# Patient Record
Sex: FEMALE | Race: WHITE | NOT HISPANIC OR LATINO | Employment: OTHER | URBAN - METROPOLITAN AREA
[De-identification: names, ages, dates, MRNs, and addresses within clinical notes are randomized per-mention and may not be internally consistent; named-entity substitution may affect disease eponyms.]

---

## 2017-02-05 ENCOUNTER — COMMUNICATION - HEALTHEAST (OUTPATIENT)
Dept: FAMILY MEDICINE | Facility: CLINIC | Age: 82
End: 2017-02-05

## 2017-02-05 DIAGNOSIS — E83.52 HYPERCALCEMIA: ICD-10-CM

## 2017-03-29 ENCOUNTER — COMMUNICATION - HEALTHEAST (OUTPATIENT)
Dept: FAMILY MEDICINE | Facility: CLINIC | Age: 82
End: 2017-03-29

## 2017-03-29 DIAGNOSIS — I10 HTN (HYPERTENSION): ICD-10-CM

## 2017-04-25 ENCOUNTER — COMMUNICATION - HEALTHEAST (OUTPATIENT)
Dept: FAMILY MEDICINE | Facility: CLINIC | Age: 82
End: 2017-04-25

## 2017-04-25 DIAGNOSIS — E83.52 HYPERCALCEMIA: ICD-10-CM

## 2017-04-26 ENCOUNTER — AMBULATORY - HEALTHEAST (OUTPATIENT)
Dept: FAMILY MEDICINE | Facility: CLINIC | Age: 82
End: 2017-04-26

## 2017-04-26 ENCOUNTER — COMMUNICATION - HEALTHEAST (OUTPATIENT)
Dept: FAMILY MEDICINE | Facility: CLINIC | Age: 82
End: 2017-04-26

## 2017-04-26 DIAGNOSIS — R53.83 FATIGUE: ICD-10-CM

## 2017-04-26 DIAGNOSIS — E55.9 VITAMIN D DEFICIENCY: ICD-10-CM

## 2017-04-26 DIAGNOSIS — I10 ESSENTIAL HYPERTENSION: ICD-10-CM

## 2017-04-26 DIAGNOSIS — E21.3 HYPERPARATHYROIDISM (H): ICD-10-CM

## 2017-05-01 ENCOUNTER — AMBULATORY - HEALTHEAST (OUTPATIENT)
Dept: LAB | Facility: CLINIC | Age: 82
End: 2017-05-01

## 2017-05-01 DIAGNOSIS — I10 ESSENTIAL HYPERTENSION: ICD-10-CM

## 2017-05-01 DIAGNOSIS — E21.3 HYPERPARATHYROIDISM (H): ICD-10-CM

## 2017-05-01 DIAGNOSIS — E55.9 VITAMIN D DEFICIENCY: ICD-10-CM

## 2017-05-01 DIAGNOSIS — R53.83 FATIGUE: ICD-10-CM

## 2017-05-01 LAB
CHOLEST SERPL-MCNC: 208 MG/DL
FASTING STATUS PATIENT QL REPORTED: YES
HDLC SERPL-MCNC: 64 MG/DL
LDLC SERPL CALC-MCNC: 123 MG/DL
TRIGL SERPL-MCNC: 106 MG/DL

## 2017-05-08 ENCOUNTER — OFFICE VISIT - HEALTHEAST (OUTPATIENT)
Dept: FAMILY MEDICINE | Facility: CLINIC | Age: 82
End: 2017-05-08

## 2017-05-08 DIAGNOSIS — I10 ESSENTIAL HYPERTENSION: ICD-10-CM

## 2017-05-08 ASSESSMENT — MIFFLIN-ST. JEOR: SCORE: 820.46

## 2017-05-24 ENCOUNTER — COMMUNICATION - HEALTHEAST (OUTPATIENT)
Dept: FAMILY MEDICINE | Facility: CLINIC | Age: 82
End: 2017-05-24

## 2017-05-24 DIAGNOSIS — Z96.0 URINARY CATHETER IN PLACE: ICD-10-CM

## 2017-08-01 ENCOUNTER — COMMUNICATION - HEALTHEAST (OUTPATIENT)
Dept: FAMILY MEDICINE | Facility: CLINIC | Age: 82
End: 2017-08-01

## 2017-08-01 DIAGNOSIS — I10 HTN (HYPERTENSION): ICD-10-CM

## 2017-08-01 DIAGNOSIS — E83.52 HYPERCALCEMIA: ICD-10-CM

## 2017-10-03 ENCOUNTER — COMMUNICATION - HEALTHEAST (OUTPATIENT)
Dept: FAMILY MEDICINE | Facility: CLINIC | Age: 82
End: 2017-10-03

## 2017-10-03 DIAGNOSIS — I10 ESSENTIAL HYPERTENSION: ICD-10-CM

## 2017-10-03 DIAGNOSIS — I10 HTN (HYPERTENSION): ICD-10-CM

## 2017-11-09 ENCOUNTER — RECORDS - HEALTHEAST (OUTPATIENT)
Dept: ADMINISTRATIVE | Facility: OTHER | Age: 82
End: 2017-11-09

## 2017-12-03 ENCOUNTER — RECORDS - HEALTHEAST (OUTPATIENT)
Dept: ADMINISTRATIVE | Facility: OTHER | Age: 82
End: 2017-12-03

## 2017-12-30 ENCOUNTER — COMMUNICATION - HEALTHEAST (OUTPATIENT)
Dept: FAMILY MEDICINE | Facility: CLINIC | Age: 82
End: 2017-12-30

## 2017-12-30 DIAGNOSIS — I10 ESSENTIAL HYPERTENSION: ICD-10-CM

## 2018-01-05 ENCOUNTER — OFFICE VISIT - HEALTHEAST (OUTPATIENT)
Dept: FAMILY MEDICINE | Facility: CLINIC | Age: 83
End: 2018-01-05

## 2018-01-05 DIAGNOSIS — C68.0 MALIGNANT NEOPLASM OF URETHRA (H): ICD-10-CM

## 2018-01-05 DIAGNOSIS — J02.9 SORE THROAT: ICD-10-CM

## 2018-01-05 LAB — DEPRECATED S PYO AG THROAT QL EIA: NORMAL

## 2018-01-06 LAB — GROUP A STREP BY PCR: NORMAL

## 2018-01-16 ENCOUNTER — RECORDS - HEALTHEAST (OUTPATIENT)
Dept: ADMINISTRATIVE | Facility: OTHER | Age: 83
End: 2018-01-16

## 2018-01-24 ENCOUNTER — COMMUNICATION - HEALTHEAST (OUTPATIENT)
Dept: FAMILY MEDICINE | Facility: CLINIC | Age: 83
End: 2018-01-24

## 2018-01-24 DIAGNOSIS — E83.52 HYPERCALCEMIA: ICD-10-CM

## 2018-02-06 ENCOUNTER — COMMUNICATION - HEALTHEAST (OUTPATIENT)
Dept: FAMILY MEDICINE | Facility: CLINIC | Age: 83
End: 2018-02-06

## 2018-02-06 DIAGNOSIS — I10 HTN (HYPERTENSION): ICD-10-CM

## 2018-03-08 ENCOUNTER — COMMUNICATION - HEALTHEAST (OUTPATIENT)
Dept: FAMILY MEDICINE | Facility: CLINIC | Age: 83
End: 2018-03-08

## 2018-03-08 ENCOUNTER — RECORDS - HEALTHEAST (OUTPATIENT)
Dept: GENERAL RADIOLOGY | Facility: CLINIC | Age: 83
End: 2018-03-08

## 2018-03-08 ENCOUNTER — OFFICE VISIT - HEALTHEAST (OUTPATIENT)
Dept: FAMILY MEDICINE | Facility: CLINIC | Age: 83
End: 2018-03-08

## 2018-03-08 ENCOUNTER — COMMUNICATION - HEALTHEAST (OUTPATIENT)
Dept: SCHEDULING | Facility: CLINIC | Age: 83
End: 2018-03-08

## 2018-03-08 DIAGNOSIS — R22.2 CHEST MASS: ICD-10-CM

## 2018-03-08 DIAGNOSIS — E21.3 HYPERPARATHYROIDISM (H): ICD-10-CM

## 2018-03-08 DIAGNOSIS — R07.81 PLEURODYNIA: ICD-10-CM

## 2018-03-08 DIAGNOSIS — S22.31XA FRACTURE OF RIB OF RIGHT SIDE: ICD-10-CM

## 2018-03-08 DIAGNOSIS — K59.00 CONSTIPATION: ICD-10-CM

## 2018-03-08 DIAGNOSIS — Z13.0 SCREENING FOR DEFICIENCY ANEMIA: ICD-10-CM

## 2018-03-08 DIAGNOSIS — I10 ESSENTIAL HYPERTENSION: ICD-10-CM

## 2018-03-08 LAB
ANION GAP SERPL CALCULATED.3IONS-SCNC: 9 MMOL/L (ref 5–18)
BUN SERPL-MCNC: 21 MG/DL (ref 8–28)
CALCIUM SERPL-MCNC: 10.3 MG/DL (ref 8.5–10.5)
CHLORIDE BLD-SCNC: 103 MMOL/L (ref 98–107)
CO2 SERPL-SCNC: 26 MMOL/L (ref 22–31)
CREAT SERPL-MCNC: 0.85 MG/DL (ref 0.6–1.1)
ERYTHROCYTE [DISTWIDTH] IN BLOOD BY AUTOMATED COUNT: 13 % (ref 11–14.5)
GFR SERPL CREATININE-BSD FRML MDRD: >60 ML/MIN/1.73M2
GLUCOSE BLD-MCNC: 89 MG/DL (ref 70–125)
HCT VFR BLD AUTO: 37.3 % (ref 35–47)
HGB BLD-MCNC: 12.5 G/DL (ref 12–16)
MCH RBC QN AUTO: 29.7 PG (ref 27–34)
MCHC RBC AUTO-ENTMCNC: 33.4 G/DL (ref 32–36)
MCV RBC AUTO: 89 FL (ref 80–100)
PLATELET # BLD AUTO: 313 THOU/UL (ref 140–440)
PMV BLD AUTO: 7.7 FL (ref 7–10)
POTASSIUM BLD-SCNC: 4.1 MMOL/L (ref 3.5–5)
PTH-INTACT SERPL-MCNC: 73 PG/ML (ref 10–86)
RBC # BLD AUTO: 4.2 MILL/UL (ref 3.8–5.4)
SODIUM SERPL-SCNC: 138 MMOL/L (ref 136–145)
WBC: 8.1 THOU/UL (ref 4–11)

## 2018-03-09 ENCOUNTER — COMMUNICATION - HEALTHEAST (OUTPATIENT)
Dept: FAMILY MEDICINE | Facility: CLINIC | Age: 83
End: 2018-03-09

## 2018-03-09 ENCOUNTER — HOSPITAL ENCOUNTER (OUTPATIENT)
Dept: CT IMAGING | Facility: CLINIC | Age: 83
Discharge: HOME | End: 2018-03-09
Attending: NURSE PRACTITIONER

## 2018-03-09 ENCOUNTER — AMBULATORY - HEALTHEAST (OUTPATIENT)
Dept: FAMILY MEDICINE | Facility: CLINIC | Age: 83
End: 2018-03-09

## 2018-03-09 DIAGNOSIS — S22.31XA FRACTURE OF RIB OF RIGHT SIDE: ICD-10-CM

## 2018-03-09 DIAGNOSIS — R22.2 CHEST MASS: ICD-10-CM

## 2018-03-09 DIAGNOSIS — C64.9 RENAL CELL CARCINOMA (H): ICD-10-CM

## 2018-03-12 ENCOUNTER — COMMUNICATION - HEALTHEAST (OUTPATIENT)
Dept: ONCOLOGY | Facility: HOSPITAL | Age: 83
End: 2018-03-12

## 2018-03-12 ENCOUNTER — COMMUNICATION - HEALTHEAST (OUTPATIENT)
Dept: SCHEDULING | Facility: CLINIC | Age: 83
End: 2018-03-12

## 2018-03-20 ENCOUNTER — OFFICE VISIT - HEALTHEAST (OUTPATIENT)
Dept: ONCOLOGY | Facility: CLINIC | Age: 83
End: 2018-03-20

## 2018-03-20 DIAGNOSIS — R22.2 MASS OF RIGHT CHEST WALL: ICD-10-CM

## 2018-03-20 DIAGNOSIS — N28.89 RENAL MASS, RIGHT: ICD-10-CM

## 2018-03-20 LAB
ALBUMIN SERPL-MCNC: 3.7 G/DL (ref 3.5–5)
ALP SERPL-CCNC: 92 U/L (ref 45–120)
ALT SERPL W P-5'-P-CCNC: 33 U/L (ref 0–45)
AST SERPL W P-5'-P-CCNC: 30 U/L (ref 0–40)
BILIRUB DIRECT SERPL-MCNC: 0.2 MG/DL
BILIRUB SERPL-MCNC: 0.9 MG/DL (ref 0–1)
INR PPP: 0.94 (ref 0.9–1.1)
LDH SERPL L TO P-CCNC: 219 U/L (ref 125–220)
PROT SERPL-MCNC: 6.6 G/DL (ref 6–8)

## 2018-03-20 ASSESSMENT — MIFFLIN-ST. JEOR: SCORE: 811.84

## 2018-03-22 ENCOUNTER — COMMUNICATION - HEALTHEAST (OUTPATIENT)
Dept: FAMILY MEDICINE | Facility: CLINIC | Age: 83
End: 2018-03-22

## 2018-03-22 ENCOUNTER — COMMUNICATION - HEALTHEAST (OUTPATIENT)
Dept: ONCOLOGY | Facility: HOSPITAL | Age: 83
End: 2018-03-22

## 2018-03-22 DIAGNOSIS — I10 HTN (HYPERTENSION): ICD-10-CM

## 2018-03-23 ENCOUNTER — HOSPITAL ENCOUNTER (OUTPATIENT)
Dept: CT IMAGING | Facility: CLINIC | Age: 83
Setting detail: RADIATION/ONCOLOGY SERIES
Discharge: STILL ADMITTED OR ADMINISTRATIVE | End: 2018-03-23
Attending: INTERNAL MEDICINE

## 2018-03-23 ENCOUNTER — HOSPITAL ENCOUNTER (OUTPATIENT)
Dept: NUCLEAR MEDICINE | Facility: CLINIC | Age: 83
Setting detail: RADIATION/ONCOLOGY SERIES
Discharge: STILL ADMITTED OR ADMINISTRATIVE | End: 2018-03-23
Attending: INTERNAL MEDICINE

## 2018-03-23 DIAGNOSIS — R22.2 MASS OF RIGHT CHEST WALL: ICD-10-CM

## 2018-03-23 DIAGNOSIS — N28.89 RENAL MASS, RIGHT: ICD-10-CM

## 2018-03-23 LAB
CREAT BLD-MCNC: 0.9 MG/DL
POC GFR AMER AF HE - HISTORICAL: >60 ML/MIN/1.73M2
POC GFR NON AMER AF HE - HISTORICAL: 59 ML/MIN/1.73M2

## 2018-03-26 ENCOUNTER — HOSPITAL ENCOUNTER (OUTPATIENT)
Dept: CT IMAGING | Facility: HOSPITAL | Age: 83
Discharge: HOME | End: 2018-03-26
Attending: INTERNAL MEDICINE | Admitting: RADIOLOGY

## 2018-03-26 ENCOUNTER — HOSPITAL ENCOUNTER (OUTPATIENT)
Dept: INTERVENTIONAL RADIOLOGY/VASCULAR | Facility: HOSPITAL | Age: 83
Discharge: HOME | End: 2018-03-26
Attending: INTERNAL MEDICINE

## 2018-03-26 DIAGNOSIS — R22.2 MASS OF RIGHT CHEST WALL: ICD-10-CM

## 2018-03-26 LAB
HGB BLD-MCNC: 12 G/DL (ref 12–16)
INR PPP: 0.91 (ref 0.9–1.1)
PLATELET # BLD AUTO: 342 THOU/UL (ref 140–440)

## 2018-03-26 ASSESSMENT — MIFFLIN-ST. JEOR: SCORE: 824.99

## 2018-04-03 ENCOUNTER — OFFICE VISIT - HEALTHEAST (OUTPATIENT)
Dept: ONCOLOGY | Facility: CLINIC | Age: 83
End: 2018-04-03

## 2018-04-03 ENCOUNTER — HOSPITAL ENCOUNTER (OUTPATIENT)
Dept: CARDIOLOGY | Facility: CLINIC | Age: 83
Discharge: HOME | End: 2018-04-03
Attending: INTERNAL MEDICINE

## 2018-04-03 DIAGNOSIS — R06.00 DYSPNEA, UNSPECIFIED TYPE: ICD-10-CM

## 2018-04-03 DIAGNOSIS — C64.1 RENAL CELL CARCINOMA OF RIGHT KIDNEY (H): ICD-10-CM

## 2018-04-03 ASSESSMENT — MIFFLIN-ST. JEOR: SCORE: 824.99

## 2018-04-04 ENCOUNTER — COMMUNICATION - HEALTHEAST (OUTPATIENT)
Dept: ONCOLOGY | Facility: CLINIC | Age: 83
End: 2018-04-04

## 2018-04-04 LAB
AORTIC VALVE MEAN VELOCITY: 115 CM/S
AV DIMENSIONLESS INDEX VTI: 0.9
AV MEAN GRADIENT: 6 MMHG
AV PEAK GRADIENT: 10.2 MMHG
AV VALVE AREA: 2.6 CM2
AV VELOCITY RATIO: 0.9
BSA FOR ECHO PROCEDURE: 1.46 M2
CV BLOOD PRESSURE: NORMAL MMHG
CV ECHO HEIGHT: 60 IN
CV ECHO WEIGHT: 111 LBS
DOP CALC AO PEAK VEL: 160 CM/S
DOP CALC AO VTI: 34.3 CM
DOP CALC LVOT AREA: 2.83 CM2
DOP CALC LVOT DIAMETER: 1.9 CM
DOP CALC LVOT PEAK VEL: 139 CM/S
DOP CALC LVOT STROKE VOLUME: 88.4 CM3
DOP CALCLVOT PEAK VEL VTI: 31.2 CM
ECHO EJECTION FRACTION ESTIMATED: 70 %
EJECTION FRACTION: 56 % (ref 55–75)
FRACTIONAL SHORTENING: 30 % (ref 28–44)
INTERVENTRICULAR SEPTUM IN END DIASTOLE: 0.75 CM (ref 0.6–0.9)
IVS/PW RATIO: 1
LA AREA 1: 12.3 CM2
LA AREA 2: 12.4 CM2
LEFT ATRIUM LENGTH: 4.44 CM
LEFT ATRIUM SIZE: 3.1 CM
LEFT ATRIUM VOLUME INDEX: 20 ML/M2
LEFT ATRIUM VOLUME: 29.2 ML
LEFT VENTRICLE CARDIAC INDEX: 5 L/MIN/M2
LEFT VENTRICLE CARDIAC OUTPUT: 7.3 L/MIN
LEFT VENTRICLE DIASTOLIC VOLUME INDEX: 21.9 CM3/M2 (ref 34–74)
LEFT VENTRICLE DIASTOLIC VOLUME: 32 CM3 (ref 46–106)
LEFT VENTRICLE HEART RATE: 83 BPM
LEFT VENTRICLE MASS INDEX: 46.5 G/M2
LEFT VENTRICLE SYSTOLIC VOLUME INDEX: 9.6 CM3/M2 (ref 11–31)
LEFT VENTRICLE SYSTOLIC VOLUME: 14 CM3 (ref 14–42)
LEFT VENTRICULAR INTERNAL DIMENSION IN DIASTOLE: 3.5 CM (ref 3.8–5.2)
LEFT VENTRICULAR INTERNAL DIMENSION IN SYSTOLE: 2.45 CM (ref 2.2–3.5)
LEFT VENTRICULAR MASS: 67.9 G
LEFT VENTRICULAR OUTFLOW TRACT MEAN GRADIENT: 5 MMHG
LEFT VENTRICULAR OUTFLOW TRACT MEAN VELOCITY: 111 CM/S
LEFT VENTRICULAR OUTFLOW TRACT PEAK GRADIENT: 8 MMHG
LEFT VENTRICULAR POSTERIOR WALL IN END DIASTOLE: 0.74 CM (ref 0.6–0.9)
LV STROKE VOLUME INDEX: 60.6 ML/M2
MITRAL VALVE E/A RATIO: 0.9
MV AVERAGE E/E' RATIO: 17.9 CM/S
MV DECELERATION TIME: 225 MS
MV E'TISSUE VEL-LAT: 5.46 CM/S
MV E'TISSUE VEL-MED: 4.78 CM/S
MV LATERAL E/E' RATIO: 16.8
MV MEDIAL E/E' RATIO: 19.2
MV PEAK A VELOCITY: 101 CM/S
MV PEAK E VELOCITY: 91.8 CM/S
NUC REST DIASTOLIC VOLUME INDEX: 1776 LBS
NUC REST SYSTOLIC VOLUME INDEX: 60 IN
TRICUSPID REGURGITATION PEAK PRESSURE GRADIENT: 26 MMHG
TRICUSPID VALVE ANULAR PLANE SYSTOLIC EXCURSION: 2.4 CM
TRICUSPID VALVE PEAK REGURGITANT VELOCITY: 255 CM/S

## 2018-04-11 ENCOUNTER — OFFICE VISIT - HEALTHEAST (OUTPATIENT)
Dept: ONCOLOGY | Facility: CLINIC | Age: 83
End: 2018-04-11

## 2018-04-11 DIAGNOSIS — C64.1 RENAL CELL CARCINOMA OF RIGHT KIDNEY (H): ICD-10-CM

## 2018-04-11 LAB
LAB AP CHARGES (HE HISTORICAL CONVERSION): ABNORMAL
LAB AP INITIAL CYTO EVAL (HE HISTORICAL CONVERSION): ABNORMAL
LAB MED GENERAL PATH INTERP (HE HISTORICAL CONVERSION): ABNORMAL
PATH REPORT.ADDENDUM SPEC: ABNORMAL
PATH REPORT.COMMENTS IMP SPEC: ABNORMAL
PATH REPORT.COMMENTS IMP SPEC: ABNORMAL
PATH REPORT.FINAL DX SPEC: ABNORMAL
PATH REPORT.MICROSCOPIC SPEC OTHER STN: ABNORMAL
PATH REPORT.RELEVANT HX SPEC: ABNORMAL
RESULT FLAG (HE HISTORICAL CONVERSION): ABNORMAL
SPECIMEN DESCRIPTION: ABNORMAL

## 2018-04-11 ASSESSMENT — MIFFLIN-ST. JEOR: SCORE: 812.75

## 2018-04-17 ENCOUNTER — COMMUNICATION - HEALTHEAST (OUTPATIENT)
Dept: ONCOLOGY | Facility: CLINIC | Age: 83
End: 2018-04-17

## 2018-04-24 ENCOUNTER — COMMUNICATION - HEALTHEAST (OUTPATIENT)
Dept: ONCOLOGY | Facility: CLINIC | Age: 83
End: 2018-04-24

## 2018-04-24 ENCOUNTER — OFFICE VISIT - HEALTHEAST (OUTPATIENT)
Dept: ONCOLOGY | Facility: CLINIC | Age: 83
End: 2018-04-24

## 2018-04-24 ENCOUNTER — AMBULATORY - HEALTHEAST (OUTPATIENT)
Dept: INFUSION THERAPY | Facility: CLINIC | Age: 83
End: 2018-04-24

## 2018-04-24 DIAGNOSIS — C64.1 RENAL CELL CARCINOMA OF RIGHT KIDNEY (H): ICD-10-CM

## 2018-04-24 DIAGNOSIS — C79.51 METASTASIS TO BONE (H): ICD-10-CM

## 2018-04-24 LAB
ALBUMIN SERPL-MCNC: 3.3 G/DL (ref 3.5–5)
ALP SERPL-CCNC: 103 U/L (ref 45–120)
ALT SERPL W P-5'-P-CCNC: 31 U/L (ref 0–45)
ANION GAP SERPL CALCULATED.3IONS-SCNC: 10 MMOL/L (ref 5–18)
AST SERPL W P-5'-P-CCNC: 27 U/L (ref 0–40)
BASOPHILS # BLD AUTO: 0.1 THOU/UL (ref 0–0.2)
BASOPHILS NFR BLD AUTO: 1 % (ref 0–2)
BILIRUB SERPL-MCNC: 1.3 MG/DL (ref 0–1)
BUN SERPL-MCNC: 27 MG/DL (ref 8–28)
CALCIUM SERPL-MCNC: 10.1 MG/DL (ref 8.5–10.5)
CHLORIDE BLD-SCNC: 102 MMOL/L (ref 98–107)
CO2 SERPL-SCNC: 24 MMOL/L (ref 22–31)
CREAT SERPL-MCNC: 0.84 MG/DL (ref 0.6–1.1)
EOSINOPHIL # BLD AUTO: 0 THOU/UL (ref 0–0.4)
EOSINOPHIL NFR BLD AUTO: 1 % (ref 0–6)
ERYTHROCYTE [DISTWIDTH] IN BLOOD BY AUTOMATED COUNT: 15.3 % (ref 11–14.5)
GFR SERPL CREATININE-BSD FRML MDRD: >60 ML/MIN/1.73M2
GLUCOSE BLD-MCNC: 81 MG/DL (ref 70–125)
HCT VFR BLD AUTO: 38.1 % (ref 35–47)
HGB BLD-MCNC: 12.6 G/DL (ref 12–16)
LYMPHOCYTES # BLD AUTO: 1.3 THOU/UL (ref 0.8–4.4)
LYMPHOCYTES NFR BLD AUTO: 16 % (ref 20–40)
MCH RBC QN AUTO: 29.2 PG (ref 27–34)
MCHC RBC AUTO-ENTMCNC: 33.1 G/DL (ref 32–36)
MCV RBC AUTO: 88 FL (ref 80–100)
MONOCYTES # BLD AUTO: 0.5 THOU/UL (ref 0–0.9)
MONOCYTES NFR BLD AUTO: 6 % (ref 2–10)
NEUTROPHILS # BLD AUTO: 6.4 THOU/UL (ref 2–7.7)
NEUTROPHILS NFR BLD AUTO: 77 % (ref 50–70)
PLATELET # BLD AUTO: 294 THOU/UL (ref 140–440)
PMV BLD AUTO: 9.6 FL (ref 8.5–12.5)
POTASSIUM BLD-SCNC: 4.1 MMOL/L (ref 3.5–5)
PROT SERPL-MCNC: 6.2 G/DL (ref 6–8)
RBC # BLD AUTO: 4.31 MILL/UL (ref 3.8–5.4)
SODIUM SERPL-SCNC: 136 MMOL/L (ref 136–145)
WBC: 8.4 THOU/UL (ref 4–11)

## 2018-04-24 ASSESSMENT — MIFFLIN-ST. JEOR: SCORE: 799.59

## 2018-04-26 ENCOUNTER — OFFICE VISIT - HEALTHEAST (OUTPATIENT)
Dept: FAMILY MEDICINE | Facility: CLINIC | Age: 83
End: 2018-04-26

## 2018-04-26 DIAGNOSIS — R19.5 LOOSE STOOLS: ICD-10-CM

## 2018-04-26 DIAGNOSIS — I10 HYPERTENSION: ICD-10-CM

## 2018-04-27 ENCOUNTER — COMMUNICATION - HEALTHEAST (OUTPATIENT)
Dept: ONCOLOGY | Facility: CLINIC | Age: 83
End: 2018-04-27

## 2018-04-30 ENCOUNTER — COMMUNICATION - HEALTHEAST (OUTPATIENT)
Dept: ONCOLOGY | Facility: CLINIC | Age: 83
End: 2018-04-30

## 2018-05-09 ENCOUNTER — COMMUNICATION - HEALTHEAST (OUTPATIENT)
Dept: FAMILY MEDICINE | Facility: CLINIC | Age: 83
End: 2018-05-09

## 2018-05-09 ENCOUNTER — OFFICE VISIT - HEALTHEAST (OUTPATIENT)
Dept: ONCOLOGY | Facility: CLINIC | Age: 83
End: 2018-05-09

## 2018-05-09 ENCOUNTER — INFUSION - HEALTHEAST (OUTPATIENT)
Dept: INFUSION THERAPY | Facility: CLINIC | Age: 83
End: 2018-05-09

## 2018-05-09 ENCOUNTER — COMMUNICATION - HEALTHEAST (OUTPATIENT)
Dept: ONCOLOGY | Facility: CLINIC | Age: 83
End: 2018-05-09

## 2018-05-09 ENCOUNTER — AMBULATORY - HEALTHEAST (OUTPATIENT)
Dept: INFUSION THERAPY | Facility: CLINIC | Age: 83
End: 2018-05-09

## 2018-05-09 DIAGNOSIS — C79.51 METASTASIS TO BONE (H): ICD-10-CM

## 2018-05-09 DIAGNOSIS — I10 HTN (HYPERTENSION): ICD-10-CM

## 2018-05-09 DIAGNOSIS — C64.1 RENAL CELL CARCINOMA OF RIGHT KIDNEY (H): ICD-10-CM

## 2018-05-09 LAB
ANION GAP SERPL CALCULATED.3IONS-SCNC: 11 MMOL/L (ref 5–18)
BUN SERPL-MCNC: 18 MG/DL (ref 8–28)
CALCIUM SERPL-MCNC: 10 MG/DL (ref 8.5–10.5)
CHLORIDE BLD-SCNC: 100 MMOL/L (ref 98–107)
CO2 SERPL-SCNC: 24 MMOL/L (ref 22–31)
CREAT SERPL-MCNC: 0.79 MG/DL (ref 0.6–1.1)
GFR SERPL CREATININE-BSD FRML MDRD: >60 ML/MIN/1.73M2
GLUCOSE BLD-MCNC: 94 MG/DL (ref 70–125)
POTASSIUM BLD-SCNC: 4.6 MMOL/L (ref 3.5–5)
SODIUM SERPL-SCNC: 135 MMOL/L (ref 136–145)

## 2018-05-09 ASSESSMENT — MIFFLIN-ST. JEOR: SCORE: 800.95

## 2018-05-15 ENCOUNTER — COMMUNICATION - HEALTHEAST (OUTPATIENT)
Dept: ONCOLOGY | Facility: CLINIC | Age: 83
End: 2018-05-15

## 2018-05-22 ENCOUNTER — COMMUNICATION - HEALTHEAST (OUTPATIENT)
Dept: ONCOLOGY | Facility: CLINIC | Age: 83
End: 2018-05-22

## 2018-05-24 ENCOUNTER — COMMUNICATION - HEALTHEAST (OUTPATIENT)
Dept: ONCOLOGY | Facility: CLINIC | Age: 83
End: 2018-05-24

## 2018-05-25 ENCOUNTER — COMMUNICATION - HEALTHEAST (OUTPATIENT)
Dept: ONCOLOGY | Facility: CLINIC | Age: 83
End: 2018-05-25

## 2018-05-29 ENCOUNTER — OFFICE VISIT - HEALTHEAST (OUTPATIENT)
Dept: ONCOLOGY | Facility: CLINIC | Age: 83
End: 2018-05-29

## 2018-05-29 DIAGNOSIS — C64.1 RENAL CELL CARCINOMA OF RIGHT KIDNEY (H): ICD-10-CM

## 2018-05-29 LAB
ALBUMIN SERPL-MCNC: 2.8 G/DL (ref 3.5–5)
ALP SERPL-CCNC: 155 U/L (ref 45–120)
ALT SERPL W P-5'-P-CCNC: 25 U/L (ref 0–45)
ANION GAP SERPL CALCULATED.3IONS-SCNC: 12 MMOL/L (ref 5–18)
AST SERPL W P-5'-P-CCNC: 20 U/L (ref 0–40)
BASOPHILS # BLD AUTO: 0 THOU/UL (ref 0–0.2)
BASOPHILS NFR BLD AUTO: 0 % (ref 0–2)
BILIRUB SERPL-MCNC: 0.7 MG/DL (ref 0–1)
BUN SERPL-MCNC: 17 MG/DL (ref 8–28)
CALCIUM SERPL-MCNC: 9.4 MG/DL (ref 8.5–10.5)
CHLORIDE BLD-SCNC: 94 MMOL/L (ref 98–107)
CO2 SERPL-SCNC: 25 MMOL/L (ref 22–31)
CREAT SERPL-MCNC: 0.76 MG/DL (ref 0.6–1.1)
EOSINOPHIL # BLD AUTO: 0 THOU/UL (ref 0–0.4)
EOSINOPHIL NFR BLD AUTO: 1 % (ref 0–6)
ERYTHROCYTE [DISTWIDTH] IN BLOOD BY AUTOMATED COUNT: 17.4 % (ref 11–14.5)
GFR SERPL CREATININE-BSD FRML MDRD: >60 ML/MIN/1.73M2
GLUCOSE BLD-MCNC: 108 MG/DL (ref 70–125)
HCT VFR BLD AUTO: 33.1 % (ref 35–47)
HGB BLD-MCNC: 11.4 G/DL (ref 12–16)
LYMPHOCYTES # BLD AUTO: 1 THOU/UL (ref 0.8–4.4)
LYMPHOCYTES NFR BLD AUTO: 17 % (ref 20–40)
MAGNESIUM SERPL-MCNC: 1.9 MG/DL (ref 1.8–2.6)
MCH RBC QN AUTO: 30.1 PG (ref 27–34)
MCHC RBC AUTO-ENTMCNC: 34.4 G/DL (ref 32–36)
MCV RBC AUTO: 87 FL (ref 80–100)
MONOCYTES # BLD AUTO: 0.5 THOU/UL (ref 0–0.9)
MONOCYTES NFR BLD AUTO: 9 % (ref 2–10)
NEUTROPHILS # BLD AUTO: 4.3 THOU/UL (ref 2–7.7)
NEUTROPHILS NFR BLD AUTO: 73 % (ref 50–70)
PLATELET # BLD AUTO: 307 THOU/UL (ref 140–440)
PMV BLD AUTO: 9 FL (ref 8.5–12.5)
POTASSIUM BLD-SCNC: 3 MMOL/L (ref 3.5–5)
PROT SERPL-MCNC: 6.1 G/DL (ref 6–8)
RBC # BLD AUTO: 3.79 MILL/UL (ref 3.8–5.4)
SODIUM SERPL-SCNC: 131 MMOL/L (ref 136–145)
WBC: 5.9 THOU/UL (ref 4–11)

## 2018-05-29 ASSESSMENT — MIFFLIN-ST. JEOR: SCORE: 795.06

## 2018-06-04 ENCOUNTER — HOSPITAL ENCOUNTER (OUTPATIENT)
Dept: CT IMAGING | Facility: CLINIC | Age: 83
Setting detail: RADIATION/ONCOLOGY SERIES
Discharge: STILL ADMITTED OR ADMINISTRATIVE | End: 2018-06-04
Attending: INTERNAL MEDICINE

## 2018-06-04 DIAGNOSIS — C64.1 RENAL CELL CARCINOMA OF RIGHT KIDNEY (H): ICD-10-CM

## 2018-06-04 LAB — CREAT BLD-MCNC: 0.7 MG/DL

## 2018-06-05 ENCOUNTER — OFFICE VISIT - HEALTHEAST (OUTPATIENT)
Dept: ONCOLOGY | Facility: CLINIC | Age: 83
End: 2018-06-05

## 2018-06-05 DIAGNOSIS — Z51.11 CHEMOTHERAPY MANAGEMENT, ENCOUNTER FOR: ICD-10-CM

## 2018-06-05 DIAGNOSIS — C64.1 RENAL CELL CARCINOMA OF RIGHT KIDNEY (H): ICD-10-CM

## 2018-06-05 LAB — PROTEIN, RANDOM URINE - HISTORICAL: 39 MG/DL

## 2018-06-05 ASSESSMENT — MIFFLIN-ST. JEOR: SCORE: 804.58

## 2018-06-06 ENCOUNTER — COMMUNICATION - HEALTHEAST (OUTPATIENT)
Dept: ONCOLOGY | Facility: CLINIC | Age: 83
End: 2018-06-06

## 2018-06-19 ENCOUNTER — AMBULATORY - HEALTHEAST (OUTPATIENT)
Dept: INFUSION THERAPY | Facility: CLINIC | Age: 83
End: 2018-06-19

## 2018-06-19 ENCOUNTER — OFFICE VISIT - HEALTHEAST (OUTPATIENT)
Dept: ONCOLOGY | Facility: CLINIC | Age: 83
End: 2018-06-19

## 2018-06-19 DIAGNOSIS — C64.1 RENAL CELL CARCINOMA OF RIGHT KIDNEY (H): ICD-10-CM

## 2018-06-19 DIAGNOSIS — Z51.11 CHEMOTHERAPY MANAGEMENT, ENCOUNTER FOR: ICD-10-CM

## 2018-06-19 LAB
ALBUMIN SERPL-MCNC: 3.4 G/DL (ref 3.5–5)
ALP SERPL-CCNC: 107 U/L (ref 45–120)
ALT SERPL W P-5'-P-CCNC: 16 U/L (ref 0–45)
ANION GAP SERPL CALCULATED.3IONS-SCNC: 10 MMOL/L (ref 5–18)
AST SERPL W P-5'-P-CCNC: 19 U/L (ref 0–40)
BASOPHILS # BLD AUTO: 0.1 THOU/UL (ref 0–0.2)
BASOPHILS NFR BLD AUTO: 1 % (ref 0–2)
BILIRUB SERPL-MCNC: 1 MG/DL (ref 0–1)
BUN SERPL-MCNC: 15 MG/DL (ref 8–28)
CALCIUM SERPL-MCNC: 9.9 MG/DL (ref 8.5–10.5)
CHLORIDE BLD-SCNC: 99 MMOL/L (ref 98–107)
CO2 SERPL-SCNC: 23 MMOL/L (ref 22–31)
CREAT SERPL-MCNC: 0.8 MG/DL (ref 0.6–1.1)
EOSINOPHIL # BLD AUTO: 0 THOU/UL (ref 0–0.4)
EOSINOPHIL NFR BLD AUTO: 1 % (ref 0–6)
ERYTHROCYTE [DISTWIDTH] IN BLOOD BY AUTOMATED COUNT: 16.8 % (ref 11–14.5)
GFR SERPL CREATININE-BSD FRML MDRD: >60 ML/MIN/1.73M2
GLUCOSE BLD-MCNC: 111 MG/DL (ref 70–125)
HCT VFR BLD AUTO: 37.3 % (ref 35–47)
HGB BLD-MCNC: 12.6 G/DL (ref 12–16)
LYMPHOCYTES # BLD AUTO: 1.3 THOU/UL (ref 0.8–4.4)
LYMPHOCYTES NFR BLD AUTO: 22 % (ref 20–40)
MCH RBC QN AUTO: 30.4 PG (ref 27–34)
MCHC RBC AUTO-ENTMCNC: 33.8 G/DL (ref 32–36)
MCV RBC AUTO: 90 FL (ref 80–100)
MONOCYTES # BLD AUTO: 0.3 THOU/UL (ref 0–0.9)
MONOCYTES NFR BLD AUTO: 6 % (ref 2–10)
NEUTROPHILS # BLD AUTO: 4.3 THOU/UL (ref 2–7.7)
NEUTROPHILS NFR BLD AUTO: 71 % (ref 50–70)
PLATELET # BLD AUTO: 334 THOU/UL (ref 140–440)
PMV BLD AUTO: 9.6 FL (ref 8.5–12.5)
POTASSIUM BLD-SCNC: 4 MMOL/L (ref 3.5–5)
PROT SERPL-MCNC: 6.3 G/DL (ref 6–8)
RBC # BLD AUTO: 4.15 MILL/UL (ref 3.8–5.4)
SODIUM SERPL-SCNC: 132 MMOL/L (ref 136–145)
WBC: 6 THOU/UL (ref 4–11)

## 2018-06-19 ASSESSMENT — MIFFLIN-ST. JEOR: SCORE: 799.59

## 2018-06-25 ENCOUNTER — COMMUNICATION - HEALTHEAST (OUTPATIENT)
Dept: ONCOLOGY | Facility: CLINIC | Age: 83
End: 2018-06-25

## 2018-07-15 ENCOUNTER — COMMUNICATION - HEALTHEAST (OUTPATIENT)
Dept: FAMILY MEDICINE | Facility: CLINIC | Age: 83
End: 2018-07-15

## 2018-07-15 DIAGNOSIS — I10 ESSENTIAL HYPERTENSION: ICD-10-CM

## 2018-07-16 ENCOUNTER — OFFICE VISIT - HEALTHEAST (OUTPATIENT)
Dept: FAMILY MEDICINE | Facility: CLINIC | Age: 83
End: 2018-07-16

## 2018-07-16 DIAGNOSIS — H90.3 SENSORY HEARING LOSS, BILATERAL: ICD-10-CM

## 2018-07-16 DIAGNOSIS — I10 ESSENTIAL HYPERTENSION: ICD-10-CM

## 2018-07-16 DIAGNOSIS — E55.9 VITAMIN D DEFICIENCY: ICD-10-CM

## 2018-07-16 DIAGNOSIS — C64.1 RENAL CELL CARCINOMA OF RIGHT KIDNEY (H): ICD-10-CM

## 2018-07-16 LAB
ALBUMIN SERPL-MCNC: 3.7 G/DL (ref 3.5–5)
ALP SERPL-CCNC: 110 U/L (ref 45–120)
ALT SERPL W P-5'-P-CCNC: 23 U/L (ref 0–45)
ANION GAP SERPL CALCULATED.3IONS-SCNC: 13 MMOL/L (ref 5–18)
AST SERPL W P-5'-P-CCNC: 27 U/L (ref 0–40)
BASOPHILS # BLD AUTO: 0 THOU/UL (ref 0–0.2)
BASOPHILS NFR BLD AUTO: 1 % (ref 0–2)
BILIRUB SERPL-MCNC: 1.1 MG/DL (ref 0–1)
BUN SERPL-MCNC: 23 MG/DL (ref 8–28)
CALCIUM SERPL-MCNC: 10.5 MG/DL (ref 8.5–10.5)
CHLORIDE BLD-SCNC: 99 MMOL/L (ref 98–107)
CO2 SERPL-SCNC: 22 MMOL/L (ref 22–31)
CREAT SERPL-MCNC: 0.85 MG/DL (ref 0.6–1.1)
EOSINOPHIL # BLD AUTO: 0.1 THOU/UL (ref 0–0.4)
EOSINOPHIL NFR BLD AUTO: 1 % (ref 0–6)
ERYTHROCYTE [DISTWIDTH] IN BLOOD BY AUTOMATED COUNT: 16 % (ref 11–14.5)
GFR SERPL CREATININE-BSD FRML MDRD: >60 ML/MIN/1.73M2
GLUCOSE BLD-MCNC: 86 MG/DL (ref 70–125)
HCT VFR BLD AUTO: 39.9 % (ref 35–47)
HGB BLD-MCNC: 13 G/DL (ref 12–16)
LYMPHOCYTES # BLD AUTO: 1.3 THOU/UL (ref 0.8–4.4)
LYMPHOCYTES NFR BLD AUTO: 16 % (ref 20–40)
MCH RBC QN AUTO: 30.1 PG (ref 27–34)
MCHC RBC AUTO-ENTMCNC: 32.7 G/DL (ref 32–36)
MCV RBC AUTO: 92 FL (ref 80–100)
MONOCYTES # BLD AUTO: 0.7 THOU/UL (ref 0–0.9)
MONOCYTES NFR BLD AUTO: 9 % (ref 2–10)
NEUTROPHILS # BLD AUTO: 5.5 THOU/UL (ref 2–7.7)
NEUTROPHILS NFR BLD AUTO: 73 % (ref 50–70)
PLATELET # BLD AUTO: 323 THOU/UL (ref 140–440)
PMV BLD AUTO: 7.2 FL (ref 7–10)
POTASSIUM BLD-SCNC: 4.4 MMOL/L (ref 3.5–5)
PROT SERPL-MCNC: 6.1 G/DL (ref 6–8)
RBC # BLD AUTO: 4.32 MILL/UL (ref 3.8–5.4)
SODIUM SERPL-SCNC: 134 MMOL/L (ref 136–145)
WBC: 7.6 THOU/UL (ref 4–11)

## 2018-07-16 ASSESSMENT — MIFFLIN-ST. JEOR: SCORE: 803.45

## 2018-07-17 ENCOUNTER — AMBULATORY - HEALTHEAST (OUTPATIENT)
Dept: INFUSION THERAPY | Facility: CLINIC | Age: 83
End: 2018-07-17

## 2018-07-17 ENCOUNTER — OFFICE VISIT - HEALTHEAST (OUTPATIENT)
Dept: ONCOLOGY | Facility: CLINIC | Age: 83
End: 2018-07-17

## 2018-07-17 DIAGNOSIS — Z51.11 CHEMOTHERAPY MANAGEMENT, ENCOUNTER FOR: ICD-10-CM

## 2018-07-17 DIAGNOSIS — C64.1 RENAL CELL CARCINOMA OF RIGHT KIDNEY (H): ICD-10-CM

## 2018-07-17 LAB — 25(OH)D3 SERPL-MCNC: 59.6 NG/ML (ref 30–80)

## 2018-07-18 ENCOUNTER — COMMUNICATION - HEALTHEAST (OUTPATIENT)
Dept: FAMILY MEDICINE | Facility: CLINIC | Age: 83
End: 2018-07-18

## 2018-07-19 ENCOUNTER — COMMUNICATION - HEALTHEAST (OUTPATIENT)
Dept: FAMILY MEDICINE | Facility: CLINIC | Age: 83
End: 2018-07-19

## 2018-08-02 ENCOUNTER — COMMUNICATION - HEALTHEAST (OUTPATIENT)
Dept: FAMILY MEDICINE | Facility: CLINIC | Age: 83
End: 2018-08-02

## 2018-08-08 ENCOUNTER — COMMUNICATION - HEALTHEAST (OUTPATIENT)
Dept: ONCOLOGY | Facility: CLINIC | Age: 83
End: 2018-08-08

## 2018-08-08 ENCOUNTER — COMMUNICATION - HEALTHEAST (OUTPATIENT)
Dept: ONCOLOGY | Facility: HOSPITAL | Age: 83
End: 2018-08-08

## 2018-08-14 ENCOUNTER — HOSPITAL ENCOUNTER (OUTPATIENT)
Dept: CT IMAGING | Facility: CLINIC | Age: 83
Setting detail: RADIATION/ONCOLOGY SERIES
Discharge: STILL ADMITTED OR ADMINISTRATIVE | End: 2018-08-14
Attending: INTERNAL MEDICINE

## 2018-08-14 DIAGNOSIS — C64.1 RENAL CELL CARCINOMA OF RIGHT KIDNEY (H): ICD-10-CM

## 2018-08-16 ENCOUNTER — OFFICE VISIT - HEALTHEAST (OUTPATIENT)
Dept: ONCOLOGY | Facility: CLINIC | Age: 83
End: 2018-08-16

## 2018-08-16 ENCOUNTER — AMBULATORY - HEALTHEAST (OUTPATIENT)
Dept: INFUSION THERAPY | Facility: CLINIC | Age: 83
End: 2018-08-16

## 2018-08-16 DIAGNOSIS — C64.1 RENAL CELL CARCINOMA OF RIGHT KIDNEY (H): ICD-10-CM

## 2018-08-16 DIAGNOSIS — Z51.11 CHEMOTHERAPY MANAGEMENT, ENCOUNTER FOR: ICD-10-CM

## 2018-08-16 LAB
ALBUMIN SERPL-MCNC: 3.8 G/DL (ref 3.5–5)
ALP SERPL-CCNC: 90 U/L (ref 45–120)
ALT SERPL W P-5'-P-CCNC: 21 U/L (ref 0–45)
ANION GAP SERPL CALCULATED.3IONS-SCNC: 9 MMOL/L (ref 5–18)
AST SERPL W P-5'-P-CCNC: 23 U/L (ref 0–40)
BASOPHILS # BLD AUTO: 0 THOU/UL (ref 0–0.2)
BASOPHILS NFR BLD AUTO: 1 % (ref 0–2)
BILIRUB SERPL-MCNC: 0.9 MG/DL (ref 0–1)
BUN SERPL-MCNC: 22 MG/DL (ref 8–28)
CALCIUM SERPL-MCNC: 10.2 MG/DL (ref 8.5–10.5)
CHLORIDE BLD-SCNC: 97 MMOL/L (ref 98–107)
CO2 SERPL-SCNC: 26 MMOL/L (ref 22–31)
CREAT SERPL-MCNC: 0.87 MG/DL (ref 0.6–1.1)
EOSINOPHIL # BLD AUTO: 0 THOU/UL (ref 0–0.4)
EOSINOPHIL NFR BLD AUTO: 1 % (ref 0–6)
ERYTHROCYTE [DISTWIDTH] IN BLOOD BY AUTOMATED COUNT: 15.4 % (ref 11–14.5)
GFR SERPL CREATININE-BSD FRML MDRD: >60 ML/MIN/1.73M2
GLUCOSE BLD-MCNC: 103 MG/DL (ref 70–125)
HCT VFR BLD AUTO: 39.8 % (ref 35–47)
HGB BLD-MCNC: 13.6 G/DL (ref 12–16)
LYMPHOCYTES # BLD AUTO: 1.1 THOU/UL (ref 0.8–4.4)
LYMPHOCYTES NFR BLD AUTO: 18 % (ref 20–40)
MCH RBC QN AUTO: 31.5 PG (ref 27–34)
MCHC RBC AUTO-ENTMCNC: 34.2 G/DL (ref 32–36)
MCV RBC AUTO: 92 FL (ref 80–100)
MONOCYTES # BLD AUTO: 0.4 THOU/UL (ref 0–0.9)
MONOCYTES NFR BLD AUTO: 7 % (ref 2–10)
NEUTROPHILS # BLD AUTO: 4.5 THOU/UL (ref 2–7.7)
NEUTROPHILS NFR BLD AUTO: 74 % (ref 50–70)
PLATELET # BLD AUTO: 288 THOU/UL (ref 140–440)
PMV BLD AUTO: 9.7 FL (ref 8.5–12.5)
POTASSIUM BLD-SCNC: 4.2 MMOL/L (ref 3.5–5)
PROT SERPL-MCNC: 6.7 G/DL (ref 6–8)
RBC # BLD AUTO: 4.32 MILL/UL (ref 3.8–5.4)
SODIUM SERPL-SCNC: 132 MMOL/L (ref 136–145)
WBC: 6.1 THOU/UL (ref 4–11)

## 2018-08-16 ASSESSMENT — MIFFLIN-ST. JEOR: SCORE: 800.95

## 2018-08-17 ENCOUNTER — COMMUNICATION - HEALTHEAST (OUTPATIENT)
Dept: FAMILY MEDICINE | Facility: CLINIC | Age: 83
End: 2018-08-17

## 2018-08-17 DIAGNOSIS — I10 HTN (HYPERTENSION): ICD-10-CM

## 2018-09-06 ENCOUNTER — OFFICE VISIT - HEALTHEAST (OUTPATIENT)
Dept: ONCOLOGY | Facility: CLINIC | Age: 83
End: 2018-09-06

## 2018-09-06 ENCOUNTER — AMBULATORY - HEALTHEAST (OUTPATIENT)
Dept: INFUSION THERAPY | Facility: CLINIC | Age: 83
End: 2018-09-06

## 2018-09-06 ENCOUNTER — INFUSION - HEALTHEAST (OUTPATIENT)
Dept: INFUSION THERAPY | Facility: CLINIC | Age: 83
End: 2018-09-06

## 2018-09-06 DIAGNOSIS — C64.1 RENAL CELL CARCINOMA OF RIGHT KIDNEY (H): ICD-10-CM

## 2018-09-06 DIAGNOSIS — C79.51 METASTASIS TO BONE (H): ICD-10-CM

## 2018-09-06 LAB
ALBUMIN SERPL-MCNC: 3.5 G/DL (ref 3.5–5)
ALP SERPL-CCNC: 92 U/L (ref 45–120)
ALT SERPL W P-5'-P-CCNC: 22 U/L (ref 0–45)
ANION GAP SERPL CALCULATED.3IONS-SCNC: 8 MMOL/L (ref 5–18)
AST SERPL W P-5'-P-CCNC: 25 U/L (ref 0–40)
BASOPHILS # BLD AUTO: 0.1 THOU/UL (ref 0–0.2)
BASOPHILS NFR BLD AUTO: 1 % (ref 0–2)
BILIRUB SERPL-MCNC: 0.7 MG/DL (ref 0–1)
BUN SERPL-MCNC: 21 MG/DL (ref 8–28)
CALCIUM SERPL-MCNC: 10.2 MG/DL (ref 8.5–10.5)
CHLORIDE BLD-SCNC: 101 MMOL/L (ref 98–107)
CO2 SERPL-SCNC: 25 MMOL/L (ref 22–31)
CREAT SERPL-MCNC: 0.78 MG/DL (ref 0.6–1.1)
EOSINOPHIL # BLD AUTO: 0.1 THOU/UL (ref 0–0.4)
EOSINOPHIL NFR BLD AUTO: 1 % (ref 0–6)
ERYTHROCYTE [DISTWIDTH] IN BLOOD BY AUTOMATED COUNT: 14.6 % (ref 11–14.5)
GFR SERPL CREATININE-BSD FRML MDRD: >60 ML/MIN/1.73M2
GLUCOSE BLD-MCNC: 97 MG/DL (ref 70–125)
HCT VFR BLD AUTO: 39.7 % (ref 35–47)
HGB BLD-MCNC: 13.2 G/DL (ref 12–16)
LYMPHOCYTES # BLD AUTO: 1.2 THOU/UL (ref 0.8–4.4)
LYMPHOCYTES NFR BLD AUTO: 18 % (ref 20–40)
MCH RBC QN AUTO: 31.6 PG (ref 27–34)
MCHC RBC AUTO-ENTMCNC: 33.2 G/DL (ref 32–36)
MCV RBC AUTO: 95 FL (ref 80–100)
MONOCYTES # BLD AUTO: 0.4 THOU/UL (ref 0–0.9)
MONOCYTES NFR BLD AUTO: 7 % (ref 2–10)
NEUTROPHILS # BLD AUTO: 4.8 THOU/UL (ref 2–7.7)
NEUTROPHILS NFR BLD AUTO: 74 % (ref 50–70)
PLATELET # BLD AUTO: 272 THOU/UL (ref 140–440)
PMV BLD AUTO: 9.6 FL (ref 8.5–12.5)
POTASSIUM BLD-SCNC: 4.2 MMOL/L (ref 3.5–5)
PROT SERPL-MCNC: 6.2 G/DL (ref 6–8)
RBC # BLD AUTO: 4.18 MILL/UL (ref 3.8–5.4)
SODIUM SERPL-SCNC: 134 MMOL/L (ref 136–145)
WBC: 6.5 THOU/UL (ref 4–11)

## 2018-09-06 ASSESSMENT — MIFFLIN-ST. JEOR: SCORE: 891.22

## 2018-09-07 ENCOUNTER — COMMUNICATION - HEALTHEAST (OUTPATIENT)
Dept: ONCOLOGY | Facility: HOSPITAL | Age: 83
End: 2018-09-07

## 2018-09-11 ENCOUNTER — COMMUNICATION - HEALTHEAST (OUTPATIENT)
Dept: FAMILY MEDICINE | Facility: CLINIC | Age: 83
End: 2018-09-11

## 2018-09-24 ENCOUNTER — OFFICE VISIT - HEALTHEAST (OUTPATIENT)
Dept: FAMILY MEDICINE | Facility: CLINIC | Age: 83
End: 2018-09-24

## 2018-09-24 DIAGNOSIS — R26.81 UNSTEADY GAIT: ICD-10-CM

## 2018-09-24 DIAGNOSIS — E21.3 HYPERPARATHYROIDISM (H): ICD-10-CM

## 2018-09-24 DIAGNOSIS — M62.81 MUSCLE WEAKNESS (GENERALIZED): ICD-10-CM

## 2018-09-24 DIAGNOSIS — K40.00 BILATERAL INGUINAL HERNIA WITH OBSTRUCTION AND WITHOUT GANGRENE, RECURRENCE NOT SPECIFIED: ICD-10-CM

## 2018-09-24 DIAGNOSIS — M79.609 PAIN IN LIMB: ICD-10-CM

## 2018-09-24 DIAGNOSIS — C79.51 METASTASIS TO BONE (H): ICD-10-CM

## 2018-09-24 DIAGNOSIS — E83.52 HYPERCALCEMIA: ICD-10-CM

## 2018-09-24 LAB
CALCIUM, IONIZED MEASURED: 1.27 MMOL/L (ref 1.11–1.3)
CK SERPL-CCNC: 134 U/L (ref 30–190)
ION CA PH 7.4: 1.22 MMOL/L (ref 1.11–1.3)
PH: 7.3 (ref 7.35–7.45)
PTH-INTACT SERPL-MCNC: 145 PG/ML (ref 10–86)
TSH SERPL DL<=0.005 MIU/L-ACNC: 2.74 UIU/ML (ref 0.3–5)
VIT B12 SERPL-MCNC: 305 PG/ML (ref 213–816)

## 2018-09-24 ASSESSMENT — MIFFLIN-ST. JEOR: SCORE: 877.16

## 2018-09-25 ENCOUNTER — RECORDS - HEALTHEAST (OUTPATIENT)
Dept: GENERAL RADIOLOGY | Facility: CLINIC | Age: 83
End: 2018-09-25

## 2018-09-25 ENCOUNTER — COMMUNICATION - HEALTHEAST (OUTPATIENT)
Dept: FAMILY MEDICINE | Facility: CLINIC | Age: 83
End: 2018-09-25

## 2018-09-25 DIAGNOSIS — M79.609 PAIN IN UNSPECIFIED LIMB: ICD-10-CM

## 2018-09-27 ENCOUNTER — COMMUNICATION - HEALTHEAST (OUTPATIENT)
Dept: FAMILY MEDICINE | Facility: CLINIC | Age: 83
End: 2018-09-27

## 2018-09-28 ENCOUNTER — COMMUNICATION - HEALTHEAST (OUTPATIENT)
Dept: FAMILY MEDICINE | Facility: CLINIC | Age: 83
End: 2018-09-28

## 2018-10-01 ENCOUNTER — THERAPY VISIT (OUTPATIENT)
Dept: PHYSICAL THERAPY | Facility: CLINIC | Age: 83
End: 2018-10-01
Payer: COMMERCIAL

## 2018-10-01 DIAGNOSIS — R53.81 PHYSICAL DECONDITIONING: Primary | ICD-10-CM

## 2018-10-01 PROCEDURE — 97110 THERAPEUTIC EXERCISES: CPT | Mod: GP | Performed by: PHYSICAL THERAPIST

## 2018-10-01 PROCEDURE — 97530 THERAPEUTIC ACTIVITIES: CPT | Mod: GP | Performed by: PHYSICAL THERAPIST

## 2018-10-01 PROCEDURE — 97161 PT EVAL LOW COMPLEX 20 MIN: CPT | Mod: GP | Performed by: PHYSICAL THERAPIST

## 2018-10-01 NOTE — PROGRESS NOTES
[facility] Initial Evaluation  Subjective:  Patient is a 89 year old female presenting with rehab right knee hpi.   Dania Da Silva is a 89 year old female with a bilateral knees condition.  Condition occurred with:  Insidious onset and degenerative joint disease.  Condition occurred: at home.  This is a chronic condition  Pt presents to PT with c/o B leg deconditioning as a result of having renal and lung cancer since 3-1-18.     Pt reports her legs feel very weak and she feels off balance.  Pt was referred to PT on 9-24-18 for gait training and LE strengthening.      Pt is retired.      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    Site of Pain: leg weakness.  Radiates to: n/a.  Pain is described as aching and is intermittent and reported as 4/10.  Associated symptoms:  Loss of strength and loss of motion/stiffness. Pain is the same all the time.  Symptoms are exacerbated by nothing, activity, ascending stairs, bending/squatting, certain positions, descending stairs and lying on the extremity and relieved by rest.  Since onset symptoms are gradually worsening.  Special testing: n/a.  Previous treatment: none.  Improvement with previous treatment: n/a.  General health as reported by patient is fair.                                              Objective:        Flexibility/Screens:           Lower Extremity:  Normal        Physical Exam        General Evaluation:        Lower Extremity Strength:  Strength wnl lower extremity general: sig mm deconditioning.          Lower Extremity Flexibility:  normal                        Sensation:  normal      Edema:  normal            Functional Assessment:  Functional assessment wnl: Sit to stand x 5 with UE assist and falls into chair on descent.            Gait:  Gait wnl general: Pt amb with standard cane WBAT with slow  and unsteady gait.                                         ROS    Assessment/Plan:    Patient is a 89 year old female with both sides knee complaints.    Patient has the following significant findings with corresponding treatment plan.                Diagnosis 1:  B leg pain  Pain -  hot/cold therapy  Decreased ROM/flexibility - manual therapy and therapeutic exercise  Decreased joint mobility - manual therapy and therapeutic exercise  Decreased strength - therapeutic exercise and therapeutic activities  Impaired balance - neuro re-education and therapeutic activities  Decreased proprioception - neuro re-education and therapeutic activities  Impaired gait - gait training  Impaired muscle performance - neuro re-education  Decreased function - therapeutic activities  Impaired posture - neuro re-education    Therapy Evaluation Codes:   1) History comprised of:   Personal factors that impact the plan of care:      None.    Comorbidity factors that impact the plan of care are:      None.     Medications impacting care: None.  2) Examination of Body Systems comprised of:   Body structures and functions that impact the plan of care:      Knee.   Activity limitations that impact the plan of care are:      Lifting, Sports, Squatting/kneeling, Stairs, Standing and Walking.  3) Clinical presentation characteristics are:   Stable/Uncomplicated.  4) Decision-Making    Low complexity using standardized patient assessment instrument and/or measureable assessment of functional outcome.  Cumulative Therapy Evaluation is: Low complexity.    Previous and current functional limitations:  (See Goal Flow Sheet for this information)    Short term and Long term goals: (See Goal Flow Sheet for this information)     Communication ability:  Patient appears to be able to clearly communicate and understand verbal and written communication and follow directions correctly.  Treatment Explanation - The following has been discussed with the  patient:   RX ordered/plan of care  Anticipated outcomes  Possible risks and side effects  This patient would benefit from PT intervention to resume normal activities.   Rehab potential is excellent.    Frequency:  1 X week, once daily  Duration:  for 6 weeks  Discharge Plan:  Achieve all LTG.  Independent in home treatment program.  Return to work with or without restrictions.  Return to previous functional level by discharge.  Reach maximal therapeutic benefit.    Please refer to the daily flowsheet for treatment today, total treatment time and time spent performing 1:1 timed codes.

## 2018-10-01 NOTE — MR AVS SNAPSHOT
After Visit Summary   10/1/2018    Dania Da Silva    MRN: [id number]           Patient Information     Date Of Birth          5/10/1927        Visit Information        Provider Department      10/1/2018 11:30 AM Varun Hyde PT [facility] Physical Therapy        Today's Diagnoses     Physical deconditioning    -  1       Follow-ups after your visit        Your next 10 appointments already scheduled     Oct 08, 2018 10:10 AM CDT   DRE Extremity with Varun Hyde PT   [facility] Therapy ([facility]  )    [address] [place] [id number]   [phone]            Oct 15, 2018 12:50 PM CDT   DRE Extremity with Varun Hyde PT   [facility] Therapy ([facility]  )    [address] [place] [id number] [phone]            Oct 22, 2018 12:50 PM CDT   DRE Extremity with Varun Hyde PT   [facility] Therapy ([facility]  )    [address] [place] [id number] [phone]            Oct 29, 2018 12:50 PM CDT   DRE Extremity with Varun Hyde PT   [facility] Therapy ([facility]  )    [address] [place] [id number] [phone]            Nov 05, 2018 12:50 PM CST   DRE Extremity with Varun Hyde PT   [facility] ([facility]  )    [address] [place] [id number] [phone]              Who to contact     If you have questions or need follow up information about today's clinic visit or your schedule please contact [facility] PHYSICAL THERAPY directly at [phone].  Normal or non-critical lab and imaging results will be communicated to you by MyChart, letter or phone within 4  "business days after the clinic has received the results. If you do not hear from us within 7 days, please contact the clinic through [employer] or phone. If you have a critical or abnormal lab result, we will notify you by phone as soon as possible.  Submit refill requests through [employer] or call your pharmacy and they will forward the refill request to us. Please allow 3 business days for your refill to be completed.          Additional Information About Your Visit        [employer] Information     [employer] lets you send messages to your doctor, view your test results, renew your prescriptions, schedule appointments and more. To sign up, go to [email or web address] . Click on \"Log in\" on the left side of the screen, which will take you to the Welcome page. Then click on \"Sign up Now\" on the right side of the page.     You will be asked to enter the access code listed below, as well as some personal information. Please follow the directions to create your username and password.     Your access code is: [email or web address]  Expires: 2018 12:52 PM     Your access code will  in 90 days. If you need help or a new code, please call your [facility] clinic or [phone].        Care EveryWhere ID     This is your Care EveryWhere ID. This could be used by other organizations to access your [facility] medical records  [id number]         Blood Pressure from Last 3 Encounters:   No data found for BP    Weight from Last 3 Encounters:   No data found for Wt              We Performed the Following     PT Eval, Low Complexity (96786)     Therapeutic Activities     Therapeutic Exercises        Primary Care Provider Fax #    Physician No Ref-Primary [phone]       No address on file        Equal Access to Services     [facility]: Mika Menendez, santy dai, audie hubbard. So [facility] [phone].    ATENCIÓN: Si habla español, tiene a bradley disposición " servicios gratuitos de asistencia lingüística. Mitchel farfan [phone].    We comply with applicable federal civil rights laws and [place] laws. We do not discriminate on the basis of race, color, national origin, age, disability, sex, sexual orientation, or gender identity.            Thank you!     Thank you for choosing [facility] PH[facility]  for your care. Our goal is always to provide you with excellent care. Hearing back from our patients is one way we can continue to improve our services. Please take a few minutes to complete the written survey that you may receive in the mail after your visit with us. Thank you!             Your Updated Medication List - Protect others around you: Learn how to safely use, store and throw away your medicines at www.disposemymeds.org.      Notice  As of 10/1/2018 12:52 PM    You have not been prescribed any medications.

## 2018-10-02 ENCOUNTER — COMMUNICATION - HEALTHEAST (OUTPATIENT)
Dept: SCHEDULING | Facility: CLINIC | Age: 83
End: 2018-10-02

## 2018-10-04 ENCOUNTER — AMBULATORY - HEALTHEAST (OUTPATIENT)
Dept: INFUSION THERAPY | Facility: CLINIC | Age: 83
End: 2018-10-04

## 2018-10-04 ENCOUNTER — OFFICE VISIT - HEALTHEAST (OUTPATIENT)
Dept: ONCOLOGY | Facility: CLINIC | Age: 83
End: 2018-10-04

## 2018-10-04 DIAGNOSIS — C64.1 RENAL CELL CARCINOMA OF RIGHT KIDNEY (H): ICD-10-CM

## 2018-10-04 LAB
ALBUMIN SERPL-MCNC: 3.3 G/DL (ref 3.5–5)
ALP SERPL-CCNC: 93 U/L (ref 45–120)
ALT SERPL W P-5'-P-CCNC: 17 U/L (ref 0–45)
ANION GAP SERPL CALCULATED.3IONS-SCNC: 7 MMOL/L (ref 5–18)
AST SERPL W P-5'-P-CCNC: 20 U/L (ref 0–40)
BASOPHILS # BLD AUTO: 0 THOU/UL (ref 0–0.2)
BASOPHILS NFR BLD AUTO: 1 % (ref 0–2)
BILIRUB SERPL-MCNC: 0.6 MG/DL (ref 0–1)
BUN SERPL-MCNC: 22 MG/DL (ref 8–28)
CALCIUM SERPL-MCNC: 9.7 MG/DL (ref 8.5–10.5)
CHLORIDE BLD-SCNC: 100 MMOL/L (ref 98–107)
CO2 SERPL-SCNC: 28 MMOL/L (ref 22–31)
CREAT SERPL-MCNC: 0.83 MG/DL (ref 0.6–1.1)
EOSINOPHIL # BLD AUTO: 0 THOU/UL (ref 0–0.4)
EOSINOPHIL NFR BLD AUTO: 1 % (ref 0–6)
ERYTHROCYTE [DISTWIDTH] IN BLOOD BY AUTOMATED COUNT: 13.8 % (ref 11–14.5)
GFR SERPL CREATININE-BSD FRML MDRD: >60 ML/MIN/1.73M2
GLUCOSE BLD-MCNC: 103 MG/DL (ref 70–125)
HCT VFR BLD AUTO: 38.4 % (ref 35–47)
HGB BLD-MCNC: 13 G/DL (ref 12–16)
LYMPHOCYTES # BLD AUTO: 1.1 THOU/UL (ref 0.8–4.4)
LYMPHOCYTES NFR BLD AUTO: 18 % (ref 20–40)
MCH RBC QN AUTO: 32.1 PG (ref 27–34)
MCHC RBC AUTO-ENTMCNC: 33.9 G/DL (ref 32–36)
MCV RBC AUTO: 95 FL (ref 80–100)
MONOCYTES # BLD AUTO: 0.5 THOU/UL (ref 0–0.9)
MONOCYTES NFR BLD AUTO: 9 % (ref 2–10)
NEUTROPHILS # BLD AUTO: 4.4 THOU/UL (ref 2–7.7)
NEUTROPHILS NFR BLD AUTO: 72 % (ref 50–70)
PLATELET # BLD AUTO: 281 THOU/UL (ref 140–440)
PMV BLD AUTO: 9.9 FL (ref 8.5–12.5)
POTASSIUM BLD-SCNC: 4.1 MMOL/L (ref 3.5–5)
PROT SERPL-MCNC: 5.9 G/DL (ref 6–8)
RBC # BLD AUTO: 4.05 MILL/UL (ref 3.8–5.4)
SODIUM SERPL-SCNC: 135 MMOL/L (ref 136–145)
WBC: 6.1 THOU/UL (ref 4–11)

## 2018-10-08 ENCOUNTER — THERAPY VISIT (OUTPATIENT)
Dept: PHYSICAL THERAPY | Facility: CLINIC | Age: 83
End: 2018-10-08
Payer: COMMERCIAL

## 2018-10-08 DIAGNOSIS — R53.81 PHYSICAL DECONDITIONING: ICD-10-CM

## 2018-10-08 PROCEDURE — 97110 THERAPEUTIC EXERCISES: CPT | Mod: GP | Performed by: PHYSICAL THERAPIST

## 2018-10-08 PROCEDURE — 97530 THERAPEUTIC ACTIVITIES: CPT | Mod: GP | Performed by: PHYSICAL THERAPIST

## 2018-10-08 NOTE — MR AVS SNAPSHOT
After Visit Summary   10/8/2018    Dania Da Silva    MRN: [id number]           Patient Information     Date Of Birth          5/10/1927        Visit Information        Provider Department      10/8/2018 10:10 AM Varun Hyde PT [facility] Physical Therapy        Today's Diagnoses     Physical deconditioning           Follow-ups after your visit        Your next 10 appointments already scheduled     Oct 22, 2018 12:50 PM CDT   DRE Extremity with aVrun Hyde PT   [facility] Physical Therapy ([facility]  )    [address] [place] [phone]   [phone]            Oct 29, 2018 12:50 PM CDT   DRE Extremity with Varun Hyde PT   [facility] Physical Therapy ([facility]  )    [address] [place] [phone]   [phone]            Nov 05, 2018 12:50 PM CST   DRE Extremity with Varun Hyde PT   [facility] Physical Therapy ([facility]  )    [address] [place] [phone]   [phone]              Who to contact     If you have questions or need follow up information about today's clinic visit or your schedule please contact [facility] PHYSICAL THERAPY directly at [phone].  Normal or non-critical lab and imaging results will be communicated to you by MyChart, letter or phone within 4 business days after the clinic has received the results. If you do not hear from us within 7 days, please contact the clinic through MyChart or phone. If you have a critical or abnormal lab result, we will notify you by phone as soon as possible.  Submit refill requests through [employer] or call your pharmacy and they will forward the refill request to us. Please allow 3 business days for your refill to be completed.          Additional Information About Your  "Visit        [employer] Information     [employer] lets you send messages to your doctor, view your test results, renew your prescriptions, schedule appointments and more. To sign up, go to [email or web address] . Click on \"Log in\" on the left side of the screen, which will take you to the Welcome page. Then click on \"Sign up Now\" on the right side of the page.     You will be asked to enter the access code listed below, as well as some personal information. Please follow the directions to create your username and password.     Your access code is: [email or web address]  Expires: 2018 12:52 PM     Your access code will  in 90 days. If you need help or a new code, please call your [facility] clinic or [phone].        Care EveryWhere ID     This is your Care EveryWhere ID. This could be used by other organizations to access your [facility] medical records  [id number]         Blood Pressure from Last 3 Encounters:   No data found for BP    Weight from Last 3 Encounters:   No data found for Wt              We Performed the Following     Therapeutic Activities     Therapeutic Exercises        Primary Care Provider Fax #    Physician No Ref-Primary [phone]       No address on file        Equal Access to Services     [facility] : Mika Menendez, wajeffy dai, qabenoitta kaalmada nikunj, audie ritter. So [facility] [phone].    ATENCIÓN: Si habla español, tiene a bradley disposición servicios gratuitos de asistencia lingüística. Mitchel al [phone].    We comply with applicable federal civil rights laws and [place] laws. We do not discriminate on the basis of race, color, national origin, age, disability, sex, sexual orientation, or gender identity.            Thank you!     Thank you for choosing [facility] PHYSICAL THERAP[facility]  for your care. Our goal is always to provide you with excellent care. Hearing back from our patients is one way " we can continue to improve our services. Please take a few minutes to complete the written survey that you may receive in the mail after your visit with us. Thank you!             Your Updated Medication List - Protect others around you: Learn how to safely use, store and throw away your medicines at www.disposemymeds.org.      Notice  As of 10/8/2018 10:50 AM    You have not been prescribed any medications.

## 2018-10-23 ENCOUNTER — HOSPITAL ENCOUNTER (OUTPATIENT)
Dept: CT IMAGING | Facility: CLINIC | Age: 83
Setting detail: RADIATION/ONCOLOGY SERIES
Discharge: STILL ADMITTED OR ADMINISTRATIVE | End: 2018-10-23
Attending: INTERNAL MEDICINE

## 2018-10-23 DIAGNOSIS — C64.1 RENAL CELL CARCINOMA OF RIGHT KIDNEY (H): ICD-10-CM

## 2018-11-01 ENCOUNTER — OFFICE VISIT - HEALTHEAST (OUTPATIENT)
Dept: ONCOLOGY | Facility: CLINIC | Age: 83
End: 2018-11-01

## 2018-11-01 ENCOUNTER — AMBULATORY - HEALTHEAST (OUTPATIENT)
Dept: INFUSION THERAPY | Facility: CLINIC | Age: 83
End: 2018-11-01

## 2018-11-01 DIAGNOSIS — Z51.11 CHEMOTHERAPY MANAGEMENT, ENCOUNTER FOR: ICD-10-CM

## 2018-11-01 DIAGNOSIS — C64.1 RENAL CELL CARCINOMA OF RIGHT KIDNEY (H): ICD-10-CM

## 2018-11-01 DIAGNOSIS — K52.1 DIARRHEA DUE TO DRUG: ICD-10-CM

## 2018-11-01 LAB
ALBUMIN SERPL-MCNC: 3.8 G/DL (ref 3.5–5)
ALP SERPL-CCNC: 94 U/L (ref 45–120)
ALT SERPL W P-5'-P-CCNC: 27 U/L (ref 0–45)
ANION GAP SERPL CALCULATED.3IONS-SCNC: 9 MMOL/L (ref 5–18)
AST SERPL W P-5'-P-CCNC: 30 U/L (ref 0–40)
BASOPHILS # BLD AUTO: 0.1 THOU/UL (ref 0–0.2)
BASOPHILS NFR BLD AUTO: 1 % (ref 0–2)
BILIRUB SERPL-MCNC: 1.2 MG/DL (ref 0–1)
BUN SERPL-MCNC: 22 MG/DL (ref 8–28)
CALCIUM SERPL-MCNC: 10.4 MG/DL (ref 8.5–10.5)
CHLORIDE BLD-SCNC: 97 MMOL/L (ref 98–107)
CO2 SERPL-SCNC: 26 MMOL/L (ref 22–31)
CREAT SERPL-MCNC: 0.79 MG/DL (ref 0.6–1.1)
EOSINOPHIL # BLD AUTO: 0 THOU/UL (ref 0–0.4)
EOSINOPHIL NFR BLD AUTO: 1 % (ref 0–6)
ERYTHROCYTE [DISTWIDTH] IN BLOOD BY AUTOMATED COUNT: 14 % (ref 11–14.5)
GFR SERPL CREATININE-BSD FRML MDRD: >60 ML/MIN/1.73M2
GLUCOSE BLD-MCNC: 96 MG/DL (ref 70–125)
HCT VFR BLD AUTO: 40 % (ref 35–47)
HGB BLD-MCNC: 13.7 G/DL (ref 12–16)
LYMPHOCYTES # BLD AUTO: 1 THOU/UL (ref 0.8–4.4)
LYMPHOCYTES NFR BLD AUTO: 15 % (ref 20–40)
MCH RBC QN AUTO: 32.9 PG (ref 27–34)
MCHC RBC AUTO-ENTMCNC: 34.3 G/DL (ref 32–36)
MCV RBC AUTO: 96 FL (ref 80–100)
MONOCYTES # BLD AUTO: 0.3 THOU/UL (ref 0–0.9)
MONOCYTES NFR BLD AUTO: 5 % (ref 2–10)
NEUTROPHILS # BLD AUTO: 5.3 THOU/UL (ref 2–7.7)
NEUTROPHILS NFR BLD AUTO: 79 % (ref 50–70)
PLATELET # BLD AUTO: 269 THOU/UL (ref 140–440)
PMV BLD AUTO: 9.6 FL (ref 8.5–12.5)
POTASSIUM BLD-SCNC: 3.9 MMOL/L (ref 3.5–5)
PROT SERPL-MCNC: 6.8 G/DL (ref 6–8)
RBC # BLD AUTO: 4.16 MILL/UL (ref 3.8–5.4)
SODIUM SERPL-SCNC: 132 MMOL/L (ref 136–145)
WBC: 6.8 THOU/UL (ref 4–11)

## 2018-11-05 ENCOUNTER — COMMUNICATION - HEALTHEAST (OUTPATIENT)
Dept: FAMILY MEDICINE | Facility: CLINIC | Age: 83
End: 2018-11-05

## 2018-11-05 ENCOUNTER — COMMUNICATION - HEALTHEAST (OUTPATIENT)
Dept: ONCOLOGY | Facility: CLINIC | Age: 83
End: 2018-11-05

## 2018-11-05 ENCOUNTER — RECORDS - HEALTHEAST (OUTPATIENT)
Dept: ADMINISTRATIVE | Facility: OTHER | Age: 83
End: 2018-11-05

## 2018-11-05 DIAGNOSIS — M62.81 GENERALIZED MUSCLE WEAKNESS: ICD-10-CM

## 2018-11-07 ENCOUNTER — RECORDS - HEALTHEAST (OUTPATIENT)
Dept: ADMINISTRATIVE | Facility: OTHER | Age: 83
End: 2018-11-07

## 2018-11-13 ENCOUNTER — COMMUNICATION - HEALTHEAST (OUTPATIENT)
Dept: ONCOLOGY | Facility: CLINIC | Age: 83
End: 2018-11-13

## 2018-11-13 ENCOUNTER — COMMUNICATION - HEALTHEAST (OUTPATIENT)
Dept: ONCOLOGY | Facility: HOSPITAL | Age: 83
End: 2018-11-13

## 2018-11-14 ENCOUNTER — COMMUNICATION - HEALTHEAST (OUTPATIENT)
Dept: ONCOLOGY | Facility: CLINIC | Age: 83
End: 2018-11-14

## 2018-11-26 ENCOUNTER — COMMUNICATION - HEALTHEAST (OUTPATIENT)
Dept: ONCOLOGY | Facility: CLINIC | Age: 83
End: 2018-11-26

## 2018-11-28 ENCOUNTER — COMMUNICATION - HEALTHEAST (OUTPATIENT)
Dept: FAMILY MEDICINE | Facility: CLINIC | Age: 83
End: 2018-11-28

## 2018-11-28 ENCOUNTER — COMMUNICATION - HEALTHEAST (OUTPATIENT)
Dept: ONCOLOGY | Facility: CLINIC | Age: 83
End: 2018-11-28

## 2018-12-03 ENCOUNTER — RECORDS - HEALTHEAST (OUTPATIENT)
Dept: ADMINISTRATIVE | Facility: OTHER | Age: 83
End: 2018-12-03

## 2018-12-05 ENCOUNTER — COMMUNICATION - HEALTHEAST (OUTPATIENT)
Dept: FAMILY MEDICINE | Facility: CLINIC | Age: 83
End: 2018-12-05

## 2018-12-06 ENCOUNTER — AMBULATORY - HEALTHEAST (OUTPATIENT)
Dept: INFUSION THERAPY | Facility: CLINIC | Age: 83
End: 2018-12-06

## 2018-12-06 ENCOUNTER — OFFICE VISIT - HEALTHEAST (OUTPATIENT)
Dept: ONCOLOGY | Facility: CLINIC | Age: 83
End: 2018-12-06

## 2018-12-06 DIAGNOSIS — C64.1 RENAL CELL CARCINOMA OF RIGHT KIDNEY (H): ICD-10-CM

## 2018-12-06 DIAGNOSIS — C68.0 MALIGNANT NEOPLASM OF URETHRA (H): ICD-10-CM

## 2018-12-06 DIAGNOSIS — C79.51 METASTASIS TO BONE (H): ICD-10-CM

## 2018-12-06 DIAGNOSIS — R93.89 ABNORMAL FINDINGS ON DIAGNOSTIC IMAGING OF OTHER SPECIFIED BODY STRUCTURES: ICD-10-CM

## 2018-12-06 LAB
ALBUMIN SERPL-MCNC: 3.8 G/DL (ref 3.5–5)
ALP SERPL-CCNC: 88 U/L (ref 45–120)
ALT SERPL W P-5'-P-CCNC: 20 U/L (ref 0–45)
ANION GAP SERPL CALCULATED.3IONS-SCNC: 9 MMOL/L (ref 5–18)
AST SERPL W P-5'-P-CCNC: 22 U/L (ref 0–40)
BASOPHILS # BLD AUTO: 0 THOU/UL (ref 0–0.2)
BASOPHILS NFR BLD AUTO: 0 % (ref 0–2)
BILIRUB SERPL-MCNC: 0.7 MG/DL (ref 0–1)
BUN SERPL-MCNC: 16 MG/DL (ref 8–28)
CALCIUM SERPL-MCNC: 10.6 MG/DL (ref 8.5–10.5)
CHLORIDE BLD-SCNC: 93 MMOL/L (ref 98–107)
CO2 SERPL-SCNC: 29 MMOL/L (ref 22–31)
CREAT SERPL-MCNC: 0.9 MG/DL (ref 0.6–1.1)
EOSINOPHIL # BLD AUTO: 0 THOU/UL (ref 0–0.4)
EOSINOPHIL NFR BLD AUTO: 0 % (ref 0–6)
ERYTHROCYTE [DISTWIDTH] IN BLOOD BY AUTOMATED COUNT: 14.3 % (ref 11–14.5)
GFR SERPL CREATININE-BSD FRML MDRD: 59 ML/MIN/1.73M2
GLUCOSE BLD-MCNC: 113 MG/DL (ref 70–125)
HCT VFR BLD AUTO: 38.9 % (ref 35–47)
HGB BLD-MCNC: 13.3 G/DL (ref 12–16)
LYMPHOCYTES # BLD AUTO: 1.4 THOU/UL (ref 0.8–4.4)
LYMPHOCYTES NFR BLD AUTO: 16 % (ref 20–40)
MCH RBC QN AUTO: 33.3 PG (ref 27–34)
MCHC RBC AUTO-ENTMCNC: 34.2 G/DL (ref 32–36)
MCV RBC AUTO: 97 FL (ref 80–100)
MONOCYTES # BLD AUTO: 0.6 THOU/UL (ref 0–0.9)
MONOCYTES NFR BLD AUTO: 8 % (ref 2–10)
NEUTROPHILS # BLD AUTO: 6.4 THOU/UL (ref 2–7.7)
NEUTROPHILS NFR BLD AUTO: 76 % (ref 50–70)
PLATELET # BLD AUTO: 312 THOU/UL (ref 140–440)
PMV BLD AUTO: 9.9 FL (ref 8.5–12.5)
POTASSIUM BLD-SCNC: 3.7 MMOL/L (ref 3.5–5)
PROT SERPL-MCNC: 6.6 G/DL (ref 6–8)
RBC # BLD AUTO: 4 MILL/UL (ref 3.8–5.4)
SODIUM SERPL-SCNC: 131 MMOL/L (ref 136–145)
TSH SERPL DL<=0.005 MIU/L-ACNC: 1.99 UIU/ML (ref 0.3–5)
WBC: 8.5 THOU/UL (ref 4–11)

## 2018-12-07 ENCOUNTER — RECORDS - HEALTHEAST (OUTPATIENT)
Dept: ADMINISTRATIVE | Facility: OTHER | Age: 83
End: 2018-12-07

## 2018-12-08 ENCOUNTER — RECORDS - HEALTHEAST (OUTPATIENT)
Dept: ADMINISTRATIVE | Facility: OTHER | Age: 83
End: 2018-12-08

## 2018-12-10 ENCOUNTER — COMMUNICATION - HEALTHEAST (OUTPATIENT)
Dept: ONCOLOGY | Facility: CLINIC | Age: 83
End: 2018-12-10

## 2018-12-12 ENCOUNTER — INFUSION - HEALTHEAST (OUTPATIENT)
Dept: INFUSION THERAPY | Facility: CLINIC | Age: 83
End: 2018-12-12

## 2018-12-12 DIAGNOSIS — C79.51 METASTASIS TO BONE (H): ICD-10-CM

## 2018-12-12 DIAGNOSIS — C64.1 RENAL CELL CARCINOMA OF RIGHT KIDNEY (H): ICD-10-CM

## 2018-12-17 ENCOUNTER — COMMUNICATION - HEALTHEAST (OUTPATIENT)
Dept: FAMILY MEDICINE | Facility: CLINIC | Age: 83
End: 2018-12-17

## 2018-12-26 ENCOUNTER — COMMUNICATION - HEALTHEAST (OUTPATIENT)
Dept: SCHEDULING | Facility: CLINIC | Age: 83
End: 2018-12-26

## 2018-12-27 ENCOUNTER — AMBULATORY - HEALTHEAST (OUTPATIENT)
Dept: INFUSION THERAPY | Facility: HOSPITAL | Age: 83
End: 2018-12-27

## 2018-12-27 ENCOUNTER — INFUSION - HEALTHEAST (OUTPATIENT)
Dept: INFUSION THERAPY | Facility: CLINIC | Age: 83
End: 2018-12-27

## 2018-12-27 ENCOUNTER — OFFICE VISIT - HEALTHEAST (OUTPATIENT)
Dept: ONCOLOGY | Facility: CLINIC | Age: 83
End: 2018-12-27

## 2018-12-27 DIAGNOSIS — C64.1 RENAL CELL CARCINOMA OF RIGHT KIDNEY (H): ICD-10-CM

## 2019-01-04 ENCOUNTER — COMMUNICATION - HEALTHEAST (OUTPATIENT)
Dept: ONCOLOGY | Facility: CLINIC | Age: 84
End: 2019-01-04

## 2019-01-07 ENCOUNTER — RECORDS - HEALTHEAST (OUTPATIENT)
Dept: ADMINISTRATIVE | Facility: OTHER | Age: 84
End: 2019-01-07

## 2019-01-10 ENCOUNTER — OFFICE VISIT - HEALTHEAST (OUTPATIENT)
Dept: ONCOLOGY | Facility: CLINIC | Age: 84
End: 2019-01-10

## 2019-01-10 ENCOUNTER — INFUSION - HEALTHEAST (OUTPATIENT)
Dept: INFUSION THERAPY | Facility: CLINIC | Age: 84
End: 2019-01-10

## 2019-01-10 DIAGNOSIS — C79.51 METASTASIS TO BONE (H): ICD-10-CM

## 2019-01-10 DIAGNOSIS — C64.1 RENAL CELL CARCINOMA OF RIGHT KIDNEY (H): ICD-10-CM

## 2019-01-10 DIAGNOSIS — M81.0 OSTEOPOROSIS, SENILE: ICD-10-CM

## 2019-01-10 DIAGNOSIS — Z51.12 ENCOUNTER FOR ANTINEOPLASTIC IMMUNOTHERAPY: ICD-10-CM

## 2019-01-10 LAB
ALBUMIN SERPL-MCNC: 3.5 G/DL (ref 3.5–5)
ALP SERPL-CCNC: 100 U/L (ref 45–120)
ALT SERPL W P-5'-P-CCNC: 19 U/L (ref 0–45)
ANION GAP SERPL CALCULATED.3IONS-SCNC: 10 MMOL/L (ref 5–18)
AST SERPL W P-5'-P-CCNC: 24 U/L (ref 0–40)
BASOPHILS # BLD AUTO: 0.1 THOU/UL (ref 0–0.2)
BASOPHILS NFR BLD AUTO: 1 % (ref 0–2)
BILIRUB SERPL-MCNC: 0.7 MG/DL (ref 0–1)
BUN SERPL-MCNC: 19 MG/DL (ref 8–28)
CALCIUM SERPL-MCNC: 10 MG/DL (ref 8.5–10.5)
CHLORIDE BLD-SCNC: 99 MMOL/L (ref 98–107)
CO2 SERPL-SCNC: 25 MMOL/L (ref 22–31)
CREAT SERPL-MCNC: 0.78 MG/DL (ref 0.6–1.1)
EOSINOPHIL # BLD AUTO: 0.1 THOU/UL (ref 0–0.4)
EOSINOPHIL NFR BLD AUTO: 1 % (ref 0–6)
ERYTHROCYTE [DISTWIDTH] IN BLOOD BY AUTOMATED COUNT: 13 % (ref 11–14.5)
GFR SERPL CREATININE-BSD FRML MDRD: >60 ML/MIN/1.73M2
GLUCOSE BLD-MCNC: 96 MG/DL (ref 70–125)
HCT VFR BLD AUTO: 37.1 % (ref 35–47)
HGB BLD-MCNC: 12.3 G/DL (ref 12–16)
LYMPHOCYTES # BLD AUTO: 1.2 THOU/UL (ref 0.8–4.4)
LYMPHOCYTES NFR BLD AUTO: 16 % (ref 20–40)
MCH RBC QN AUTO: 32.1 PG (ref 27–34)
MCHC RBC AUTO-ENTMCNC: 33.2 G/DL (ref 32–36)
MCV RBC AUTO: 97 FL (ref 80–100)
MONOCYTES # BLD AUTO: 0.7 THOU/UL (ref 0–0.9)
MONOCYTES NFR BLD AUTO: 10 % (ref 2–10)
NEUTROPHILS # BLD AUTO: 5.3 THOU/UL (ref 2–7.7)
NEUTROPHILS NFR BLD AUTO: 72 % (ref 50–70)
PLATELET # BLD AUTO: 344 THOU/UL (ref 140–440)
PMV BLD AUTO: 9.5 FL (ref 8.5–12.5)
POTASSIUM BLD-SCNC: 4.4 MMOL/L (ref 3.5–5)
PROT SERPL-MCNC: 6.3 G/DL (ref 6–8)
RBC # BLD AUTO: 3.83 MILL/UL (ref 3.8–5.4)
SODIUM SERPL-SCNC: 134 MMOL/L (ref 136–145)
TSH SERPL DL<=0.005 MIU/L-ACNC: 1.8 UIU/ML (ref 0.3–5)
WBC: 7.4 THOU/UL (ref 4–11)

## 2019-01-11 ENCOUNTER — COMMUNICATION - HEALTHEAST (OUTPATIENT)
Dept: FAMILY MEDICINE | Facility: CLINIC | Age: 84
End: 2019-01-11

## 2019-01-11 ENCOUNTER — RECORDS - HEALTHEAST (OUTPATIENT)
Dept: ADMINISTRATIVE | Facility: OTHER | Age: 84
End: 2019-01-11

## 2019-01-15 ENCOUNTER — OFFICE VISIT - HEALTHEAST (OUTPATIENT)
Dept: FAMILY MEDICINE | Facility: CLINIC | Age: 84
End: 2019-01-15

## 2019-01-15 ENCOUNTER — COMMUNICATION - HEALTHEAST (OUTPATIENT)
Dept: FAMILY MEDICINE | Facility: CLINIC | Age: 84
End: 2019-01-15

## 2019-01-15 DIAGNOSIS — Z97.8 INDWELLING FOLEY CATHETER PRESENT: ICD-10-CM

## 2019-01-15 DIAGNOSIS — M62.838 SPASM OF MUSCLE: ICD-10-CM

## 2019-01-18 ENCOUNTER — RECORDS - HEALTHEAST (OUTPATIENT)
Dept: ADMINISTRATIVE | Facility: OTHER | Age: 84
End: 2019-01-18

## 2019-01-24 ENCOUNTER — INFUSION - HEALTHEAST (OUTPATIENT)
Dept: INFUSION THERAPY | Facility: CLINIC | Age: 84
End: 2019-01-24

## 2019-01-24 ENCOUNTER — OFFICE VISIT - HEALTHEAST (OUTPATIENT)
Dept: ONCOLOGY | Facility: CLINIC | Age: 84
End: 2019-01-24

## 2019-01-24 DIAGNOSIS — C64.1 RENAL CELL CARCINOMA OF RIGHT KIDNEY (H): ICD-10-CM

## 2019-02-05 ENCOUNTER — HOSPITAL ENCOUNTER (OUTPATIENT)
Dept: CT IMAGING | Facility: CLINIC | Age: 84
Setting detail: RADIATION/ONCOLOGY SERIES
Discharge: STILL ADMITTED OR ADMINISTRATIVE | End: 2019-02-05
Attending: INTERNAL MEDICINE

## 2019-02-05 DIAGNOSIS — C64.1 RENAL CELL CARCINOMA OF RIGHT KIDNEY (H): ICD-10-CM

## 2019-02-07 ENCOUNTER — COMMUNICATION - HEALTHEAST (OUTPATIENT)
Dept: ONCOLOGY | Facility: CLINIC | Age: 84
End: 2019-02-07

## 2019-02-11 ENCOUNTER — INFUSION - HEALTHEAST (OUTPATIENT)
Dept: INFUSION THERAPY | Facility: CLINIC | Age: 84
End: 2019-02-11

## 2019-02-11 ENCOUNTER — OFFICE VISIT - HEALTHEAST (OUTPATIENT)
Dept: ONCOLOGY | Facility: CLINIC | Age: 84
End: 2019-02-11

## 2019-02-11 ENCOUNTER — AMBULATORY - HEALTHEAST (OUTPATIENT)
Dept: INFUSION THERAPY | Facility: CLINIC | Age: 84
End: 2019-02-11

## 2019-02-11 DIAGNOSIS — C79.51 METASTASIS TO BONE (H): ICD-10-CM

## 2019-02-11 DIAGNOSIS — C64.1 RENAL CELL CARCINOMA OF RIGHT KIDNEY (H): ICD-10-CM

## 2019-02-11 DIAGNOSIS — Z51.12 ENCOUNTER FOR ANTINEOPLASTIC IMMUNOTHERAPY: ICD-10-CM

## 2019-02-11 LAB
ALBUMIN SERPL-MCNC: 3.7 G/DL (ref 3.5–5)
ALP SERPL-CCNC: 112 U/L (ref 45–120)
ALT SERPL W P-5'-P-CCNC: 18 U/L (ref 0–45)
ANION GAP SERPL CALCULATED.3IONS-SCNC: 10 MMOL/L (ref 5–18)
AST SERPL W P-5'-P-CCNC: 21 U/L (ref 0–40)
BASOPHILS # BLD AUTO: 0 THOU/UL (ref 0–0.2)
BASOPHILS NFR BLD AUTO: 1 % (ref 0–2)
BILIRUB SERPL-MCNC: 0.7 MG/DL (ref 0–1)
BUN SERPL-MCNC: 22 MG/DL (ref 8–28)
CALCIUM SERPL-MCNC: 10.3 MG/DL (ref 8.5–10.5)
CHLORIDE BLD-SCNC: 98 MMOL/L (ref 98–107)
CO2 SERPL-SCNC: 25 MMOL/L (ref 22–31)
CREAT SERPL-MCNC: 0.81 MG/DL (ref 0.6–1.1)
EOSINOPHIL # BLD AUTO: 0 THOU/UL (ref 0–0.4)
EOSINOPHIL NFR BLD AUTO: 1 % (ref 0–6)
ERYTHROCYTE [DISTWIDTH] IN BLOOD BY AUTOMATED COUNT: 13.2 % (ref 11–14.5)
GFR SERPL CREATININE-BSD FRML MDRD: >60 ML/MIN/1.73M2
GLUCOSE BLD-MCNC: 100 MG/DL (ref 70–125)
HCT VFR BLD AUTO: 39.2 % (ref 35–47)
HGB BLD-MCNC: 13 G/DL (ref 12–16)
LYMPHOCYTES # BLD AUTO: 1.2 THOU/UL (ref 0.8–4.4)
LYMPHOCYTES NFR BLD AUTO: 19 % (ref 20–40)
MCH RBC QN AUTO: 31.3 PG (ref 27–34)
MCHC RBC AUTO-ENTMCNC: 33.2 G/DL (ref 32–36)
MCV RBC AUTO: 95 FL (ref 80–100)
MONOCYTES # BLD AUTO: 0.5 THOU/UL (ref 0–0.9)
MONOCYTES NFR BLD AUTO: 7 % (ref 2–10)
NEUTROPHILS # BLD AUTO: 4.6 THOU/UL (ref 2–7.7)
NEUTROPHILS NFR BLD AUTO: 73 % (ref 50–70)
PLATELET # BLD AUTO: 321 THOU/UL (ref 140–440)
PMV BLD AUTO: 9.5 FL (ref 8.5–12.5)
POTASSIUM BLD-SCNC: 4.1 MMOL/L (ref 3.5–5)
PROT SERPL-MCNC: 6.6 G/DL (ref 6–8)
RBC # BLD AUTO: 4.15 MILL/UL (ref 3.8–5.4)
SODIUM SERPL-SCNC: 133 MMOL/L (ref 136–145)
TSH SERPL DL<=0.005 MIU/L-ACNC: 4.1 UIU/ML (ref 0.3–5)
WBC: 6.4 THOU/UL (ref 4–11)

## 2019-02-28 ENCOUNTER — INFUSION - HEALTHEAST (OUTPATIENT)
Dept: INFUSION THERAPY | Facility: CLINIC | Age: 84
End: 2019-02-28

## 2019-02-28 ENCOUNTER — AMBULATORY - HEALTHEAST (OUTPATIENT)
Dept: INFUSION THERAPY | Facility: CLINIC | Age: 84
End: 2019-02-28

## 2019-02-28 ENCOUNTER — OFFICE VISIT - HEALTHEAST (OUTPATIENT)
Dept: ONCOLOGY | Facility: CLINIC | Age: 84
End: 2019-02-28

## 2019-02-28 DIAGNOSIS — C68.0 MALIGNANT NEOPLASM OF URETHRA (H): ICD-10-CM

## 2019-02-28 DIAGNOSIS — C64.1 RENAL CELL CARCINOMA OF RIGHT KIDNEY (H): ICD-10-CM

## 2019-02-28 DIAGNOSIS — C79.51 METASTASIS TO BONE (H): ICD-10-CM

## 2019-02-28 LAB
ALBUMIN SERPL-MCNC: 3.7 G/DL (ref 3.5–5)
ALP SERPL-CCNC: 94 U/L (ref 45–120)
ALT SERPL W P-5'-P-CCNC: 17 U/L (ref 0–45)
ANION GAP SERPL CALCULATED.3IONS-SCNC: 8 MMOL/L (ref 5–18)
AST SERPL W P-5'-P-CCNC: 20 U/L (ref 0–40)
BASOPHILS # BLD AUTO: 0.1 THOU/UL (ref 0–0.2)
BASOPHILS NFR BLD AUTO: 1 % (ref 0–2)
BILIRUB SERPL-MCNC: 0.6 MG/DL (ref 0–1)
BUN SERPL-MCNC: 22 MG/DL (ref 8–28)
CALCIUM SERPL-MCNC: 10.3 MG/DL (ref 8.5–10.5)
CHLORIDE BLD-SCNC: 101 MMOL/L (ref 98–107)
CO2 SERPL-SCNC: 27 MMOL/L (ref 22–31)
CREAT SERPL-MCNC: 0.85 MG/DL (ref 0.6–1.1)
EOSINOPHIL # BLD AUTO: 0.1 THOU/UL (ref 0–0.4)
EOSINOPHIL NFR BLD AUTO: 1 % (ref 0–6)
ERYTHROCYTE [DISTWIDTH] IN BLOOD BY AUTOMATED COUNT: 13.8 % (ref 11–14.5)
GFR SERPL CREATININE-BSD FRML MDRD: >60 ML/MIN/1.73M2
GLUCOSE BLD-MCNC: 93 MG/DL (ref 70–125)
HCT VFR BLD AUTO: 38.2 % (ref 35–47)
HGB BLD-MCNC: 12.2 G/DL (ref 12–16)
LYMPHOCYTES # BLD AUTO: 1.5 THOU/UL (ref 0.8–4.4)
LYMPHOCYTES NFR BLD AUTO: 19 % (ref 20–40)
MCH RBC QN AUTO: 30.1 PG (ref 27–34)
MCHC RBC AUTO-ENTMCNC: 31.9 G/DL (ref 32–36)
MCV RBC AUTO: 94 FL (ref 80–100)
MONOCYTES # BLD AUTO: 0.6 THOU/UL (ref 0–0.9)
MONOCYTES NFR BLD AUTO: 7 % (ref 2–10)
NEUTROPHILS # BLD AUTO: 5.5 THOU/UL (ref 2–7.7)
NEUTROPHILS NFR BLD AUTO: 71 % (ref 50–70)
PLATELET # BLD AUTO: 325 THOU/UL (ref 140–440)
PMV BLD AUTO: 9.7 FL (ref 8.5–12.5)
POTASSIUM BLD-SCNC: 4 MMOL/L (ref 3.5–5)
PROT SERPL-MCNC: 6.3 G/DL (ref 6–8)
RBC # BLD AUTO: 4.05 MILL/UL (ref 3.8–5.4)
SODIUM SERPL-SCNC: 136 MMOL/L (ref 136–145)
WBC: 7.8 THOU/UL (ref 4–11)

## 2019-03-11 ENCOUNTER — RECORDS - HEALTHEAST (OUTPATIENT)
Dept: ADMINISTRATIVE | Facility: OTHER | Age: 84
End: 2019-03-11

## 2019-03-14 ENCOUNTER — AMBULATORY - HEALTHEAST (OUTPATIENT)
Dept: INFUSION THERAPY | Facility: CLINIC | Age: 84
End: 2019-03-14

## 2019-03-14 ENCOUNTER — OFFICE VISIT - HEALTHEAST (OUTPATIENT)
Dept: ONCOLOGY | Facility: CLINIC | Age: 84
End: 2019-03-14

## 2019-03-14 ENCOUNTER — INFUSION - HEALTHEAST (OUTPATIENT)
Dept: INFUSION THERAPY | Facility: CLINIC | Age: 84
End: 2019-03-14

## 2019-03-14 DIAGNOSIS — C64.1 RENAL CELL CARCINOMA OF RIGHT KIDNEY (H): ICD-10-CM

## 2019-03-14 DIAGNOSIS — C79.51 METASTASIS TO BONE (H): ICD-10-CM

## 2019-03-14 DIAGNOSIS — Z51.12 ENCOUNTER FOR ANTINEOPLASTIC IMMUNOTHERAPY: ICD-10-CM

## 2019-03-14 LAB
ALBUMIN SERPL-MCNC: 3.5 G/DL (ref 3.5–5)
ALP SERPL-CCNC: 87 U/L (ref 45–120)
ALT SERPL W P-5'-P-CCNC: 15 U/L (ref 0–45)
ANION GAP SERPL CALCULATED.3IONS-SCNC: 6 MMOL/L (ref 5–18)
AST SERPL W P-5'-P-CCNC: 20 U/L (ref 0–40)
BASOPHILS # BLD AUTO: 0.1 THOU/UL (ref 0–0.2)
BASOPHILS NFR BLD AUTO: 1 % (ref 0–2)
BILIRUB SERPL-MCNC: 0.6 MG/DL (ref 0–1)
BUN SERPL-MCNC: 25 MG/DL (ref 8–28)
CALCIUM SERPL-MCNC: 9.8 MG/DL (ref 8.5–10.5)
CHLORIDE BLD-SCNC: 106 MMOL/L (ref 98–107)
CO2 SERPL-SCNC: 26 MMOL/L (ref 22–31)
CREAT SERPL-MCNC: 0.84 MG/DL (ref 0.6–1.1)
EOSINOPHIL # BLD AUTO: 0.1 THOU/UL (ref 0–0.4)
EOSINOPHIL NFR BLD AUTO: 2 % (ref 0–6)
ERYTHROCYTE [DISTWIDTH] IN BLOOD BY AUTOMATED COUNT: 14.2 % (ref 11–14.5)
GFR SERPL CREATININE-BSD FRML MDRD: >60 ML/MIN/1.73M2
GLUCOSE BLD-MCNC: 91 MG/DL (ref 70–125)
HCT VFR BLD AUTO: 37.7 % (ref 35–47)
HGB BLD-MCNC: 12.3 G/DL (ref 12–16)
LYMPHOCYTES # BLD AUTO: 1.2 THOU/UL (ref 0.8–4.4)
LYMPHOCYTES NFR BLD AUTO: 17 % (ref 20–40)
MCH RBC QN AUTO: 30.5 PG (ref 27–34)
MCHC RBC AUTO-ENTMCNC: 32.6 G/DL (ref 32–36)
MCV RBC AUTO: 94 FL (ref 80–100)
MONOCYTES # BLD AUTO: 0.5 THOU/UL (ref 0–0.9)
MONOCYTES NFR BLD AUTO: 8 % (ref 2–10)
NEUTROPHILS # BLD AUTO: 4.8 THOU/UL (ref 2–7.7)
NEUTROPHILS NFR BLD AUTO: 72 % (ref 50–70)
PLATELET # BLD AUTO: 310 THOU/UL (ref 140–440)
PMV BLD AUTO: 9.8 FL (ref 8.5–12.5)
POTASSIUM BLD-SCNC: 4.2 MMOL/L (ref 3.5–5)
PROT SERPL-MCNC: 6.2 G/DL (ref 6–8)
RBC # BLD AUTO: 4.03 MILL/UL (ref 3.8–5.4)
SODIUM SERPL-SCNC: 138 MMOL/L (ref 136–145)
TSH SERPL DL<=0.005 MIU/L-ACNC: 1.6 UIU/ML (ref 0.3–5)
WBC: 6.6 THOU/UL (ref 4–11)

## 2019-03-15 ENCOUNTER — RECORDS - HEALTHEAST (OUTPATIENT)
Dept: ADMINISTRATIVE | Facility: OTHER | Age: 84
End: 2019-03-15

## 2019-03-20 ENCOUNTER — RECORDS - HEALTHEAST (OUTPATIENT)
Dept: ADMINISTRATIVE | Facility: OTHER | Age: 84
End: 2019-03-20

## 2019-03-25 ENCOUNTER — COMMUNICATION - HEALTHEAST (OUTPATIENT)
Dept: FAMILY MEDICINE | Facility: CLINIC | Age: 84
End: 2019-03-25

## 2019-03-26 ENCOUNTER — COMMUNICATION - HEALTHEAST (OUTPATIENT)
Dept: ONCOLOGY | Facility: CLINIC | Age: 84
End: 2019-03-26

## 2019-03-27 ENCOUNTER — RECORDS - HEALTHEAST (OUTPATIENT)
Dept: ADMINISTRATIVE | Facility: OTHER | Age: 84
End: 2019-03-27

## 2019-03-27 ENCOUNTER — COMMUNICATION - HEALTHEAST (OUTPATIENT)
Dept: ONCOLOGY | Facility: CLINIC | Age: 84
End: 2019-03-27

## 2019-03-29 ENCOUNTER — OFFICE VISIT - HEALTHEAST (OUTPATIENT)
Dept: FAMILY MEDICINE | Facility: CLINIC | Age: 84
End: 2019-03-29

## 2019-03-29 DIAGNOSIS — E87.1 LOW SODIUM LEVELS: ICD-10-CM

## 2019-03-29 DIAGNOSIS — R31.0 GROSS HEMATURIA: ICD-10-CM

## 2019-03-29 DIAGNOSIS — C64.1 RENAL CELL CARCINOMA OF RIGHT KIDNEY (H): ICD-10-CM

## 2019-03-29 LAB
ALBUMIN UR-MCNC: ABNORMAL MG/DL
APPEARANCE UR: CLEAR
BACTERIA #/AREA URNS HPF: ABNORMAL HPF
BILIRUB UR QL STRIP: NEGATIVE
COLOR UR AUTO: YELLOW
GLUCOSE UR STRIP-MCNC: NEGATIVE MG/DL
HGB UR QL STRIP: ABNORMAL
KETONES UR STRIP-MCNC: NEGATIVE MG/DL
LEUKOCYTE ESTERASE UR QL STRIP: ABNORMAL
NITRATE UR QL: NEGATIVE
PH UR STRIP: 7 [PH] (ref 5–8)
RBC #/AREA URNS AUTO: >100 HPF
SP GR UR STRIP: 1.02 (ref 1–1.03)
SQUAMOUS #/AREA URNS AUTO: ABNORMAL LPF
TRANS CELLS #/AREA URNS HPF: ABNORMAL LPF
UROBILINOGEN UR STRIP-ACNC: ABNORMAL
WBC #/AREA URNS AUTO: ABNORMAL HPF

## 2019-03-31 LAB — BACTERIA SPEC CULT: ABNORMAL

## 2019-04-01 ENCOUNTER — RECORDS - HEALTHEAST (OUTPATIENT)
Dept: ADMINISTRATIVE | Facility: OTHER | Age: 84
End: 2019-04-01

## 2019-04-03 ENCOUNTER — RECORDS - HEALTHEAST (OUTPATIENT)
Dept: ADMINISTRATIVE | Facility: OTHER | Age: 84
End: 2019-04-03

## 2019-04-04 ENCOUNTER — COMMUNICATION - HEALTHEAST (OUTPATIENT)
Dept: FAMILY MEDICINE | Facility: CLINIC | Age: 84
End: 2019-04-04

## 2019-04-06 ENCOUNTER — COMMUNICATION - HEALTHEAST (OUTPATIENT)
Dept: FAMILY MEDICINE | Facility: CLINIC | Age: 84
End: 2019-04-06

## 2019-04-06 ENCOUNTER — RECORDS - HEALTHEAST (OUTPATIENT)
Dept: SCHEDULING | Facility: CLINIC | Age: 84
End: 2019-04-06

## 2019-04-06 DIAGNOSIS — N39.0 URINARY TRACT INFECTION: ICD-10-CM

## 2019-04-08 ENCOUNTER — COMMUNICATION - HEALTHEAST (OUTPATIENT)
Dept: ONCOLOGY | Facility: CLINIC | Age: 84
End: 2019-04-08

## 2019-04-09 ENCOUNTER — COMMUNICATION - HEALTHEAST (OUTPATIENT)
Dept: FAMILY MEDICINE | Facility: CLINIC | Age: 84
End: 2019-04-09

## 2019-04-10 ENCOUNTER — OFFICE VISIT - HEALTHEAST (OUTPATIENT)
Dept: ONCOLOGY | Facility: CLINIC | Age: 84
End: 2019-04-10

## 2019-04-10 ENCOUNTER — AMBULATORY - HEALTHEAST (OUTPATIENT)
Dept: INFUSION THERAPY | Facility: CLINIC | Age: 84
End: 2019-04-10

## 2019-04-10 ENCOUNTER — INFUSION - HEALTHEAST (OUTPATIENT)
Dept: INFUSION THERAPY | Facility: CLINIC | Age: 84
End: 2019-04-10

## 2019-04-10 DIAGNOSIS — C64.1 RENAL CELL CARCINOMA OF RIGHT KIDNEY (H): ICD-10-CM

## 2019-04-10 LAB
ALBUMIN SERPL-MCNC: 3.6 G/DL (ref 3.5–5)
ALP SERPL-CCNC: 102 U/L (ref 45–120)
ALT SERPL W P-5'-P-CCNC: 14 U/L (ref 0–45)
ANION GAP SERPL CALCULATED.3IONS-SCNC: 7 MMOL/L (ref 5–18)
AST SERPL W P-5'-P-CCNC: 18 U/L (ref 0–40)
BASOPHILS # BLD AUTO: 0.1 THOU/UL (ref 0–0.2)
BASOPHILS NFR BLD AUTO: 1 % (ref 0–2)
BILIRUB SERPL-MCNC: 0.5 MG/DL (ref 0–1)
BUN SERPL-MCNC: 19 MG/DL (ref 8–28)
CALCIUM SERPL-MCNC: 9.5 MG/DL (ref 8.5–10.5)
CHLORIDE BLD-SCNC: 101 MMOL/L (ref 98–107)
CO2 SERPL-SCNC: 27 MMOL/L (ref 22–31)
CREAT SERPL-MCNC: 0.92 MG/DL (ref 0.6–1.1)
EOSINOPHIL # BLD AUTO: 0.1 THOU/UL (ref 0–0.4)
EOSINOPHIL NFR BLD AUTO: 1 % (ref 0–6)
ERYTHROCYTE [DISTWIDTH] IN BLOOD BY AUTOMATED COUNT: 15.3 % (ref 11–14.5)
GFR SERPL CREATININE-BSD FRML MDRD: 57 ML/MIN/1.73M2
GLUCOSE BLD-MCNC: 85 MG/DL (ref 70–125)
HCT VFR BLD AUTO: 36.3 % (ref 35–47)
HGB BLD-MCNC: 11.7 G/DL (ref 12–16)
LYMPHOCYTES # BLD AUTO: 1.1 THOU/UL (ref 0.8–4.4)
LYMPHOCYTES NFR BLD AUTO: 18 % (ref 20–40)
MCH RBC QN AUTO: 29.9 PG (ref 27–34)
MCHC RBC AUTO-ENTMCNC: 32.2 G/DL (ref 32–36)
MCV RBC AUTO: 93 FL (ref 80–100)
MONOCYTES # BLD AUTO: 0.5 THOU/UL (ref 0–0.9)
MONOCYTES NFR BLD AUTO: 8 % (ref 2–10)
NEUTROPHILS # BLD AUTO: 4.6 THOU/UL (ref 2–7.7)
NEUTROPHILS NFR BLD AUTO: 72 % (ref 50–70)
PLATELET # BLD AUTO: 388 THOU/UL (ref 140–440)
PMV BLD AUTO: 9.5 FL (ref 8.5–12.5)
POTASSIUM BLD-SCNC: 4 MMOL/L (ref 3.5–5)
PROT SERPL-MCNC: 6.3 G/DL (ref 6–8)
RBC # BLD AUTO: 3.91 MILL/UL (ref 3.8–5.4)
SODIUM SERPL-SCNC: 135 MMOL/L (ref 136–145)
TSH SERPL DL<=0.005 MIU/L-ACNC: 2.27 UIU/ML (ref 0.3–5)
WBC: 6.5 THOU/UL (ref 4–11)

## 2019-04-11 ENCOUNTER — COMMUNICATION - HEALTHEAST (OUTPATIENT)
Dept: FAMILY MEDICINE | Facility: CLINIC | Age: 84
End: 2019-04-11

## 2019-04-11 DIAGNOSIS — E83.52 HYPERCALCEMIA: ICD-10-CM

## 2019-04-19 ENCOUNTER — RECORDS - HEALTHEAST (OUTPATIENT)
Dept: ADMINISTRATIVE | Facility: OTHER | Age: 84
End: 2019-04-19

## 2019-04-23 ENCOUNTER — AMBULATORY - HEALTHEAST (OUTPATIENT)
Dept: PHARMACY | Facility: CLINIC | Age: 84
End: 2019-04-23

## 2019-04-23 DIAGNOSIS — M06.9 RHEUMATOID ARTHRITIS OF HAND, UNSPECIFIED LATERALITY, UNSPECIFIED RHEUMATOID FACTOR PRESENCE: ICD-10-CM

## 2019-04-23 DIAGNOSIS — C64.1 RENAL CELL CARCINOMA OF RIGHT KIDNEY (H): ICD-10-CM

## 2019-04-23 DIAGNOSIS — E87.1 HYPONATREMIA: ICD-10-CM

## 2019-04-23 DIAGNOSIS — I10 ESSENTIAL HYPERTENSION: ICD-10-CM

## 2019-04-24 ENCOUNTER — INFUSION - HEALTHEAST (OUTPATIENT)
Dept: INFUSION THERAPY | Facility: CLINIC | Age: 84
End: 2019-04-24

## 2019-04-24 DIAGNOSIS — E87.1 HYPONATREMIA: ICD-10-CM

## 2019-04-24 DIAGNOSIS — I10 ESSENTIAL HYPERTENSION: ICD-10-CM

## 2019-04-24 DIAGNOSIS — C64.1 RENAL CELL CARCINOMA OF RIGHT KIDNEY (H): ICD-10-CM

## 2019-04-24 LAB — SODIUM SERPL-SCNC: 137 MMOL/L (ref 136–145)

## 2019-05-06 ENCOUNTER — HOSPITAL ENCOUNTER (OUTPATIENT)
Dept: CT IMAGING | Facility: CLINIC | Age: 84
Setting detail: RADIATION/ONCOLOGY SERIES
Discharge: STILL ADMITTED OR ADMINISTRATIVE | End: 2019-05-06
Attending: INTERNAL MEDICINE

## 2019-05-06 DIAGNOSIS — C64.1 RENAL CELL CARCINOMA OF RIGHT KIDNEY (H): ICD-10-CM

## 2019-05-09 ENCOUNTER — OFFICE VISIT - HEALTHEAST (OUTPATIENT)
Dept: ONCOLOGY | Facility: CLINIC | Age: 84
End: 2019-05-09

## 2019-05-09 ENCOUNTER — AMBULATORY - HEALTHEAST (OUTPATIENT)
Dept: INFUSION THERAPY | Facility: CLINIC | Age: 84
End: 2019-05-09

## 2019-05-09 ENCOUNTER — INFUSION - HEALTHEAST (OUTPATIENT)
Dept: INFUSION THERAPY | Facility: CLINIC | Age: 84
End: 2019-05-09

## 2019-05-09 DIAGNOSIS — C64.1 RENAL CELL CARCINOMA OF RIGHT KIDNEY (H): ICD-10-CM

## 2019-05-09 LAB
ALBUMIN SERPL-MCNC: 3.7 G/DL (ref 3.5–5)
ALP SERPL-CCNC: 88 U/L (ref 45–120)
ALT SERPL W P-5'-P-CCNC: 16 U/L (ref 0–45)
ANION GAP SERPL CALCULATED.3IONS-SCNC: 6 MMOL/L (ref 5–18)
AST SERPL W P-5'-P-CCNC: 20 U/L (ref 0–40)
BASOPHILS # BLD AUTO: 0.1 THOU/UL (ref 0–0.2)
BASOPHILS NFR BLD AUTO: 1 % (ref 0–2)
BILIRUB SERPL-MCNC: 0.7 MG/DL (ref 0–1)
BUN SERPL-MCNC: 19 MG/DL (ref 8–28)
CALCIUM SERPL-MCNC: 10 MG/DL (ref 8.5–10.5)
CHLORIDE BLD-SCNC: 106 MMOL/L (ref 98–107)
CO2 SERPL-SCNC: 27 MMOL/L (ref 22–31)
CREAT SERPL-MCNC: 0.82 MG/DL (ref 0.6–1.1)
EOSINOPHIL # BLD AUTO: 0.1 THOU/UL (ref 0–0.4)
EOSINOPHIL NFR BLD AUTO: 1 % (ref 0–6)
ERYTHROCYTE [DISTWIDTH] IN BLOOD BY AUTOMATED COUNT: 15.9 % (ref 11–14.5)
GFR SERPL CREATININE-BSD FRML MDRD: >60 ML/MIN/1.73M2
GLUCOSE BLD-MCNC: 74 MG/DL (ref 70–125)
HCT VFR BLD AUTO: 38.4 % (ref 35–47)
HGB BLD-MCNC: 12.2 G/DL (ref 12–16)
LYMPHOCYTES # BLD AUTO: 1.1 THOU/UL (ref 0.8–4.4)
LYMPHOCYTES NFR BLD AUTO: 17 % (ref 20–40)
MCH RBC QN AUTO: 29.9 PG (ref 27–34)
MCHC RBC AUTO-ENTMCNC: 31.8 G/DL (ref 32–36)
MCV RBC AUTO: 94 FL (ref 80–100)
MONOCYTES # BLD AUTO: 0.5 THOU/UL (ref 0–0.9)
MONOCYTES NFR BLD AUTO: 7 % (ref 2–10)
NEUTROPHILS # BLD AUTO: 4.9 THOU/UL (ref 2–7.7)
NEUTROPHILS NFR BLD AUTO: 74 % (ref 50–70)
PLATELET # BLD AUTO: 308 THOU/UL (ref 140–440)
PMV BLD AUTO: 10.2 FL (ref 8.5–12.5)
POTASSIUM BLD-SCNC: 4 MMOL/L (ref 3.5–5)
PROT SERPL-MCNC: 6.2 G/DL (ref 6–8)
RBC # BLD AUTO: 4.08 MILL/UL (ref 3.8–5.4)
SODIUM SERPL-SCNC: 139 MMOL/L (ref 136–145)
TSH SERPL DL<=0.005 MIU/L-ACNC: 2.55 UIU/ML (ref 0.3–5)
WBC: 6.6 THOU/UL (ref 4–11)

## 2019-05-22 ENCOUNTER — INFUSION - HEALTHEAST (OUTPATIENT)
Dept: INFUSION THERAPY | Facility: CLINIC | Age: 84
End: 2019-05-22

## 2019-05-22 DIAGNOSIS — C64.1 RENAL CELL CARCINOMA OF RIGHT KIDNEY (H): ICD-10-CM

## 2019-06-03 ENCOUNTER — OFFICE VISIT - HEALTHEAST (OUTPATIENT)
Dept: FAMILY MEDICINE | Facility: CLINIC | Age: 84
End: 2019-06-03

## 2019-06-03 DIAGNOSIS — I10 ESSENTIAL HYPERTENSION: ICD-10-CM

## 2019-06-03 DIAGNOSIS — M06.9 RHEUMATOID ARTHRITIS OF HAND, UNSPECIFIED LATERALITY, UNSPECIFIED RHEUMATOID FACTOR PRESENCE: ICD-10-CM

## 2019-06-03 DIAGNOSIS — C64.1 RENAL CELL CARCINOMA OF RIGHT KIDNEY (H): ICD-10-CM

## 2019-06-03 DIAGNOSIS — C79.51 METASTASIS TO BONE (H): ICD-10-CM

## 2019-06-03 DIAGNOSIS — B07.8 OTHER VIRAL WARTS: ICD-10-CM

## 2019-06-03 DIAGNOSIS — L98.9 SORE ON LEG: ICD-10-CM

## 2019-06-03 DIAGNOSIS — E21.3 HYPERPARATHYROIDISM (H): ICD-10-CM

## 2019-06-05 ENCOUNTER — AMBULATORY - HEALTHEAST (OUTPATIENT)
Dept: INFUSION THERAPY | Facility: CLINIC | Age: 84
End: 2019-06-05

## 2019-06-05 ENCOUNTER — INFUSION - HEALTHEAST (OUTPATIENT)
Dept: INFUSION THERAPY | Facility: CLINIC | Age: 84
End: 2019-06-05

## 2019-06-05 ENCOUNTER — OFFICE VISIT - HEALTHEAST (OUTPATIENT)
Dept: ONCOLOGY | Facility: CLINIC | Age: 84
End: 2019-06-05

## 2019-06-05 DIAGNOSIS — C64.1 RENAL CELL CARCINOMA OF RIGHT KIDNEY (H): ICD-10-CM

## 2019-06-05 DIAGNOSIS — C79.51 METASTASIS TO BONE (H): ICD-10-CM

## 2019-06-05 DIAGNOSIS — Z51.11 CHEMOTHERAPY MANAGEMENT, ENCOUNTER FOR: ICD-10-CM

## 2019-06-05 DIAGNOSIS — Z51.12 ENCOUNTER FOR ANTINEOPLASTIC IMMUNOTHERAPY: ICD-10-CM

## 2019-06-05 LAB
ALBUMIN SERPL-MCNC: 3.8 G/DL (ref 3.5–5)
ALP SERPL-CCNC: 105 U/L (ref 45–120)
ALT SERPL W P-5'-P-CCNC: 18 U/L (ref 0–45)
ANION GAP SERPL CALCULATED.3IONS-SCNC: 7 MMOL/L (ref 5–18)
AST SERPL W P-5'-P-CCNC: 19 U/L (ref 0–40)
BASOPHILS # BLD AUTO: 0.1 THOU/UL (ref 0–0.2)
BASOPHILS NFR BLD AUTO: 1 % (ref 0–2)
BILIRUB SERPL-MCNC: 0.6 MG/DL (ref 0–1)
BUN SERPL-MCNC: 21 MG/DL (ref 8–28)
CALCIUM SERPL-MCNC: 10.4 MG/DL (ref 8.5–10.5)
CHLORIDE BLD-SCNC: 106 MMOL/L (ref 98–107)
CO2 SERPL-SCNC: 26 MMOL/L (ref 22–31)
CREAT SERPL-MCNC: 0.85 MG/DL (ref 0.6–1.1)
EOSINOPHIL # BLD AUTO: 0.1 THOU/UL (ref 0–0.4)
EOSINOPHIL NFR BLD AUTO: 1 % (ref 0–6)
ERYTHROCYTE [DISTWIDTH] IN BLOOD BY AUTOMATED COUNT: 15.4 % (ref 11–14.5)
GFR SERPL CREATININE-BSD FRML MDRD: >60 ML/MIN/1.73M2
GLUCOSE BLD-MCNC: 111 MG/DL (ref 70–125)
HCT VFR BLD AUTO: 36.9 % (ref 35–47)
HGB BLD-MCNC: 12.1 G/DL (ref 12–16)
LYMPHOCYTES # BLD AUTO: 1.3 THOU/UL (ref 0.8–4.4)
LYMPHOCYTES NFR BLD AUTO: 17 % (ref 20–40)
MCH RBC QN AUTO: 30.3 PG (ref 27–34)
MCHC RBC AUTO-ENTMCNC: 32.8 G/DL (ref 32–36)
MCV RBC AUTO: 93 FL (ref 80–100)
MONOCYTES # BLD AUTO: 0.6 THOU/UL (ref 0–0.9)
MONOCYTES NFR BLD AUTO: 7 % (ref 2–10)
NEUTROPHILS # BLD AUTO: 5.9 THOU/UL (ref 2–7.7)
NEUTROPHILS NFR BLD AUTO: 74 % (ref 50–70)
PLATELET # BLD AUTO: 297 THOU/UL (ref 140–440)
PMV BLD AUTO: 9.8 FL (ref 8.5–12.5)
POTASSIUM BLD-SCNC: 4.3 MMOL/L (ref 3.5–5)
PROT SERPL-MCNC: 6.5 G/DL (ref 6–8)
RBC # BLD AUTO: 3.99 MILL/UL (ref 3.8–5.4)
SODIUM SERPL-SCNC: 139 MMOL/L (ref 136–145)
TSH SERPL DL<=0.005 MIU/L-ACNC: 1.7 UIU/ML (ref 0.3–5)
WBC: 8 THOU/UL (ref 4–11)

## 2019-06-06 ENCOUNTER — COMMUNICATION - HEALTHEAST (OUTPATIENT)
Dept: FAMILY MEDICINE | Facility: CLINIC | Age: 84
End: 2019-06-06

## 2019-06-19 ENCOUNTER — INFUSION - HEALTHEAST (OUTPATIENT)
Dept: INFUSION THERAPY | Facility: CLINIC | Age: 84
End: 2019-06-19

## 2019-06-19 DIAGNOSIS — C64.1 RENAL CELL CARCINOMA OF RIGHT KIDNEY (H): ICD-10-CM

## 2019-06-22 ENCOUNTER — COMMUNICATION - HEALTHEAST (OUTPATIENT)
Dept: FAMILY MEDICINE | Facility: CLINIC | Age: 84
End: 2019-06-22

## 2019-06-22 DIAGNOSIS — I10 HTN (HYPERTENSION): ICD-10-CM

## 2019-06-22 DIAGNOSIS — I10 ESSENTIAL HYPERTENSION: ICD-10-CM

## 2019-07-01 ENCOUNTER — COMMUNICATION - HEALTHEAST (OUTPATIENT)
Dept: FAMILY MEDICINE | Facility: CLINIC | Age: 84
End: 2019-07-01

## 2019-07-03 ENCOUNTER — OFFICE VISIT - HEALTHEAST (OUTPATIENT)
Dept: ONCOLOGY | Facility: CLINIC | Age: 84
End: 2019-07-03

## 2019-07-03 ENCOUNTER — AMBULATORY - HEALTHEAST (OUTPATIENT)
Dept: INFUSION THERAPY | Facility: CLINIC | Age: 84
End: 2019-07-03

## 2019-07-03 ENCOUNTER — INFUSION - HEALTHEAST (OUTPATIENT)
Dept: INFUSION THERAPY | Facility: CLINIC | Age: 84
End: 2019-07-03

## 2019-07-03 DIAGNOSIS — C64.1 RENAL CELL CARCINOMA OF RIGHT KIDNEY (H): ICD-10-CM

## 2019-07-03 DIAGNOSIS — Z93.59 CHRONIC SUPRAPUBIC CATHETER (H): ICD-10-CM

## 2019-07-03 DIAGNOSIS — Z51.12 ENCOUNTER FOR ANTINEOPLASTIC IMMUNOTHERAPY: ICD-10-CM

## 2019-07-03 DIAGNOSIS — K59.01 SLOW TRANSIT CONSTIPATION: ICD-10-CM

## 2019-07-03 DIAGNOSIS — C79.51 METASTASIS TO BONE (H): ICD-10-CM

## 2019-07-03 LAB
ALBUMIN SERPL-MCNC: 3.7 G/DL (ref 3.5–5)
ALP SERPL-CCNC: 90 U/L (ref 45–120)
ALT SERPL W P-5'-P-CCNC: 14 U/L (ref 0–45)
ANION GAP SERPL CALCULATED.3IONS-SCNC: 9 MMOL/L (ref 5–18)
AST SERPL W P-5'-P-CCNC: 18 U/L (ref 0–40)
BASOPHILS # BLD AUTO: 0.1 THOU/UL (ref 0–0.2)
BASOPHILS NFR BLD AUTO: 1 % (ref 0–2)
BILIRUB SERPL-MCNC: 0.6 MG/DL (ref 0–1)
BUN SERPL-MCNC: 21 MG/DL (ref 8–28)
CALCIUM SERPL-MCNC: 10.2 MG/DL (ref 8.5–10.5)
CHLORIDE BLD-SCNC: 103 MMOL/L (ref 98–107)
CO2 SERPL-SCNC: 25 MMOL/L (ref 22–31)
CREAT SERPL-MCNC: 0.9 MG/DL (ref 0.6–1.1)
EOSINOPHIL # BLD AUTO: 0.1 THOU/UL (ref 0–0.4)
EOSINOPHIL NFR BLD AUTO: 2 % (ref 0–6)
ERYTHROCYTE [DISTWIDTH] IN BLOOD BY AUTOMATED COUNT: 14.9 % (ref 11–14.5)
GFR SERPL CREATININE-BSD FRML MDRD: 59 ML/MIN/1.73M2
GLUCOSE BLD-MCNC: 105 MG/DL (ref 70–125)
HCT VFR BLD AUTO: 38.1 % (ref 35–47)
HGB BLD-MCNC: 12.2 G/DL (ref 12–16)
LYMPHOCYTES # BLD AUTO: 1.2 THOU/UL (ref 0.8–4.4)
LYMPHOCYTES NFR BLD AUTO: 21 % (ref 20–40)
MCH RBC QN AUTO: 29.8 PG (ref 27–34)
MCHC RBC AUTO-ENTMCNC: 32 G/DL (ref 32–36)
MCV RBC AUTO: 93 FL (ref 80–100)
MONOCYTES # BLD AUTO: 0.6 THOU/UL (ref 0–0.9)
MONOCYTES NFR BLD AUTO: 10 % (ref 2–10)
NEUTROPHILS # BLD AUTO: 3.8 THOU/UL (ref 2–7.7)
NEUTROPHILS NFR BLD AUTO: 67 % (ref 50–70)
PLATELET # BLD AUTO: 306 THOU/UL (ref 140–440)
PMV BLD AUTO: 10.2 FL (ref 8.5–12.5)
POTASSIUM BLD-SCNC: 4.5 MMOL/L (ref 3.5–5)
PROT SERPL-MCNC: 6.1 G/DL (ref 6–8)
RBC # BLD AUTO: 4.1 MILL/UL (ref 3.8–5.4)
SODIUM SERPL-SCNC: 137 MMOL/L (ref 136–145)
TSH SERPL DL<=0.005 MIU/L-ACNC: 1.44 UIU/ML (ref 0.3–5)
WBC: 5.7 THOU/UL (ref 4–11)

## 2019-07-08 ENCOUNTER — COMMUNICATION - HEALTHEAST (OUTPATIENT)
Dept: ONCOLOGY | Facility: HOSPITAL | Age: 84
End: 2019-07-08

## 2019-07-15 ENCOUNTER — RECORDS - HEALTHEAST (OUTPATIENT)
Dept: ADMINISTRATIVE | Facility: OTHER | Age: 84
End: 2019-07-15

## 2019-07-17 ENCOUNTER — INFUSION - HEALTHEAST (OUTPATIENT)
Dept: INFUSION THERAPY | Facility: CLINIC | Age: 84
End: 2019-07-17

## 2019-07-17 DIAGNOSIS — C64.1 RENAL CELL CARCINOMA OF RIGHT KIDNEY (H): ICD-10-CM

## 2019-07-19 ENCOUNTER — COMMUNICATION - HEALTHEAST (OUTPATIENT)
Dept: SCHEDULING | Facility: CLINIC | Age: 84
End: 2019-07-19

## 2019-07-23 ENCOUNTER — COMMUNICATION - HEALTHEAST (OUTPATIENT)
Dept: SCHEDULING | Facility: CLINIC | Age: 84
End: 2019-07-23

## 2019-07-24 ENCOUNTER — COMMUNICATION - HEALTHEAST (OUTPATIENT)
Dept: CARE COORDINATION | Facility: CLINIC | Age: 84
End: 2019-07-24

## 2019-07-26 ENCOUNTER — OFFICE VISIT - HEALTHEAST (OUTPATIENT)
Dept: FAMILY MEDICINE | Facility: CLINIC | Age: 84
End: 2019-07-26

## 2019-07-26 DIAGNOSIS — K59.00 OBSTIPATION: ICD-10-CM

## 2019-07-26 ASSESSMENT — MIFFLIN-ST. JEOR: SCORE: 796.65

## 2019-07-31 ENCOUNTER — OFFICE VISIT - HEALTHEAST (OUTPATIENT)
Dept: ONCOLOGY | Facility: CLINIC | Age: 84
End: 2019-07-31

## 2019-07-31 ENCOUNTER — AMBULATORY - HEALTHEAST (OUTPATIENT)
Dept: INFUSION THERAPY | Facility: CLINIC | Age: 84
End: 2019-07-31

## 2019-07-31 ENCOUNTER — INFUSION - HEALTHEAST (OUTPATIENT)
Dept: INFUSION THERAPY | Facility: CLINIC | Age: 84
End: 2019-07-31

## 2019-07-31 DIAGNOSIS — C79.51 METASTASIS TO BONE (H): ICD-10-CM

## 2019-07-31 DIAGNOSIS — K59.01 SLOW TRANSIT CONSTIPATION: ICD-10-CM

## 2019-07-31 DIAGNOSIS — C64.1 RENAL CELL CARCINOMA OF RIGHT KIDNEY (H): ICD-10-CM

## 2019-07-31 DIAGNOSIS — Z93.59 CHRONIC SUPRAPUBIC CATHETER (H): ICD-10-CM

## 2019-07-31 DIAGNOSIS — Z51.12 ENCOUNTER FOR ANTINEOPLASTIC IMMUNOTHERAPY: ICD-10-CM

## 2019-07-31 LAB
ALBUMIN SERPL-MCNC: 3.6 G/DL (ref 3.5–5)
ALP SERPL-CCNC: 95 U/L (ref 45–120)
ALT SERPL W P-5'-P-CCNC: 15 U/L (ref 0–45)
ANION GAP SERPL CALCULATED.3IONS-SCNC: 9 MMOL/L (ref 5–18)
AST SERPL W P-5'-P-CCNC: 20 U/L (ref 0–40)
BASOPHILS # BLD AUTO: 0.1 THOU/UL (ref 0–0.2)
BASOPHILS NFR BLD AUTO: 1 % (ref 0–2)
BILIRUB SERPL-MCNC: 0.4 MG/DL (ref 0–1)
BUN SERPL-MCNC: 22 MG/DL (ref 8–28)
CALCIUM SERPL-MCNC: 10.3 MG/DL (ref 8.5–10.5)
CHLORIDE BLD-SCNC: 104 MMOL/L (ref 98–107)
CO2 SERPL-SCNC: 26 MMOL/L (ref 22–31)
CREAT SERPL-MCNC: 0.87 MG/DL (ref 0.6–1.1)
EOSINOPHIL # BLD AUTO: 0.2 THOU/UL (ref 0–0.4)
EOSINOPHIL NFR BLD AUTO: 2 % (ref 0–6)
ERYTHROCYTE [DISTWIDTH] IN BLOOD BY AUTOMATED COUNT: 14.8 % (ref 11–14.5)
GFR SERPL CREATININE-BSD FRML MDRD: >60 ML/MIN/1.73M2
GLUCOSE BLD-MCNC: 109 MG/DL (ref 70–125)
HCT VFR BLD AUTO: 36.4 % (ref 35–47)
HGB BLD-MCNC: 11.7 G/DL (ref 12–16)
LYMPHOCYTES # BLD AUTO: 1.7 THOU/UL (ref 0.8–4.4)
LYMPHOCYTES NFR BLD AUTO: 20 % (ref 20–40)
MCH RBC QN AUTO: 29.5 PG (ref 27–34)
MCHC RBC AUTO-ENTMCNC: 32.1 G/DL (ref 32–36)
MCV RBC AUTO: 92 FL (ref 80–100)
MONOCYTES # BLD AUTO: 0.5 THOU/UL (ref 0–0.9)
MONOCYTES NFR BLD AUTO: 6 % (ref 2–10)
NEUTROPHILS # BLD AUTO: 6.1 THOU/UL (ref 2–7.7)
NEUTROPHILS NFR BLD AUTO: 72 % (ref 50–70)
PLATELET # BLD AUTO: 343 THOU/UL (ref 140–440)
PMV BLD AUTO: 9.9 FL (ref 8.5–12.5)
POTASSIUM BLD-SCNC: 4.2 MMOL/L (ref 3.5–5)
PROT SERPL-MCNC: 6.7 G/DL (ref 6–8)
RBC # BLD AUTO: 3.96 MILL/UL (ref 3.8–5.4)
SODIUM SERPL-SCNC: 139 MMOL/L (ref 136–145)
TSH SERPL DL<=0.005 MIU/L-ACNC: 2.27 UIU/ML (ref 0.3–5)
WBC: 8.5 THOU/UL (ref 4–11)

## 2019-08-09 PROBLEM — R53.81 PHYSICAL DECONDITIONING: Status: RESOLVED | Noted: 2018-10-01 | Resolved: 2019-08-09

## 2019-08-14 ENCOUNTER — COMMUNICATION - HEALTHEAST (OUTPATIENT)
Dept: FAMILY MEDICINE | Facility: CLINIC | Age: 84
End: 2019-08-14

## 2019-08-16 ENCOUNTER — COMMUNICATION - HEALTHEAST (OUTPATIENT)
Dept: CARE COORDINATION | Facility: CLINIC | Age: 84
End: 2019-08-16

## 2019-08-20 ENCOUNTER — INFUSION - HEALTHEAST (OUTPATIENT)
Dept: INFUSION THERAPY | Facility: CLINIC | Age: 84
End: 2019-08-20

## 2019-08-20 DIAGNOSIS — C64.1 RENAL CELL CARCINOMA OF RIGHT KIDNEY (H): ICD-10-CM

## 2019-08-20 ASSESSMENT — MIFFLIN-ST. JEOR: SCORE: 804.58

## 2019-08-23 ENCOUNTER — OFFICE VISIT - HEALTHEAST (OUTPATIENT)
Dept: FAMILY MEDICINE | Facility: CLINIC | Age: 84
End: 2019-08-23

## 2019-08-23 DIAGNOSIS — R53.81 PHYSICAL DECONDITIONING: ICD-10-CM

## 2019-08-23 DIAGNOSIS — Z96.0 URINARY CATHETER IN PLACE: ICD-10-CM

## 2019-08-23 DIAGNOSIS — C64.1 RENAL CELL CARCINOMA OF RIGHT KIDNEY (H): ICD-10-CM

## 2019-08-23 DIAGNOSIS — N39.0 URINARY TRACT INFECTION ASSOCIATED WITH CYSTOSTOMY CATHETER, INITIAL ENCOUNTER (H): ICD-10-CM

## 2019-08-23 DIAGNOSIS — T83.510A URINARY TRACT INFECTION ASSOCIATED WITH CYSTOSTOMY CATHETER, INITIAL ENCOUNTER (H): ICD-10-CM

## 2019-08-23 DIAGNOSIS — I10 ESSENTIAL HYPERTENSION: ICD-10-CM

## 2019-08-23 ASSESSMENT — MIFFLIN-ST. JEOR: SCORE: 795.51

## 2019-08-27 ENCOUNTER — COMMUNICATION - HEALTHEAST (OUTPATIENT)
Dept: FAMILY MEDICINE | Facility: CLINIC | Age: 84
End: 2019-08-27

## 2019-08-28 ENCOUNTER — COMMUNICATION - HEALTHEAST (OUTPATIENT)
Dept: FAMILY MEDICINE | Facility: CLINIC | Age: 84
End: 2019-08-28

## 2019-09-02 ENCOUNTER — RECORDS - HEALTHEAST (OUTPATIENT)
Dept: ADMINISTRATIVE | Facility: OTHER | Age: 84
End: 2019-09-02

## 2019-09-03 ENCOUNTER — COMMUNICATION - HEALTHEAST (OUTPATIENT)
Dept: SCHEDULING | Facility: CLINIC | Age: 84
End: 2019-09-03

## 2019-09-06 ENCOUNTER — COMMUNICATION - HEALTHEAST (OUTPATIENT)
Dept: ADMINISTRATIVE | Facility: HOSPITAL | Age: 84
End: 2019-09-06

## 2019-09-09 ENCOUNTER — COMMUNICATION - HEALTHEAST (OUTPATIENT)
Dept: ONCOLOGY | Facility: CLINIC | Age: 84
End: 2019-09-09

## 2019-09-09 ENCOUNTER — COMMUNICATION - HEALTHEAST (OUTPATIENT)
Dept: CARE COORDINATION | Facility: CLINIC | Age: 84
End: 2019-09-09

## 2019-09-10 ENCOUNTER — COMMUNICATION - HEALTHEAST (OUTPATIENT)
Dept: FAMILY MEDICINE | Facility: CLINIC | Age: 84
End: 2019-09-10

## 2019-09-11 ENCOUNTER — OFFICE VISIT - HEALTHEAST (OUTPATIENT)
Dept: ONCOLOGY | Facility: CLINIC | Age: 84
End: 2019-09-11

## 2019-09-11 ENCOUNTER — COMMUNICATION - HEALTHEAST (OUTPATIENT)
Dept: FAMILY MEDICINE | Facility: CLINIC | Age: 84
End: 2019-09-11

## 2019-09-11 ENCOUNTER — INFUSION - HEALTHEAST (OUTPATIENT)
Dept: INFUSION THERAPY | Facility: CLINIC | Age: 84
End: 2019-09-11

## 2019-09-11 ENCOUNTER — AMBULATORY - HEALTHEAST (OUTPATIENT)
Dept: INFUSION THERAPY | Facility: CLINIC | Age: 84
End: 2019-09-11

## 2019-09-11 DIAGNOSIS — K59.01 SLOW TRANSIT CONSTIPATION: ICD-10-CM

## 2019-09-11 DIAGNOSIS — C64.1 RENAL CELL CARCINOMA OF RIGHT KIDNEY (H): ICD-10-CM

## 2019-09-11 DIAGNOSIS — C79.51 METASTASIS TO BONE (H): ICD-10-CM

## 2019-09-11 DIAGNOSIS — Z51.11 CHEMOTHERAPY MANAGEMENT, ENCOUNTER FOR: ICD-10-CM

## 2019-09-11 DIAGNOSIS — C68.0 MALIGNANT NEOPLASM OF URETHRA (H): ICD-10-CM

## 2019-09-11 DIAGNOSIS — Z51.12 ENCOUNTER FOR ANTINEOPLASTIC IMMUNOTHERAPY: ICD-10-CM

## 2019-09-11 LAB
ALBUMIN SERPL-MCNC: 3.1 G/DL (ref 3.5–5)
ALP SERPL-CCNC: 108 U/L (ref 45–120)
ALT SERPL W P-5'-P-CCNC: 12 U/L (ref 0–45)
ANION GAP SERPL CALCULATED.3IONS-SCNC: 9 MMOL/L (ref 5–18)
AST SERPL W P-5'-P-CCNC: 17 U/L (ref 0–40)
BASOPHILS # BLD AUTO: 0.1 THOU/UL (ref 0–0.2)
BASOPHILS NFR BLD AUTO: 1 % (ref 0–2)
BILIRUB SERPL-MCNC: 0.4 MG/DL (ref 0–1)
BUN SERPL-MCNC: 18 MG/DL (ref 8–28)
CALCIUM SERPL-MCNC: 9.9 MG/DL (ref 8.5–10.5)
CHLORIDE BLD-SCNC: 101 MMOL/L (ref 98–107)
CO2 SERPL-SCNC: 27 MMOL/L (ref 22–31)
CREAT SERPL-MCNC: 1.02 MG/DL (ref 0.6–1.1)
EOSINOPHIL # BLD AUTO: 0.1 THOU/UL (ref 0–0.4)
EOSINOPHIL NFR BLD AUTO: 1 % (ref 0–6)
ERYTHROCYTE [DISTWIDTH] IN BLOOD BY AUTOMATED COUNT: 16.3 % (ref 11–14.5)
GFR SERPL CREATININE-BSD FRML MDRD: 51 ML/MIN/1.73M2
GLUCOSE BLD-MCNC: 96 MG/DL (ref 70–125)
HCT VFR BLD AUTO: 32.7 % (ref 35–47)
HGB BLD-MCNC: 10.5 G/DL (ref 12–16)
LYMPHOCYTES # BLD AUTO: 1.5 THOU/UL (ref 0.8–4.4)
LYMPHOCYTES NFR BLD AUTO: 16 % (ref 20–40)
MCH RBC QN AUTO: 28.8 PG (ref 27–34)
MCHC RBC AUTO-ENTMCNC: 32.1 G/DL (ref 32–36)
MCV RBC AUTO: 90 FL (ref 80–100)
MONOCYTES # BLD AUTO: 0.9 THOU/UL (ref 0–0.9)
MONOCYTES NFR BLD AUTO: 10 % (ref 2–10)
NEUTROPHILS # BLD AUTO: 6.8 THOU/UL (ref 2–7.7)
NEUTROPHILS NFR BLD AUTO: 72 % (ref 50–70)
PLATELET # BLD AUTO: 442 THOU/UL (ref 140–440)
PMV BLD AUTO: 10.4 FL (ref 8.5–12.5)
POTASSIUM BLD-SCNC: 4.1 MMOL/L (ref 3.5–5)
PROT SERPL-MCNC: 6.4 G/DL (ref 6–8)
RBC # BLD AUTO: 3.64 MILL/UL (ref 3.8–5.4)
SODIUM SERPL-SCNC: 137 MMOL/L (ref 136–145)
TSH SERPL DL<=0.005 MIU/L-ACNC: 1.02 UIU/ML (ref 0.3–5)
WBC: 9.4 THOU/UL (ref 4–11)

## 2019-09-20 ENCOUNTER — OFFICE VISIT - HEALTHEAST (OUTPATIENT)
Dept: FAMILY MEDICINE | Facility: CLINIC | Age: 84
End: 2019-09-20

## 2019-09-20 ENCOUNTER — HOSPITAL ENCOUNTER (OUTPATIENT)
Dept: LAB | Age: 84
Setting detail: SPECIMEN
Discharge: HOME | End: 2019-09-20

## 2019-09-20 DIAGNOSIS — L30.9 DERMATITIS: ICD-10-CM

## 2019-09-20 DIAGNOSIS — C79.51 METASTASIS TO BONE (H): ICD-10-CM

## 2019-09-20 DIAGNOSIS — T83.511D URINARY TRACT INFECTION ASSOCIATED WITH INDWELLING URETHRAL CATHETER, SUBSEQUENT ENCOUNTER: ICD-10-CM

## 2019-09-20 DIAGNOSIS — I10 ESSENTIAL HYPERTENSION: ICD-10-CM

## 2019-09-20 DIAGNOSIS — Z93.59 CHRONIC SUPRAPUBIC CATHETER (H): ICD-10-CM

## 2019-09-20 DIAGNOSIS — D64.9 ANEMIA, UNSPECIFIED TYPE: ICD-10-CM

## 2019-09-20 DIAGNOSIS — K59.01 SLOW TRANSIT CONSTIPATION: ICD-10-CM

## 2019-09-20 DIAGNOSIS — N39.0 URINARY TRACT INFECTION ASSOCIATED WITH INDWELLING URETHRAL CATHETER, SUBSEQUENT ENCOUNTER: ICD-10-CM

## 2019-09-20 LAB
FERRITIN SERPL-MCNC: 39 NG/ML (ref 10–130)
IRON SATN MFR SERPL: 9 % (ref 20–50)
IRON SERPL-MCNC: 37 UG/DL (ref 42–175)
TIBC SERPL-MCNC: 396 UG/DL (ref 313–563)
TRANSFERRIN SERPL-MCNC: 317 MG/DL (ref 212–360)
VIT B12 SERPL-MCNC: 402 PG/ML (ref 213–816)

## 2019-09-21 LAB
BASOPHILS # BLD AUTO: 0.1 THOU/UL (ref 0–0.2)
BASOPHILS NFR BLD AUTO: 1 % (ref 0–2)
EOSINOPHIL # BLD AUTO: 0.1 THOU/UL (ref 0–0.4)
EOSINOPHIL NFR BLD AUTO: 1 % (ref 0–6)
ERYTHROCYTE [DISTWIDTH] IN BLOOD BY AUTOMATED COUNT: 16.6 % (ref 11–14.5)
HCT VFR BLD AUTO: 37.8 % (ref 35–47)
HGB BLD-MCNC: 11.8 G/DL (ref 12–16)
LYMPHOCYTES # BLD AUTO: 1.5 THOU/UL (ref 0.8–4.4)
LYMPHOCYTES NFR BLD AUTO: 18 % (ref 20–40)
MCH RBC QN AUTO: 28.4 PG (ref 27–34)
MCHC RBC AUTO-ENTMCNC: 31.2 G/DL (ref 32–36)
MCV RBC AUTO: 91 FL (ref 80–100)
MONOCYTES # BLD AUTO: 0.7 THOU/UL (ref 0–0.9)
MONOCYTES NFR BLD AUTO: 9 % (ref 2–10)
NEUTROPHILS # BLD AUTO: 5.9 THOU/UL (ref 2–7.7)
NEUTROPHILS NFR BLD AUTO: 71 % (ref 50–70)
PATH REPORT.MICROSCOPIC SPEC OTHER STN: ABNORMAL
PLAT MORPH BLD: NORMAL
PLATELET # BLD AUTO: 411 THOU/UL (ref 140–440)
PMV BLD AUTO: 10.9 FL (ref 8.5–12.5)
POLYCHROMASIA BLD QL SMEAR: ABNORMAL
RBC # BLD AUTO: 4.16 MILL/UL (ref 3.8–5.4)
REACTIVE LYMPHS: ABNORMAL
WBC: 8.3 THOU/UL (ref 4–11)

## 2019-09-23 LAB
LAB AP CHARGES (HE HISTORICAL CONVERSION): NORMAL
PATH REPORT.COMMENTS IMP SPEC: NORMAL
PATH REPORT.COMMENTS IMP SPEC: NORMAL
PATH REPORT.FINAL DX SPEC: NORMAL
PATH REPORT.MICROSCOPIC SPEC OTHER STN: NORMAL
PATH REPORT.RELEVANT HX SPEC: NORMAL

## 2019-09-24 ENCOUNTER — COMMUNICATION - HEALTHEAST (OUTPATIENT)
Dept: FAMILY MEDICINE | Facility: CLINIC | Age: 84
End: 2019-09-24

## 2019-09-24 ENCOUNTER — RECORDS - HEALTHEAST (OUTPATIENT)
Dept: ADMINISTRATIVE | Facility: OTHER | Age: 84
End: 2019-09-24

## 2019-09-25 ENCOUNTER — RECORDS - HEALTHEAST (OUTPATIENT)
Dept: ADMINISTRATIVE | Facility: OTHER | Age: 84
End: 2019-09-25

## 2019-09-25 ENCOUNTER — COMMUNICATION - HEALTHEAST (OUTPATIENT)
Dept: FAMILY MEDICINE | Facility: CLINIC | Age: 84
End: 2019-09-25

## 2019-09-26 ENCOUNTER — INFUSION - HEALTHEAST (OUTPATIENT)
Dept: INFUSION THERAPY | Facility: CLINIC | Age: 84
End: 2019-09-26

## 2019-09-26 DIAGNOSIS — C64.1 RENAL CELL CARCINOMA OF RIGHT KIDNEY (H): ICD-10-CM

## 2019-09-28 ENCOUNTER — RECORDS - HEALTHEAST (OUTPATIENT)
Dept: ADMINISTRATIVE | Facility: OTHER | Age: 84
End: 2019-09-28

## 2019-10-01 ENCOUNTER — COMMUNICATION - HEALTHEAST (OUTPATIENT)
Dept: FAMILY MEDICINE | Facility: CLINIC | Age: 84
End: 2019-10-01

## 2019-10-03 ENCOUNTER — COMMUNICATION - HEALTHEAST (OUTPATIENT)
Dept: FAMILY MEDICINE | Facility: CLINIC | Age: 84
End: 2019-10-03

## 2019-10-04 ENCOUNTER — COMMUNICATION - HEALTHEAST (OUTPATIENT)
Dept: FAMILY MEDICINE | Facility: CLINIC | Age: 84
End: 2019-10-04

## 2019-10-07 ENCOUNTER — HOSPITAL ENCOUNTER (OUTPATIENT)
Dept: CT IMAGING | Facility: CLINIC | Age: 84
Setting detail: RADIATION/ONCOLOGY SERIES
Discharge: STILL ADMITTED OR ADMINISTRATIVE | End: 2019-10-07
Attending: INTERNAL MEDICINE

## 2019-10-07 DIAGNOSIS — C64.1 RENAL CELL CARCINOMA OF RIGHT KIDNEY (H): ICD-10-CM

## 2019-10-07 DIAGNOSIS — C79.51 METASTASIS TO BONE (H): ICD-10-CM

## 2019-10-10 ENCOUNTER — COMMUNICATION - HEALTHEAST (OUTPATIENT)
Dept: FAMILY MEDICINE | Facility: CLINIC | Age: 84
End: 2019-10-10

## 2019-10-10 ENCOUNTER — OFFICE VISIT - HEALTHEAST (OUTPATIENT)
Dept: ONCOLOGY | Facility: CLINIC | Age: 84
End: 2019-10-10

## 2019-10-10 ENCOUNTER — INFUSION - HEALTHEAST (OUTPATIENT)
Dept: INFUSION THERAPY | Facility: CLINIC | Age: 84
End: 2019-10-10

## 2019-10-10 ENCOUNTER — RECORDS - HEALTHEAST (OUTPATIENT)
Dept: ADMINISTRATIVE | Facility: OTHER | Age: 84
End: 2019-10-10

## 2019-10-10 ENCOUNTER — AMBULATORY - HEALTHEAST (OUTPATIENT)
Dept: INFUSION THERAPY | Facility: CLINIC | Age: 84
End: 2019-10-10

## 2019-10-10 DIAGNOSIS — Z96.649 HISTORY OF TOTAL HIP REPLACEMENT, UNSPECIFIED LATERALITY: ICD-10-CM

## 2019-10-10 DIAGNOSIS — C64.1 RENAL CELL CARCINOMA OF RIGHT KIDNEY (H): ICD-10-CM

## 2019-10-10 LAB
ALBUMIN SERPL-MCNC: 3.8 G/DL (ref 3.5–5)
ALP SERPL-CCNC: 104 U/L (ref 45–120)
ALT SERPL W P-5'-P-CCNC: <9 U/L (ref 0–45)
ANION GAP SERPL CALCULATED.3IONS-SCNC: 8 MMOL/L (ref 5–18)
AST SERPL W P-5'-P-CCNC: 16 U/L (ref 0–40)
BASOPHILS # BLD AUTO: 0.1 THOU/UL (ref 0–0.2)
BASOPHILS NFR BLD AUTO: 1 % (ref 0–2)
BILIRUB SERPL-MCNC: 0.7 MG/DL (ref 0–1)
BUN SERPL-MCNC: 22 MG/DL (ref 8–28)
CALCIUM SERPL-MCNC: 10.4 MG/DL (ref 8.5–10.5)
CHLORIDE BLD-SCNC: 104 MMOL/L (ref 98–107)
CO2 SERPL-SCNC: 25 MMOL/L (ref 22–31)
CREAT SERPL-MCNC: 0.81 MG/DL (ref 0.6–1.1)
EOSINOPHIL # BLD AUTO: 0.1 THOU/UL (ref 0–0.4)
EOSINOPHIL NFR BLD AUTO: 2 % (ref 0–6)
ERYTHROCYTE [DISTWIDTH] IN BLOOD BY AUTOMATED COUNT: 15.5 % (ref 11–14.5)
GFR SERPL CREATININE-BSD FRML MDRD: >60 ML/MIN/1.73M2
GLUCOSE BLD-MCNC: 77 MG/DL (ref 70–125)
HCT VFR BLD AUTO: 39.7 % (ref 35–47)
HGB BLD-MCNC: 12.3 G/DL (ref 12–16)
LYMPHOCYTES # BLD AUTO: 1.6 THOU/UL (ref 0.8–4.4)
LYMPHOCYTES NFR BLD AUTO: 22 % (ref 20–40)
MCH RBC QN AUTO: 27.9 PG (ref 27–34)
MCHC RBC AUTO-ENTMCNC: 31 G/DL (ref 32–36)
MCV RBC AUTO: 90 FL (ref 80–100)
MONOCYTES # BLD AUTO: 0.6 THOU/UL (ref 0–0.9)
MONOCYTES NFR BLD AUTO: 8 % (ref 2–10)
NEUTROPHILS # BLD AUTO: 4.9 THOU/UL (ref 2–7.7)
NEUTROPHILS NFR BLD AUTO: 67 % (ref 50–70)
PLATELET # BLD AUTO: 303 THOU/UL (ref 140–440)
PMV BLD AUTO: 10.6 FL (ref 8.5–12.5)
POTASSIUM BLD-SCNC: 4.2 MMOL/L (ref 3.5–5)
PROT SERPL-MCNC: 6.8 G/DL (ref 6–8)
RBC # BLD AUTO: 4.41 MILL/UL (ref 3.8–5.4)
SODIUM SERPL-SCNC: 137 MMOL/L (ref 136–145)
TSH SERPL DL<=0.005 MIU/L-ACNC: 1.94 UIU/ML (ref 0.3–5)
WBC: 7.3 THOU/UL (ref 4–11)

## 2019-10-14 ENCOUNTER — AMBULATORY - HEALTHEAST (OUTPATIENT)
Dept: FAMILY MEDICINE | Facility: CLINIC | Age: 84
End: 2019-10-14

## 2019-10-14 ENCOUNTER — COMMUNICATION - HEALTHEAST (OUTPATIENT)
Dept: FAMILY MEDICINE | Facility: CLINIC | Age: 84
End: 2019-10-14

## 2019-10-14 RX ORDER — AMOXICILLIN 500 MG/1
2000 CAPSULE ORAL PRN
Qty: 16 CAPSULE | Refills: 1 | Status: SHIPPED | OUTPATIENT
Start: 2019-10-14

## 2019-10-15 ENCOUNTER — RECORDS - HEALTHEAST (OUTPATIENT)
Dept: ADMINISTRATIVE | Facility: OTHER | Age: 84
End: 2019-10-15

## 2019-10-21 ENCOUNTER — COMMUNICATION - HEALTHEAST (OUTPATIENT)
Dept: ONCOLOGY | Facility: HOSPITAL | Age: 84
End: 2019-10-21

## 2019-10-24 ENCOUNTER — AMBULATORY - HEALTHEAST (OUTPATIENT)
Dept: ONCOLOGY | Facility: HOSPITAL | Age: 84
End: 2019-10-24

## 2019-10-24 ENCOUNTER — INFUSION - HEALTHEAST (OUTPATIENT)
Dept: INFUSION THERAPY | Facility: CLINIC | Age: 84
End: 2019-10-24

## 2019-10-24 ENCOUNTER — RECORDS - HEALTHEAST (OUTPATIENT)
Dept: ADMINISTRATIVE | Facility: OTHER | Age: 84
End: 2019-10-24

## 2019-10-24 DIAGNOSIS — C64.1 RENAL CELL CARCINOMA OF RIGHT KIDNEY (H): ICD-10-CM

## 2019-10-30 ENCOUNTER — RECORDS - HEALTHEAST (OUTPATIENT)
Dept: ADMINISTRATIVE | Facility: OTHER | Age: 84
End: 2019-10-30

## 2019-11-07 ENCOUNTER — OFFICE VISIT - HEALTHEAST (OUTPATIENT)
Dept: ONCOLOGY | Facility: CLINIC | Age: 84
End: 2019-11-07

## 2019-11-07 ENCOUNTER — INFUSION - HEALTHEAST (OUTPATIENT)
Dept: INFUSION THERAPY | Facility: CLINIC | Age: 84
End: 2019-11-07

## 2019-11-07 ENCOUNTER — AMBULATORY - HEALTHEAST (OUTPATIENT)
Dept: INFUSION THERAPY | Facility: CLINIC | Age: 84
End: 2019-11-07

## 2019-11-07 DIAGNOSIS — C68.0 MALIGNANT NEOPLASM OF URETHRA (H): ICD-10-CM

## 2019-11-07 DIAGNOSIS — C64.1 RENAL CELL CARCINOMA OF RIGHT KIDNEY (H): ICD-10-CM

## 2019-11-07 DIAGNOSIS — C79.51 METASTASIS TO BONE (H): ICD-10-CM

## 2019-11-07 DIAGNOSIS — K59.01 SLOW TRANSIT CONSTIPATION: ICD-10-CM

## 2019-11-07 DIAGNOSIS — Z51.12 ENCOUNTER FOR ANTINEOPLASTIC IMMUNOTHERAPY: ICD-10-CM

## 2019-11-07 LAB
ALBUMIN SERPL-MCNC: 3.7 G/DL (ref 3.5–5)
ALP SERPL-CCNC: 115 U/L (ref 45–120)
ALT SERPL W P-5'-P-CCNC: 13 U/L (ref 0–45)
ANION GAP SERPL CALCULATED.3IONS-SCNC: 8 MMOL/L (ref 5–18)
AST SERPL W P-5'-P-CCNC: 17 U/L (ref 0–40)
BASOPHILS # BLD AUTO: 0.1 THOU/UL (ref 0–0.2)
BASOPHILS NFR BLD AUTO: 1 % (ref 0–2)
BILIRUB SERPL-MCNC: 0.7 MG/DL (ref 0–1)
BUN SERPL-MCNC: 18 MG/DL (ref 8–28)
CALCIUM SERPL-MCNC: 10.2 MG/DL (ref 8.5–10.5)
CHLORIDE BLD-SCNC: 103 MMOL/L (ref 98–107)
CO2 SERPL-SCNC: 28 MMOL/L (ref 22–31)
CREAT SERPL-MCNC: 0.82 MG/DL (ref 0.6–1.1)
EOSINOPHIL # BLD AUTO: 0.1 THOU/UL (ref 0–0.4)
EOSINOPHIL NFR BLD AUTO: 1 % (ref 0–6)
ERYTHROCYTE [DISTWIDTH] IN BLOOD BY AUTOMATED COUNT: 14.6 % (ref 11–14.5)
GFR SERPL CREATININE-BSD FRML MDRD: >60 ML/MIN/1.73M2
GLUCOSE BLD-MCNC: 100 MG/DL (ref 70–125)
HCT VFR BLD AUTO: 41.7 % (ref 35–47)
HGB BLD-MCNC: 13.1 G/DL (ref 12–16)
LYMPHOCYTES # BLD AUTO: 1.3 THOU/UL (ref 0.8–4.4)
LYMPHOCYTES NFR BLD AUTO: 19 % (ref 20–40)
MCH RBC QN AUTO: 28 PG (ref 27–34)
MCHC RBC AUTO-ENTMCNC: 31.4 G/DL (ref 32–36)
MCV RBC AUTO: 89 FL (ref 80–100)
MONOCYTES # BLD AUTO: 0.5 THOU/UL (ref 0–0.9)
MONOCYTES NFR BLD AUTO: 7 % (ref 2–10)
NEUTROPHILS # BLD AUTO: 5 THOU/UL (ref 2–7.7)
NEUTROPHILS NFR BLD AUTO: 72 % (ref 50–70)
PLATELET # BLD AUTO: 288 THOU/UL (ref 140–440)
PMV BLD AUTO: 10.7 FL (ref 8.5–12.5)
POTASSIUM BLD-SCNC: 4 MMOL/L (ref 3.5–5)
PROT SERPL-MCNC: 6.7 G/DL (ref 6–8)
RBC # BLD AUTO: 4.68 MILL/UL (ref 3.8–5.4)
SODIUM SERPL-SCNC: 139 MMOL/L (ref 136–145)
TSH SERPL DL<=0.005 MIU/L-ACNC: 1.36 UIU/ML (ref 0.3–5)
WBC: 7 THOU/UL (ref 4–11)

## 2019-11-12 ENCOUNTER — RECORDS - HEALTHEAST (OUTPATIENT)
Dept: ADMINISTRATIVE | Facility: OTHER | Age: 84
End: 2019-11-12

## 2019-11-14 ENCOUNTER — COMMUNICATION - HEALTHEAST (OUTPATIENT)
Dept: FAMILY MEDICINE | Facility: CLINIC | Age: 84
End: 2019-11-14

## 2019-12-02 ENCOUNTER — HOSPITAL ENCOUNTER (OUTPATIENT)
Dept: CT IMAGING | Facility: CLINIC | Age: 84
Setting detail: RADIATION/ONCOLOGY SERIES
Discharge: STILL ADMITTED OR ADMINISTRATIVE | End: 2019-12-02
Attending: INTERNAL MEDICINE

## 2019-12-02 DIAGNOSIS — C64.1 RENAL CELL CARCINOMA OF RIGHT KIDNEY (H): ICD-10-CM

## 2019-12-02 DIAGNOSIS — C79.51 METASTASIS TO BONE (H): ICD-10-CM

## 2019-12-04 ENCOUNTER — AMBULATORY - HEALTHEAST (OUTPATIENT)
Dept: INFUSION THERAPY | Facility: CLINIC | Age: 84
End: 2019-12-04

## 2019-12-04 ENCOUNTER — OFFICE VISIT - HEALTHEAST (OUTPATIENT)
Dept: ONCOLOGY | Facility: CLINIC | Age: 84
End: 2019-12-04

## 2019-12-04 DIAGNOSIS — C64.1 RENAL CELL CARCINOMA OF RIGHT KIDNEY (H): ICD-10-CM

## 2019-12-04 DIAGNOSIS — R53.0 NEOPLASTIC (MALIGNANT) RELATED FATIGUE: ICD-10-CM

## 2019-12-04 DIAGNOSIS — C79.51 METASTASIS TO BONE (H): ICD-10-CM

## 2019-12-04 LAB
ALBUMIN SERPL-MCNC: 3.5 G/DL (ref 3.5–5)
ALP SERPL-CCNC: 106 U/L (ref 45–120)
ALT SERPL W P-5'-P-CCNC: 13 U/L (ref 0–45)
ANION GAP SERPL CALCULATED.3IONS-SCNC: 8 MMOL/L (ref 5–18)
AST SERPL W P-5'-P-CCNC: 17 U/L (ref 0–40)
BASOPHILS # BLD AUTO: 0.1 THOU/UL (ref 0–0.2)
BASOPHILS NFR BLD AUTO: 1 % (ref 0–2)
BILIRUB SERPL-MCNC: 0.8 MG/DL (ref 0–1)
BUN SERPL-MCNC: 28 MG/DL (ref 8–28)
CALCIUM SERPL-MCNC: 10 MG/DL (ref 8.5–10.5)
CHLORIDE BLD-SCNC: 106 MMOL/L (ref 98–107)
CO2 SERPL-SCNC: 27 MMOL/L (ref 22–31)
CREAT SERPL-MCNC: 0.92 MG/DL (ref 0.6–1.1)
EOSINOPHIL # BLD AUTO: 0.1 THOU/UL (ref 0–0.4)
EOSINOPHIL NFR BLD AUTO: 2 % (ref 0–6)
ERYTHROCYTE [DISTWIDTH] IN BLOOD BY AUTOMATED COUNT: 14.2 % (ref 11–14.5)
GFR SERPL CREATININE-BSD FRML MDRD: 57 ML/MIN/1.73M2
GLUCOSE BLD-MCNC: 118 MG/DL (ref 70–125)
HCT VFR BLD AUTO: 40.6 % (ref 35–47)
HGB BLD-MCNC: 12.8 G/DL (ref 12–16)
LYMPHOCYTES # BLD AUTO: 1.3 THOU/UL (ref 0.8–4.4)
LYMPHOCYTES NFR BLD AUTO: 17 % (ref 20–40)
MCH RBC QN AUTO: 27.7 PG (ref 27–34)
MCHC RBC AUTO-ENTMCNC: 31.5 G/DL (ref 32–36)
MCV RBC AUTO: 88 FL (ref 80–100)
MONOCYTES # BLD AUTO: 0.6 THOU/UL (ref 0–0.9)
MONOCYTES NFR BLD AUTO: 7 % (ref 2–10)
NEUTROPHILS # BLD AUTO: 5.6 THOU/UL (ref 2–7.7)
NEUTROPHILS NFR BLD AUTO: 72 % (ref 50–70)
PLATELET # BLD AUTO: 312 THOU/UL (ref 140–440)
PMV BLD AUTO: 10.7 FL (ref 8.5–12.5)
POTASSIUM BLD-SCNC: 3.9 MMOL/L (ref 3.5–5)
PROT SERPL-MCNC: 6.6 G/DL (ref 6–8)
RBC # BLD AUTO: 4.62 MILL/UL (ref 3.8–5.4)
SODIUM SERPL-SCNC: 141 MMOL/L (ref 136–145)
TSH SERPL DL<=0.005 MIU/L-ACNC: 1.43 UIU/ML (ref 0.3–5)
WBC: 7.7 THOU/UL (ref 4–11)

## 2019-12-09 ENCOUNTER — COMMUNICATION - HEALTHEAST (OUTPATIENT)
Dept: FAMILY MEDICINE | Facility: CLINIC | Age: 84
End: 2019-12-09

## 2019-12-17 ENCOUNTER — COMMUNICATION - HEALTHEAST (OUTPATIENT)
Dept: FAMILY MEDICINE | Facility: CLINIC | Age: 84
End: 2019-12-17

## 2019-12-31 ENCOUNTER — RECORDS - HEALTHEAST (OUTPATIENT)
Dept: ADMINISTRATIVE | Facility: OTHER | Age: 84
End: 2019-12-31

## 2020-01-13 ENCOUNTER — RECORDS - HEALTHEAST (OUTPATIENT)
Dept: ADMINISTRATIVE | Facility: OTHER | Age: 85
End: 2020-01-13

## 2020-01-23 ENCOUNTER — RECORDS - HEALTHEAST (OUTPATIENT)
Dept: ADMINISTRATIVE | Facility: OTHER | Age: 85
End: 2020-01-23

## 2020-01-24 ENCOUNTER — COMMUNICATION - HEALTHEAST (OUTPATIENT)
Dept: FAMILY MEDICINE | Facility: CLINIC | Age: 85
End: 2020-01-24

## 2020-02-03 ENCOUNTER — COMMUNICATION - HEALTHEAST (OUTPATIENT)
Dept: ONCOLOGY | Facility: HOSPITAL | Age: 85
End: 2020-02-03

## 2020-02-04 ENCOUNTER — OFFICE VISIT - HEALTHEAST (OUTPATIENT)
Dept: GERIATRICS | Facility: CLINIC | Age: 85
End: 2020-02-04
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
## 2020-02-04 DIAGNOSIS — Z71.89 ADVANCED CARE PLANNING/COUNSELING DISCUSSION: ICD-10-CM

## 2020-02-04 DIAGNOSIS — C64.1 RENAL CELL CARCINOMA OF RIGHT KIDNEY (H): ICD-10-CM

## 2020-02-04 DIAGNOSIS — S72.002A CLOSED LEFT HIP FRACTURE, INITIAL ENCOUNTER (H): ICD-10-CM

## 2020-02-04 DIAGNOSIS — R53.81 PHYSICAL DECONDITIONING: ICD-10-CM

## 2020-02-06 ENCOUNTER — OFFICE VISIT - HEALTHEAST (OUTPATIENT)
Dept: GERIATRICS | Facility: CLINIC | Age: 85
End: 2020-02-06

## 2020-02-06 DIAGNOSIS — I10 ESSENTIAL HYPERTENSION: ICD-10-CM

## 2020-02-06 DIAGNOSIS — M06.9 RHEUMATOID ARTHRITIS, INVOLVING UNSPECIFIED SITE, UNSPECIFIED RHEUMATOID FACTOR PRESENCE: ICD-10-CM

## 2020-02-06 DIAGNOSIS — M25.552 PAIN OF LEFT HIP JOINT: ICD-10-CM

## 2020-02-06 DIAGNOSIS — C64.1 RENAL CELL CARCINOMA OF RIGHT KIDNEY (H): ICD-10-CM

## 2020-02-06 DIAGNOSIS — Z93.59 CHRONIC SUPRAPUBIC CATHETER (H): ICD-10-CM

## 2020-02-07 ENCOUNTER — COMMUNICATION - HEALTHEAST (OUTPATIENT)
Dept: GERIATRICS | Facility: CLINIC | Age: 85
End: 2020-02-07

## 2020-02-10 ENCOUNTER — OFFICE VISIT - HEALTHEAST (OUTPATIENT)
Dept: GERIATRICS | Facility: CLINIC | Age: 85
End: 2020-02-10

## 2020-02-10 DIAGNOSIS — R53.81 PHYSICAL DECONDITIONING: ICD-10-CM
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
## 2020-02-10 DIAGNOSIS — S72.002A CLOSED LEFT HIP FRACTURE, INITIAL ENCOUNTER (H): ICD-10-CM

## 2020-02-12 ENCOUNTER — OFFICE VISIT - HEALTHEAST (OUTPATIENT)
Dept: GERIATRICS | Facility: CLINIC | Age: 85
End: 2020-02-12

## 2020-02-12 DIAGNOSIS — M79.609 PAIN IN EXTREMITY, UNSPECIFIED EXTREMITY: ICD-10-CM

## 2020-02-12 DIAGNOSIS — R53.81 PHYSICAL DECONDITIONING: ICD-10-CM
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
## 2020-02-12 DIAGNOSIS — S72.002A CLOSED LEFT HIP FRACTURE, INITIAL ENCOUNTER (H): ICD-10-CM

## 2020-02-13 ENCOUNTER — OFFICE VISIT - HEALTHEAST (OUTPATIENT)
Dept: GERIATRICS | Facility: CLINIC | Age: 85
End: 2020-02-13

## 2020-02-13 DIAGNOSIS — M25.552 PAIN OF LEFT HIP JOINT: ICD-10-CM

## 2020-02-18 ENCOUNTER — OFFICE VISIT - HEALTHEAST (OUTPATIENT)
Dept: GERIATRICS | Facility: CLINIC | Age: 85
End: 2020-02-18

## 2020-02-18 DIAGNOSIS — R53.81 PHYSICAL DECONDITIONING: ICD-10-CM

## 2020-02-18 DIAGNOSIS — M25.552 PAIN OF LEFT HIP JOINT: ICD-10-CM

## 2020-02-18 DIAGNOSIS — S72.002A CLOSED LEFT HIP FRACTURE, INITIAL ENCOUNTER (H): ICD-10-CM
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
## 2020-02-19 ENCOUNTER — OFFICE VISIT - HEALTHEAST (OUTPATIENT)
Dept: GERIATRICS | Facility: CLINIC | Age: 85
End: 2020-02-19

## 2020-02-19 ENCOUNTER — RECORDS - HEALTHEAST (OUTPATIENT)
Dept: ADMINISTRATIVE | Facility: OTHER | Age: 85
End: 2020-02-19

## 2020-02-19 DIAGNOSIS — M25.552 PAIN OF LEFT HIP JOINT: ICD-10-CM

## 2020-02-19 DIAGNOSIS — R53.81 PHYSICAL DECONDITIONING: ICD-10-CM

## 2020-02-19 DIAGNOSIS — G47.00 INSOMNIA, UNSPECIFIED TYPE: ICD-10-CM
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
## 2020-02-19 DIAGNOSIS — S72.002A CLOSED LEFT HIP FRACTURE, INITIAL ENCOUNTER (H): ICD-10-CM

## 2020-02-24 ENCOUNTER — OFFICE VISIT - HEALTHEAST (OUTPATIENT)
Dept: GERIATRICS | Facility: CLINIC | Age: 85
End: 2020-02-24

## 2020-02-24 DIAGNOSIS — S72.002A CLOSED LEFT HIP FRACTURE, INITIAL ENCOUNTER (H): ICD-10-CM

## 2020-02-24 DIAGNOSIS — R53.81 PHYSICAL DECONDITIONING: ICD-10-CM
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
## 2020-02-24 DIAGNOSIS — G47.00 INSOMNIA, UNSPECIFIED TYPE: ICD-10-CM

## 2020-02-26 ENCOUNTER — RECORDS - HEALTHEAST (OUTPATIENT)
Dept: LAB | Facility: CLINIC | Age: 85
End: 2020-02-26

## 2020-02-26 ENCOUNTER — COMMUNICATION - HEALTHEAST (OUTPATIENT)
Dept: PHARMACY | Facility: CLINIC | Age: 85
End: 2020-02-26

## 2020-02-26 LAB
ANION GAP SERPL CALCULATED.3IONS-SCNC: 7 MMOL/L (ref 5–18)
BUN SERPL-MCNC: 18 MG/DL (ref 8–28)
CALCIUM SERPL-MCNC: 9.5 MG/DL (ref 8.5–10.5)
CHLORIDE BLD-SCNC: 100 MMOL/L (ref 98–107)
CO2 SERPL-SCNC: 26 MMOL/L (ref 22–31)
CREAT SERPL-MCNC: 0.77 MG/DL (ref 0.6–1.1)
GFR SERPL CREATININE-BSD FRML MDRD: >60 ML/MIN/1.73M2
GLUCOSE BLD-MCNC: 115 MG/DL (ref 70–125)
POTASSIUM BLD-SCNC: 3.9 MMOL/L (ref 3.5–5)
SODIUM SERPL-SCNC: 133 MMOL/L (ref 136–145)

## 2020-02-29 ENCOUNTER — RECORDS - HEALTHEAST (OUTPATIENT)
Dept: ADMINISTRATIVE | Facility: OTHER | Age: 85
End: 2020-02-29

## 2020-03-03 ENCOUNTER — OFFICE VISIT - HEALTHEAST (OUTPATIENT)
Dept: GERIATRICS | Facility: CLINIC | Age: 85
End: 2020-03-03

## 2020-03-03 DIAGNOSIS — C64.1 RENAL CELL CARCINOMA OF RIGHT KIDNEY (H): ICD-10-CM

## 2020-03-03 DIAGNOSIS — S72.002A CLOSED LEFT HIP FRACTURE, INITIAL ENCOUNTER (H): ICD-10-CM

## 2020-03-03 DIAGNOSIS — Z93.59 CHRONIC SUPRAPUBIC CATHETER (H): ICD-10-CM

## 2020-03-03 DIAGNOSIS — R53.81 PHYSICAL DECONDITIONING: ICD-10-CM

## 2020-03-03 DIAGNOSIS — I10 ESSENTIAL HYPERTENSION: ICD-10-CM

## 2020-03-03 DIAGNOSIS — M06.9 RHEUMATOID ARTHRITIS, INVOLVING UNSPECIFIED SITE, UNSPECIFIED RHEUMATOID FACTOR PRESENCE: ICD-10-CM

## 2020-03-03 DIAGNOSIS — K59.01 SLOW TRANSIT CONSTIPATION: ICD-10-CM

## 2020-03-10 ENCOUNTER — RECORDS - HEALTHEAST (OUTPATIENT)
Dept: ADMINISTRATIVE | Facility: OTHER | Age: 85
End: 2020-03-10

## 2020-03-13 ENCOUNTER — OFFICE VISIT - HEALTHEAST (OUTPATIENT)
Dept: GERIATRICS | Facility: CLINIC | Age: 85
End: 2020-03-13

## 2020-03-13 DIAGNOSIS — R53.81 PHYSICAL DECONDITIONING: ICD-10-CM

## 2020-03-13 DIAGNOSIS — C79.51 METASTASIS TO BONE (H): ICD-10-CM
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
## 2020-03-13 DIAGNOSIS — S72.002A CLOSED LEFT HIP FRACTURE, INITIAL ENCOUNTER (H): ICD-10-CM

## 2020-03-13 DIAGNOSIS — M06.9 RHEUMATOID ARTHRITIS, INVOLVING UNSPECIFIED SITE, UNSPECIFIED RHEUMATOID FACTOR PRESENCE: ICD-10-CM

## 2020-03-16 ENCOUNTER — OFFICE VISIT - HEALTHEAST (OUTPATIENT)
Dept: GERIATRICS | Facility: CLINIC | Age: 85
End: 2020-03-16

## 2020-03-16 DIAGNOSIS — R53.81 PHYSICAL DECONDITIONING: ICD-10-CM

## 2020-03-16 DIAGNOSIS — S72.002A CLOSED LEFT HIP FRACTURE, INITIAL ENCOUNTER (H): ICD-10-CM
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
## 2020-03-18 ENCOUNTER — AMBULATORY - HEALTHEAST (OUTPATIENT)
Dept: GERIATRICS | Facility: CLINIC | Age: 85
End: 2020-03-18

## 2020-03-18 ENCOUNTER — COMMUNICATION - HEALTHEAST (OUTPATIENT)
Dept: FAMILY MEDICINE | Facility: CLINIC | Age: 85
End: 2020-03-18

## 2020-03-19 ENCOUNTER — COMMUNICATION - HEALTHEAST (OUTPATIENT)
Dept: FAMILY MEDICINE | Facility: CLINIC | Age: 85
End: 2020-03-19

## 2020-03-19 ENCOUNTER — COMMUNICATION - HEALTHEAST (OUTPATIENT)
Dept: GERIATRICS | Facility: CLINIC | Age: 85
End: 2020-03-19

## 2020-03-23 ENCOUNTER — COMMUNICATION - HEALTHEAST (OUTPATIENT)
Dept: FAMILY MEDICINE | Facility: CLINIC | Age: 85
End: 2020-03-23

## 2020-03-24 ENCOUNTER — OFFICE VISIT - HEALTHEAST (OUTPATIENT)
Dept: FAMILY MEDICINE | Facility: CLINIC | Age: 85
End: 2020-03-24

## 2020-03-24 ENCOUNTER — COMMUNICATION - HEALTHEAST (OUTPATIENT)
Dept: FAMILY MEDICINE | Facility: CLINIC | Age: 85
End: 2020-03-24

## 2020-03-24 DIAGNOSIS — K59.01 SLOW TRANSIT CONSTIPATION: ICD-10-CM

## 2020-03-24 DIAGNOSIS — R41.3 MEMORY DIFFICULTIES: ICD-10-CM

## 2020-03-24 DIAGNOSIS — C79.51 METASTASIS TO BONE (H): ICD-10-CM

## 2020-03-24 DIAGNOSIS — I10 ESSENTIAL HYPERTENSION: ICD-10-CM

## 2020-03-24 DIAGNOSIS — S72.002A HIP FRACTURE, LEFT, CLOSED, INITIAL ENCOUNTER (H): ICD-10-CM

## 2020-03-24 DIAGNOSIS — N39.0 COMPLICATED UTI (URINARY TRACT INFECTION): ICD-10-CM

## 2020-03-24 RX ORDER — ACETAMINOPHEN 500 MG
1000 TABLET ORAL 3 TIMES DAILY
Qty: 90 TABLET | Refills: 0 | Status: SHIPPED
Start: 2020-03-24

## 2020-03-24 RX ORDER — POLYETHYLENE GLYCOL 3350 17 G/17G
17 POWDER, FOR SOLUTION ORAL WEEKLY
Qty: 10 PACKET | Refills: 12 | Status: SHIPPED
Start: 2020-03-24

## 2020-03-26 ENCOUNTER — COMMUNICATION - HEALTHEAST (OUTPATIENT)
Dept: FAMILY MEDICINE | Facility: CLINIC | Age: 85
End: 2020-03-26

## 2020-03-26 ENCOUNTER — RECORDS - HEALTHEAST (OUTPATIENT)
Dept: ADMINISTRATIVE | Facility: OTHER | Age: 85
End: 2020-03-26

## 2020-03-26 DIAGNOSIS — K59.01 SLOW TRANSIT CONSTIPATION: ICD-10-CM

## 2020-03-27 ENCOUNTER — COMMUNICATION - HEALTHEAST (OUTPATIENT)
Dept: ONCOLOGY | Facility: CLINIC | Age: 85
End: 2020-03-27

## 2020-03-27 ENCOUNTER — COMMUNICATION - HEALTHEAST (OUTPATIENT)
Dept: FAMILY MEDICINE | Facility: CLINIC | Age: 85
End: 2020-03-27

## 2020-03-30 ENCOUNTER — COMMUNICATION - HEALTHEAST (OUTPATIENT)
Dept: SCHEDULING | Facility: CLINIC | Age: 85
End: 2020-03-30

## 2020-03-31 ENCOUNTER — COMMUNICATION - HEALTHEAST (OUTPATIENT)
Dept: FAMILY MEDICINE | Facility: CLINIC | Age: 85
End: 2020-03-31

## 2020-04-07 ENCOUNTER — COMMUNICATION - HEALTHEAST (OUTPATIENT)
Dept: SCHEDULING | Facility: CLINIC | Age: 85
End: 2020-04-07

## 2020-04-07 ENCOUNTER — RECORDS - HEALTHEAST (OUTPATIENT)
Dept: ADMINISTRATIVE | Facility: OTHER | Age: 85
End: 2020-04-07

## 2020-04-08 ENCOUNTER — AMBULATORY - HEALTHEAST (OUTPATIENT)
Dept: FAMILY MEDICINE | Facility: CLINIC | Age: 85
End: 2020-04-08

## 2020-04-08 ENCOUNTER — COMMUNICATION - HEALTHEAST (OUTPATIENT)
Dept: FAMILY MEDICINE | Facility: CLINIC | Age: 85
End: 2020-04-08

## 2020-04-08 DIAGNOSIS — K59.00 CONSTIPATION, UNSPECIFIED CONSTIPATION TYPE: ICD-10-CM

## 2020-04-08 DIAGNOSIS — R19.7 DIARRHEA, UNSPECIFIED TYPE: ICD-10-CM

## 2020-04-14 ENCOUNTER — COMMUNICATION - HEALTHEAST (OUTPATIENT)
Dept: ONCOLOGY | Facility: CLINIC | Age: 85
End: 2020-04-14

## 2020-04-17 ENCOUNTER — COMMUNICATION - HEALTHEAST (OUTPATIENT)
Dept: FAMILY MEDICINE | Facility: CLINIC | Age: 85
End: 2020-04-17

## 2020-04-17 ENCOUNTER — HOSPITAL ENCOUNTER (OUTPATIENT)
Dept: CT IMAGING | Facility: CLINIC | Age: 85
Setting detail: RADIATION/ONCOLOGY SERIES
Discharge: STILL ADMITTED OR ADMINISTRATIVE | End: 2020-04-17
Attending: INTERNAL MEDICINE

## 2020-04-17 DIAGNOSIS — K59.00 CONSTIPATION, UNSPECIFIED CONSTIPATION TYPE: ICD-10-CM

## 2020-04-17 DIAGNOSIS — M25.552 PAIN OF LEFT HIP JOINT: ICD-10-CM

## 2020-04-17 DIAGNOSIS — C79.51 METASTASIS TO BONE (H): ICD-10-CM

## 2020-04-17 DIAGNOSIS — R41.3 MEMORY DIFFICULTIES: ICD-10-CM

## 2020-04-17 DIAGNOSIS — R19.7 DIARRHEA, UNSPECIFIED TYPE: ICD-10-CM

## 2020-04-17 DIAGNOSIS — C64.1 RENAL CELL CARCINOMA OF RIGHT KIDNEY (H): ICD-10-CM

## 2020-04-17 LAB
CREAT BLD-MCNC: 0.8 MG/DL (ref 0.6–1.1)
GFR SERPL CREATININE-BSD FRML MDRD: >60 ML/MIN/1.73M2

## 2020-04-17 RX ORDER — LOPERAMIDE HYDROCHLORIDE 2 MG/1
4 TABLET ORAL
Qty: 24 TABLET | Refills: 0 | Status: SHIPPED
Start: 2020-04-17

## 2020-04-17 RX ORDER — FOLIC ACID 1 MG/1
1000 TABLET ORAL DAILY
Qty: 90 TABLET | Refills: 2 | Status: SHIPPED | OUTPATIENT
Start: 2020-04-17

## 2020-04-17 RX ORDER — BISACODYL 5 MG/1
5 TABLET, DELAYED RELEASE ORAL DAILY PRN
Qty: 30 TABLET | Refills: 1 | Status: SHIPPED
Start: 2020-04-17

## 2020-04-21 ENCOUNTER — OFFICE VISIT - HEALTHEAST (OUTPATIENT)
Dept: ONCOLOGY | Facility: CLINIC | Age: 85
End: 2020-04-21

## 2020-04-21 DIAGNOSIS — K59.01 SLOW TRANSIT CONSTIPATION: ICD-10-CM

## 2020-04-21 DIAGNOSIS — C79.51 METASTASIS TO BONE (H): ICD-10-CM

## 2020-04-21 DIAGNOSIS — C64.1 RENAL CELL CARCINOMA OF RIGHT KIDNEY (H): ICD-10-CM

## 2020-04-23 ENCOUNTER — COMMUNICATION - HEALTHEAST (OUTPATIENT)
Dept: SCHEDULING | Facility: CLINIC | Age: 85
End: 2020-04-23

## 2020-05-05 ENCOUNTER — COMMUNICATION - HEALTHEAST (OUTPATIENT)
Dept: FAMILY MEDICINE | Facility: CLINIC | Age: 85
End: 2020-05-05

## 2020-05-06 ENCOUNTER — AMBULATORY - HEALTHEAST (OUTPATIENT)
Dept: FAMILY MEDICINE | Facility: CLINIC | Age: 85
End: 2020-05-06

## 2020-05-06 DIAGNOSIS — L30.9 DERMATITIS: ICD-10-CM

## 2020-05-07 ENCOUNTER — RECORDS - HEALTHEAST (OUTPATIENT)
Dept: ADMINISTRATIVE | Facility: OTHER | Age: 85
End: 2020-05-07

## 2020-05-07 ENCOUNTER — AMBULATORY - HEALTHEAST (OUTPATIENT)
Dept: FAMILY MEDICINE | Facility: CLINIC | Age: 85
End: 2020-05-07

## 2020-05-07 DIAGNOSIS — M25.552 PAIN OF LEFT HIP JOINT: ICD-10-CM

## 2020-05-08 ENCOUNTER — AMBULATORY - HEALTHEAST (OUTPATIENT)
Dept: FAMILY MEDICINE | Facility: CLINIC | Age: 85
End: 2020-05-08

## 2020-05-08 DIAGNOSIS — L30.9 DERMATITIS: ICD-10-CM

## 2020-05-14 ENCOUNTER — COMMUNICATION - HEALTHEAST (OUTPATIENT)
Dept: FAMILY MEDICINE | Facility: CLINIC | Age: 85
End: 2020-05-14

## 2020-05-15 ENCOUNTER — RECORDS - HEALTHEAST (OUTPATIENT)
Dept: ADMINISTRATIVE | Facility: OTHER | Age: 85
End: 2020-05-15

## 2020-05-18 ENCOUNTER — RECORDS - HEALTHEAST (OUTPATIENT)
Dept: ADMINISTRATIVE | Facility: OTHER | Age: 85
End: 2020-05-18

## 2020-05-21 ENCOUNTER — COMMUNICATION - HEALTHEAST (OUTPATIENT)
Dept: SCHEDULING | Facility: CLINIC | Age: 85
End: 2020-05-21

## 2020-05-22 ENCOUNTER — OFFICE VISIT - HEALTHEAST (OUTPATIENT)
Dept: FAMILY MEDICINE | Facility: CLINIC | Age: 85
End: 2020-05-22

## 2020-05-22 ENCOUNTER — COMMUNICATION - HEALTHEAST (OUTPATIENT)
Dept: FAMILY MEDICINE | Facility: CLINIC | Age: 85
End: 2020-05-22

## 2020-05-22 ENCOUNTER — COMMUNICATION - HEALTHEAST (OUTPATIENT)
Dept: HOME HEALTH SERVICES | Facility: HOME HEALTH | Age: 85
End: 2020-05-22

## 2020-05-22 DIAGNOSIS — N89.8 VAGINAL DRYNESS: ICD-10-CM

## 2020-05-22 DIAGNOSIS — G47.00 INSOMNIA, UNSPECIFIED TYPE: ICD-10-CM

## 2020-05-22 DIAGNOSIS — L30.9 DERMATITIS: ICD-10-CM

## 2020-05-22 DIAGNOSIS — T14.8XXA WOUND, OPEN: ICD-10-CM

## 2020-05-22 DIAGNOSIS — Z91.89 AT RISK FOR COMPLICATION OF STOMA: ICD-10-CM

## 2020-05-22 DIAGNOSIS — C64.1 RENAL CELL CARCINOMA OF RIGHT KIDNEY (H): ICD-10-CM

## 2020-05-22 RX ORDER — NYSTATIN 100000 U/G
OINTMENT TOPICAL
Qty: 30 G | Refills: 0 | Status: SHIPPED | OUTPATIENT
Start: 2020-05-22

## 2020-05-28 ENCOUNTER — COMMUNICATION - HEALTHEAST (OUTPATIENT)
Dept: FAMILY MEDICINE | Facility: CLINIC | Age: 85
End: 2020-05-28

## 2020-06-04 ENCOUNTER — COMMUNICATION - HEALTHEAST (OUTPATIENT)
Dept: FAMILY MEDICINE | Facility: CLINIC | Age: 85
End: 2020-06-04

## 2020-06-04 DIAGNOSIS — L30.9 DERMATITIS: ICD-10-CM

## 2020-06-04 RX ORDER — TRIAMCINOLONE ACETONIDE 1 MG/G
OINTMENT TOPICAL
Qty: 453 G | Refills: 0 | Status: SHIPPED | OUTPATIENT
Start: 2020-06-04

## 2020-06-08 ENCOUNTER — COMMUNICATION - HEALTHEAST (OUTPATIENT)
Dept: SCHEDULING | Facility: CLINIC | Age: 85
End: 2020-06-08

## 2020-06-08 DIAGNOSIS — L08.9 LOCAL INFECTION OF SKIN AND SUBCUTANEOUS TISSUE: ICD-10-CM

## 2020-06-10 ENCOUNTER — COMMUNICATION - HEALTHEAST (OUTPATIENT)
Dept: FAMILY MEDICINE | Facility: CLINIC | Age: 85
End: 2020-06-10

## 2020-06-15 ENCOUNTER — RECORDS - HEALTHEAST (OUTPATIENT)
Dept: ADMINISTRATIVE | Facility: OTHER | Age: 85
End: 2020-06-15

## 2020-06-16 ENCOUNTER — COMMUNICATION - HEALTHEAST (OUTPATIENT)
Dept: FAMILY MEDICINE | Facility: CLINIC | Age: 85
End: 2020-06-16

## 2020-06-18 ENCOUNTER — RECORDS - HEALTHEAST (OUTPATIENT)
Dept: ADMINISTRATIVE | Facility: OTHER | Age: 85
End: 2020-06-18

## 2020-06-19 ENCOUNTER — RECORDS - HEALTHEAST (OUTPATIENT)
Dept: ADMINISTRATIVE | Facility: OTHER | Age: 85
End: 2020-06-19

## 2020-06-23 ENCOUNTER — RECORDS - HEALTHEAST (OUTPATIENT)
Dept: ADMINISTRATIVE | Facility: OTHER | Age: 85
End: 2020-06-23

## 2020-06-25 ENCOUNTER — COMMUNICATION - HEALTHEAST (OUTPATIENT)
Dept: FAMILY MEDICINE | Facility: CLINIC | Age: 85
End: 2020-06-25

## 2020-06-29 ENCOUNTER — COMMUNICATION - HEALTHEAST (OUTPATIENT)
Dept: FAMILY MEDICINE | Facility: CLINIC | Age: 85
End: 2020-06-29

## 2020-07-02 ENCOUNTER — COMMUNICATION - HEALTHEAST (OUTPATIENT)
Dept: FAMILY MEDICINE | Facility: CLINIC | Age: 85
End: 2020-07-02

## 2020-07-02 ENCOUNTER — AMBULATORY - HEALTHEAST (OUTPATIENT)
Dept: FAMILY MEDICINE | Facility: CLINIC | Age: 85
End: 2020-07-02

## 2020-07-02 DIAGNOSIS — M25.559 HIP PAIN: ICD-10-CM

## 2020-07-03 ENCOUNTER — RECORDS - HEALTHEAST (OUTPATIENT)
Dept: ADMINISTRATIVE | Facility: OTHER | Age: 85
End: 2020-07-03

## 2020-07-07 ENCOUNTER — COMMUNICATION - HEALTHEAST (OUTPATIENT)
Dept: FAMILY MEDICINE | Facility: CLINIC | Age: 85
End: 2020-07-07

## 2020-07-07 ENCOUNTER — COMMUNICATION - HEALTHEAST (OUTPATIENT)
Dept: SCHEDULING | Facility: CLINIC | Age: 85
End: 2020-07-07

## 2020-07-10 ENCOUNTER — COMMUNICATION - HEALTHEAST (OUTPATIENT)
Dept: SCHEDULING | Facility: CLINIC | Age: 85
End: 2020-07-10

## 2020-07-10 ENCOUNTER — AMBULATORY - HEALTHEAST (OUTPATIENT)
Dept: FAMILY MEDICINE | Facility: CLINIC | Age: 85
End: 2020-07-10

## 2020-07-16 ENCOUNTER — COMMUNICATION - HEALTHEAST (OUTPATIENT)
Dept: FAMILY MEDICINE | Facility: CLINIC | Age: 85
End: 2020-07-16

## 2020-07-22 ENCOUNTER — RECORDS - HEALTHEAST (OUTPATIENT)
Dept: ADMINISTRATIVE | Facility: OTHER | Age: 85
End: 2020-07-22

## 2020-07-28 ENCOUNTER — COMMUNICATION - HEALTHEAST (OUTPATIENT)
Dept: FAMILY MEDICINE | Facility: CLINIC | Age: 85
End: 2020-07-28

## 2020-07-28 DIAGNOSIS — M25.559 HIP PAIN: ICD-10-CM

## 2020-07-30 ENCOUNTER — COMMUNICATION - HEALTHEAST (OUTPATIENT)
Dept: FAMILY MEDICINE | Facility: CLINIC | Age: 85
End: 2020-07-30

## 2020-07-30 DIAGNOSIS — M25.559 HIP PAIN: ICD-10-CM

## 2020-07-30 RX ORDER — TRAMADOL HYDROCHLORIDE 50 MG/1
50 TABLET ORAL EVERY 8 HOURS PRN
Qty: 18 TABLET | Refills: 0 | Status: SHIPPED | OUTPATIENT
Start: 2020-07-30

## 2020-07-31 ENCOUNTER — RECORDS - HEALTHEAST (OUTPATIENT)
Dept: ADMINISTRATIVE | Facility: OTHER | Age: 85
End: 2020-07-31

## 2020-08-03 ENCOUNTER — RECORDS - HEALTHEAST (OUTPATIENT)
Dept: LAB | Facility: CLINIC | Age: 85
End: 2020-08-03

## 2020-08-03 LAB
ALBUMIN UR-MCNC: ABNORMAL MG/DL
AMORPH CRY #/AREA URNS HPF: ABNORMAL /[HPF]
APPEARANCE UR: ABNORMAL
BACTERIA #/AREA URNS HPF: ABNORMAL HPF
BILIRUB UR QL STRIP: NEGATIVE
COLOR UR AUTO: ABNORMAL
GLUCOSE UR STRIP-MCNC: NEGATIVE MG/DL
HGB UR QL STRIP: ABNORMAL
KETONES UR STRIP-MCNC: NEGATIVE MG/DL
LEUKOCYTE ESTERASE UR QL STRIP: ABNORMAL
MUCOUS THREADS #/AREA URNS LPF: ABNORMAL LPF
NITRATE UR QL: NEGATIVE
PH UR STRIP: 7.5 [PH] (ref 4.5–8)
RBC #/AREA URNS AUTO: >100 HPF
SP GR UR STRIP: 1.02 (ref 1–1.03)
SQUAMOUS #/AREA URNS AUTO: ABNORMAL LPF
UROBILINOGEN UR STRIP-ACNC: ABNORMAL
WBC #/AREA URNS AUTO: ABNORMAL HPF
WBC CLUMPS #/AREA URNS HPF: PRESENT /[HPF]

## 2020-08-08 LAB
BACTERIA SPEC CULT: ABNORMAL
BACTERIA SPEC CULT: ABNORMAL

## 2020-08-14 ENCOUNTER — AMBULATORY - HEALTHEAST (OUTPATIENT)
Dept: INFUSION THERAPY | Facility: CLINIC | Age: 85
End: 2020-08-14

## 2020-08-14 ENCOUNTER — RECORDS - HEALTHEAST (OUTPATIENT)
Dept: ADMINISTRATIVE | Facility: OTHER | Age: 85
End: 2020-08-14

## 2020-08-14 ENCOUNTER — HOSPITAL ENCOUNTER (OUTPATIENT)
Dept: CT IMAGING | Facility: CLINIC | Age: 85
Setting detail: RADIATION/ONCOLOGY SERIES
Discharge: STILL ADMITTED OR ADMINISTRATIVE | End: 2020-08-14
Attending: INTERNAL MEDICINE

## 2020-08-14 DIAGNOSIS — C64.1 RENAL CELL CARCINOMA OF RIGHT KIDNEY (H): ICD-10-CM

## 2020-08-14 DIAGNOSIS — K59.01 SLOW TRANSIT CONSTIPATION: ICD-10-CM

## 2020-08-14 DIAGNOSIS — C79.51 METASTASIS TO BONE (H): ICD-10-CM

## 2020-08-14 LAB
ALBUMIN SERPL-MCNC: 3.7 G/DL (ref 3.5–5)
ALP SERPL-CCNC: 96 U/L (ref 45–120)
ALT SERPL W P-5'-P-CCNC: 11 U/L (ref 0–45)
ANION GAP SERPL CALCULATED.3IONS-SCNC: 12 MMOL/L (ref 5–18)
AST SERPL W P-5'-P-CCNC: 20 U/L (ref 0–40)
BASOPHILS # BLD AUTO: 0 THOU/UL (ref 0–0.2)
BASOPHILS NFR BLD AUTO: 1 % (ref 0–2)
BILIRUB SERPL-MCNC: 0.4 MG/DL (ref 0–1)
BUN SERPL-MCNC: 17 MG/DL (ref 8–28)
CALCIUM SERPL-MCNC: 10.2 MG/DL (ref 8.5–10.5)
CHLORIDE BLD-SCNC: 96 MMOL/L (ref 98–107)
CO2 SERPL-SCNC: 25 MMOL/L (ref 22–31)
CREAT SERPL-MCNC: 0.75 MG/DL (ref 0.6–1.1)
EOSINOPHIL # BLD AUTO: 0.3 THOU/UL (ref 0–0.4)
EOSINOPHIL NFR BLD AUTO: 5 % (ref 0–6)
ERYTHROCYTE [DISTWIDTH] IN BLOOD BY AUTOMATED COUNT: 13.5 % (ref 11–14.5)
GFR SERPL CREATININE-BSD FRML MDRD: >60 ML/MIN/1.73M2
GLUCOSE BLD-MCNC: 93 MG/DL (ref 70–125)
HCT VFR BLD AUTO: 42 % (ref 35–47)
HGB BLD-MCNC: 13.7 G/DL (ref 12–16)
LYMPHOCYTES # BLD AUTO: 1.1 THOU/UL (ref 0.8–4.4)
LYMPHOCYTES NFR BLD AUTO: 20 % (ref 20–40)
MCH RBC QN AUTO: 28.7 PG (ref 27–34)
MCHC RBC AUTO-ENTMCNC: 32.6 G/DL (ref 32–36)
MCV RBC AUTO: 88 FL (ref 80–100)
MONOCYTES # BLD AUTO: 0.4 THOU/UL (ref 0–0.9)
MONOCYTES NFR BLD AUTO: 8 % (ref 2–10)
NEUTROPHILS # BLD AUTO: 3.7 THOU/UL (ref 2–7.7)
NEUTROPHILS NFR BLD AUTO: 66 % (ref 50–70)
PLATELET # BLD AUTO: 322 THOU/UL (ref 140–440)
PMV BLD AUTO: 9.5 FL (ref 8.5–12.5)
POTASSIUM BLD-SCNC: 4.3 MMOL/L (ref 3.5–5)
PROT SERPL-MCNC: 7.4 G/DL (ref 6–8)
RBC # BLD AUTO: 4.78 MILL/UL (ref 3.8–5.4)
SODIUM SERPL-SCNC: 133 MMOL/L (ref 136–145)
TSH SERPL DL<=0.005 MIU/L-ACNC: 1.22 UIU/ML (ref 0.3–5)
WBC: 5.6 THOU/UL (ref 4–11)

## 2020-08-17 ENCOUNTER — COMMUNICATION - HEALTHEAST (OUTPATIENT)
Dept: ONCOLOGY | Facility: CLINIC | Age: 85
End: 2020-08-17

## 2020-08-20 ENCOUNTER — RECORDS - HEALTHEAST (OUTPATIENT)
Dept: LAB | Facility: CLINIC | Age: 85
End: 2020-08-20

## 2020-08-21 LAB
ALBUMIN SERPL-MCNC: 3 G/DL (ref 3.5–5)
ALP SERPL-CCNC: 86 U/L (ref 45–120)
ALT SERPL W P-5'-P-CCNC: <9 U/L (ref 0–45)
ANION GAP SERPL CALCULATED.3IONS-SCNC: 8 MMOL/L (ref 5–18)
AST SERPL W P-5'-P-CCNC: 14 U/L (ref 0–40)
BASOPHILS # BLD AUTO: 0 THOU/UL (ref 0–0.2)
BASOPHILS NFR BLD AUTO: 1 % (ref 0–2)
BILIRUB DIRECT SERPL-MCNC: 0.2 MG/DL
BILIRUB SERPL-MCNC: 0.4 MG/DL (ref 0–1)
BUN SERPL-MCNC: 17 MG/DL (ref 8–28)
CALCIUM SERPL-MCNC: 9.6 MG/DL (ref 8.5–10.5)
CHLORIDE BLD-SCNC: 101 MMOL/L (ref 98–107)
CO2 SERPL-SCNC: 25 MMOL/L (ref 22–31)
CREAT SERPL-MCNC: 0.73 MG/DL (ref 0.6–1.1)
EOSINOPHIL # BLD AUTO: 0.6 THOU/UL (ref 0–0.4)
EOSINOPHIL NFR BLD AUTO: 9 % (ref 0–6)
ERYTHROCYTE [DISTWIDTH] IN BLOOD BY AUTOMATED COUNT: 13.5 % (ref 11–14.5)
GFR SERPL CREATININE-BSD FRML MDRD: >60 ML/MIN/1.73M2
GLUCOSE BLD-MCNC: 97 MG/DL (ref 70–125)
HCT VFR BLD AUTO: 38.7 % (ref 35–47)
HGB BLD-MCNC: 12.3 G/DL (ref 12–16)
IMM GRANULOCYTES # BLD: 0 THOU/UL
IMM GRANULOCYTES NFR BLD: 0 %
LDH SERPL L TO P-CCNC: 165 U/L (ref 125–220)
LYMPHOCYTES # BLD AUTO: 1.4 THOU/UL (ref 0.8–4.4)
LYMPHOCYTES NFR BLD AUTO: 23 % (ref 20–40)
MCH RBC QN AUTO: 27.6 PG (ref 27–34)
MCHC RBC AUTO-ENTMCNC: 31.8 G/DL (ref 32–36)
MCV RBC AUTO: 87 FL (ref 80–100)
MONOCYTES # BLD AUTO: 0.6 THOU/UL (ref 0–0.9)
MONOCYTES NFR BLD AUTO: 9 % (ref 2–10)
NEUTROPHILS # BLD AUTO: 3.7 THOU/UL (ref 2–7.7)
NEUTROPHILS NFR BLD AUTO: 59 % (ref 50–70)
PLATELET # BLD AUTO: 371 THOU/UL (ref 140–440)
PMV BLD AUTO: 9.7 FL (ref 8.5–12.5)
POTASSIUM BLD-SCNC: 4.3 MMOL/L (ref 3.5–5)
PROT SERPL-MCNC: 6.1 G/DL (ref 6–8)
RBC # BLD AUTO: 4.46 MILL/UL (ref 3.8–5.4)
SODIUM SERPL-SCNC: 134 MMOL/L (ref 136–145)
WBC: 6.3 THOU/UL (ref 4–11)

## 2020-08-23 ENCOUNTER — COMMUNICATION - HEALTHEAST (OUTPATIENT)
Dept: FAMILY MEDICINE | Facility: CLINIC | Age: 85
End: 2020-08-23

## 2020-08-23 DIAGNOSIS — I10 ESSENTIAL HYPERTENSION: ICD-10-CM

## 2020-08-24 ENCOUNTER — COMMUNICATION - HEALTHEAST (OUTPATIENT)
Dept: FAMILY MEDICINE | Facility: CLINIC | Age: 85
End: 2020-08-24

## 2020-08-24 DIAGNOSIS — M25.552 PAIN OF LEFT HIP JOINT: ICD-10-CM

## 2020-08-25 RX ORDER — LISINOPRIL/HYDROCHLOROTHIAZIDE 10-12.5 MG
TABLET ORAL
Qty: 90 TABLET | Refills: 2 | Status: SHIPPED | OUTPATIENT
Start: 2020-08-25

## 2020-09-16 ENCOUNTER — OFFICE VISIT - HEALTHEAST (OUTPATIENT)
Dept: ONCOLOGY | Facility: CLINIC | Age: 85
End: 2020-09-16

## 2020-09-16 DIAGNOSIS — C64.1 RENAL CELL CARCINOMA OF RIGHT KIDNEY (H): ICD-10-CM

## 2020-09-16 DIAGNOSIS — R53.81 PHYSICAL DECONDITIONING: ICD-10-CM

## 2020-09-16 RX ORDER — CETIRIZINE HYDROCHLORIDE 10 MG/1
10 TABLET ORAL DAILY PRN
Status: SHIPPED | COMMUNITY
Start: 2020-07-22

## 2020-09-16 RX ORDER — ESTRADIOL 0.1 MG/G
CREAM VAGINAL
Status: SHIPPED | COMMUNITY
Start: 2020-07-31

## 2020-09-16 RX ORDER — FLUOCINONIDE TOPICAL SOLUTION USP, 0.05% 0.5 MG/ML
SOLUTION TOPICAL
Status: SHIPPED | COMMUNITY
Start: 2020-07-31

## 2020-09-17 ENCOUNTER — COMMUNICATION - HEALTHEAST (OUTPATIENT)
Dept: FAMILY MEDICINE | Facility: CLINIC | Age: 85
End: 2020-09-17

## 2020-09-23 ENCOUNTER — COMMUNICATION - HEALTHEAST (OUTPATIENT)
Dept: ONCOLOGY | Facility: CLINIC | Age: 85
End: 2020-09-23

## 2020-09-23 ENCOUNTER — RECORDS - HEALTHEAST (OUTPATIENT)
Dept: ADMINISTRATIVE | Facility: OTHER | Age: 85
End: 2020-09-23

## 2020-09-29 ENCOUNTER — COMMUNICATION - HEALTHEAST (OUTPATIENT)
Dept: FAMILY MEDICINE | Facility: CLINIC | Age: 85
End: 2020-09-29

## 2020-09-29 DIAGNOSIS — K59.01 SLOW TRANSIT CONSTIPATION: ICD-10-CM

## 2020-10-12 ENCOUNTER — COMMUNICATION - HEALTHEAST (OUTPATIENT)
Dept: FAMILY MEDICINE | Facility: CLINIC | Age: 85
End: 2020-10-12

## 2020-10-12 DIAGNOSIS — L29.9 PRURITUS: ICD-10-CM

## 2020-10-12 DIAGNOSIS — L29.3 PRURITUS OF GENITAL ORGANS: ICD-10-CM

## 2020-10-15 RX ORDER — DOXEPIN HYDROCHLORIDE 25 MG/1
CAPSULE ORAL
Qty: 30 CAPSULE | Refills: 0 | Status: SHIPPED | OUTPATIENT
Start: 2020-10-15

## 2020-12-27 ENCOUNTER — RECORDS - HEALTHEAST (OUTPATIENT)
Dept: LAB | Facility: CLINIC | Age: 85
End: 2020-12-27

## 2020-12-27 LAB
ALBUMIN UR-MCNC: ABNORMAL MG/DL
AMORPH CRY #/AREA URNS HPF: ABNORMAL /[HPF]
APPEARANCE UR: ABNORMAL
BACTERIA #/AREA URNS HPF: ABNORMAL HPF
BILIRUB UR QL STRIP: NEGATIVE
CAOX CRY #/AREA URNS HPF: PRESENT /[HPF]
COLOR UR AUTO: YELLOW
GLUCOSE UR STRIP-MCNC: NEGATIVE MG/DL
HGB UR QL STRIP: NEGATIVE
HYALINE CASTS #/AREA URNS LPF: ABNORMAL LPF
KETONES UR STRIP-MCNC: NEGATIVE MG/DL
LEUKOCYTE ESTERASE UR QL STRIP: ABNORMAL
MUCOUS THREADS #/AREA URNS LPF: ABNORMAL LPF
NITRATE UR QL: POSITIVE
PH UR STRIP: 7.5 [PH] (ref 4.5–8)
RBC #/AREA URNS AUTO: ABNORMAL HPF
SP GR UR STRIP: 1.02 (ref 1–1.03)
SQUAMOUS #/AREA URNS AUTO: ABNORMAL LPF
TRI-PHOS CRY #/AREA URNS HPF: PRESENT /[HPF]
UROBILINOGEN UR STRIP-ACNC: ABNORMAL
WBC #/AREA URNS AUTO: ABNORMAL HPF

## 2020-12-28 ENCOUNTER — RECORDS - HEALTHEAST (OUTPATIENT)
Dept: LAB | Facility: CLINIC | Age: 85
End: 2020-12-28

## 2020-12-28 LAB
ALBUMIN UR-MCNC: ABNORMAL MG/DL
AMORPH CRY #/AREA URNS HPF: ABNORMAL /[HPF]
APPEARANCE UR: ABNORMAL
BACTERIA #/AREA URNS HPF: ABNORMAL HPF
BILIRUB UR QL STRIP: NEGATIVE
CAOX CRY #/AREA URNS HPF: PRESENT /[HPF]
COLOR UR AUTO: ABNORMAL
GLUCOSE UR STRIP-MCNC: NEGATIVE MG/DL
HGB UR QL STRIP: ABNORMAL
KETONES UR STRIP-MCNC: ABNORMAL MG/DL
LEUKOCYTE ESTERASE UR QL STRIP: ABNORMAL
MUCOUS THREADS #/AREA URNS LPF: ABNORMAL LPF
NITRATE UR QL: NEGATIVE
PH UR STRIP: 7 [PH] (ref 4.5–8)
RBC #/AREA URNS AUTO: ABNORMAL HPF
SP GR UR STRIP: 1.02 (ref 1–1.03)
SQUAMOUS #/AREA URNS AUTO: ABNORMAL LPF
TRI-PHOS CRY #/AREA URNS HPF: PRESENT /[HPF]
UROBILINOGEN UR STRIP-ACNC: ABNORMAL
WBC #/AREA URNS AUTO: ABNORMAL HPF

## 2020-12-29 LAB
ANION GAP SERPL CALCULATED.3IONS-SCNC: 10 MMOL/L (ref 5–18)
BUN SERPL-MCNC: 13 MG/DL (ref 8–28)
CALCIUM SERPL-MCNC: 10.2 MG/DL (ref 8.5–10.5)
CHLORIDE BLD-SCNC: 100 MMOL/L (ref 98–107)
CO2 SERPL-SCNC: 27 MMOL/L (ref 22–31)
CREAT SERPL-MCNC: 0.71 MG/DL (ref 0.6–1.1)
ERYTHROCYTE [DISTWIDTH] IN BLOOD BY AUTOMATED COUNT: 13.6 % (ref 11–14.5)
GFR SERPL CREATININE-BSD FRML MDRD: >60 ML/MIN/1.73M2
GLUCOSE BLD-MCNC: 156 MG/DL (ref 70–125)
HCT VFR BLD AUTO: 40.1 % (ref 35–47)
HGB BLD-MCNC: 12.8 G/DL (ref 12–16)
MCH RBC QN AUTO: 26.2 PG (ref 27–34)
MCHC RBC AUTO-ENTMCNC: 31.9 G/DL (ref 32–36)
MCV RBC AUTO: 82 FL (ref 80–100)
PLATELET # BLD AUTO: 566 THOU/UL (ref 140–440)
PMV BLD AUTO: 10.4 FL (ref 8.5–12.5)
POTASSIUM BLD-SCNC: 4 MMOL/L (ref 3.5–5)
RBC # BLD AUTO: 4.88 MILL/UL (ref 3.8–5.4)
SODIUM SERPL-SCNC: 137 MMOL/L (ref 136–145)
WBC: 8.9 THOU/UL (ref 4–11)

## 2020-12-30 LAB
BACTERIA SPEC CULT: ABNORMAL
BACTERIA SPEC CULT: ABNORMAL

## 2020-12-31 LAB
BACTERIA SPEC CULT: ABNORMAL
BACTERIA SPEC CULT: ABNORMAL

## 2021-02-02 ENCOUNTER — COMMUNICATION - HEALTHEAST (OUTPATIENT)
Dept: FAMILY MEDICINE | Facility: CLINIC | Age: 86
End: 2021-02-02

## 2021-02-02 DIAGNOSIS — M25.552 PAIN OF LEFT HIP JOINT: ICD-10-CM

## 2021-05-26 ENCOUNTER — RECORDS - HEALTHEAST (OUTPATIENT)
Dept: ADMINISTRATIVE | Facility: CLINIC | Age: 86
End: 2021-05-26

## 2021-05-26 VITALS
HEART RATE: 77 BPM | DIASTOLIC BLOOD PRESSURE: 72 MMHG | OXYGEN SATURATION: 99 % | SYSTOLIC BLOOD PRESSURE: 162 MMHG | TEMPERATURE: 98.4 F

## 2021-05-26 VITALS
TEMPERATURE: 96.7 F | RESPIRATION RATE: 18 BRPM | SYSTOLIC BLOOD PRESSURE: 158 MMHG | HEART RATE: 78 BPM | DIASTOLIC BLOOD PRESSURE: 68 MMHG | OXYGEN SATURATION: 99 %

## 2021-05-26 VITALS
SYSTOLIC BLOOD PRESSURE: 123 MMHG | TEMPERATURE: 97.1 F | OXYGEN SATURATION: 96 % | DIASTOLIC BLOOD PRESSURE: 71 MMHG | RESPIRATION RATE: 16 BRPM | HEART RATE: 89 BPM

## 2021-05-26 VITALS
DIASTOLIC BLOOD PRESSURE: 84 MMHG | SYSTOLIC BLOOD PRESSURE: 207 MMHG | HEART RATE: 77 BPM | OXYGEN SATURATION: 100 % | TEMPERATURE: 97.9 F

## 2021-05-27 ENCOUNTER — RECORDS - HEALTHEAST (OUTPATIENT)
Dept: ADMINISTRATIVE | Facility: CLINIC | Age: 86
End: 2021-05-27

## 2021-05-27 VITALS
RESPIRATION RATE: 16 BRPM | OXYGEN SATURATION: 97 % | SYSTOLIC BLOOD PRESSURE: 169 MMHG | DIASTOLIC BLOOD PRESSURE: 79 MMHG | TEMPERATURE: 98.4 F | HEART RATE: 94 BPM

## 2021-05-27 NOTE — TELEPHONE ENCOUNTER
----- Message from Jose Leija MD sent at 4/5/2019  1:10 PM CDT -----  PLease Ask the patient to Cipro 500 mg two times a day for 7 days and Berberine 500 mg OTC daily for 30 days   This may be a colonizer though, given the indwelling catheter.   Robi

## 2021-05-27 NOTE — TELEPHONE ENCOUNTER
cipro 250 mg two times a day for 7 days per written order      I would treat with Ciprofloxacin 250 mg Twice Daily for 7 days and Berberine 500 mg Daily for 30 days.     If you are OK I will send to pharmacy and  the Berberine at [email or web address], the [employer] or [employer].       Robi

## 2021-05-27 NOTE — TELEPHONE ENCOUNTER
Reason contacted:  Regarding Results  Information relayed:  Pt was informed of the providers response and has started the medication.  Additional questions:  No  Further follow-up needed:  No  Okay to leave a detailed message:  No

## 2021-05-27 NOTE — PATIENT INSTRUCTIONS - HE
Inc. you for coming in today ambulating    Your sodium level was low while you are in the hospital    We will discontinue your triamterene hydrochlorothiazide    This will likely improve your sodium levels    I would like you to take a half dose daily for 8 days then stop    If you would like you can check your sodium level is a lab only anytime in the next month    We will recheck your urine today    Follow-through with your urologist as your schedule

## 2021-05-27 NOTE — TELEPHONE ENCOUNTER
Dania Somers calls today to report that she taking Cipro 250 mg two times a day for 7 days for a UTI. She started first dose on Saturday, 4/6. She is scheduled for [facility] on 4/10. She is asking if she can still have her tx if taking antibx. Will seek advisement per Aidan Tatmu NP. Natalie Díaz

## 2021-05-27 NOTE — PROGRESS NOTES
Patient here today for labs, OV with Dr. Britton, and chemo for renal carcinoma/MICHAEL Posadas RN

## 2021-05-27 NOTE — PROGRESS NOTES
ASSESSMENT & PLAN    No problem-specific Assessment & Plan notes found for this encounter.      Dania Somers was seen today for test results.    Diagnoses and all orders for this visit:    Renal cell carcinoma of right kidney (H)  -     Urinalysis-UC if Indicated  -     Cancel: Culture, Urine  -     Culture, Urine    Gross hematuria  -     Urinalysis-UC if Indicated  -     Cancel: Culture, Urine  -     Culture, Urine    Low sodium levels        Patient Instructions   Inc. you for coming in today ambulating    Your sodium level was low while you are in the hospital    We will discontinue your triamterene hydrochlorothiazide    This will likely improve your sodium levels    I would like you to take a half dose daily for 8 days then stop    If you would like you can check your sodium level is a lab only anytime in the next month    We will recheck your urine today    Follow-through with your urologist as your schedule        Return in about 1 month (around 4/29/2019).            CHIEF COMPLAINT: Dania Da Silva had concerns including Test Results (urology).    Cahto: 1.............. had concerns including Test Results (urology).    1. Renal cell carcinoma of right kidney (H)    2. Gross hematuria    3. Low sodium levels          CC:             Why are you here today?                              Test results/ CT      Patient comes in today she was recently hospitalized [facility] what sounds like she had a catheter placed and the catheter was a little too stiff she had it placed on a Wednesday 2 days later she felt it was uncomfortable had a changed out catheter was more comfortable however she developed some blood in her urine she proceeded to the hospital    Went to the emergency room on or around 3/24/2019 after onset of hematuria gross that she could see on 3/22/2019    Story goes she was typically replacing her own catheters but had an appointment with her urologist and had a replaced on March 20,  2019    Catheter as she stated was stiff and uncomfortable    Flexible catheter was placed on 3/22/2019    She experienced some loss of appetite weakness lightheadedness and some right lower quadrant pain    Because of this she proceeded to the emergency room on 3/24/2019    She was found to have a sodium of 126  Urine culture grew out mixed saumya    Known history of ureteral cancer    She is followed by Dr. johnson    She is seen by Dr. England and urology    She does have a leg bag    On 324 creatinine 1.2    Repeated on 3/28/2019 and creatinine had returned to normal        Any other Problems in order of Priority:        SUBJECTIVE:  Dania Da Silva is a 89 y.o. female    Past Medical History:   Diagnosis Date     Cancer (H)      GERD (gastroesophageal reflux disease)      Hypertension      Osteoporosis      Rheumatoid arthritis (H)      Past Surgical History:   Procedure Laterality Date     MT CYSTO/URETERO/PYELOSCOPY,W/RESECT TUMOR      Description: Cystoscopy With Resection Of Tumor;  Proc Date: 12/01/2003;     MT CYSTOURETHROSCOPY      Description: Cystoscopy (Diagnostic);  Proc Date: 01/13/2005;     MT REMOVAL OF TONSILS,<13 Y/O      Description: Tonsillectomy;  Recorded: 06/03/2013;     MT TOTAL HIP ARTHROPLASTY      Description: Total Hip Replacement;  Recorded: 04/23/2008;     Sulfa (sulfonamide antibiotics) and Sulindac  Current Outpatient Medications   Medication Sig Dispense Refill     acetaminophen (TYLENOL) 500 MG tablet Take 1,000 mg by mouth every 6 (six) hours as needed for pain.       amLODIPine (NORVASC) 5 MG tablet Take 1 tablet (5 mg total) by mouth daily. As directed 90 tablet 2     aspirin 81 MG EC tablet Take 81 mg by mouth daily.       cholecalciferol, vitamin D3, 10,000 unit Tab Take 3 tablets by mouth daily.        folic acid (FOLVITE) 1 MG tablet TAKE 1 TABLET BY MOUTH EVERY DAY 90 tablet 3     lactase (LACTAID) 3,000 unit tablet Take 3,000 Units by mouth as needed.       lisinopril  (PRINIVIL,ZESTRIL) 10 MG tablet TAKE 1 TABLET BY MOUTH EVERY DAY (Patient taking differently: TAKE 1 TABLET (10 mg)  BY MOUTH EVERY DAY) 90 tablet 3     methotrexate sodium (METHOTREXATE) 2.5 mg DsPk Take 15 mg by mouth once a week.       multivitamin (MULTIPLE VITAMINS) per tablet Take 1 tablet by mouth daily.       NaCl 0.9% IR (NS) 0.9 % irrigation USE FOR IRRIGATION. (Patient taking differently: USE FOR IRRIGATION. TWICE DAILY) 2000 mL PRN     naproxen sodium (ALEVE) 220 MG tablet Take 220 mg by mouth daily.        prochlorperazine (COMPAZINE) 10 MG tablet Take 1 tablet (10 mg total) by mouth every 6 (six) hours as needed (For breakthrough nausea/vomiting). 30 tablet 1     psyllium (METAMUCIL) 3.4 gram packet Take 1 packet by mouth 2 (two) times a day as needed.              traMADol (ULTRAM) 50 mg tablet Take 1 tablet (50 mg total) by mouth every 6 (six) hours as needed for pain. 12 tablet 0     amoxicillin (AMOXIL) 500 MG capsule Take 4 capsules by mouth as needed. Prior to dental appointments       CLINDAMYCIN HCL ORAL Take 3 tablets by mouth as needed.       loperamide (IMODIUM) 2 mg capsule Take 2 mg by mouth 4 (four) times a day as needed for diarrhea.       SIMETHICONE (GAS-X EXTRA STRENGTH ORAL) Take 1 tablet by mouth as needed.       No current facility-administered medications for this visit.      Family History   Problem Relation Age of Onset     Thyroid cancer Daughter      Cervical cancer Daughter      Breast cancer Daughter      Uterine cancer Daughter      Testicular cancer Nephew      Lung cancer Cousin      Social History     Socioeconomic History     Marital status: Single     Spouse name: None     Number of children: None     Years of education: None     Highest education level: None   Occupational History     None   Social Needs     Financial resource strain: None     Food insecurity:     Worry: None     Inability: None     Transportation needs:     Medical: None     Non-medical: None  "  Tobacco Use     Smoking status: Never Smoker     Smokeless tobacco: Never Used   Substance and Sexual Activity     Alcohol use: Yes     Comment: \"very infrequent\"     Drug use: No     Sexual activity: No   Lifestyle     Physical activity:     Days per week: None     Minutes per session: None     Stress: None   Relationships     Social connections:     Talks on phone: None     Gets together: None     Attends Restoration service: None     Active member of club or organization: None     Attends meetings of clubs or organizations: None     Relationship status: None     Intimate partner violence:     Fear of current or ex partner: None     Emotionally abused: None     Physically abused: None     Forced sexual activity: None   Other Topics Concern     None   Social History Narrative     None     Patient Active Problem List   Diagnosis     Hypertension     Ganglion     Abdominal Pain Feels Crampy / Colicky     Abnormal Blood Chemistry     Squamous Cell Carcinoma Of The Urethra     Hyperlipidemia     Hyperparathyroidism (H)     Hypercalcemia     Rheumatoid Arthritis     Osteoporosis, senile     Bilateral Inguinal Hernia     Limb Pain     Urinary Frequency Increased     Renal cell carcinoma of right kidney (H)     Metastasis to bone (H)     Chemotherapy management, encounter for     Diarrhea due to drug     Encounter for antineoplastic immunotherapy     Chronic suprapubic catheter (H)                                              SOCIAL: She  reports that  has never smoked. she has never used smokeless tobacco. She reports that she drinks alcohol. She reports that she does not use drugs.    REVIEW OF SYSTEMS:   Family history not pertinent to chief complaint or presenting problem    Review of Systems:      Nervous System:  No headache, paresthesia or dizziness or fainting                                  Ears: No hearing loss or ringing in the ears    Eyes: No blurring of vision, Double Vision            No redness, itching " or dryness.    Nose: No nosebleed or loss of smell    Mouth: No mouth sores or  coated tongue    Throat: No hoarseness or difficulty swallowing    Neck: No enlarged thyroid or lymph nodes.    Heart: No chest pain, palpitation or irregular heartbeat.                  Lungs: No shortness of breath, wheezing or hemoptysis.    Gastrointestinal: No nausea or vomiting, melena or blood in stools.    Kidney/Bladdr: No polyuria, polydipsia, has a history of gross hematuria                             Genital/Sexual: No Sex function Changes                                Skin: No rash    Muscles/Joints/Bones: No Muscle morning stiffness, No Effusion of a Joint     In general she does feel tired    Review of systems otherwise negative as requested from patient, except   Those positive ROS outlined and discussed in [place].    OBJECTIVE:  /52 (Patient Site: Right Arm, Patient Position: Sitting, Cuff Size: Adult Regular)   Wt 105 lb (47.6 kg)   BMI 19.84 kg/m      GENERAL:     No acute distress.   Alert and oriented X 3         Physical:    Delightful woman she is in no distress  Catheter bag in right leg  Katerina colored urine  Lungs are clear  Cardiac regular rate and rhythm no appreciable murmur  Full range of affect  No jugular venous distention appreciated  No lower extremity edema appreciated        ASSESSMENT & PLAN      Dania Somers was seen today for test results.    Diagnoses and all orders for this visit:    Renal cell carcinoma of right kidney (H)  -     Urinalysis-UC if Indicated  -     Cancel: Culture, Urine  -     Culture, Urine    Gross hematuria  -     Urinalysis-UC if Indicated  -     Cancel: Culture, Urine  -     Culture, Urine    Low sodium levels        Return in about 1 month (around 4/29/2019).       Anticipatory Guidance and Symptomatic Cares Discussed   Advised to call back directly if there are further questions, or if these symptoms fail to improve as anticipated or worsen.  Return to clinic if patient  has a clinical concern that warrants an exam.         I spent 25  minutes with this patient face to face, of which 50% or greater was spent in counseling and coordination of care with regards to Dania Somers was seen today for test results.    Diagnoses and all orders for this visit:    Renal cell carcinoma of right kidney (H)  -     Urinalysis-UC if Indicated  -     Cancel: Culture, Urine  -     Culture, Urine    Gross hematuria  -     Urinalysis-UC if Indicated  -     Cancel: Culture, Urine  -     Culture, Urine    Low sodium levels        Jose Leija MD  [facility] [address] [phone]

## 2021-05-27 NOTE — TELEPHONE ENCOUNTER
0015:  Patient called and left message wanting to know if she should still come in Thursday.  She said she's been on antibiotics and feeling a lot better.  She asked for return call/MICHAEL Posadas RN     4770:  Called patient. Patient reports feeling better. I told patient to make sure to keep her appointment, as scheduled on Thurs so we can recheck her labs. I told her that her sodium was low and kidney function was elevated. Dr. Britton wanted to be sure patient drinking fluids well.  I instructed patient to drink gatorade, other fluids, but not just plain water due to low sodium. Patient verbalized understanding of my instructions and said she has Gatorade to drink at home.  Patient aware of her appt on Thurs and said will arrive a little early. She thanked me for calling/MICHAEL Posadas RN

## 2021-05-27 NOTE — TELEPHONE ENCOUNTER
Reason contacted:  Regarding test results  Information relayed:  Pt has an appt with PCP tomorrow 3/29/19 and will discuss lab results from both appts at that time. Pt called the urology office and is having them send over the CT results and office notes.  Additional questions:  No  Further follow-up needed:  No  Okay to leave a detailed message:  No

## 2021-05-27 NOTE — PROGRESS NOTES
[facility] Hematology and Oncology Progress Note    Patient: Dania Da Silva  MRN: [id number]  Date of Service:4/12/2019      Reason for Visit    Follow up metastatic clear-cell renal cell carcinoma    Assessment and Plan  Cancer Staging  Renal cell carcinoma of right kidney (H)  Staging form: Kidney, AJCC 8th Edition  - Clinical stage from 4/3/2018: Stage IV (cT1b, cNX, pM1) - Signed by Ernesto Britton MD on 4/3/2018      ECOG Performance   ECOG Performance Status: 1     Distress Assessment  Distress Assessment Score: No distress    Pain  Currently in Pain: No/denies    #.  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   Currently on second line treatment with nivolumab.  She is tolerating nivolumab well without intolerable side effects.  We will continue treatment.     Follow-up lab and infusion appointment in 2 weeks.   Follow-up with me in 4 weeks with CT scan prior.       #. Constipation   It is now on alternating constipation and diarrhea.  She is managing okay with stool softener and suppository. Advised to use Miralax if no stools for 3 days.       #.  Metastatic bone disease   On Zometa every 12 weeks.     #.  Cancer related pain, controlled well.   Fairly controlled at this point and not needing breakthrough pain medication at all.  She normally take 1 tablet of Aleve in the morning and 1 tablet of Tylenol at bedtime as a precautionary. She continues the same.    #.  2 several centimeter bladder stones.   She is seeing a urologist in [place].  #.  Rheumatoid arthritis- on methotrexate  #.  Osteoporosis  #.  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  has a suprapubic  catheter.    #.  Hypertension, controlled.      Problem List    1. Renal cell carcinoma of right kidney (H)  CC OFFICE VISIT LONG    Infusion Appointment    Infusion Appointment    CC OFFICE VISIT LONG    CT Chest Abdomen Pelvis With Oral With IV Cont    DISCONTINUED: sodium chloride 0.9% 250 mL infusion    DISCONTINUED: nivolumab 240 mg in sodium chloride 0.9% 100 mL chemo (OPDIVO)    DISCONTINUED: diphenhydrAMINE injection 50 mg (BENADRYL)    DISCONTINUED: famotidine 20 mg/2 mL injection 20 mg (PEPCID)    DISCONTINUED: hydrocortisone sod succ (PF) injection 100 mg    DISCONTINUED: acetaminophen tablet 1,000 mg (TYLENOL)    DISCONTINUED: sodium chloride 0.9% 500 mL      _____________________________________________________________________________  Diagnosis  March 2018- Metastatic renal cell carcinoma of the right kidney- clear cell type (2.5 cm pleural-based expansile destructive lesion in the right lateral seventh rib, pleural-based nodules, multiple bilateral pulmonary nodules with small right pleural effusion, 3.2 cm renal mass consistent with renal cell carcinoma)- PDL1 expression 90%.    Treatment to date  4/18/18- started pazopanib 400 mg daily (at 50% dose reduction with plan to escalate to 800 mg daily as tolerated), stop on 5/26/2018 due to grade 3 diarrhea   -CT scan show overall stable disease (mild progression about 20% increase in size of pleural-based metastases), stable or slightly smaller pulmonary nodules.   - 6/5/2018- restarted pazopanib 200 mg daily for better tolerability  12/12/2018-therapy changed to nivolumab due to intolerable diarrhea with pazopanib.    History of Present Illness    Ms. Da Silva is here for follow up, accompanied by her friend.     She has some constipation and last bowel movement was days ago.  She was using Metamucil but not using reports that she is now feeling more constipated than diarrhea.  She is managing okay with stool softener and suppository.  No intolerable  side effects from immunotherapy.  She has chronic neuropathy in both feet.  No pain.  She wants to know how this immunotherapy is working or not.  She is asked about the timing of CT scan.  No concern.     Pain Status  Currently in Pain: No/denies    Review of Systems    Constitutional  Constitutional (WDL): Exceptions to WDL  Fatigue: Fatigue relieved by rest  Fever: None  Chills: None  Weight Gain: None  Weight Loss: None  Neurosensory  Neurosensory (WDL): Exceptions to WDL  Peripheral Motor Neuropathy: Asymptomatic, clinical or diagnostic observations only, intervention not indicated  Ataxia: Asymptomatic, clinical or diagnostic observations only, intervention not indicated(uses cane)  Peripheral Sensory Neuropathy: Asymptomatic, loss of deep tendon reflexes or paresthesia(feet @ noc)  Confusion: None  Syncope: None  Eye   Eye Disorder (WDL): Exceptions to WDL(wears glasses)  Blurred Vision: Intervention not indicated(sees Eye  next week)  Dry Eye: Asymptomatic, clinical or diagnostic observations only, mild symptoms relieved by lubricants  Eye Pain: None  Watering Eyes: None  Ear  Ear Disorder (WDL): All ear disorder elements are within defined limits  Cardiovascular  Cardiovascular (WDL): All cardiovascular elements are within defined limits  Pulmonary  Respiratory (WDL): Exceptions to WDL  Cough: None  Dyspnea: Shortness of breath with moderate exertion  Hypoxia: None  Gastrointestinal  Gastrointestinal (WDL): Exceptions to WDL  Anorexia: None  Constipation: Occasional or intermittent symptoms, occasional use of stool softeners, laxatives, dietary modification, or enema(alternates between constipation and diarrhea)  Diarrhea: Increase of <4 stools per day over baseline, mild increase in ostomy output compared to baseline(alternates between constipation and diarrhea)  Dysphagia: None  Esophagitis: None  Nausea: None  Pharyngitis: None  Vomiting: None  Dysgeusia: None  Dry Mouth:  None  Genitourinary  Genitourinary (WDL): All genitourinary elements are within defined limits(suprapubic catheter)  Lymphatic  Lymph (WDL): All lymph disorder elements are within defined limits  Musculoskeletal and Connective Tissue  Musculoskeletal and Connetive Tissue Disorders (WDL): Exceptions to WDL  Arthralgia: Mild pain(in the mornings)  Bone Pain: None  Muscle Weakness : None  Myalgia: Mild pain(in the mornings)  Integumentary  Integumentary (WDL): All integumentary elements are within defined limits  Patient Coping  Patient Coping: Accepting;Open/discussion  Distress Assessment  Distress Assessment Score: No distress  Accompanied by  Accompanied by: Friend(Anthony, Friend)  Oral Chemo Adherence       Past History  Past Medical History:   Diagnosis Date     Cancer (H)      GERD (gastroesophageal reflux disease)      Hypertension      Osteoporosis      Rheumatoid arthritis (H)        Physical Exam    Recent Vitals 4/10/2019   Height -   Weight 105 lbs   BSA (m2) 1.43 m2   /62   Pulse 86   Temp 97.5   Temp src 1   SpO2 97   Some recent data might be hidden     General: alert, awake, not in acute distress  HEENT: Head: Normal, normocephalic, atraumatic.  Eye: Normal external eye, conjunctiva, lids cornea, MAHESH.  Ears:  Non-tender.  Nose: Normal external nose, mucus membranes and septum.  Pharynx: Dental Hygiene adequate. Normal buccal mucosa. Normal pharynx.  Neck / Thyroid: Supple, no masses, nodes, nodules or enlargement.  Lymphatics: No abnormally enlarged lymph nodes.  Chest: Normal chest wall and respirations. Clear to auscultation.  No palpable mass or reproducible pain.  Heart: S1 S2 RRR, no murmur.   Abdomen: abdomen is soft without significant tenderness, masses, organomegaly or guarding  Extremities: normal strength, tone, and muscle mass  Skin: normal. no rash or abnormalities  CNS: non focal.  Uses a cane for walking.    Lab Results    Recent Results (from the past 168 hour(s))   Comprehensive  Metabolic Panel   Result Value Ref Range    Sodium 135 (L) 136 - 145 mmol/L    Potassium 4.0 3.5 - 5.0 mmol/L    Chloride 101 98 - 107 mmol/L    CO2 27 22 - 31 mmol/L    Anion Gap, Calculation 7 5 - 18 mmol/L    Glucose 85 70 - 125 mg/dL    BUN 19 8 - 28 mg/dL    Creatinine 0.92 0.60 - 1.10 mg/dL    GFR MDRD Af Amer >60 >60 mL/min/1.73m2    GFR MDRD Non Af Amer 57 (L) >60 mL/min/1.73m2    Bilirubin, Total 0.5 0.0 - 1.0 mg/dL    Calcium 9.5 8.5 - 10.5 mg/dL    Protein, Total 6.3 6.0 - 8.0 g/dL    Albumin 3.6 3.5 - 5.0 g/dL    Alkaline Phosphatase 102 45 - 120 U/L    AST 18 0 - 40 U/L    ALT 14 0 - 45 U/L   TSH   Result Value Ref Range    TSH 2.27 0.30 - 5.00 uIU/mL   HM1 (CBC with Diff)   Result Value Ref Range    WBC 6.5 4.0 - 11.0 thou/uL    RBC 3.91 3.80 - 5.40 mill/uL    Hemoglobin 11.7 (L) 12.0 - 16.0 g/dL    Hematocrit 36.3 35.0 - 47.0 %    MCV 93 80 - 100 fL    MCH 29.9 27.0 - 34.0 pg    MCHC 32.2 32.0 - 36.0 g/dL    RDW 15.3 (H) 11.0 - 14.5 %    Platelets 388 140 - 440 thou/uL    MPV 9.5 8.5 - 12.5 fL    Neutrophils % 72 (H) 50 - 70 %    Lymphocytes % 18 (L) 20 - 40 %    Monocytes % 8 2 - 10 %    Eosinophils % 1 0 - 6 %    Basophils % 1 0 - 2 %    Neutrophils Absolute 4.6 2.0 - 7.7 thou/uL    Lymphocytes Absolute 1.1 0.8 - 4.4 thou/uL    Monocytes Absolute 0.5 0.0 - 0.9 thou/uL    Eosinophils Absolute 0.1 0.0 - 0.4 thou/uL    Basophils Absolute 0.1 0.0 - 0.2 thou/uL       Imaging    No results found.     TT: 40 minutes and more than 50% was spent on coordination and counseling of care.    Signed by: Ernesto Britton MD

## 2021-05-27 NOTE — TELEPHONE ENCOUNTER
Who is calling:  Patient   Reason for Call:  Patient calling back to state: She misplaced the Office notes and CT scan from Bo England MD. At [facility]. And will need the clinic to request these results before her fu apt this Friday 03/29/19. Please call patient and follow up with this matter. 2nd request   Date of last appointment with primary care: 01/15/19  Okay to leave a detailed message: Yes

## 2021-05-27 NOTE — TELEPHONE ENCOUNTER
Who is calling:  Patient   Reason for Call:  Would like Dr Leija to review her visits from the ER at [facility] on 3/24/2019 and with Dr England with Urology off of [facility] on 3/20/2019.     She would like to have PCP review the office visit notes and would like to know what is the next step? She states that Dr England's office has not returned her calls. She is worried about the results.  Date of last appointment with primary care: 1/15/2019  Okay to leave a detailed message: Yes- call the home number first

## 2021-05-27 NOTE — TELEPHONE ENCOUNTER
RN cannot approve Refill Request    RN can NOT refill this medication med is not covered by policy/route to provider     . Last office visit: 3/29/2019 Jose Leija MD Last Physical: Visit date not found Last MTM visit: Visit date not found Last visit same specialty: 3/29/2019 Jose Leija MD.  Next visit within 3 mo: Visit date not found  Next physical within 3 mo: Visit date not found      Dottie Arvizu, [facility] Triage/Med Refill 4/12/2019    Requested Prescriptions   Pending Prescriptions Disp Refills     folic acid (FOLVITE) 1 MG tablet [Pharmacy Med Name: FOLIC ACID 1MG TABLETS] 90 tablet 0     Sig: TAKE 1 TABLET BY MOUTH EVERY DAY       There is no refill protocol information for this order

## 2021-05-27 NOTE — TELEPHONE ENCOUNTER
Called patient and updated her on Aidan's response. She expressed understanding and will plan to come to get her Optivo on 4/10. Natalie Díaz

## 2021-05-28 ENCOUNTER — RECORDS - HEALTHEAST (OUTPATIENT)
Dept: ADMINISTRATIVE | Facility: CLINIC | Age: 86
End: 2021-05-28

## 2021-05-28 NOTE — PROGRESS NOTES
Pt presented from clinic for chemo. Pt iv working well. opdivo to be given over 30 minutes. Danielle Perry RN   2018

## 2021-05-28 NOTE — PROGRESS NOTES
"[facility] Initial Encounter  Assessment & Plan                                                     Hypertension: Last Na level improved, but slightly low. She is having some edema now that she is off triamterene-HCTZ. Reviewed that amlodipine may be contributing. BP slightly higher now that she is off. Recommended elevating feet. Reviewed that we can recheck her Na tomorrow at oncology, and if normal will ask Dr. Leija about restarting HCTZ but at 12.5 mg daily to lessen hyponatremia risk.   PLAN:   1. Na level tomorrow -- order placed.   Addendum 5/6/19: Per Dr. Leija, ok to start HCTZ 12.5 mg -- spoke to patient, she is still having swelling. Is willing to try. Reports she talked to her rheumatologist, who knows her longer than Dr. Leija, and they told her she has always had low Na levels. Will be getting labs drawn Thursday 5/9/19. I will call her 5/10/19 to follow up.     Renal Cell Carcinoma: Continue to follow with [facility].     Rheumatoid Arthritis/Pain: Stable. Continue to follow up with rheumatology. Recommended that she taking naproxen with food and to use PRN due to GI and renal risks.     Follow Up  As needed with MTM    Subjective & Objective                                                     Dania Da Silva is a 89 y.o. female called for an initial visit for Medication Therapy Management. She was referred to me from  Part D program     Chief Complaint: Wonders about triamterene which was discontinued. Reports her ankles and feet are swollen, none in chest. Painful always \"needles and pins\" --wearing sandals. Wonders if she can be put back on Na.     Hypertension: Currently taking amlodipine 5 mg daily and lisinopril 10 mg daily. Triamterene-HCTZ was d/c'ed on 3/29 by Dr. Leija due to hyponatremia.  Patient does not monitor BP at home.  Denies lightheadedness/dizziness. Is not elevating her feet. She is adding salt to her food.   Last Na level = 135 on 4/10/19   BP Readings from Last 3 " Encounters:   04/10/19 140/62   03/29/19 120/52   03/28/19 161/71     Renal Cell Carcinoma: Followed by [facility]. Optivio infusions. On Zometa every 12 weeks for metastatic bone disese.  Reports she does not get nausea or diarrhea. Is taking metamucil daily which keeps her regular. Uses Gas X PRN.     Rheumatoid Arthritis/Pain: Currently taking MTX 15 mg weekly + folic acid. Symptoms in her hands, but well controlled. Sees a rheumatologist Dr. Loyd. Does not think she ever used tramadol -- was recommended for hip pain from ED on 12/26/19. Takes Aleve every morning due to cancer pain, does not take with food. Sometimes takes APAP x2 at night.       PMH: reviewed per patient   Allergies/ADRs: reviewed in EPIC   Alcohol: reviewed in EPIC   Tobacco:   Social History     Tobacco Use   Smoking Status Never Smoker   Smokeless Tobacco Never Used     Today's Vitals: There were no vitals filed for this visit.  ----------------    Much or all of the text in this note was generated through the use of Dragon Dictate voice-to-text software. Errors in spelling or words which seem out of context are unintentional. Sound alike errors, in particular, may have escaped editing.    The patient was given a CMS standardized format medication action plan    I spent 30 minutes with this patient today. An extra 15 minutes was spent creating the Medication Action Plan. All changes were made via collaborative practice agreement with Jose Leija MD. A copy of the visit note was provided to the patient's provider.     Isabel Patel, Pharm.D., BCACP  Medication Therapy Management Pharmacist  [facility]     Current Outpatient Medications   Medication Sig Dispense Refill     acetaminophen (TYLENOL) 500 MG tablet Take 1,000 mg by mouth every 6 (six) hours as needed for pain.       amLODIPine (NORVASC) 5 MG tablet Take 1 tablet (5 mg total) by mouth daily. As directed 90 tablet 2     amoxicillin (AMOXIL) 500  MG capsule Take 4 capsules by mouth as needed. Prior to dental appointments       aspirin 81 MG EC tablet Take 81 mg by mouth daily.       cholecalciferol, vitamin D3, 10,000 unit Tab Take 3 tablets by mouth daily.        CLINDAMYCIN HCL ORAL Take 3 tablets by mouth as needed.       folic acid (FOLVITE) 1 MG tablet TAKE 1 TABLET BY MOUTH EVERY DAY 90 tablet 3     lactase (LACTAID) 3,000 unit tablet Take 3,000 Units by mouth as needed.       lisinopril (PRINIVIL,ZESTRIL) 10 MG tablet TAKE 1 TABLET BY MOUTH EVERY DAY (Patient taking differently: TAKE 1 TABLET (10 mg)  BY MOUTH EVERY DAY) 90 tablet 3     loperamide (IMODIUM) 2 mg capsule Take 2 mg by mouth 4 (four) times a day as needed for diarrhea.       methotrexate sodium (METHOTREXATE) 2.5 mg DsPk Take 15 mg by mouth once a week.       multivitamin (MULTIPLE VITAMINS) per tablet Take 1 tablet by mouth daily.       NaCl 0.9% IR (NS) 0.9 % irrigation USE FOR IRRIGATION. (Patient taking differently: USE FOR IRRIGATION. TWICE DAILY) 2000 mL PRN     naproxen sodium (ALEVE) 220 MG tablet Take 220 mg by mouth daily.        prochlorperazine (COMPAZINE) 10 MG tablet Take 1 tablet (10 mg total) by mouth every 6 (six) hours as needed (For breakthrough nausea/vomiting). 30 tablet 1     psyllium (METAMUCIL) 3.4 gram packet Take 1 packet by mouth 2 (two) times a day as needed.              SIMETHICONE (GAS-X EXTRA STRENGTH ORAL) Take 1 tablet by mouth as needed.       traMADol (ULTRAM) 50 mg tablet Take 1 tablet (50 mg total) by mouth every 6 (six) hours as needed for pain. 12 tablet 0     No current facility-administered medications for this visit.

## 2021-05-28 NOTE — PROGRESS NOTES
[facility] Progress Note    Patient: Dania Da Silva  MRN: [id number]  Date of Service:5/9/2019      Reason for Visit    Follow up metastatic clear-cell renal cell carcinoma    Assessment and Plan  Cancer Staging  Renal cell carcinoma of right kidney (H)  Staging form: Kidney, AJCC 8th Edition  - Clinical stage from 4/3/2018: Stage IV (cT1b, cNX, pM1) - Signed by Ernesto Britton MD on 4/3/2018      ECOG Performance   ECOG Performance Status: 1     Distress Assessment  Distress Assessment Score: No distress    Pain  Currently in Pain: No/denies    #.  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   Reviewed the interval CT scan of chest abdomen pelvis and overall mixed response.  Clinically she is stable without any new concern or infact she is feeling better without significant pain.  Therefore, we will continue nivolumab.  She is tolerating nivolumab well without intolerable side effects.  We will continue treatment.     Follow-up lab and infusion appointment in 2 weeks.   Follow-up provider visit and infusion appointment per protocol.  Restaging CT scan in about 3-4 months.      #.  Metastatic bone disease   On Zometa every 12 weeks.     #.  Cancer related pain, controlled well.     #.  2 several centimeter bladder stones.   She is seeing a urologist in [place].  #.  Rheumatoid arthritis- on methotrexate  #.  Osteoporosis  #.  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  has a suprapubic catheter.    #.  Hypertension, controlled.      Problem List    1. Renal cell carcinoma of right kidney (H)  CC OFFICE VISIT LONG       _____________________________________________________________________________  Diagnosis  March 2018- Metastatic renal cell carcinoma of the right kidney- clear cell type (2.5 cm pleural-based expansile destructive lesion in the right lateral seventh rib, pleural-based nodules, multiple bilateral pulmonary nodules with small right pleural effusion, 3.2 cm renal mass consistent with renal cell carcinoma)- PDL1 expression 90%.    Treatment to date  4/18/18- started pazopanib 400 mg daily (at 50% dose reduction with plan to escalate to 800 mg daily as tolerated), stop on 5/26/2018 due to grade 3 diarrhea   -CT scan show overall stable disease (mild progression about 20% increase in size of pleural-based metastases), stable or slightly smaller pulmonary nodules.   - 6/5/2018- restarted pazopanib 200 mg daily for better tolerability  12/12/2018-therapy changed to nivolumab due to intolerable diarrhea with pazopanib.    History of Present Illness    Ms. Da Silva is here for follow up, accompanied by her friend.     Overall she is doing okay.  No changes from her health.  Pain is well controlled and currently no pain.  She does not have any intolerable side effects from immunotherapy, in fact no side effects that she recalls.    Pain Status  Currently in Pain: No/denies    Review of Systems    Constitutional  Constitutional (WDL): Exceptions to WDL  Fatigue: Concerns(relieved with rest)  Fever: No Concerns  Chills: No Concerns  Weight Gain: No Concerns  Weight Loss: No Concerns  Hot flashes/Night Sweats: No Concerns  Neurosensory  Neurosensory (WDL): Exceptions to WDL  Peripheral Motor Neuropathy: Concerns  Ataxia: No Concerns(uses cane)  Peripheral Sensory Neuropathy: Concerns  Confusion: No Concerns  Dizziness: No Concerns  Syncope: No Concerns  Eye   Eye Disorder (WDL): All eye disorder elements are within defined limits(glasses)  Blurred Vision: No Concerns  Dry Eye: No Concerns  Eye Pain: No Concerns  Watering Eyes:  No Concerns  Ear  Ear Disorder (WDL): All ear disorder elements are within defined limits(bilateral hearing aids)  Ear Pain: No Concerns  Tinnitus: No Concerns  Cardiovascular  Cardiovascular (WDL): Exceptions to WDL  Palpitations: No Concerns  Edema: Concerns(ankles)  SVT, DVT/PE: No Concerns  Chest Pain - Cardiac: No Concerns  Pulmonary  Respiratory (WDL): Exceptions to WDL  Cough: No Concerns  Dyspnea: Concerns(occ)  Hypoxia: No Concerns  Gastrointestinal  Gastrointestinal (WDL): Exceptions to WDL  Anorexia: No Concerns(ok)  Nausea: No Concerns  Vomiting: No Concerns  Dehydration: No Concerns  Dysgeusia: No Concerns  Dysphagia: No Concerns  Mucositis Oral: No Concerns  Esophagitis: No Concerns  Constipation: Concerns(chronic)  Diarrhea: No Concerns  Pharyngitis: No Concerns  Dry Mouth: No Concerns  Genitourinary  Genitourinary (WDL): All genitourinary elements are within defined limits(catheter)  Urinary Frequency: No Concerns  Urinary Retention: No Concerns  Urinary Tract Pain: No Concerns  Lymphatic  Lymph (WDL): All lymph disorder elements are within defined limits  Lymphedema: No Concerns  Lymph Node Discomfort: No Concerns  Musculoskeletal and Connective Tissue  Musculoskeletal and Connetive Tissue Disorders (WDL): All Musculoskeletal and Connetive Tissue Disorder elements are within defined limits  Arthralgia: No Concerns  Bone Pain: No Concerns  Muscle Weakness : No Concerns  Myalgia: No Concerns  Integumentary  Integumentary (WDL): All integumentary elements are within defined limits  Alopecia: No Concerns  Rash Maculo-Papular: No Concerns  Pruritus: No Concerns  Urticaria: No Concerns  Palmar-Plantar Erythrodysesthesia Syndrome: No Concerns  Flushing: No Concerns  Patient Coping  Patient Coping: Accepting  Accompanied by  Accompanied by: Friend(Loni)  Oral Chemo Adherence         Constitutional     Neurosensory     Eye      Ear     Cardiovascular     Pulmonary     Gastrointestinal     Genitourinary      Lymphatic     Musculoskeletal and Connective Tissue     Integumentary     Patient Coping  Patient Coping: Accepting  Distress Assessment  Distress Assessment Score: No distress  Accompanied by  Accompanied by: Friend(Loni)  Oral Chemo Adherence       Past History  Past Medical History:   Diagnosis Date     Cancer (H)      GERD (gastroesophageal reflux disease)      Hypertension      Osteoporosis      Rheumatoid arthritis (H)        Physical Exam    Recent Vitals 5/9/2019   Height -   Weight 105 lbs 11 oz   BSA (m2) 1.44 m2   /65   Pulse 101   Temp 97.4   Temp src 1   SpO2 97   Some recent data might be hidden     General: alert, awake, not in acute distress  HEENT: Head: Normal, normocephalic, atraumatic.  Eye: Normal external eye, conjunctiva, lids cornea, MAHESH.  Ears:  Non-tender.  Nose: Normal external nose, mucus membranes and septum.  Pharynx: Dental Hygiene adequate. Normal buccal mucosa. Normal pharynx.  Neck / Thyroid: Supple, no masses, nodes, nodules or enlargement.  Lymphatics: No abnormally enlarged lymph nodes.  Chest: Normal chest wall and respirations. Clear to auscultation.  No palpable mass or reproducible pain.  Heart: S1 S2 RRR, no murmur.   Abdomen: abdomen is soft without significant tenderness, masses, organomegaly or guarding  Extremities: normal strength, tone, and muscle mass  Skin: normal. no rash or abnormalities  CNS: non focal.  Uses a cane for walking.    Lab Results    No results found for this or any previous visit (from the past 168 hour(s)).    Imaging    Ct Chest Abdomen Pelvis With Oral With Iv Cont    Result Date: 5/6/2019  EXAM: CT CHEST ABDOMEN PELVIS W ORAL W IV CONTRAST LOCATION: [facility] DATE/TIME: 5/6/2019 11:05 AM INDICATION: Metastatic renal cell carcinoma, treatment response assessment. COMPARISON: None. TECHNIQUE: Helical thin-section CT scan of the chest, abdomen, and pelvis was performed before and after injection of IV contrast. Multiplanar  reformats were obtained. Dose reduction techniques were used. CONTRAST: Iohexol (Omni) 75 mL. FINDINGS: CHEST: Mixed response in the lung. One of the more prominent nodules in the right middle lobe along the pleural surface measuring 6 x 8 mm on the prior study has mostly resolved. Some of the areas of slight pleural thickening are unchanged. Nodule in the left lower lobe inferior laterally image 112 is unchanged measuring 5 x 8 mm. Mild increase in size in a right upper lobe nodule anteriorly image 51 measuring 7 mm, previously 5 mm and on image 59 measuring 4 mm previously 3 mm and a new 2 mm nodule on image 48 left upper lobe. Nodule centrally in the left lower lobe image 69 has mildly increased in size as well measuring 8 mm previously 5 mm. No mediastinal lymphadenopathy.  ABDOMEN: Right renal mass has not significantly changed measuring 3.5 x 2.0 cm. No significant findings in the liver, spleen, pancreas and adrenal glands. No lymphadenopathy. PELVIS: No lymphadenopathy. Moderate amount of stool consistent with some constipation. Tiny bilateral inguinal hernias. No obstruction. MUSCULOSKELETAL: Negative.     CONCLUSION: 1.  Stable solid mass right kidney. 2.  Mixed response in the lungs with several nodules mildly increasing in size and a few nodules slightly improving. 3.  No significant new findings.     TT: 40 minutes and more than 50% was spent on coordination and counseling of care.    Signed by: Ernesto Britton MD

## 2021-05-29 ENCOUNTER — RECORDS - HEALTHEAST (OUTPATIENT)
Dept: ADMINISTRATIVE | Facility: CLINIC | Age: 86
End: 2021-05-29

## 2021-05-29 NOTE — TELEPHONE ENCOUNTER
Refill Approved    Rx renewed per Medication Renewal Policy. Medication was last renewed on 7/15/18.3/22/18    Dottie Arvizu, [facility] Triage/Med Refill 6/24/2019     Requested Prescriptions   Pending Prescriptions Disp Refills     amLODIPine (NORVASC) 5 MG tablet [Pharmacy Med Name: AMLODIPINE BESYLATE 5MG TABLETS] 90 tablet 0     Sig: TAKE 1 TABLET(5 MG) BY MOUTH DAILY AS DIRECTED       Calcium-Channel Blockers Protocol Passed - 6/22/2019  8:10 PM        Passed - PCP or prescribing provider visit in past 12 months or next 3 months     Last office visit with prescriber/PCP: 6/3/2019 Jose Leija MD OR same dept: 6/3/2019 Jose Leija MD OR same specialty: 6/3/2019 Jose Leija MD  Last physical: Visit date not found Last MTM visit: Visit date not found   Next visit within 3 mo: Visit date not found  Next physical within 3 mo: Visit date not found  Prescriber OR PCP: Jose Leija MD  Last diagnosis associated with med order: 1. Essential hypertension  - amLODIPine (NORVASC) 5 MG tablet [Pharmacy Med Name: AMLODIPINE BESYLATE 5MG TABLETS]; TAKE 1 TABLET(5 MG) BY MOUTH DAILY AS DIRECTED  Dispense: 90 tablet; Refill: 0    2. HTN (hypertension)  - lisinopril (PRINIVIL,ZESTRIL) 10 MG tablet [Pharmacy Med Name: LISINOPRIL 10MG TABLETS]; TAKE 1 TABLET BY MOUTH EVERY DAY  Dispense: 90 tablet; Refill: 0    If protocol passes may refill for 12 months if within 3 months of last provider visit (or a total of 15 months).             Passed - Blood pressure filed in past 12 months     BP Readings from Last 1 Encounters:   06/19/19 162/71             lisinopril (PRINIVIL,ZESTRIL) 10 MG tablet [Pharmacy Med Name: LISINOPRIL 10MG TABLETS] 90 tablet 0     Sig: TAKE 1 TABLET BY MOUTH EVERY DAY       Ace Inhibitors Refill Protocol Passed - 6/22/2019  8:10 PM        Passed - PCP or prescribing provider visit in past 12 months       Last office visit with prescriber/PCP: 6/3/2019 Jose Leija MD OR same  dept: 6/3/2019 Jose Leija MD OR same specialty: 6/3/2019 Jose Leija MD  Last physical: Visit date not found Last MTM visit: Visit date not found   Next visit within 3 mo: Visit date not found  Next physical within 3 mo: Visit date not found  Prescriber OR PCP: Jose Leija MD  Last diagnosis associated with med order: 1. Essential hypertension  - amLODIPine (NORVASC) 5 MG tablet [Pharmacy Med Name: AMLODIPINE BESYLATE 5MG TABLETS]; TAKE 1 TABLET(5 MG) BY MOUTH DAILY AS DIRECTED  Dispense: 90 tablet; Refill: 0    2. HTN (hypertension)  - lisinopril (PRINIVIL,ZESTRIL) 10 MG tablet [Pharmacy Med Name: LISINOPRIL 10MG TABLETS]; TAKE 1 TABLET BY MOUTH EVERY DAY  Dispense: 90 tablet; Refill: 0    If protocol passes may refill for 12 months if within 3 months of last provider visit (or a total of 15 months).             Passed - Serum Potassium in past 12 months     Lab Results   Component Value Date    Potassium 4.3 06/05/2019             Passed - Blood pressure filed in past 12 months     BP Readings from Last 1 Encounters:   06/19/19 162/71             Passed - Serum Creatinine in past 12 months     Creatinine   Date Value Ref Range Status   06/05/2019 0.85 0.60 - 1.10 mg/dL Final

## 2021-05-29 NOTE — PATIENT INSTRUCTIONS - HE
Wart Finger:  Purhcase OCT salacylic acid  ( Compound W)  Use 40% on wart daily     Stop Hydrochlorothiazide  REstart Dyazide 37.5/25 1 daily   Eat salt        Clear Lax OK for Constipation     Metamucil OK     SPot on Leg  Mupirocin Oint  Apply daily to spot on leg  4 mm x 5 mm

## 2021-05-29 NOTE — TELEPHONE ENCOUNTER
Wart on the pointer finger    Questions answered.    Angie Levin, RN  [facility] Medication Refill and Triage Nurse  6/6/2019  2:38 PM      Reason for Disposition    Small growth or mole that is unchanged in size or appearance    Protocols used: SKIN LESION - MOLES OR GROWTHS-A-OH

## 2021-05-29 NOTE — PROGRESS NOTES
[facility] Hematology and Oncology Progress Note    Patient: Dania Da Silva  MRN: [id number]  Date of Service:6/5/2019      Reason for Visit:    C7 Nivolumab therapy    Assessment and Plan:    1) Renal cell carcinoma of right kidney (H)   Clinical Stage IV (cT1b, cNX, pM1)    2.5 cm pleural-based expansile destructive lesion in the right lateral seventh rib, pleural-based nodules, multiple bilateral pulmonary nodules, small right pleural effusion, 3.2 cm renal mass   Clear cell type     PDL1 expression 90%    89 y.o.     2018, March - diagnosed.     03/09/18 CT chest - expansile destructive lesion in the right lateral 7th rib, consistent with a metastasis. Multiple enhancing pleural-based masses.  Small right pleural effusion.  Multiple pulmonary nodules in the right and left of varying size. (R) renal mass measuring 3.2 x 2.5 x 3.8 cm.    03/23/18 CT A&P - right kidney with 4 cm enhancing mass.  Patent renal vein.  No retroperitoneal lymphadenopathy.  Metastases in the lungs, right pleural space and right seventh rib.    03/26/18 CT lung biopsy - metastatic renal cell carcinoma - clear cell type.    04/18 - 05/26/18 - received pazopanib therapy @ 400 mg daily (at 50% dose reduction with plan to escalate to 800 mg daily as tolerated).  Stopped due to grade 3 diarrhea    06/04/18 CT CAP - overall stable disease (mild progression about 20% increase in size of pleural-based metastases), stable or slightly smaller pulmonary nodules.    06/05/18 - restarted pazopanib @ 200 mg daily for better tolerability.    08/14/18 CT CAP - Diffuse expansion of the anterior-lateral right seventh rib has improved.  Focally increased ovoid expansile appearance of the anterior right sixth rib.  No significant change of pulmonary nodules, pleural metastases and right renal mass.  Improved but persistent minimal right pleural effusion.  Small left inguinal hernia containing minimal small bowel.  No obstruction or bowel wall  thickening.    10/23/18 CT CAP - Mixed response to therapy. Some of the pulmonary nodules are larger than previous and some are smaller.  Others are unchanged in size.  Skeletal and pleural metastases, as well as right renal mass, have not appreciably changed.    12/12/18 - changed therapy to Nivolumab due to intolerable diarrhea with pazopanib.    02/05/19 CT CAP - a few regions of pleural thickening and some pulmonary nodules have slightly improved. However, a couple pulmonary nodules have also slightly increased. Remaining exam without significant change, including heterogeneous right renal mass.    05/06/19 CT CAP - Stable solid mass right kidney. Mixed response in the lungs with several nodules mildly increasing in size and a few nodules slightly improving.  No significant new findings    06/05/19:    CBC - WNL.    CMP - WNL.      TSH - pending.    Patient looks and feels quite good.  Weight down a couple pounds.  Appetite good.  Bowels acceptable.      No concerning known side effects from nivolumab therapy    Proceed D1C7 Nivolumab therapy.      06/19/19 - D15C7 Nivolumab therapy.    07/03/19 - follow up D1C8 Nivolumab therapy with labs per Treatment Plan.      2) Metastatic bone disease and osteoporosis:    05/09/18 - started Zometa every 12 week therapy.    Due for Zometa today.     3) Bladder stones:    06/27/18 cystoscopy by Dr. Trevino @ [facility].  2 several centimeter bladder stones.    Mild symptomatic bladder spasms about once a month.      Offered litholapaxy and bladder biopsy if she becomes more symptomatic.      Last follow-up with Urology in March, 2019.      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      Has a suprapubic catheter, changing the catheter herself monthly.    Followed by Urology at [facility].      6) Constipation:    Currently not needing or taking anything.  Has stool  softeners if needed.     7) Pain:    Minimal - managed with 1 Aleve every morning and sometimes Tylenol at bedtime.    ECOG Performance:     ECOG Performance Status: 1     Distress Assessment:    Distress Assessment Score: No distress        TT > 25 minutes face to face with > 50% counseling and care coordination.    Aidan Tatum, [facility]   ____________________________________________    Interim History (IH):    Ms. Da Silva presents in a good mood, accompanied by her friend, anticipating D1C7 Opdivo therapy.  She looks and feels quite good and is tolerating Opdivo therapy without incidence or apparent toxicities.  Her fatigue is chronic and stable.  She resides @ [facility] and does well there receiving PT and OT.  She denies cough, fever, chills, unusual headaches, visual or mentation disturbance.  She is s/p radical urethrectomy in 2003 and has since had a suprapubic catheter, changing the catheter herself monthly.    Pain Status:    Currently in Pain: No/denies    Review of Systems    Constitutional  Constitutional (WDL): Exceptions to WDL  Fatigue: Fatigue relieved by rest  Neurosensory  Neurosensory (WDL): Exceptions to WDL  Peripheral Motor Neuropathy: Moderate symptoms, limiting instrumental ADL  Ataxia: Asymptomatic, clinical or diagnostic observations only, intervention not indicated  Peripheral Sensory Neuropathy: Asymptomatic, loss of deep tendon reflexes or paresthesia  Eye   Eye Disorder (WDL): Exceptions to WDL  Blurred Vision: Intervention not indicated  Dry Eye: Asymptomatic, clinical or diagnostic observations only, mild symptoms relieved by lubricants  Ear  Ear Disorder (WDL): All ear disorder elements are within defined limits  Cardiovascular  Cardiovascular (WDL): Exceptions to WDL  Edema: Yes  Edema Limbs: 5 - 10% inter-limb discrepancy in volume or circumference at point of greatest visible difference, swelling or obscuration of anatomic architecture on close  inspection  Pulmonary  Respiratory (WDL): Exceptions to WDL  Dyspnea: Shortness of breath with moderate exertion(with walking down the scruggs)  Gastrointestinal  Gastrointestinal (WDL): Exceptions to WDL  Constipation: Occasional or intermittent symptoms, occasional use of stool softeners, laxatives, dietary modification, or enema  Genitourinary  Genitourinary (WDL): Exceptions to WDL(indwelling catheter)  Lymphatic  Lymph (WDL): All lymph disorder elements are within defined limits  Musculoskeletal and Connective Tissue  Musculoskeletal and Connetive Tissue Disorders (WDL): Exceptions to WDL  Arthralgia: Mild pain  Muscle Weakness : Symptomatic, perceived by patient but not evident on physical exam  Integumentary  Integumentary (WDL): Exceptions to WDL(dry patch of skin on right thigh using cream)  Patient Coping  Patient Coping: Accepting;Open/discussion  Distress Assessment  Distress Assessment Score: No distress  Accompanied by  Accompanied by: Friend  Oral Chemo Adherence       Past History  Past Medical History:   Diagnosis Date     Cancer (H)      GERD (gastroesophageal reflux disease)      Hypertension      Osteoporosis      Rheumatoid arthritis (H)        Physical Exam    Recent Vitals 6/5/2019   Weight 102 lbs 10 oz   BSA (m2) 1.41 m2   /67   Pulse 101   Temp 97.3   Temp src 1   SpO2 96   Some recent data might be hidden     General:  Pleasant.  Alert.  No distress.  Thin.  Accompanied by friend.  HEENT:  MAHESH. EOMI.  Anicteric sclera.  Normal buccal mucosa.   Lymphatics:  No abnormally enlarged lymph nodes.  Chest:   Normal chest wall and respirations. Clear to auscultation.  Heart:   S1 S2 heard.  RRR,  No murmur.   Abdomen:  Soft.  Not tender.  Not distended.  No palpable masses, organomegaly or guarding.  Suprapubic catheter - urine color is clear in the bag.  Extremities:  Normal strength, tone, and muscle mass  Skin:   No rash noted.    CNS:   Non focal.    Lab Results:    Reviewed with  patient.    Recent Results (from the past 24 hour(s))   Comprehensive Metabolic Panel   Result Value Ref Range    Sodium 139 136 - 145 mmol/L    Potassium 4.3 3.5 - 5.0 mmol/L    Chloride 106 98 - 107 mmol/L    CO2 26 22 - 31 mmol/L    Anion Gap, Calculation 7 5 - 18 mmol/L    Glucose 111 70 - 125 mg/dL    BUN 21 8 - 28 mg/dL    Creatinine 0.85 0.60 - 1.10 mg/dL    GFR MDRD Af Amer >60 >60 mL/min/1.73m2    GFR MDRD Non Af Amer >60 >60 mL/min/1.73m2    Bilirubin, Total 0.6 0.0 - 1.0 mg/dL    Calcium 10.4 8.5 - 10.5 mg/dL    Protein, Total 6.5 6.0 - 8.0 g/dL    Albumin 3.8 3.5 - 5.0 g/dL    Alkaline Phosphatase 105 45 - 120 U/L    AST 19 0 - 40 U/L    ALT 18 0 - 45 U/L   HM1 (CBC with Diff)   Result Value Ref Range    WBC 8.0 4.0 - 11.0 thou/uL    RBC 3.99 3.80 - 5.40 mill/uL    Hemoglobin 12.1 12.0 - 16.0 g/dL    Hematocrit 36.9 35.0 - 47.0 %    MCV 93 80 - 100 fL    MCH 30.3 27.0 - 34.0 pg    MCHC 32.8 32.0 - 36.0 g/dL    RDW 15.4 (H) 11.0 - 14.5 %    Platelets 297 140 - 440 thou/uL    MPV 9.8 8.5 - 12.5 fL    Neutrophils % 74 (H) 50 - 70 %    Lymphocytes % 17 (L) 20 - 40 %    Monocytes % 7 2 - 10 %    Eosinophils % 1 0 - 6 %    Basophils % 1 0 - 2 %    Neutrophils Absolute 5.9 2.0 - 7.7 thou/uL    Lymphocytes Absolute 1.3 0.8 - 4.4 thou/uL    Monocytes Absolute 0.6 0.0 - 0.9 thou/uL    Eosinophils Absolute 0.1 0.0 - 0.4 thou/uL    Basophils Absolute 0.1 0.0 - 0.2 thou/uL       Imaging:    No new imaging.

## 2021-05-29 NOTE — PROGRESS NOTES
ASSESSMENT & PLAN    Rheumatoid arthritis (H)  Followed by Rach     Metastasis to bone (H)  Followed by Dr Britton     Hyperparathyroidism (H)  Monitor yearly ensure adequate vitamin D       Dania Somers was seen today for follow-up and mass.    Diagnoses and all orders for this visit:    Other viral warts  -     salicylic acid 40 % Plst; Apply daily for wart for pointer finger and occlude until resolved    Sore on leg  -     mupirocin (BACTROBAN) 2 % ointment; Apply  daily for affected area up to 10 days to thigh    Hypertension  -     triamterene-hydrochlorothiazide (DYAZIDE) 37.5-25 mg per capsule; Take 1 capsule by mouth every morning.    Metastasis to bone (H)    Renal cell carcinoma of right kidney (H)    Rheumatoid arthritis of hand, unspecified laterality, unspecified rheumatoid factor presence (H)    Hyperparathyroidism (H)        Patient Instructions   Wart Finger:  Purhcase OCT salacylic acid  ( Compound W)  Use 40% on wart daily     Stop Hydrochlorothiazide  REstart Dyazide 37.5/25 1 daily   Eat salt        Clear Lax OK for Constipation     Metamucil OK     SPot on Leg  Mupirocin Oint  Apply daily to spot on leg  4 mm x 5 mm        Return in about 3 months (around 9/3/2019) for if symptoms are not resolved or return.            CHIEF COMPLAINT: Dania Da Silva had concerns including Follow-up (Medication check, ankles swelling, feet are numb at night) and Mass (right index finger).    [place]: 1.............. had concerns including Follow-up (Medication check, ankles swelling, feet are numb at night) and Mass (right index finger).    1. Other viral warts    2. Sore on leg    3. Hypertension    4. Metastasis to bone (H)    5. Renal cell carcinoma of right kidney (H)    6. Rheumatoid arthritis of hand, unspecified laterality, unspecified rheumatoid factor presence (H)    7. Hyperparathyroidism (H)          CC:             Why are you here today?                              Medication check,  sodium      Patient wishes to take Dyazide rather than hydrochlorothiazide  States it is better for leg swelling    History of low sodium unclear the exact trigger but has always had on the lower side    Growth on her second pointer finger  She had a blister there and she thinks it was related to this  Not painful  Shoulder to her rheumatologist    Spot on her medial thigh there is a tannish solar keratosis is had an irritated area 4 x 5 mm ulcer like sits below the catheter area      Any other Problems in order of Priority:        SUBJECTIVE:  Dania Da Silva is a 89 y.o. female    Past Medical History:   Diagnosis Date     Cancer (H)      GERD (gastroesophageal reflux disease)      Hypertension      Osteoporosis      Rheumatoid arthritis (H)      Past Surgical History:   Procedure Laterality Date     OR CYSTO/URETERO/PYELOSCOPY,W/RESECT TUMOR      Description: Cystoscopy With Resection Of Tumor;  Proc Date: 12/01/2003;     OR CYSTOURETHROSCOPY      Description: Cystoscopy (Diagnostic);  Proc Date: 01/13/2005;     OR REMOVAL OF TONSILS,<11 Y/O      Description: Tonsillectomy;  Recorded: 06/03/2013;     OR TOTAL HIP ARTHROPLASTY      Description: Total Hip Replacement;  Recorded: 04/23/2008;     Sulfa (sulfonamide antibiotics) and Sulindac  Current Outpatient Medications   Medication Sig Dispense Refill     acetaminophen (TYLENOL) 500 MG tablet Take 1,000 mg by mouth every 6 (six) hours as needed for pain.       amLODIPine (NORVASC) 5 MG tablet Take 1 tablet (5 mg total) by mouth daily. As directed 90 tablet 2     aspirin 81 MG EC tablet Take 81 mg by mouth daily.       cholecalciferol, vitamin D3, 1,000 unit tablet Take 3,000 Units by mouth daily.       folic acid (FOLVITE) 1 MG tablet TAKE 1 TABLET BY MOUTH EVERY DAY 90 tablet 3     hydroCHLOROthiazide (HYDRODIURIL) 12.5 MG tablet Take 1 tablet (12.5 mg total) by mouth daily. 30 tablet 0     lactase (LACTAID) 3,000 unit tablet Take 3,000 Units by mouth as  needed.       lisinopril (PRINIVIL,ZESTRIL) 10 MG tablet TAKE 1 TABLET BY MOUTH EVERY DAY (Patient taking differently: TAKE 1 TABLET (10 mg)  BY MOUTH EVERY DAY) 90 tablet 3     loperamide (IMODIUM) 2 mg capsule Take 2 mg by mouth 4 (four) times a day as needed for diarrhea.       methotrexate sodium (METHOTREXATE) 2.5 mg DsPk Take 15 mg by mouth once a week.       multivitamin (MULTIPLE VITAMINS) per tablet Take 1 tablet by mouth daily.       NaCl 0.9% IR (NS) 0.9 % irrigation USE FOR IRRIGATION. (Patient taking differently: USE FOR IRRIGATION. TWICE DAILY) 2000 mL PRN     naproxen sodium (ALEVE) 220 MG tablet Take 220 mg by mouth daily.        prochlorperazine (COMPAZINE) 10 MG tablet Take 1 tablet (10 mg total) by mouth every 6 (six) hours as needed (For breakthrough nausea/vomiting). 30 tablet 1     psyllium (METAMUCIL) 3.4 gram packet Take 1 packet by mouth 2 (two) times a day as needed.              SIMETHICONE (GAS-X EXTRA STRENGTH ORAL) Take 1 tablet by mouth as needed.       amoxicillin (AMOXIL) 500 MG capsule Take 4 capsules by mouth as needed. Prior to dental appointments       mupirocin (BACTROBAN) 2 % ointment Apply  daily for affected area up to 10 days to thigh 15 g 0     salicylic acid 40 % Plst Apply daily for wart for pointer finger and occlude until resolved 18 each 1     triamterene-hydrochlorothiazide (DYAZIDE) 37.5-25 mg per capsule Take 1 capsule by mouth every morning. 30 capsule 12     No current facility-administered medications for this visit.      Family History   Problem Relation Age of Onset     Thyroid cancer Daughter      Cervical cancer Daughter      Breast cancer Daughter      Uterine cancer Daughter      Testicular cancer Nephew      Lung cancer Cousin      Social History     Socioeconomic History     Marital status: Single     Spouse name: None     Number of children: None     Years of education: None     Highest education level: None   Occupational History     None   Social Needs  "    Financial resource strain: None     Food insecurity:     Worry: None     Inability: None     Transportation needs:     Medical: None     Non-medical: None   Tobacco Use     Smoking status: Never Smoker     Smokeless tobacco: Never Used   Substance and Sexual Activity     Alcohol use: Yes     Comment: \"very infrequent\"     Drug use: No     Sexual activity: Never   Lifestyle     Physical activity:     Days per week: None     Minutes per session: None     Stress: None   Relationships     Social connections:     Talks on phone: None     Gets together: None     Attends Islam service: None     Active member of club or organization: None     Attends meetings of clubs or organizations: None     Relationship status: None     Intimate partner violence:     Fear of current or ex partner: None     Emotionally abused: None     Physically abused: None     Forced sexual activity: None   Other Topics Concern     None   Social History Narrative     None     Patient Active Problem List   Diagnosis     Hypertension     Ganglion     Abdominal Pain Feels Crampy / Colicky     Abnormal Blood Chemistry     Squamous Cell Carcinoma Of The Urethra     Hyperlipidemia     Hyperparathyroidism (H)     Hypercalcemia     Rheumatoid arthritis (H)     Osteoporosis, senile     Bilateral Inguinal Hernia     Limb Pain     Urinary Frequency Increased     Renal cell carcinoma of right kidney (H)     Metastasis to bone (H)     Chemotherapy management, encounter for     Diarrhea due to drug     Encounter for antineoplastic immunotherapy     Chronic suprapubic catheter (H)                                              SOCIAL: She  reports that she has never smoked. She has never used smokeless tobacco. She reports that she drinks alcohol. She reports that she does not use drugs.    REVIEW OF SYSTEMS:   Family history not pertinent to chief complaint or presenting problem    Review of Systems:      Nervous System:  No headache, paresthesia or dizziness " or fainting                                  Ears: No hearing loss or ringing in the ears    Eyes: No blurring of vision, Double Vision            No redness, itching or dryness.    Nose: No nosebleed or loss of smell    Mouth: No mouth sores or  coated tongue    Throat: No hoarseness or difficulty swallowing    Neck: No enlarged thyroid or lymph nodes.    Heart: No chest pain, palpitation or irregular heartbeat.                  Lungs: No shortness of breath, wheezing or hemoptysis.    Gastrointestinal: No nausea or vomiting, melena or blood in stools.    Kidney/Bladdr: No polyuria, polydipsia, or hematuria.                             Genital/Sexual: No Sex function Changes                                Skin: No rash    Muscles/Joints/Bones: No Muscle morning stiffness, No Effusion of a Joint     Review of systems otherwise negative as requested from patient, except   Those positive ROS outlined and discussed in [place].    OBJECTIVE:  /70 (Patient Site: Right Arm, Patient Position: Sitting, Cuff Size: Adult Regular)   Pulse 81   Wt 102 lb (46.3 kg)   SpO2 98%   BMI 19.27 kg/m      GENERAL:     No acute distress.   Alert and oriented X 3         Physical:    5 mm x 4 mm shallow scab with shallow ulcer below  Solar keratoses surrounding  Trace edema ankles  Calves are supple no  Indwelling catheter right leg bag  Lungs clear  Cardiac regular rate and rhythm no appreciable murmur  Wart second index finger on the right      ASSESSMENT & PLAN    Ensure that the shallow ulcer resolves likely secondary to catheter irritation if persistent need to exclude abnormal skin  Mupirocin to the area daily and cover    Apply Compound W 40% salicylic acid to the index finger wart until resolved    Discontinue hydrochlorthiazide change back to Dyazide monitor sodium levels      Dania Somers was seen today for follow-up and mass.    Diagnoses and all orders for this visit:    Other viral warts  -     salicylic acid 40 % Plst;  Apply daily for wart for pointer finger and occlude until resolved    Sore on leg  -     mupirocin (BACTROBAN) 2 % ointment; Apply  daily for affected area up to 10 days to thigh    Hypertension  -     triamterene-hydrochlorothiazide (DYAZIDE) 37.5-25 mg per capsule; Take 1 capsule by mouth every morning.    Metastasis to bone (H)    Renal cell carcinoma of right kidney (H)    Rheumatoid arthritis of hand, unspecified laterality, unspecified rheumatoid factor presence (H)    Hyperparathyroidism (H)        Return in about 3 months (around 9/3/2019) for if symptoms are not resolved or return.       Anticipatory Guidance and Symptomatic Cares Discussed   Advised to call back directly if there are further questions, or if these symptoms fail to improve as anticipated or worsen.  Return to clinic if patient has a clinical concern that warrants an exam.         I spent 30  minutes with this patient face to face, of which 50% or greater was spent in counseling and coordination of care with regards to Dania Somers was seen today for follow-up and mass.    Diagnoses and all orders for this visit:    Other viral warts  -     salicylic acid 40 % Plst; Apply daily for wart for pointer finger and occlude until resolved    Sore on leg  -     mupirocin (BACTROBAN) 2 % ointment; Apply  daily for affected area up to 10 days to thigh    Hypertension  -     triamterene-hydrochlorothiazide (DYAZIDE) 37.5-25 mg per capsule; Take 1 capsule by mouth every morning.    Metastasis to bone (H)    Renal cell carcinoma of right kidney (H)    Rheumatoid arthritis of hand, unspecified laterality, unspecified rheumatoid factor presence (H)    Hyperparathyroidism (H)        Jose Leija MD  Family Medicine   [facility] [id number] [phone]

## 2021-05-29 NOTE — PATIENT INSTRUCTIONS - HE
Recent Results (from the past 24 hour(s))   Comprehensive Metabolic Panel   Result Value Ref Range    Sodium 139 136 - 145 mmol/L    Potassium 4.3 3.5 - 5.0 mmol/L    Chloride 106 98 - 107 mmol/L    CO2 26 22 - 31 mmol/L    Anion Gap, Calculation 7 5 - 18 mmol/L    Glucose 111 70 - 125 mg/dL    BUN 21 8 - 28 mg/dL    Creatinine 0.85 0.60 - 1.10 mg/dL    GFR MDRD Af Amer >60 >60 mL/min/1.73m2    GFR MDRD Non Af Amer >60 >60 mL/min/1.73m2    Bilirubin, Total 0.6 0.0 - 1.0 mg/dL    Calcium 10.4 8.5 - 10.5 mg/dL    Protein, Total 6.5 6.0 - 8.0 g/dL    Albumin 3.8 3.5 - 5.0 g/dL    Alkaline Phosphatase 105 45 - 120 U/L    AST 19 0 - 40 U/L    ALT 18 0 - 45 U/L   HM1 (CBC with Diff)   Result Value Ref Range    WBC 8.0 4.0 - 11.0 thou/uL    RBC 3.99 3.80 - 5.40 mill/uL    Hemoglobin 12.1 12.0 - 16.0 g/dL    Hematocrit 36.9 35.0 - 47.0 %    MCV 93 80 - 100 fL    MCH 30.3 27.0 - 34.0 pg    MCHC 32.8 32.0 - 36.0 g/dL    RDW 15.4 (H) 11.0 - 14.5 %    Platelets 297 140 - 440 thou/uL    MPV 9.8 8.5 - 12.5 fL    Neutrophils % 74 (H) 50 - 70 %    Lymphocytes % 17 (L) 20 - 40 %    Monocytes % 7 2 - 10 %    Eosinophils % 1 0 - 6 %    Basophils % 1 0 - 2 %    Neutrophils Absolute 5.9 2.0 - 7.7 thou/uL    Lymphocytes Absolute 1.3 0.8 - 4.4 thou/uL    Monocytes Absolute 0.6 0.0 - 0.9 thou/uL    Eosinophils Absolute 0.1 0.0 - 0.4 thou/uL    Basophils Absolute 0.1 0.0 - 0.2 thou/uL

## 2021-05-30 ENCOUNTER — RECORDS - HEALTHEAST (OUTPATIENT)
Dept: ADMINISTRATIVE | Facility: CLINIC | Age: 86
End: 2021-05-30

## 2021-05-30 VITALS — WEIGHT: 110 LBS | HEIGHT: 60 IN | BODY MASS INDEX: 21.6 KG/M2

## 2021-05-30 NOTE — PROGRESS NOTES
TCM DISCHARGE FOLLOW UP CALL    Discharge Date:  7/22/2019  Reason for hospital stay (discharge diagnosis)::  RLQ pain, constipation  Are you feeling better, the same or worse since your discharge?:  Patient is feeling better (The abd pain is better but she hasn't had a BM since discharge. She can't remember the day of last BM.. Abd is firm.)  Do you feel like you have a plan in the event of a health emergency?: Yes (Friend, who is a nurse or grddtr)    As part of your discharge plan, were  home care services ordered for you?: No    Did you receive any new medications, or was there a change to your medications?: Yes    Are you taking those medications, or do you have any established regiment?:  Miralax daily.  Note sent to clinic to verify pt should be taking Miralax and Metamucil.  Do you have any follow up visits scheduled with your PCP or Specialist?:  Yes, with PCP  (RN) Is PCP appt scheduled soon enough (within 14 days of discharge date)?: Yes (7/26)    RN NOTES::  Pt is concerned that she hasn't had a BM since discharge. Instructed pt to continue Miralax daily. She could try warmed prune juice or prune juice followed by a hot beverage, then take a walk in the hallways. Instructed pt to put drinking water into her routine activities to increase fluid intake.

## 2021-05-30 NOTE — TELEPHONE ENCOUNTER
Patient calling   Trying to get information after discharge.from [facility] yesterday.    She reports she was suppose to hear from someone regarding a medication, (antibiotic) after discharge. And she states she has not heard from anyone yet.     Patient states she is at her home phone number all day. Please call her with new medication she was to receive at discharge she states.      Jeanette Resendiz RN  [facility] Triage/refill nurse

## 2021-05-30 NOTE — TELEPHONE ENCOUNTER
Pt was hospitalized 7/19-7/22 with RLQ pain and constipation. During routine hospital discharge follow up call, pt reported she is concerned that she hasn't had a BM since discharge. Pt has been prescribed Miralax daily. She is already taking Metamucil two times a day. She is also drinking prune juice daily.     RN requesting advice: should pt be taking both Metamucil and Miralax?    Instructed pt to increase water intake which she admits she hasn't been doing.

## 2021-05-30 NOTE — TELEPHONE ENCOUNTER
Appointment scheduled with Dr Coronado on Friday.   Message from Dr ALVARO Swenson was given to patient.       Danna Spann RN [facility] Triage Nurse

## 2021-05-30 NOTE — TELEPHONE ENCOUNTER
"Call from pt       CC: R sided low ABD pain      > Below the belly button   > Started this am    > Achy / cramping pain - not sharp/stabbing   > Was stretching at yoga this am \"maybe I stretched too much\"    > Yes nausea - no vomiting   > No diarrhea   > Yes some straining to pass BM - no blood in   stool   > Will elicit the pain when moving and even too when at rest       Home health RN coming to see her shortly for usual visit         A/P:   > Nothing specific from description that would indicate kevin urgent sx but office visit would be in order         She feels that ED would be better option for her - I agreed that if you feel a higher level of care may be needed, then should be seen at ED       Drink plenty of fluids as able        Daniele Sun, RN   Triage and Medication Refills                         Reason for Disposition    Age > 60 years    Protocols used: ABDOMINAL PAIN - FEMALE-A-OH      "

## 2021-05-30 NOTE — TELEPHONE ENCOUNTER
Orders being requested: Enema  Reason service is needed/diagnosis: Constipation x 3 days , denies any pain,  patient requesting Enema, please call & give verbal order to RICARDO Santiago   When are orders needed by: Today   Where to send Orders: Pamela Chaney   [phone]     Okay to leave detailed message?  Yes

## 2021-05-30 NOTE — PROGRESS NOTES
HPI:  Patient is a 89-year-old female presents today for follow-up of her hospitalization at [facility] from 7/19/2019 to 7/22/2019.    Patient had presented to the ER with right lower quadrant pain and severe nausea.  He has a known history of metastatic renal cell carcinoma for which she has been receiving outpatient chemotherapy.  Patient also has a chronic indwelling catheter installed suprapubically.  Upon arrival to the ER, her white blood cell count was quite elevated at 21.6.  UA was abnormal.    CT scan of abdomen and pelvis showed:    IMPRESSION:   CONCLUSION:   1.  Overall no change compared to 5/6/2019.  2.  There is a large amount of stool in the colon which may be the cause of the right lower quadrant pain. There is no significant inflammatory change in the right lower quadrant to suggest appendicitis.    Patient was admitted for urine culture, IV antibiotics and blood cultures to rule out sepsis.  Her renal and liver function were normal.  Her lactic acid was normal at 1.0.  During the course of her hospital stay, patient was given a laxative and she did have a large bowel movement.  She notes that her abdominal pain had improved.  During the course of her hospitalization, her white blood cell count did normalize.    At the time of discharge, her white blood cell count was normal at 10.9.  Her urine culture was negative.  Her blood cultures were negative.  Patient was discharged home without any antibiotics.  She was told to start taking MiraLAX once daily.  She presents today for follow-up.    Since discharge, patient notes that her abdominal pain has much improved.  She has continued to take the MiraLAX daily.  He notes that she has good appetite.  No pain with eating.  Her catheter is working well.  She is having daily small, soft stools.  Feels a lot less bloated since starting on this regimen.    Patient Active Problem List    Diagnosis Date Noted     Complicated UTI (urinary tract  infection) 07/19/2019     UTI (urinary tract infection) 07/19/2019     Encounter for antineoplastic immunotherapy 01/10/2019     Diarrhea due to drug 11/02/2018     Chemotherapy management, encounter for 06/05/2018     Metastasis to bone (H) 04/24/2018     Renal cell carcinoma of right kidney (H) 04/03/2018     Bilateral Inguinal Hernia      Limb Pain      Urinary Frequency Increased      Hypertension      Ganglion      Abdominal Pain Feels Crampy / Colicky      Abnormal Blood Chemistry      Squamous Cell Carcinoma Of The Urethra      Hyperlipidemia      Hyperparathyroidism (H)      Hypercalcemia      Slow transit constipation      Rheumatoid arthritis (H)      Osteoporosis, senile      Chronic suprapubic catheter (H) 03/06/2012       Current Outpatient Medications:      acetaminophen (TYLENOL) 500 MG tablet, Take 1,000 mg by mouth every 6 (six) hours as needed for pain., Disp: , Rfl:      amLODIPine (NORVASC) 5 MG tablet, TAKE 1 TABLET(5 MG) BY MOUTH DAILY AS DIRECTED, Disp: 90 tablet, Rfl: 3     amoxicillin (AMOXIL) 500 MG capsule, Take 4 capsules by mouth as needed. Prior to dental appointments, Disp: , Rfl:      aspirin 81 MG EC tablet, Take 81 mg by mouth daily., Disp: , Rfl:      folic acid (FOLVITE) 1 MG tablet, TAKE 1 TABLET BY MOUTH EVERY DAY, Disp: 90 tablet, Rfl: 3     lactase (LACTAID) 3,000 unit tablet, Take 3,000 Units by mouth as needed., Disp: , Rfl:      lisinopril (PRINIVIL,ZESTRIL) 10 MG tablet, TAKE 1 TABLET BY MOUTH EVERY DAY, Disp: 90 tablet, Rfl: 3     methotrexate sodium (METHOTREXATE) 2.5 mg DsPk, Take 15 mg by mouth once a week., Disp: , Rfl:      mupirocin (BACTROBAN) 2 % ointment, Apply  daily for affected area up to 10 days to thigh, Disp: 15 g, Rfl: 0     naproxen sodium (ALEVE) 220 MG tablet, Take 220 mg by mouth daily. , Disp: , Rfl:      polyethylene glycol (MIRALAX) 17 gram packet, Take 1 packet (17 g total) by mouth daily., Disp: , Rfl: 0     psyllium (METAMUCIL) 3.4 gram packet,  "Take 1 packet by mouth 2 (two) times a day as needed.    , Disp: , Rfl:      triamterene-hydrochlorothiazide (DYAZIDE) 37.5-25 mg per capsule, Take 1 capsule by mouth every morning., Disp: 30 capsule, Rfl: 12      Objective     /70 (Patient Site: Right Arm, Patient Position: Sitting, Cuff Size: Adult Regular)   Pulse 81   Ht 5' 1\" (1.549 m)   Wt 101 lb 4 oz (45.9 kg)   SpO2 100%   BMI 19.13 kg/m    General appearance: alert, appears stated age and cooperative  Throat: moist mucus membranes  Neck: no adenopathy and supple, symmetrical, trachea midline  Lungs: clear to auscultation bilaterally  Heart: regular rate and rhythm, S1, S2 normal, no murmur, click, rub or gallop  Abdomen: soft, non-tender; bowel sounds normal; no masses,  no organomegaly     Dania Somers was seen today for hospital visit follow up and medication management.    Diagnoses and all orders for this visit:    Obstipation  Improved since her hospitalization.  Reviewed all of her medications.  For now she should stay on the MiraLAX daily.  Discussed that if her stools were to be come either too loose or watery, I recommend that she discontinue this and start on Benefiber 1 tablespoon in 8 ounces of fluid daily.  This was written as a instruction for the patient.  Patient did have some questions about her hospitalization including whether or not an infection was found, and I clarified that no infection was found, but the white blood cell count was elevated likely as an acute phase reactant to her severe constipation.  I reviewed all of her outpatient medications.  I did take off the Imodium, as she only took this in the distant past due to diarrhea.  She also notes she has not taken Gas-X in a number of years.  A new medication list was printed for the patient.  I recommend that she follow-up with her outpatient oncologist as previously scheduled.  Complete medical record reviewed and all questions were answered.    > 30 min spent with " patient.  > 50% in counseling and coordination of care.

## 2021-05-30 NOTE — TELEPHONE ENCOUNTER
Call nurse- pt can take both Metamucil and Miralax as they work in different ways. If no bowl movement tomorrow, recommend a senna tab ( laxative ).

## 2021-05-30 NOTE — TELEPHONE ENCOUNTER
Who is calling:  Patient  Reason for Call:  Inquiring as to status of below request .  Patient advised will need nurse on duty to call back to receive order on patients chart.  Patient will reach nurse at [facility] and allow the order to be released to nurse that calls.  Date of last appointment with primary care: 06/03/2019  Okay to leave a detailed message: No

## 2021-05-30 NOTE — TELEPHONE ENCOUNTER
I called Dania Somers today for a navigator follow up. She is doing well and does not need my assistance with resources at this time. She states she still has my number and I encouraged her to call me if needed for assistance.  I reviewed my role again with her and will mail her my introduction letter at her request.

## 2021-05-30 NOTE — PROGRESS NOTES
Pt amb into clinic with friend for nivolumab infusion. Reviewed plan of care. PIV patent. Pt tolerated infusion very well. PIV flushed and de-accessed. Pt amb out of clinic with friend.

## 2021-05-30 NOTE — TELEPHONE ENCOUNTER
Please let patient know - discharge summary says no antibiotics necessary because her urine culture - done before she started antibiotics in the hospital - did not show and infecyin

## 2021-05-30 NOTE — TELEPHONE ENCOUNTER
Left message to call back for: Pamela RN   Information to relay to patient:  LM for number listed below to call clinic back. Need to verify who Pamela is and who she represents. Once verified this person is working directly with the patients care and we have a SOL to communicate order with this facility please give verbal orders listed below by Dr Pierre:    I tried to return call at number given and there was no answer.  Please give verbal for enema and discuss with the nurse if Miralax has been tried yet.  If not, then can give verbal for Miralax, 17 g, daily, PRN constipation.

## 2021-05-30 NOTE — PATIENT INSTRUCTIONS - HE
Recent Results (from the past 24 hour(s))   Comprehensive Metabolic Panel   Result Value Ref Range    Sodium 137 136 - 145 mmol/L    Potassium 4.5 3.5 - 5.0 mmol/L    Chloride 103 98 - 107 mmol/L    CO2 25 22 - 31 mmol/L    Anion Gap, Calculation 9 5 - 18 mmol/L    Glucose 105 70 - 125 mg/dL    BUN 21 8 - 28 mg/dL    Creatinine 0.90 0.60 - 1.10 mg/dL    GFR MDRD Af Amer >60 >60 mL/min/1.73m2    GFR MDRD Non Af Amer 59 (L) >60 mL/min/1.73m2    Bilirubin, Total 0.6 0.0 - 1.0 mg/dL    Calcium 10.2 8.5 - 10.5 mg/dL    Protein, Total 6.1 6.0 - 8.0 g/dL    Albumin 3.7 3.5 - 5.0 g/dL    Alkaline Phosphatase 90 45 - 120 U/L    AST 18 0 - 40 U/L    ALT 14 0 - 45 U/L   HM1 (CBC with Diff)   Result Value Ref Range    WBC 5.7 4.0 - 11.0 thou/uL    RBC 4.10 3.80 - 5.40 mill/uL    Hemoglobin 12.2 12.0 - 16.0 g/dL    Hematocrit 38.1 35.0 - 47.0 %    MCV 93 80 - 100 fL    MCH 29.8 27.0 - 34.0 pg    MCHC 32.0 32.0 - 36.0 g/dL    RDW 14.9 (H) 11.0 - 14.5 %    Platelets 306 140 - 440 thou/uL    MPV 10.2 8.5 - 12.5 fL    Neutrophils % 67 50 - 70 %    Lymphocytes % 21 20 - 40 %    Monocytes % 10 2 - 10 %    Eosinophils % 2 0 - 6 %    Basophils % 1 0 - 2 %    Neutrophils Absolute 3.8 2.0 - 7.7 thou/uL    Lymphocytes Absolute 1.2 0.8 - 4.4 thou/uL    Monocytes Absolute 0.6 0.0 - 0.9 thou/uL    Eosinophils Absolute 0.1 0.0 - 0.4 thou/uL    Basophils Absolute 0.1 0.0 - 0.2 thou/uL

## 2021-05-30 NOTE — PROGRESS NOTES
[facility] Hematology and Oncology Progress Note    Patient: Dania Da Silva  MRN: [id number]  Date of Service:7/3/2019      Reason for Visit:    D1C8 Nivolumab therapy    Assessment and Plan:    1) Renal cell carcinoma of right kidney (H)   Clinical Stage IV (cT1b, cNX, pM1)    2.5 cm pleural-based expansile destructive lesion in the right lateral seventh rib, pleural-based nodules, multiple bilateral pulmonary nodules, small right pleural effusion, 3.2 cm renal mass   Clear cell type     PDL1 expression 90%    89 y.o.     2018, March - diagnosed.     03/09/18 CT chest - expansile destructive lesion in the right lateral 7th rib, consistent with a metastasis. Multiple enhancing pleural-based masses.  Small right pleural effusion.  Multiple pulmonary nodules in the right and left of varying size. (R) renal mass measuring 3.2 x 2.5 x 3.8 cm.    03/23/18 CT A&P - right kidney with 4 cm enhancing mass.  Patent renal vein.  No retroperitoneal lymphadenopathy.  Metastases in the lungs, right pleural space and right seventh rib.    03/26/18 CT lung biopsy - metastatic renal cell carcinoma - clear cell type.    04/18 - 05/26/18 - received pazopanib therapy @ 400 mg daily (at 50% dose reduction with plan to escalate to 800 mg daily as tolerated).  Stopped due to grade 3 diarrhea    06/04/18 CT CAP - overall stable disease (mild progression about 20% increase in size of pleural-based metastases), stable or slightly smaller pulmonary nodules.    06/05/18 - restarted pazopanib @ 200 mg daily for better tolerability.    08/14/18 CT CAP - Diffuse expansion of the anterior-lateral right seventh rib has improved.  Focally increased ovoid expansile appearance of the anterior right sixth rib.  No significant change of pulmonary nodules, pleural metastases and right renal mass.  Improved but persistent minimal right pleural effusion.  Small left inguinal hernia containing minimal small bowel.  No obstruction or bowel wall  "thickening.    10/23/18 CT CAP - Mixed response to therapy. Some of the pulmonary nodules are larger than previous and some are smaller.  Others are unchanged in size.  Skeletal and pleural metastases, as well as right renal mass, have not appreciably changed.    12/12/18 - changed therapy to Nivolumab due to intolerable diarrhea with pazopanib.    02/05/19 CT CAP - a few regions of pleural thickening and some pulmonary nodules have slightly improved. However, a couple pulmonary nodules have also slightly increased. Remaining exam without significant change, including heterogeneous right renal mass.    05/06/19 CT CAP - Stable solid mass right kidney. Mixed response in the lungs with several nodules mildly increasing in size and a few nodules slightly improving.  No significant new findings    07/03/19:    CBC - WNL.    CMP - basically WNL.      TSH - pending.    Patient looks and feels quite good.  Weight down another pound.  Appetite good.  Bowels acceptable after recent constipation. Generally prune juice works.  Stopped MirALAX, \"too intense\".     No concerning known side effects from nivolumab therapy    Proceed D1C8 Nivolumab therapy.      07/17/19 - D15C8 Nivolumab therapy.    07/31/19 - follow up D1C9 Nivolumab therapy with labs per Treatment Plan.    FU CT in August.      2) Metastatic bone disease and osteoporosis:    05/09/18 - started Zometa every 12 week therapy.    08/29/19 - next Zometa due.     3) Bladder stones:    06/27/18 cystoscopy by Dr. Trevino @ [facility].  2 several centimeter bladder stones.    Mild symptomatic bladder spasms about once a month.      Offered litholapaxy and bladder biopsy if she becomes more symptomatic.      Last follow-up with Urology in March, 2019.      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      Has a suprapubic catheter, changing the catheter herself " "monthly.    Followed by Urology in [facility], transferred from [facility].      6) Constipation:    Currently only taking prunes but thinks it's not enough.      Encouraged daily MirALAX, titrating the dose down so not so \"intense\".    7) Pain:    Minimal occasional (R) chest wall - managed with 1 Aleve every morning and sometimes Tylenol at bedtime.    ECOG Performance:     ECOG Performance Status: 1     Distress Assessment:    Distress Assessment Score: 2        TT > 25 minutes face to face with > 50% counseling and care coordination.    Aidan Tatum, CNP   ____________________________________________    Interim History (IH):    Ms. Da Silva presents in a pleasant mood, accompanied by her friend, anticipating D1C8 Opdivo therapy.  She looks and feels quite good and is tolerating Opdivo therapy without incidence or apparent toxicities.  She had a wart burned on her (R)pointing finger last week by Derm.  Her fatigue is chronic and stable.  She resides @ [facility] and does well there receiving PT and OT.  She is s/p radical urethrectomy in 2003 and has since had a suprapubic catheter, changing the catheter herself monthly.  She denies cough, fever, chills, unusual headaches, visual or mentation disturbance, rash.  She still notes occasional mild (R) chest wall discomfort at times managed with 1 daily Aleve.    Pain Status:    Currently in Pain: No/denies    Review of Systems    Constitutional  Constitutional (WDL): Exceptions to WDL  Neurosensory  Peripheral Motor Neuropathy: Moderate symptoms, limiting instrumental ADL  Ataxia: Asymptomatic, clinical or diagnostic observations only, intervention not indicated  Eye   Eye Disorder (WDL): Exceptions to WDL  Blurred Vision: Intervention not indicated  Dry Eye: Asymptomatic, clinical or diagnostic observations only, mild symptoms relieved by lubricants  Ear  Ear Disorder (WDL): All ear disorder elements are within defined " limits  Cardiovascular  Cardiovascular (WDL): All cardiovascular elements are within defined limits  Pulmonary  Respiratory (WDL): Exceptions to WDL  Dyspnea: Shortness of breath with moderate exertion  Gastrointestinal  Gastrointestinal (WDL): Exceptions to WDL  Constipation: Occasional or intermittent symptoms, occasional use of stool softeners, laxatives, dietary modification, or enema(needs an enema, having very little stool)  Genitourinary  Genitourinary (WDL): Exceptions to WDL(indwelling catheter, urine clear)  Lymphatic  Lymph (WDL): All lymph disorder elements are within defined limits  Musculoskeletal and Connective Tissue  Musculoskeletal and Connetive Tissue Disorders (WDL): Exceptions to WDL  Arthralgia: Mild pain  Bone Pain: Mild pain  Muscle Weakness : Symptomatic, perceived by patient but not evident on physical exam  Integumentary  Integumentary (WDL): Exceptions to WDL(bruise easy, skin dry)  Patient Coping  Patient Coping: Accepting;Open/discussion  Distress Assessment  Distress Assessment Score: 2  Accompanied by  Accompanied by: Friend  Oral Chemo Adherence       Past History  Past Medical History:   Diagnosis Date     Cancer (H)      GERD (gastroesophageal reflux disease)      Hypertension      Osteoporosis      Rheumatoid arthritis (H)        Physical Exam    Recent Vitals 7/3/2019   Weight 100 lbs 10 oz   BSA (m2) 1.4 m2   /67   Pulse 78   Temp 98.1   Temp src 1   SpO2 98   Some recent data might be hidden     General:  Very pleasant.  Alert.  No distress.  Thin.  Accompanied by friend.  HEENT:  MAHESH. EOMI.  Anicteric sclera.  Normal buccal mucosa.   Lymphatics:  No abnormally enlarged lymph nodes.  Chest:   Normal chest wall and respirations. Clear to auscultation.  Heart:   S1 S2 heard.  RRR,  No murmur.   Abdomen:  Soft.  Not tender or distended.  No palpable masses, organomegaly or guarding.  Suprapubic catheter - urine color is clear in the bag.  Extremities:  Normal strength,  "tone, and muscle mass  Skin:   No rash noted.  (R) pointing finger wart - recently \"burned\" by Derm.  CNS:   Non focal.    Lab Results:    Reviewed with patient.    Recent Results (from the past 24 hour(s))   Comprehensive Metabolic Panel   Result Value Ref Range    Sodium 137 136 - 145 mmol/L    Potassium 4.5 3.5 - 5.0 mmol/L    Chloride 103 98 - 107 mmol/L    CO2 25 22 - 31 mmol/L    Anion Gap, Calculation 9 5 - 18 mmol/L    Glucose 105 70 - 125 mg/dL    BUN 21 8 - 28 mg/dL    Creatinine 0.90 0.60 - 1.10 mg/dL    GFR MDRD Af Amer >60 >60 mL/min/1.73m2    GFR MDRD Non Af Amer 59 (L) >60 mL/min/1.73m2    Bilirubin, Total 0.6 0.0 - 1.0 mg/dL    Calcium 10.2 8.5 - 10.5 mg/dL    Protein, Total 6.1 6.0 - 8.0 g/dL    Albumin 3.7 3.5 - 5.0 g/dL    Alkaline Phosphatase 90 45 - 120 U/L    AST 18 0 - 40 U/L    ALT 14 0 - 45 U/L   HM1 (CBC with Diff)   Result Value Ref Range    WBC 5.7 4.0 - 11.0 thou/uL    RBC 4.10 3.80 - 5.40 mill/uL    Hemoglobin 12.2 12.0 - 16.0 g/dL    Hematocrit 38.1 35.0 - 47.0 %    MCV 93 80 - 100 fL    MCH 29.8 27.0 - 34.0 pg    MCHC 32.0 32.0 - 36.0 g/dL    RDW 14.9 (H) 11.0 - 14.5 %    Platelets 306 140 - 440 thou/uL    MPV 10.2 8.5 - 12.5 fL    Neutrophils % 67 50 - 70 %    Lymphocytes % 21 20 - 40 %    Monocytes % 10 2 - 10 %    Eosinophils % 2 0 - 6 %    Basophils % 1 0 - 2 %    Neutrophils Absolute 3.8 2.0 - 7.7 thou/uL    Lymphocytes Absolute 1.2 0.8 - 4.4 thou/uL    Monocytes Absolute 0.6 0.0 - 0.9 thou/uL    Eosinophils Absolute 0.1 0.0 - 0.4 thou/uL    Basophils Absolute 0.1 0.0 - 0.2 thou/uL       Imaging:    No new imaging.   "

## 2021-05-31 ENCOUNTER — RECORDS - HEALTHEAST (OUTPATIENT)
Dept: ADMINISTRATIVE | Facility: CLINIC | Age: 86
End: 2021-05-31

## 2021-05-31 VITALS — WEIGHT: 107 LBS | BODY MASS INDEX: 20.9 KG/M2

## 2021-05-31 VITALS — WEIGHT: 105.5 LBS | BODY MASS INDEX: 20.6 KG/M2

## 2021-05-31 NOTE — PATIENT INSTRUCTIONS - HE
I would like you to follow-up with Dr. Osito Ferris urology  He can assume cares for urological aspect of renal cell carcinoma, indwelling catheter in your recent more frequent urinary tract symptoms    We are changing her blood pressure medication today  He will be stopping  Amlodipine 5 mg  Lisinopril 10 mg    He will start lisinopril hydrochlorothiazide 10/12.51 daily      Placed a referral for physical therapy to the [facility] for strength and conditioning evaluation and treatment    Finally increase fiber intake with food such as ground she is heat, oatmeal, flaxseed, greens such as spinach, chart, kale and legumes    Continue the equivalents of  Over-the-counter Metamucil  Use over-the-counter MiraLAX as needed for stimulation

## 2021-05-31 NOTE — PROGRESS NOTES
Patient here today for lab, OV with EDER Tatum CNP, and Opdivo infusion for renal carcinoma/MICHAEL Posadas RN

## 2021-05-31 NOTE — TELEPHONE ENCOUNTER
Orders being requested: Physical therapy twice a week for 4 weeks.  Reason service is needed/diagnosis: For balance and strength.  When are orders needed by: Tomorrow  Where to send Orders: Phone:  [phone]  Okay to leave detailed message?  Yes, secure voice mail, [facility]

## 2021-05-31 NOTE — PROGRESS NOTES
TCM DISCHARGE FOLLOW UP CALL    Discharge Date:  8/14/2019  Reason for hospital stay (discharge diagnosis)::  UTI, sepsis  Are you feeling better, the same or worse since your discharge?:  Patient is feeling the same (weak, no loose stools. Denies hematuria. Had abd discomfort when bloated. c/o weakness, is using walker)  Do you feel like you have a plan in the event of a health emergency?: Yes (Has PERS if needs help)    As part of your discharge plan, were  home care services ordered for you?: No    Did you receive any new medications, or was there a change to your medications?: Yes    Are you taking those medications, or do you have any established regiment?:  Cipro two times a day and  Augmentin two times a day. Pt is taking these correctly. Doesn't use Metamucil  Pt doesn't remember if she takes Dyazide or not. She will look at her pill bottles and make sure she isn't take it. Asked pt to have a family member go through her pills with her. She agreed to have her son-in-law help her.  Do you have any follow up visits scheduled with your PCP or Specialist?:  Yes, with PCP  (RN) Is PCP appt scheduled soon enough (within 14 days of discharge date)?: Yes (8/23)

## 2021-05-31 NOTE — PROGRESS NOTES
Dania presents today following provider appt for D1C9 optivo. IV placed previous in clinic and flushes swiftly with excellent blood return. She tolerated chemo without incident over 30 minutes. Upon completion, IV d/c'd and she left ambulatory with her friend. Natalie Díaz

## 2021-05-31 NOTE — PATIENT INSTRUCTIONS - HE
Recent Results (from the past 24 hour(s))   Comprehensive Metabolic Panel   Result Value Ref Range    Sodium 139 136 - 145 mmol/L    Potassium 4.2 3.5 - 5.0 mmol/L    Chloride 104 98 - 107 mmol/L    CO2 26 22 - 31 mmol/L    Anion Gap, Calculation 9 5 - 18 mmol/L    Glucose 109 70 - 125 mg/dL    BUN 22 8 - 28 mg/dL    Creatinine 0.87 0.60 - 1.10 mg/dL    GFR MDRD Af Amer >60 >60 mL/min/1.73m2    GFR MDRD Non Af Amer >60 >60 mL/min/1.73m2    Bilirubin, Total 0.4 0.0 - 1.0 mg/dL    Calcium 10.3 8.5 - 10.5 mg/dL    Protein, Total 6.7 6.0 - 8.0 g/dL    Albumin 3.6 3.5 - 5.0 g/dL    Alkaline Phosphatase 95 45 - 120 U/L    AST 20 0 - 40 U/L    ALT 15 0 - 45 U/L   HM1 (CBC with Diff)   Result Value Ref Range    WBC 8.5 4.0 - 11.0 thou/uL    RBC 3.96 3.80 - 5.40 mill/uL    Hemoglobin 11.7 (L) 12.0 - 16.0 g/dL    Hematocrit 36.4 35.0 - 47.0 %    MCV 92 80 - 100 fL    MCH 29.5 27.0 - 34.0 pg    MCHC 32.1 32.0 - 36.0 g/dL    RDW 14.8 (H) 11.0 - 14.5 %    Platelets 343 140 - 440 thou/uL    MPV 9.9 8.5 - 12.5 fL    Neutrophils % 72 (H) 50 - 70 %    Lymphocytes % 20 20 - 40 %    Monocytes % 6 2 - 10 %    Eosinophils % 2 0 - 6 %    Basophils % 1 0 - 2 %    Neutrophils Absolute 6.1 2.0 - 7.7 thou/uL    Lymphocytes Absolute 1.7 0.8 - 4.4 thou/uL    Monocytes Absolute 0.5 0.0 - 0.9 thou/uL    Eosinophils Absolute 0.2 0.0 - 0.4 thou/uL    Basophils Absolute 0.1 0.0 - 0.2 thou/uL

## 2021-05-31 NOTE — TELEPHONE ENCOUNTER
Patient had several falls 3 over the last  36 hours.  Has been hospitalized 2 x in past month over past month for infections and sepsis.    Caller is not with patient.    Says her grandmother has bad side pain and is weak.    No fever.    Taking her to ED sin ehse is in pain and very weak.    Advised to have her evaluated in the ED.  Caller agrees with plan.    Janine Mesa RN, [facility] Nurse Triage/Med Refills RN       Reason for Disposition    MODERATE weakness from poor fluid intake with no improvement after 2 hours of rest and fluids    Protocols used: WEAKNESS (GENERALIZED) AND FATIGUE-A-OH

## 2021-05-31 NOTE — PROGRESS NOTES
ASSESSMENT & PLAN    No problem-specific Assessment & Plan notes found for this encounter.      Dania Somers was seen today for hospital visit follow up.    Diagnoses and all orders for this visit:    Hypertension  -     lisinopril-hydrochlorothiazide (PRINZIDE,ZESTORETIC) 10-12.5 mg per tablet; Take 1 tablet by mouth daily.    Urinary tract infection associated with cystostomy catheter, initial encounter (H)  -     Ambulatory referral to Urology    Renal cell carcinoma of right kidney (H)  -     Ambulatory referral to Urology    Urinary catheter in place  -     Ambulatory referral to Urology    Physical deconditioning  -     Ambulatory referral to Adult PT- External        Patient Instructions   I would like you to follow-up with Dr. Osito Ferris urology  He can assume cares for urological aspect of renal cell carcinoma, indwelling catheter in your recent more frequent urinary tract symptoms    We are changing her blood pressure medication today  He will be stopping  Amlodipine 5 mg  Lisinopril 10 mg    He will start lisinopril hydrochlorothiazide 10/12.51 daily      Placed a referral for physical therapy to the [facility] for strength and conditioning evaluation and treatment    Finally increase fiber intake with food such as ground she is heat, oatmeal, flaxseed, greens such as spinach, chart, kale and legumes    Continue the equivalents of  Over-the-counter Metamucil  Use over-the-counter MiraLAX as needed for stimulation            Return in about 6 months (around 2/23/2020).            CHIEF COMPLAINT: Dania Da Silva had concerns including Hospital Visit Follow Up (WW, Bladder infection, 8/11- 8/14).    [place]: 1.............. had concerns including Hospital Visit Follow Up (WW, Bladder infection, 8/11- 8/14).    1. Hypertension    2. Urinary tract infection associated with cystostomy catheter, initial encounter (H)    3. Renal cell carcinoma of right kidney (H)    4. Urinary catheter in place    5. Physical  deconditioning          CC:             Why are you here today?     Here today with family member [facility] follow up    Recently hospitalized in August  Grew out E. coli enterococcus as well as Pseudomonas  Indwelling urinary catheter  Sees Dr. England wishes to switch urologic groups    Did have a lactic acid level was elevated    Is generally felt deconditioned    Has had some regularity issues with Metamucil with constipation and has taken what sounds like the equivalents of Metamucil and MiraLAX although she does use prune juice and she uses what she describes as all clad fiber    She feels like she is better she was worried about her appendix at the time      Labs and x-ray discharge summary reviewed      Discharge summary below                                                                           Admission Date: 8/11/2019   Discharge Provider: Siva Shin Discharge Date: 8/14/2019   Diet: regular diet    Code Status: DNR   Activity: activity as tolerated and no driving for today           Condition at Discharge: Good      REASON FOR PRESENTATION(See Admission Note for Details)   Dania Da Silva is a 89 y.o. old female with history of right-sided renal cell carcinoma followed by [facility] oncology, hypertension, and rheumatoid arthritis presents with 1 day of right lower quadrant and suprapubic pain and found to have evidence of urinary tract infection.     PRINCIPAL & ACTIVE DISCHARGE DIAGNOSES      Principal Problem:    UTI (urinary tract infection)  Active Problems:    Hypertension    Slow transit constipation    Rheumatoid arthritis (H)    Renal cell carcinoma of right kidney (H)    Metastasis to bone (H)    Sepsis due to urinary tract infection (H)    Lactic acid increased    DORY (acute kidney injury) (H)        SIGNIFICANT FINDINGS (Imaging, labs):      Urine Culture Grew >100 K Escherichia coli, >100 K Enterococcus durans, and >50 K Pseudomonas aeruginosa with mixed  sensitivities.      Lactic acid 4.5 normalized to 1.5.     Leukocytosis 25.3 downtrended to 15.1 within 1-day.      Ct Abdomen Pelvis Without Oral With Iv Contrast  Result Date: 8/11/2019  CONCLUSION: 1.  Diffuse enhancement of the right renal pelvis and ureter may be neoplastic or due to a urinary tract infection. 2.  Known right renal cell carcinoma is unchanged. Pleural, pulmonary, and lymph node metastases are all stable. 3.  Large amount of stool throughout the colon indicating constipation. 4.  Appendix is not visualized however there are no signs of appendicitis.         PENDING LABS        Order Current Status     Blood Culture from PERIPHERAL SITE (2nd one) In process     Blood culture from PERIPHERAL SITE In process          NGTD at time of discharge.      PROCEDURES ( this hospitalization only)       * No surgery found *     RECOMMENDATIONS TO OUTPATIENT PROVIDER FOR F/U VISIT      1. Ensure she completes remainder of her antibiotic treatment course.   2. Urology clinic referral placed given chronic indwelling suprapubic catheter and recurrent complicated UTI's.           Any other Problems in order of Priority:        SUBJECTIVE:  Dania Da Silva is a 89 y.o. female    Past Medical History:   Diagnosis Date     Cancer (H)      GERD (gastroesophageal reflux disease)      Hypertension      Osteoporosis      Renal cell carcinoma of right kidney (H)      Rheumatoid arthritis (H)      Past Surgical History:   Procedure Laterality Date     TN CYSTO/URETERO/PYELOSCOPY,W/RESECT TUMOR      Description: Cystoscopy With Resection Of Tumor;  Proc Date: 12/01/2003;     TN CYSTOURETHROSCOPY      Description: Cystoscopy (Diagnostic);  Proc Date: 01/13/2005;     TN REMOVAL OF TONSILS,<11 Y/O      Description: Tonsillectomy;  Recorded: 06/03/2013;     TN TOTAL HIP ARTHROPLASTY      Description: Total Hip Replacement;  Recorded: 04/23/2008;     Sulfa (sulfonamide antibiotics) and Sulindac  Current Outpatient Medications    Medication Sig Dispense Refill     aspirin 81 MG EC tablet Take 81 mg by mouth daily.       folic acid (FOLVITE) 1 MG tablet TAKE 1 TABLET BY MOUTH EVERY DAY 90 tablet 3     methotrexate sodium (METHOTREXATE) 2.5 mg DsPk Take 15 mg by mouth once a week. On Fridays             NaCl 0.9% IR (NS) 0.9 % irrigation Irrigate with  mL as directed see administration instructions. Irrigate with 60 mL in the morning and 120 mL in the evening. (for catheter)       acetaminophen (TYLENOL) 500 MG tablet Take 1,000 mg by mouth at bedtime as needed for pain.              amoxicillin (AMOXIL) 500 MG capsule Take 4 capsules by mouth as needed. Prior to dental appointments       lactase (LACTAID) 3,000 unit tablet Take 3,000 Units by mouth as needed.       lisinopril-hydrochlorothiazide (PRINZIDE,ZESTORETIC) 10-12.5 mg per tablet Take 1 tablet by mouth daily. 90 tablet 3     naproxen sodium (ALEVE) 220 MG tablet Take 220 mg by mouth daily.        polyethylene glycol (MIRALAX) 17 gram packet Take 17 g by mouth daily as needed (for constipation).       psyllium (METAMUCIL) 3.4 gram packet Take 1 packet by mouth 2 (two) times a day as needed.              No current facility-administered medications for this visit.      Family History   Problem Relation Age of Onset     Thyroid cancer Daughter      Cervical cancer Daughter      Breast cancer Daughter      Uterine cancer Daughter      Testicular cancer Nephew      Lung cancer Cousin      Social History     Socioeconomic History     Marital status: Single     Spouse name: None     Number of children: None     Years of education: None     Highest education level: None   Occupational History     None   Social Needs     Financial resource strain: None     Food insecurity:     Worry: None     Inability: None     Transportation needs:     Medical: None     Non-medical: None   Tobacco Use     Smoking status: Never Smoker     Smokeless tobacco: Never Used   Substance and Sexual Activity  "    Alcohol use: Yes     Comment: \"very infrequent\"     Drug use: No     Sexual activity: Never   Lifestyle     Physical activity:     Days per week: None     Minutes per session: None     Stress: None   Relationships     Social connections:     Talks on phone: None     Gets together: None     Attends Spiritism service: None     Active member of club or organization: None     Attends meetings of clubs or organizations: None     Relationship status: None     Intimate partner violence:     Fear of current or ex partner: None     Emotionally abused: None     Physically abused: None     Forced sexual activity: None   Other Topics Concern     None   Social History Narrative     None     Patient Active Problem List   Diagnosis     Hypertension     Ganglion     Abdominal Pain Feels Crampy / Colicky     Abnormal Blood Chemistry     Squamous Cell Carcinoma Of The Urethra     Hyperlipidemia     Hyperparathyroidism (H)     Hypercalcemia     Slow transit constipation     Rheumatoid arthritis (H)     Osteoporosis, senile     Bilateral Inguinal Hernia     Limb Pain     Urinary Frequency Increased     Renal cell carcinoma of right kidney (H)     Metastasis to bone (H)     Chemotherapy management, encounter for     Diarrhea due to drug     Encounter for antineoplastic immunotherapy     Chronic suprapubic catheter (H)     Complicated UTI (urinary tract infection)     UTI (urinary tract infection)     Sepsis due to urinary tract infection (H)     Lactic acid increased     DORY (acute kidney injury) (H)                                              SOCIAL: She  reports that she has never smoked. She has never used smokeless tobacco. She reports that she drinks alcohol. She reports that she does not use drugs.    REVIEW OF SYSTEMS:   Family history not pertinent to chief complaint or presenting problem    Review of Systems:      Nervous System:  No headache, paresthesia or dizziness or fainting                                  Ears: No " "hearing loss or ringing in the ears    Eyes: No blurring of vision, Double Vision            No redness, itching or dryness.    Nose: No nosebleed or loss of smell    Mouth: No mouth sores or  coated tongue    Throat: No hoarseness or difficulty swallowing    Neck: No enlarged thyroid or lymph nodes.    Heart: No chest pain, palpitation or irregular heartbeat.                  Lungs: No shortness of breath, wheezing or hemoptysis.    Gastrointestinal: No nausea or vomiting, melena or blood in stools.    Kidney/Bladdr: No polyuria, polydipsia, or hematuria.                             Genital/Sexual: No Sex function Changes                                Skin: No rash    Muscles/Joints/Bones: No Muscle morning stiffness, No Effusion of a Joint     Review of systems otherwise negative as requested from patient, except   Those positive ROS outlined and discussed in [place].    OBJECTIVE:  /60 (Patient Site: Right Arm, Patient Position: Sitting, Cuff Size: Adult Regular)   Pulse 78   Ht 5' 1\" (1.549 m)   Wt 101 lb (45.8 kg)   SpO2 99%   BMI 19.08 kg/m      GENERAL:     No acute distress.   Alert and oriented X 3         Physical:    Pulse regular rate and rhythm  Full range of affect  Indwelling urinary cath catheter with bag on the leg  Slightly cloudy urine        ASSESSMENT & PLAN      Dania Somers was seen today for hospital visit follow up.    Diagnoses and all orders for this visit:    Hypertension  -     lisinopril-hydrochlorothiazide (PRINZIDE,ZESTORETIC) 10-12.5 mg per tablet; Take 1 tablet by mouth daily.    Urinary tract infection associated with cystostomy catheter, initial encounter (H)  -     Ambulatory referral to Urology    Renal cell carcinoma of right kidney (H)  -     Ambulatory referral to Urology    Urinary catheter in place  -     Ambulatory referral to Urology    Physical deconditioning  -     Ambulatory referral to Adult PT- External        Return in about 6 months (around 2/23/2020).   "     Anticipatory Guidance and Symptomatic Cares Discussed   Advised to call back directly if there are further questions, or if these symptoms fail to improve as anticipated or worsen.  Return to clinic if patient has a clinical concern that warrants an exam.         I spent 40  minutes with this patient face to face, of which 50% or greater was spent in counseling and coordination of care with regards to Dania Somers was seen today for hospital visit follow up.    Diagnoses and all orders for this visit:    Hypertension  -     lisinopril-hydrochlorothiazide (PRINZIDE,ZESTORETIC) 10-12.5 mg per tablet; Take 1 tablet by mouth daily.    Urinary tract infection associated with cystostomy catheter, initial encounter (H)  -     Ambulatory referral to Urology    Renal cell carcinoma of right kidney (H)  -     Ambulatory referral to Urology    Urinary catheter in place  -     Ambulatory referral to Urology    Physical deconditioning  -     Ambulatory referral to Adult PT- External        Jose Leija MD  Family Medicine   [facility] [phone]

## 2021-05-31 NOTE — TELEPHONE ENCOUNTER
I just saw Dania in clinic with her son I believe.    I there is a change in her clinical status then Urgent/Emergent care.    If can wait we can see Thursday.      Robi

## 2021-05-31 NOTE — PROGRESS NOTES
Pt presented for opdivo. Pt had recent hospitalization for uti. Checked with provider if labs were needed and they were not. Pt  Tolerated opdivo well and discharged home with her friend. Danielle Perry RN

## 2021-05-31 NOTE — PROGRESS NOTES
[facility] Hematology and Oncology Progress Note    Patient: Dania Da Silva  MRN: [id number]  Date of Service:7/31/2019      Reason for Visit:    D1C9 Nivolumab therapy    Assessment and Plan:    1) Renal cell carcinoma of right kidney (H)   Clinical Stage IV (cT1b, cNX, pM1)    2.5 cm pleural-based expansile destructive lesion in the right lateral seventh rib, pleural-based nodules, multiple bilateral pulmonary nodules, small right pleural effusion, 3.2 cm renal mass   Clear cell type     PDL1 expression 90%    89 y.o.     2018, March - diagnosed.     03/09/18 CT chest - expansile destructive lesion in the right lateral 7th rib, consistent with a metastasis. Multiple enhancing pleural-based masses.  Small right pleural effusion.  Multiple pulmonary nodules in the right and left of varying size. (R) renal mass measuring 3.2 x 2.5 x 3.8 cm.    03/23/18 CT A&P - right kidney with 4 cm enhancing mass.  Patent renal vein.  No retroperitoneal lymphadenopathy.  Metastases in the lungs, right pleural space and right seventh rib.    03/26/18 CT lung biopsy - metastatic renal cell carcinoma - clear cell type.    04/18 - 05/26/18 - received pazopanib therapy @ 400 mg daily (at 50% dose reduction with plan to escalate to 800 mg daily as tolerated).  Stopped due to grade 3 diarrhea    06/04/18 CT CAP - overall stable disease (mild progression about 20% increase in size of pleural-based metastases), stable or slightly smaller pulmonary nodules.    06/05/18 - restarted pazopanib @ 200 mg daily for better tolerability.    08/14/18 CT CAP - Diffuse expansion of the anterior-lateral right seventh rib has improved.  Focally increased ovoid expansile appearance of the anterior right sixth rib.  No significant change of pulmonary nodules, pleural metastases and right renal mass.  Improved but persistent minimal right pleural effusion.  Small left inguinal hernia containing minimal small bowel.  No obstruction or bowel wall  thickening.    10/23/18 CT CAP - Mixed response to therapy. Some of the pulmonary nodules are larger than previous and some are smaller.  Others are unchanged in size.  Skeletal and pleural metastases, as well as right renal mass, have not appreciably changed.    12/12/18 - changed therapy to Nivolumab due to intolerable diarrhea with pazopanib.    02/05/19 CT CAP - a few regions of pleural thickening and some pulmonary nodules have slightly improved. However, a couple pulmonary nodules have also slightly increased. Remaining exam without significant change, including heterogeneous right renal mass.    05/06/19 CT CAP - Stable solid mass right kidney. Mixed response in the lungs with several nodules mildly increasing in size and a few nodules slightly improving.  No significant new findings.    07/19 - 07/22/19 hospitalized with UTI, constipation, (R)LQ ABD discomfort and leukocytosis.    Started on MirALAX daily.    Initially treated with Zosyn, switched to Rocephin.  Blood cultures - negative.  UA grew mixed organism.  Rocephin stopped.    07/31/19:    CBC - HgB stable @ 11.7.  Otherwise WNL.    CMP - WNL.      TSH - WNL..    Dania Somers looks and feels quite good.  Her weight up a couple pounds.  Her appetite is good.  Bowels are now soft and daily after recent hospitalization for constipation.  Chronic, occasional (R) chest wall pain.    Now taking generic MirALAX daily - no further constipation.    No concerning known side effects from nivolumab therapy    Proceed D1C9 Nivolumab therapy.      08/14/19 - D15C9 Nivolumab therapy.    08/28/19 - follow up D1C10 Nivolumab therapy with labs per Treatment Plan and CT CAP.      2) Metastatic bone disease and osteoporosis:    05/09/18 - started Zometa every 12 week therapy.    08/29/19 - next Zometa due.     3) Bladder stones:    06/27/18 cystoscopy by Dr. Trevino @ [facility].  2 several centimeter bladder stones.    Mild symptomatic bladder spasms about once a month.       Offered litholapaxy and bladder biopsy if she becomes more symptomatic.      Last follow-up with Urology in March, 2019.      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      Has a suprapubic catheter, changing the catheter herself monthly.    Followed by Urology in [facility], transferred from [facility].      6) Constipation:    Resolved with daily generic MirALAX.    7) Pain:    Minimal occasional (R) chest wall:    Managed with 1 Aleve every morning and sometimes Tylenol at bedtime.    ECOG Performance:     ECOG Performance Status: 1     Distress Assessment:    Distress Assessment Score: No distress        TT > 25 minutes face to face with > 50% counseling and care coordination.    Aidan Tatum, JAVY   ____________________________________________    Interim History (IH):    Ms. Da Silva presents in a pleasant mood, accompanied by her friend, anticipating D1C9 Opdivo therapy.  She looks and feels quite good and is tolerating Nivolumab therapy without incidence or apparent toxicities.  Her fatigue is chronic and stable.  She continues to reside @ [facility] and does well there receiving PT and OT.  She is s/p radical urethrectomy in 2003 and has since had a suprapubic catheter, changing the catheter herself monthly.  She denies cough, fever, chills, unusual headaches, visual or mentation disturbance, rash.  She still notes occasional mild (R) chest wall discomfort at times managed with 1 daily Aleve.  Her bowel movements are now soft and daily on generic MirALAX after recent hospitalization for constipation.     Pain Status:    Currently in Pain: No/denies    Review of Systems    Constitutional  Constitutional (WDL): All constitutional elements are within defined limits  Neurosensory  Neurosensory (WDL): Exceptions to WDL  Peripheral Motor Neuropathy: Asymptomatic, clinical or  diagnostic observations only, intervention not indicated  Ataxia: Asymptomatic, clinical or diagnostic observations only, intervention not indicated(uses cane)  Peripheral Sensory Neuropathy: Asymptomatic, loss of deep tendon reflexes or paresthesia(feet-numbness)  Confusion: None  Syncope: None  Eye   Eye Disorder (WDL): Exceptions to WDL  Blurred Vision: Intervention not indicated  Dry Eye: None  Eye Pain: None  Watering Eyes: None  Ear  Ear Disorder (WDL): All ear disorder elements are within defined limits  Cardiovascular  Cardiovascular (WDL): Exceptions to WDL  Palpitations: None  Edema: Yes  Edema Limbs: 5 - 10% inter-limb discrepancy in volume or circumference at point of greatest visible difference, swelling or obscuration of anatomic architecture on close inspection(bilat LE)  Pulmonary  Respiratory (WDL): Within Defined Limits  Gastrointestinal  Gastrointestinal (WDL): Exceptions to WDL  Anorexia: None  Constipation: Occasional or intermittent symptoms, occasional use of stool softeners, laxatives, dietary modification, or enema(Last BM 7/30/19)  Diarrhea: None  Dysphagia: None  Esophagitis: None  Nausea: None  Pharyngitis: None  Vomiting: None  Dysgeusia: None  Dry Mouth: None  Genitourinary  Genitourinary (WDL): Exceptions to WDL(suprapubic catheter)  Lymphatic  Lymph (WDL): All lymph disorder elements are within defined limits  Musculoskeletal and Connective Tissue  Musculoskeletal and Connetive Tissue Disorders (WDL): Exceptions to WDL  Arthralgia: None  Bone Pain: Mild pain(R rib pain)  Muscle Weakness : Symptomatic, perceived by patient but not evident on physical exam(hands)  Myalgia: None  Integumentary  Integumentary (WDL): All integumentary elements are within defined limits  Patient Coping  Patient Coping: Accepting;Open/discussion  Distress Assessment  Distress Assessment Score: No distress  Accompanied by  Accompanied by: Friend(Friend, Loni)  Oral Chemo Adherence       Past History:    Past  Medical History:   Diagnosis Date     Cancer (H)      GERD (gastroesophageal reflux disease)      Hypertension      Osteoporosis      Renal cell carcinoma of right kidney (H)      Rheumatoid arthritis (H)        Physical Exam:    Recent Vitals 7/31/2019   Height -   Weight 103 lbs 2 oz   BSA (m2) 1.42 m2   /74   Pulse 87   Temp 97.4   Temp src 1   SpO2 97   Some recent data might be hidden     General:  Pleasant.  Alert.  No distress.  Thin.  Accompanied by friend.  HEENT:  MAHESH. EOMI.  Anicteric sclera.  Normal buccal mucosa.   Lymphatics:  No abnormally enlarged lymph nodes.  Chest:   Normal chest wall and respirations. Clear to auscultation.  Heart:   S1 S2 heard.  RRR,  No murmur.   Abdomen:  Soft.  Not tender.  Not distended.  No palpable masses, organomegaly or guarding.  Suprapubic catheter - urine color is clear in the bag.  Extremities:  Normal strength, tone, and muscle mass  Skin:   No rash noted.    CNS:   Non focal.    Lab Results:    Reviewed with patient.    Recent Results (from the past 24 hour(s))   Comprehensive Metabolic Panel   Result Value Ref Range    Sodium 139 136 - 145 mmol/L    Potassium 4.2 3.5 - 5.0 mmol/L    Chloride 104 98 - 107 mmol/L    CO2 26 22 - 31 mmol/L    Anion Gap, Calculation 9 5 - 18 mmol/L    Glucose 109 70 - 125 mg/dL    BUN 22 8 - 28 mg/dL    Creatinine 0.87 0.60 - 1.10 mg/dL    GFR MDRD Af Amer >60 >60 mL/min/1.73m2    GFR MDRD Non Af Amer >60 >60 mL/min/1.73m2    Bilirubin, Total 0.4 0.0 - 1.0 mg/dL    Calcium 10.3 8.5 - 10.5 mg/dL    Protein, Total 6.7 6.0 - 8.0 g/dL    Albumin 3.6 3.5 - 5.0 g/dL    Alkaline Phosphatase 95 45 - 120 U/L    AST 20 0 - 40 U/L    ALT 15 0 - 45 U/L   TSH   Result Value Ref Range    TSH 2.27 0.30 - 5.00 uIU/mL   HM1 (CBC with Diff)   Result Value Ref Range    WBC 8.5 4.0 - 11.0 thou/uL    RBC 3.96 3.80 - 5.40 mill/uL    Hemoglobin 11.7 (L) 12.0 - 16.0 g/dL    Hematocrit 36.4 35.0 - 47.0 %    MCV 92 80 - 100 fL    MCH 29.5 27.0 - 34.0 pg     MCHC 32.1 32.0 - 36.0 g/dL    RDW 14.8 (H) 11.0 - 14.5 %    Platelets 343 140 - 440 thou/uL    MPV 9.9 8.5 - 12.5 fL    Neutrophils % 72 (H) 50 - 70 %    Lymphocytes % 20 20 - 40 %    Monocytes % 6 2 - 10 %    Eosinophils % 2 0 - 6 %    Basophils % 1 0 - 2 %    Neutrophils Absolute 6.1 2.0 - 7.7 thou/uL    Lymphocytes Absolute 1.7 0.8 - 4.4 thou/uL    Monocytes Absolute 0.5 0.0 - 0.9 thou/uL    Eosinophils Absolute 0.2 0.0 - 0.4 thou/uL    Basophils Absolute 0.1 0.0 - 0.2 thou/uL       Imaging:    No new imaging.

## 2021-05-31 NOTE — TELEPHONE ENCOUNTER
New Appointment Needed  What is the reason for the visit:    Inpatient/ED Follow Up Appt Request  At what hospital or facility were you seen?: [facility]    What is the reason you were seen?: Bladder infection    What date were you admitted?: date: 08/11/19     What date were you discharged?: date: 08/14/19     What was the recommended timeframe for your follow up appointment?: Within 7 days of discharge    Provider Preference: Any available     How soon do you need to be seen?: Within 7 days of discharge    Waitlist offered?: No    Okay to leave a detailed message:  Yes

## 2021-06-01 ENCOUNTER — RECORDS - HEALTHEAST (OUTPATIENT)
Dept: ADMINISTRATIVE | Facility: CLINIC | Age: 86
End: 2021-06-01

## 2021-06-01 VITALS — WEIGHT: 105 LBS | BODY MASS INDEX: 17.49 KG/M2 | HEIGHT: 65 IN

## 2021-06-01 VITALS — HEIGHT: 60 IN | BODY MASS INDEX: 20.5 KG/M2 | WEIGHT: 104.4 LBS

## 2021-06-01 VITALS — HEIGHT: 60 IN | BODY MASS INDEX: 20.69 KG/M2 | WEIGHT: 105.4 LBS

## 2021-06-01 VITALS — BODY MASS INDEX: 20.75 KG/M2 | HEIGHT: 60 IN | WEIGHT: 105.7 LBS

## 2021-06-01 VITALS — BODY MASS INDEX: 21.79 KG/M2 | HEIGHT: 60 IN | WEIGHT: 111 LBS

## 2021-06-01 VITALS — HEIGHT: 60 IN | WEIGHT: 108.1 LBS | BODY MASS INDEX: 21.22 KG/M2

## 2021-06-01 VITALS — WEIGHT: 106.25 LBS | BODY MASS INDEX: 20.86 KG/M2 | HEIGHT: 60 IN

## 2021-06-01 VITALS — WEIGHT: 106.5 LBS | BODY MASS INDEX: 20.91 KG/M2 | HEIGHT: 60 IN

## 2021-06-01 VITALS — WEIGHT: 108.3 LBS | BODY MASS INDEX: 21.26 KG/M2 | HEIGHT: 60 IN

## 2021-06-01 VITALS — HEIGHT: 65 IN | WEIGHT: 108.1 LBS | BODY MASS INDEX: 18.01 KG/M2

## 2021-06-01 VITALS — HEIGHT: 60 IN | WEIGHT: 105.7 LBS | BODY MASS INDEX: 20.75 KG/M2

## 2021-06-01 VITALS — BODY MASS INDEX: 17.54 KG/M2 | WEIGHT: 105.4 LBS

## 2021-06-01 VITALS — BODY MASS INDEX: 20.69 KG/M2 | HEIGHT: 60 IN | WEIGHT: 105.4 LBS

## 2021-06-01 VITALS — WEIGHT: 107.75 LBS | BODY MASS INDEX: 21.04 KG/M2

## 2021-06-01 VITALS — BODY MASS INDEX: 20.8 KG/M2 | WEIGHT: 106.5 LBS

## 2021-06-01 VITALS — HEIGHT: 60 IN | WEIGHT: 111 LBS | BODY MASS INDEX: 21.79 KG/M2

## 2021-06-01 NOTE — PROGRESS NOTES
ASSESSMENT & PLAN    No problem-specific Assessment & Plan notes found for this encounter.      Dania Somers was seen today for hospital visit follow up.    Diagnoses and all orders for this visit:    Urinary tract infection associated with indwelling urethral catheter, subsequent encounter  -     Culture, Urine    Slow transit constipation    Metastasis to bone (H)  -     naproxen sodium (ALEVE) 220 MG tablet; Take 1 tablet (220 mg total) by mouth daily. At 6 AM  -     aspirin 81 MG EC tablet; Take 1 tablet (81 mg total) by mouth daily. In 6 AM  -     acetaminophen (TYLENOL) 500 MG tablet; Take 2 tablets (1,000 mg total) by mouth at bedtime as needed for pain. At BEDTIME    Hypertension  -     lisinopril-hydrochlorothiazide (PRINZIDE,ZESTORETIC) 10-12.5 mg per tablet; Take 1 tablet by mouth daily. At 6 AM    Chronic suprapubic catheter (H)    Anemia, unspecified type  -     Morphology, Path Smear Review (MORP)  -     Vitamin B12  -     Iron and Transferrin Iron Binding Capacity  -     Ferritin    Dermatitis  -     nystatin (MYCOSTATIN) ointment; Apply twice daily to area around catheter up to 14 days    Other orders  -     Influenza High Dose, Seasonal 65+ yrs        Patient Instructions   We will check your urine test today    Follow through with Dr. Mcneill    Please make a list of all the medications/over-the-counter supplements you are taking to help with constipation  List the ingredients  List the timing that you are  taking these    Continue with your physical therapy    We are also checking your blood counts today to ensure that your anemia has not worsened and a possible underlying cause      Return in about 3 months (around 12/20/2019).            CHIEF COMPLAINT: Dania Da Silva had concerns including Hospital Visit Follow Up (9/3-9/6 UTI, [facility]).    [place]: 1.............. had concerns including Hospital Visit Follow Up (9/3-9/6 UTI, [facility]winds).    1. Urinary tract infection associated with indwelling  urethral catheter, subsequent encounter    2. Slow transit constipation    3. Metastasis to bone (H)    4. Hypertension    5. Chronic suprapubic catheter (H)    6. Anemia, unspecified type    7. Dermatitis          CC:             Why are you here today?                              Hospital follow up      Following up from hospital  Staying at the [facility]  Indwelling suprapubic catheter  Plans on establishing care with Dr. Mcneill  Recently had infections with Pseudomonas  For hypertension change to lisinopril hydrochlorothiazide 10/12.5  She is wanting to talk about several things    First issue bowel movements  She describes him as firm  Denies any loose stools, watery stools, hard stools, she is occasionally had cramping    She is using over-the-counter products she talks about a product called Clear All a [employer] product which I cannot find in the computer  She is also taking over-the-counter MiraLAX  Prune juice  Senokot      Recently discontinued methotrexate  We talked about discontinuing folic acid at this is this was to support folic acid treatment    We talked about the timing of medications    She occasionally has weakness mainly in her right leg finds as when she is getting out of bed she feels that the strength in her right leg is worse than the left leg  Has happened one time in the last 4 weeks where she stumbled        She is here today with a friend    Small slight irritation around her suprapubic catheter site to the left's body and irritated    Any other Problems in order of Priority:        SUBJECTIVE:  Dania Da Silva is a 89 y.o. female    Past Medical History:   Diagnosis Date     Cancer (H)      GERD (gastroesophageal reflux disease)      Hypertension      Osteoporosis      Renal cell carcinoma of right kidney (H)      Rheumatoid arthritis (H)      Past Surgical History:   Procedure Laterality Date     OR CYSTO/URETERO/PYELOSCOPY,W/RESECT TUMOR      Description: Cystoscopy With  Resection Of Tumor;  Proc Date: 12/01/2003;     SC CYSTOURETHROSCOPY      Description: Cystoscopy (Diagnostic);  Proc Date: 01/13/2005;     SC REMOVAL OF TONSILS,<13 Y/O      Description: Tonsillectomy;  Recorded: 06/03/2013;     SC TOTAL HIP ARTHROPLASTY      Description: Total Hip Replacement;  Recorded: 04/23/2008;     Sulfa (sulfonamide antibiotics) and Sulindac  Current Outpatient Medications   Medication Sig Dispense Refill     aspirin 81 MG EC tablet Take 1 tablet (81 mg total) by mouth daily. In 6  tablet 0     lactase (LACTAID) 3,000 unit tablet Take 3,000 Units by mouth as needed.       lisinopril-hydrochlorothiazide (PRINZIDE,ZESTORETIC) 10-12.5 mg per tablet Take 1 tablet by mouth daily. At 6 AM 90 tablet 3     NaCl 0.9% IR (NS) 0.9 % irrigation Irrigate with 60 mL as directed see administration instructions. Irrigate with 60 mL in the morning (for catheter)             naproxen sodium (ALEVE) 220 MG tablet Take 1 tablet (220 mg total) by mouth daily. At 6 AM  0     polyethylene glycol (MIRALAX) 17 gram packet Take 17 g by mouth daily as needed (for constipation).       psyllium (METAMUCIL) 3.4 gram packet Take 1 packet by mouth 2 (two) times a day as needed.              acetaminophen (TYLENOL) 500 MG tablet Take 2 tablets (1,000 mg total) by mouth at bedtime as needed for pain. At BEDTIME  0     amoxicillin (AMOXIL) 500 MG capsule Take 4 capsules by mouth as needed. Prior to dental appointments       nystatin (MYCOSTATIN) ointment Apply twice daily to area around catheter up to 14 days 30 g 2     No current facility-administered medications for this visit.      Family History   Problem Relation Age of Onset     Thyroid cancer Daughter      Cervical cancer Daughter      Breast cancer Daughter      Uterine cancer Daughter      Testicular cancer Nephew      Lung cancer Cousin      Social History     Socioeconomic History     Marital status: Single     Spouse name: Not on file     Number of children:  "Not on file     Years of education: Not on file     Highest education level: Not on file   Occupational History     Not on file   Social Needs     Financial resource strain: Not on file     Food insecurity:     Worry: Not on file     Inability: Not on file     Transportation needs:     Medical: Not on file     Non-medical: Not on file   Tobacco Use     Smoking status: Never Smoker     Smokeless tobacco: Never Used   Substance and Sexual Activity     Alcohol use: Yes     Comment: \"very infrequent\"     Drug use: No     Sexual activity: Never   Lifestyle     Physical activity:     Days per week: Not on file     Minutes per session: Not on file     Stress: Not on file   Relationships     Social connections:     Talks on phone: Not on file     Gets together: Not on file     Attends Gnosticist service: Not on file     Active member of club or organization: Not on file     Attends meetings of clubs or organizations: Not on file     Relationship status: Not on file     Intimate partner violence:     Fear of current or ex partner: Not on file     Emotionally abused: Not on file     Physically abused: Not on file     Forced sexual activity: Not on file   Other Topics Concern     Not on file   Social History Narrative     Not on file     Patient Active Problem List   Diagnosis     Hypertension     Ganglion     Abdominal Pain Feels Crampy / Colicky     Abnormal Blood Chemistry     Squamous Cell Carcinoma Of The Urethra     Hyperlipidemia     Hyperparathyroidism (H)     Hypercalcemia     Slow transit constipation     Rheumatoid arthritis (H)  Stopped MTX in 9/2019      Osteoporosis, senile     Bilateral Inguinal Hernia     Limb Pain     Renal cell carcinoma of right kidney (H)     Metastasis to bone (H)     Chemotherapy management, encounter for     Diarrhea due to drug     Encounter for antineoplastic immunotherapy     Chronic suprapubic catheter (H)     Complicated UTI (urinary tract infection)     UTI (urinary tract infection) "     Sepsis due to urinary tract infection (H)     DORY (acute kidney injury) (H)                                              SOCIAL: She  reports that she has never smoked. She has never used smokeless tobacco. She reports that she drinks alcohol. She reports that she does not use drugs.    REVIEW OF SYSTEMS:   Family history not pertinent to chief complaint or presenting problem    Review of Systems:      Nervous System:  No headache, paresthesia or she does have occasional dizziness or about fainting                                  Ears: No hearing loss or ringing in the ears    Eyes: No blurring of vision, Double Vision            No redness, itching or dryness.    Nose: No nosebleed or loss of smell    Mouth: No mouth sores or  coated tongue    Throat: No hoarseness or difficulty swallowing    Neck: No enlarged thyroid or lymph nodes.    Heart: No chest pain, palpitation or irregular heartbeat.                  Lungs: No shortness of breath, wheezing or hemoptysis.    Gastrointestinal: No nausea or vomiting, melena or blood in stools.    Kidney/Bladdr: She has an indwelling suprapubic cathete                               Skin: Red irritated 3 cm rash with satellite red dots    Muscles/Joints/Bones: No Muscle morning stiffness, No Effusion of a Joint     Review of systems otherwise negative as requested from patient, except   Those positive ROS outlined and discussed in [place].    OBJECTIVE:  /60 (Patient Site: Right Arm, Patient Position: Sitting, Cuff Size: Adult Regular)   Pulse 91   Wt (!) 98 lb (44.5 kg)   SpO2 99%   BMI 18.52 kg/m      GENERAL:     No acute distress.   Alert and oriented X 3         Physical:    Full range of affect  Nonpressured and articulate speech  Facial muscles are symmetric  She has no cervical or supraclavicular nodes  Cardiac regular rate and rhythm  Lungs clear  Suprapubic catheter in place 2 cm area of irritated skin with satellite pinpoint erythema  No crusting  No  drainage from the suprapubic catheter site        ASSESSMENT & PLAN  History of anemia given strength issues ensure that this is not worsening    History of urinary tract infection versus colonization  Check urine culture today    We will follow through with Dr. Jaleel Ferris urology    Patient was inquiring about a different form of catheterization given her history of urethral surgery I doubt there is an option for an indwelling urethral catheter    Likely yeast dermatitis versus contact dermatitis of the left wes-catheter area nystatin ointment    Dania Somers was seen today for hospital visit follow up.    Diagnoses and all orders for this visit:    Urinary tract infection associated with indwelling urethral catheter, subsequent encounter  -     Culture, Urine    Slow transit constipation    Metastasis to bone (H)  -     naproxen sodium (ALEVE) 220 MG tablet; Take 1 tablet (220 mg total) by mouth daily. At 6 AM  -     aspirin 81 MG EC tablet; Take 1 tablet (81 mg total) by mouth daily. In 6 AM  -     acetaminophen (TYLENOL) 500 MG tablet; Take 2 tablets (1,000 mg total) by mouth at bedtime as needed for pain. At BEDTIME    Hypertension  -     lisinopril-hydrochlorothiazide (PRINZIDE,ZESTORETIC) 10-12.5 mg per tablet; Take 1 tablet by mouth daily. At 6 AM    Chronic suprapubic catheter (H)    Anemia, unspecified type  -     Morphology, Path Smear Review (MORP)  -     Vitamin B12  -     Iron and Transferrin Iron Binding Capacity  -     Ferritin    Dermatitis  -     nystatin (MYCOSTATIN) ointment; Apply twice daily to area around catheter up to 14 days    Other orders  -     Influenza High Dose, Seasonal 65+ yrs        Return in about 3 months (around 12/20/2019).       Anticipatory Guidance and Symptomatic Cares Discussed   Advised to call back directly if there are further questions, or if these symptoms fail to improve as anticipated or worsen.  Return to clinic if patient has a clinical concern that warrants  an exam.         I spent 35  minutes with this patient face to face, of which 50% or greater was spent in counseling and coordination of care with regards to Dania Somers was seen today for hospital visit follow up.    Diagnoses and all orders for this visit:    Urinary tract infection associated with indwelling urethral catheter, subsequent encounter  -     Culture, Urine    Slow transit constipation    Metastasis to bone (H)  -     naproxen sodium (ALEVE) 220 MG tablet; Take 1 tablet (220 mg total) by mouth daily. At 6 AM  -     aspirin 81 MG EC tablet; Take 1 tablet (81 mg total) by mouth daily. In 6 AM  -     acetaminophen (TYLENOL) 500 MG tablet; Take 2 tablets (1,000 mg total) by mouth at bedtime as needed for pain. At BEDTIME    Hypertension  -     lisinopril-hydrochlorothiazide (PRINZIDE,ZESTORETIC) 10-12.5 mg per tablet; Take 1 tablet by mouth daily. At 6 AM    Chronic suprapubic catheter (H)    Anemia, unspecified type  -     Morphology, Path Smear Review (MORP)  -     Vitamin B12  -     Iron and Transferrin Iron Binding Capacity  -     Ferritin    Dermatitis  -     nystatin (MYCOSTATIN) ointment; Apply twice daily to area around catheter up to 14 days    Other orders  -     Influenza High Dose, Seasonal 65+ yrs        Jose Leija MD  Family Medicine   [facility] [address] [phone]

## 2021-06-01 NOTE — TELEPHONE ENCOUNTER
Patient Returning Call  Reason for call:  Faustina called back.  Information relayed to patient:  n/a  Patient has additional questions:  Yes  If YES, what are your questions/concerns:  States needs the verbal order today as she is going to patient house today.  Please address asap.  Okay to leave a detailed message?: Yes

## 2021-06-01 NOTE — PROGRESS NOTES
Patient arrived via wheelchair this am for opdivo as prescribed. IV access obtained with blood return confirmed. Patient tolerated infusion with no complaints/no signs of adverse reaction. IV removed from site for discharge to home. Patient discharged to home via wheelchair with friend.

## 2021-06-01 NOTE — TELEPHONE ENCOUNTER
Who is calling:  Danii Ott  Reason for Call:  I had send in a fax requesting a signed medication list, the most recent HMP and ok to open to home care.  Please return call to caller with the status of these request.   Date of last appointment with primary care: 9/20/19  Okay to leave a detailed message: Yes

## 2021-06-01 NOTE — PATIENT INSTRUCTIONS - HE
Recent Results (from the past 24 hour(s))   Comprehensive Metabolic Panel   Result Value Ref Range    Sodium 137 136 - 145 mmol/L    Potassium 4.1 3.5 - 5.0 mmol/L    Chloride 101 98 - 107 mmol/L    CO2 27 22 - 31 mmol/L    Anion Gap, Calculation 9 5 - 18 mmol/L    Glucose 96 70 - 125 mg/dL    BUN 18 8 - 28 mg/dL    Creatinine 1.02 0.60 - 1.10 mg/dL    GFR MDRD Af Amer >60 >60 mL/min/1.73m2    GFR MDRD Non Af Amer 51 (L) >60 mL/min/1.73m2    Bilirubin, Total 0.4 0.0 - 1.0 mg/dL    Calcium 9.9 8.5 - 10.5 mg/dL    Protein, Total 6.4 6.0 - 8.0 g/dL    Albumin 3.1 (L) 3.5 - 5.0 g/dL    Alkaline Phosphatase 108 45 - 120 U/L    AST 17 0 - 40 U/L    ALT 12 0 - 45 U/L   HM1 (CBC with Diff)   Result Value Ref Range    WBC 9.4 4.0 - 11.0 thou/uL    RBC 3.64 (L) 3.80 - 5.40 mill/uL    Hemoglobin 10.5 (L) 12.0 - 16.0 g/dL    Hematocrit 32.7 (L) 35.0 - 47.0 %    MCV 90 80 - 100 fL    MCH 28.8 27.0 - 34.0 pg    MCHC 32.1 32.0 - 36.0 g/dL    RDW 16.3 (H) 11.0 - 14.5 %    Platelets 442 (H) 140 - 440 thou/uL    MPV 10.4 8.5 - 12.5 fL    Neutrophils % 72 (H) 50 - 70 %    Lymphocytes % 16 (L) 20 - 40 %    Monocytes % 10 2 - 10 %    Eosinophils % 1 0 - 6 %    Basophils % 1 0 - 2 %    Neutrophils Absolute 6.8 2.0 - 7.7 thou/uL    Lymphocytes Absolute 1.5 0.8 - 4.4 thou/uL    Monocytes Absolute 0.9 0.0 - 0.9 thou/uL    Eosinophils Absolute 0.1 0.0 - 0.4 thou/uL    Basophils Absolute 0.1 0.0 - 0.2 thou/uL

## 2021-06-01 NOTE — TELEPHONE ENCOUNTER
"Medication Question or Clarification  Who is calling: Other: Faustina / [facility]  What medication are you calling about? (include dose and sig)    aspirin 81 MG EC tablet        Sig - Route: Take 81 mg by mouth daily. - Oral    Class: Historical Med      naproxen sodium (ALEVE) 220 MG tablet        Sig - Route: Take 220 mg by mouth daily.  - Oral    Class: Historical Med      Who prescribed the medication?: Historical provider   What is your question/concern?: Faustina / [facility], \"Major interaction. Probably increased bleeding due to aspirin.\"  Pharmacy: [id number] - [place], [place] - [address] AT [facility] [address]   Okay to leave a detailed message?: Yes  Site CMT - Please call the pharmacy to obtain any additional needed information.  "

## 2021-06-01 NOTE — PROGRESS NOTES
"TCM DISCHARGE FOLLOW UP CALL    Discharge Date:  9/6/2019  Reason for hospital stay (discharge diagnosis)::  UTI  Are you feeling better, the same or worse since your discharge?:  Patient is feeling worse  Why are you feeling worse?:  States \"I'm weaker than before, and fell this morning.\"  PCA is w/her now.  Suprapubic cath draining well, changed after she got home from hospital as it was leaking, but no longer having problems w/it.  States \"I'll keep working at it and hopefully get better.\"    Do you feel like you have a plan in the event of a health emergency?: Yes (Lives at the [facility] and has a medical button)    As part of your discharge plan, were  home care services ordered for you?: Yes    Have you seen them yet, or are they scheduled to visit?: Yes    Do you have any follow up visits scheduled with your PCP or Specialist?:  Yes, with PCP and Yes, with Specialist (Dr. Leija 9/20/19)  Who are you seeing and when is it scheduled?:  Oncology 9/11/19  Sorry to hear you're not doing better. Your PCP would like to see you for a follow-up visit. Can we help set that up for your today?: No    (RN) Provided patient the PCP's phone number to call if they have any questions or concerns?: Yes        Shalini Lora RN Care Manager, Population Health    "

## 2021-06-01 NOTE — TELEPHONE ENCOUNTER
Orders being requested: PT 2 x 4 weeks. Skilled nursing to assess the supra pubic catheter, starting week of 9/16/2019.   Reason service is needed/diagnosis: As requested by Faustina / [facility] to work on post hospital follow up.   When are orders needed by: As able  Where to send Orders: Phone:  [phone]  Okay to leave detailed message?  Yes

## 2021-06-01 NOTE — TELEPHONE ENCOUNTER
Orders being requested: Restart of Home Care  Reason service is needed/diagnosis: Patient was recently hospitalized; looking to resume home care  When are orders needed by: Within 72 hours  Where to send Orders: Verbal order is ok  Okay to leave detailed message?  Yes

## 2021-06-01 NOTE — PROGRESS NOTES
[facility] Hematology and Oncology Progress Note    Patient: Dania Da Silva  MRN: [id number]  Date of Service:9/11/2019      Reason for Visit:    D1C10 Nivolumab therapy    Assessment and Plan:    1) Renal cell carcinoma of right kidney (H)   Clinical Stage IV (cT1b, cNX, pM1)    2.5 cm pleural-based expansile destructive lesion in the right lateral seventh rib, pleural-based nodules, multiple bilateral pulmonary nodules, small right pleural effusion, 3.2 cm renal mass   Clear cell type     PDL1 expression 90%    89 y.o.     2018, March - diagnosed.     03/09/18 CT chest - expansile destructive lesion in the right lateral 7th rib, consistent with a metastasis. Multiple enhancing pleural-based masses.  Small right pleural effusion.  Multiple pulmonary nodules in the right and left of varying size. (R) renal mass measuring 3.2 x 2.5 x 3.8 cm.    03/23/18 CT A&P - right kidney with 4 cm enhancing mass.  Patent renal vein.  No retroperitoneal lymphadenopathy.  Metastases in the lungs, right pleural space and right seventh rib.    03/26/18 CT lung biopsy - metastatic renal cell carcinoma - clear cell type.    04/18 - 05/26/18 - received pazopanib therapy @ 400 mg daily (at 50% dose reduction with plan to escalate to 800 mg daily as tolerated).  Stopped due to grade 3 diarrhea    06/04/18 CT CAP - overall stable disease (mild progression about 20% increase in size of pleural-based metastases), stable or slightly smaller pulmonary nodules.    06/05/18 - restarted pazopanib @ 200 mg daily for better tolerability.    08/14/18 CT CAP - Diffuse expansion of the anterior-lateral right seventh rib has improved.  Focally increased ovoid expansile appearance of the anterior right sixth rib.  No significant change of pulmonary nodules, pleural metastases and right renal mass.  Improved but persistent minimal right pleural effusion.  Small left inguinal hernia containing minimal small bowel.  No obstruction or bowel wall  thickening.    10/23/18 CT CAP - Mixed response to therapy. Some of the pulmonary nodules are larger than previous and some are smaller.  Others are unchanged in size.  Skeletal and pleural metastases, as well as right renal mass, have not appreciably changed.    12/12/18 - changed therapy to Nivolumab due to intolerable diarrhea with pazopanib.    02/05/19 CT CAP - a few regions of pleural thickening and some pulmonary nodules have slightly improved. However, a couple pulmonary nodules have also slightly increased. Remaining exam without significant change, including heterogeneous right renal mass.    05/06/19 CT CAP - Stable solid mass right kidney. Mixed response in the lungs with several nodules mildly increasing in size and a few nodules slightly improving.  No significant new findings.    07/19 - 07/22/19 hospitalized with UTI, constipation, (R)LQ ABD discomfort and leukocytosis.    Started on MirALAX daily.    Initially treated with Zosyn, switched to Rocephin.  Blood cultures - negative.  UA grew mixed organism.  Rocephin stopped.    07/19/19 CT CAP - no change compared to 5/6/2019 imaging.    08/11/19 CT A&P - Diffuse enhancement of the right renal pelvis and ureter may be neoplastic or due to a urinary tract infection. Known right renal cell carcinoma is unchanged. Pleural, pulmonary, and lymph node metastases are all stable. Large amount of stool throughout the colon indicating constipation. Appendix is not visualized however there are no signs of appendicitis.    09/03-09/06/19 hospitalized with UTI.    09/11/19:    CBC - HgB quite stable @ 10.5.  Otherwise WNL.    CMP - hypoalbuminemia.  GFR a bit low.    Instructed on need for increased protein through diet or supplements.  Samples given.    TSH - WNL.    Dania Somers looks and feels quite good post hospitalization ready to resume immunotherapy.  Her weight is up a couple pounds.  Her appetite is good.  Bowels are now soft and daily after recent  hospitalization for constipation.  Chronic, occasional (R) chest wall pain.    CT CAP cancelled/missed due to hospitalization.    No concerning known side effects from nivolumab therapy    Proceed one week delayed (hospitalization) D1C10 Nivolumab therapy.      09/25/19 - D15C10 Nivolumab therapy.    10/09/19 - follow up D1C11 Nivolumab therapy with labs per Treatment Plan and CT CAP.      2) Metastatic bone disease and osteoporosis:    05/09/18 - started Zometa every 12 week therapy.    08/29/19 - next Zometa due.     3) Bladder stones:    06/27/18 cystoscopy by Dr. Trevino @ [facility].  2 several centimeter bladder stones.    Mild symptomatic bladder spasms about once a month.      Offered litholapaxy and bladder biopsy if she becomes more symptomatic.      Last follow-up with Urology in March, 2019.      4) Rheumatoid arthritis    Stable    OFF weekly methotrexate since 09/03/19 hospitalization per Rheumatologist.    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      Has a suprapubic catheter, changing the catheter herself monthly.    Followed by Urology in [facility], transferred from [facility].      6) Constipation:    Still a bit constipated with daily generic MirALAX + Metamucil.    Instructed to add Senna-S 1-2 tabs daily and push oral fluids.    7) Pain:    Minimal occasional (R) chest wall:    Managed with 1 Aleve every morning and sometimes Tylenol at bedtime.    ECOG Performance:     ECOG Performance Status: 1     Distress Assessment:    Distress Assessment Score: Unable to rate        TT > 25 minutes face to face with > 50% counseling and care coordination.    Aidan Tatum, CNP   ____________________________________________    Interim History (IH):    Ms. Da Silva presents in a pleasant mood, accompanied by her friend, anticipating D1C10 Opdivo therapy.  She looks and feels quite good post hospitalization and is tolerating Nivolumab therapy without incidence  or apparent toxicities.  Her fatigue is chronic and stable.  She continues to reside @ [facility] and does well there receiving PT and OT.  She is s/p radical urethrectomy in 2003 and has since had a suprapubic catheter, changing the catheter herself monthly.  She denies cough, fever, chills, unusual headaches, visual or mentation disturbance, rash.  She still notes occasional mild (R) chest wall discomfort at times managed with 1 daily Aleve.  Her bowel movements are yet a bit constipated on generic MirALAX and Metamucil.     Pain Status:    Currently in Pain: No/denies    Review of Systems    Constitutional  Constitutional (WDL): Exceptions to WDL  Fatigue: Fatigue relieved by rest  Neurosensory  Neurosensory (WDL): Exceptions to WDL  Peripheral Motor Neuropathy: Asymptomatic, clinical or diagnostic observations only, intervention not indicated  Ataxia: Asymptomatic, clinical or diagnostic observations only, intervention not indicated  Eye   Eye Disorder (WDL): Assessment not pertinent to visit  Ear  Ear Disorder (WDL): Assessment not pertinent to visit  Cardiovascular  Cardiovascular (WDL): Exceptions to WDL  Edema: No  Edema Limbs: 5 - 10% inter-limb discrepancy in volume or circumference at point of greatest visible difference, swelling or obscuration of anatomic architecture on close inspection(kurtis lower extremity)  Pulmonary  Respiratory (WDL): Exceptions to WDL  Dyspnea: Shortness of breath with moderate exertion  Gastrointestinal  Gastrointestinal (WDL): Exceptions to WDL  Anorexia: Loss of appetite without alteration in eating habits  Constipation: Occasional or intermittent symptoms, occasional use of stool softeners, laxatives, dietary modification, or enema  Genitourinary  Genitourinary (WDL): Exceptions to WDL(indwelling catheter )  Lymphatic  Lymph (WDL): All lymph disorder elements are within defined limits  Musculoskeletal and Connective Tissue  Musculoskeletal and Connetive  Tissue Disorders (WDL): Exceptions to WDL  Muscle Weakness : Symptomatic, evident on physical exam, limiting instrumental ADL(very weak legs)  Integumentary  Integumentary (WDL): All integumentary elements are within defined limits  Patient Coping  Patient Coping: Accepting;Open/discussion  Distress Assessment  Distress Assessment Score: Unable to rate  Accompanied by  Accompanied by: Friend  Oral Chemo Adherence       Past History:    Past Medical History:   Diagnosis Date     Cancer (H)      GERD (gastroesophageal reflux disease)      Hypertension      Osteoporosis      Renal cell carcinoma of right kidney (H)      Rheumatoid arthritis (H)        Physical Exam:    Recent Vitals 9/11/2019   Height -   Weight 102 lbs 13 oz   BSA (m2) 1.42 m2   /58   Pulse 78   Temp 97.9   Temp src 1   SpO2 97   Some recent data might be hidden     General:  Very pleasant.  Alert.  No distress.  Thin.  Accompanied by friend.  HEENT:  MAHESH. EOMI.  Anicteric sclera.  Normal buccal mucosa.   Lymphatics:  No abnormally enlarged lymph nodes.  Chest:   Normal chest wall and respirations. Clear to auscultation.  Heart:   S1 S2 heard.  RRR,  No murmur.   Abdomen:  Soft.  Not tender or distended.  No palpable masses, organomegaly or guarding.  Suprapubic catheter - urine color is clear in the bag.  Extremities:  Normal strength, tone, and muscle mass  Skin:   No rash noted.    CNS:   Non focal.    Lab Results:    Reviewed with patient.    Recent Results (from the past 24 hour(s))   Comprehensive Metabolic Panel   Result Value Ref Range    Sodium 137 136 - 145 mmol/L    Potassium 4.1 3.5 - 5.0 mmol/L    Chloride 101 98 - 107 mmol/L    CO2 27 22 - 31 mmol/L    Anion Gap, Calculation 9 5 - 18 mmol/L    Glucose 96 70 - 125 mg/dL    BUN 18 8 - 28 mg/dL    Creatinine 1.02 0.60 - 1.10 mg/dL    GFR MDRD Af Amer >60 >60 mL/min/1.73m2    GFR MDRD Non Af Amer 51 (L) >60 mL/min/1.73m2    Bilirubin, Total 0.4 0.0 - 1.0 mg/dL    Calcium 9.9 8.5 -  10.5 mg/dL    Protein, Total 6.4 6.0 - 8.0 g/dL    Albumin 3.1 (L) 3.5 - 5.0 g/dL    Alkaline Phosphatase 108 45 - 120 U/L    AST 17 0 - 40 U/L    ALT 12 0 - 45 U/L   TSH   Result Value Ref Range    TSH 1.02 0.30 - 5.00 uIU/mL   HM1 (CBC with Diff)   Result Value Ref Range    WBC 9.4 4.0 - 11.0 thou/uL    RBC 3.64 (L) 3.80 - 5.40 mill/uL    Hemoglobin 10.5 (L) 12.0 - 16.0 g/dL    Hematocrit 32.7 (L) 35.0 - 47.0 %    MCV 90 80 - 100 fL    MCH 28.8 27.0 - 34.0 pg    MCHC 32.1 32.0 - 36.0 g/dL    RDW 16.3 (H) 11.0 - 14.5 %    Platelets 442 (H) 140 - 440 thou/uL    MPV 10.4 8.5 - 12.5 fL    Neutrophils % 72 (H) 50 - 70 %    Lymphocytes % 16 (L) 20 - 40 %    Monocytes % 10 2 - 10 %    Eosinophils % 1 0 - 6 %    Basophils % 1 0 - 2 %    Neutrophils Absolute 6.8 2.0 - 7.7 thou/uL    Lymphocytes Absolute 1.5 0.8 - 4.4 thou/uL    Monocytes Absolute 0.9 0.0 - 0.9 thou/uL    Eosinophils Absolute 0.1 0.0 - 0.4 thou/uL    Basophils Absolute 0.1 0.0 - 0.2 thou/uL         Imaging:    No new imaging.

## 2021-06-01 NOTE — PATIENT INSTRUCTIONS - HE
We will check your urine test today    Follow through with Dr. Mcneill    Please make a list of all the medications/over-the-counter supplements you are taking to help with constipation  List the ingredients  List the timing that you are  taking these    Continue with your physical therapy    We are also checking your blood counts today to ensure that your anemia has not worsened and a possible underlying cause

## 2021-06-01 NOTE — PROGRESS NOTES
Pt here for C1 D1.  Given both written and verbal information and states she understands info.  Daughter present and very supportive.

## 2021-06-01 NOTE — TELEPHONE ENCOUNTER
Patient called and left message for Dr. Britton. She wanted Dr. Britton to know that they stopped her methotrexate because they think her infection was from Methotrexate causing low counts.  She said that her Rheumatologist will be calling to discuss other options with Dr. Britton. She said there was no need to call her back.  Message sent to Dr. Britton/MICHAEL Posadas RN

## 2021-06-01 NOTE — TELEPHONE ENCOUNTER
Dania is a competent decision maker.      OK to continue with the knowledge of some increased risk of bleeding.      If OK with patient, continue as prescribed.  Robi

## 2021-06-01 NOTE — TELEPHONE ENCOUNTER
Orders being requested: Ok to start nursing order this week as it was first ordered for last week.  Due to staffing.  Reason service is needed/diagnosis: generalized muscle weakness  When are orders needed by: Today  Where to send Orders: Phone:  [phone]  Okay to leave detailed message?  Yes

## 2021-06-01 NOTE — PROGRESS NOTES
Hospital discharge follow up call to pt, no answer.  Left VM message reminding patient of their appt 9/20/19 at 1:00pm.  RN will attempt call back at another time.    Shalini Lora RN Care Manager, Population Health

## 2021-06-02 VITALS — BODY MASS INDEX: 17.42 KG/M2 | WEIGHT: 104.7 LBS

## 2021-06-02 VITALS — BODY MASS INDEX: 17.61 KG/M2 | WEIGHT: 105.8 LBS

## 2021-06-02 VITALS — WEIGHT: 105.7 LBS | BODY MASS INDEX: 19.97 KG/M2

## 2021-06-02 VITALS — WEIGHT: 105 LBS | BODY MASS INDEX: 19.84 KG/M2

## 2021-06-02 VITALS — BODY MASS INDEX: 19.84 KG/M2 | WEIGHT: 105 LBS

## 2021-06-02 VITALS — BODY MASS INDEX: 17.39 KG/M2 | WEIGHT: 104.5 LBS

## 2021-06-02 VITALS — BODY MASS INDEX: 17.54 KG/M2 | WEIGHT: 105.4 LBS

## 2021-06-02 VITALS — WEIGHT: 106.7 LBS | BODY MASS INDEX: 17.76 KG/M2

## 2021-06-02 VITALS — BODY MASS INDEX: 17.31 KG/M2 | WEIGHT: 104 LBS

## 2021-06-02 VITALS — BODY MASS INDEX: 19.39 KG/M2 | WEIGHT: 102.6 LBS

## 2021-06-02 VITALS — BODY MASS INDEX: 17.59 KG/M2 | WEIGHT: 105.7 LBS

## 2021-06-02 VITALS — WEIGHT: 104.6 LBS | BODY MASS INDEX: 17.41 KG/M2

## 2021-06-02 VITALS — BODY MASS INDEX: 17.57 KG/M2 | WEIGHT: 105.6 LBS

## 2021-06-02 VITALS — BODY MASS INDEX: 19.27 KG/M2 | WEIGHT: 102 LBS

## 2021-06-02 VITALS — WEIGHT: 103.5 LBS | BODY MASS INDEX: 17.22 KG/M2

## 2021-06-02 NOTE — PROGRESS NOTES
I stopped in and met with Dania today who was here with her friend.  I gave her the end of life folder for review of hospice.  I gave her my intro letter to refer back to at home.  I encouraged that she contact me if needed for resources and assistance.

## 2021-06-02 NOTE — TELEPHONE ENCOUNTER
Orders being requested: Skilled nursing home visits for  1 time a week  X 3 weeks.   Reason service is needed/diagnosis: Management of suprapubic catheter  When are orders needed by: ASAP  Where to send Orders: Phone:  [facility] [phone]  Okay to leave detailed message?  Yes

## 2021-06-02 NOTE — TELEPHONE ENCOUNTER
Dania Lizbet called me back.  She had a lot of questions regarding her cancer and progression.  I referred back to Dr. Britton's note and discussed that she had 3 options including continuing on with current treatment which has shown slight progression, she could switch to a new treatment, cabozantinib, or she could choose to stop treatment and go on hospice.  I reviewed the potential side effects of cabozantinib.  She had many questions about hospice.  I answered to the best of my ability and informed her we could have them come out for a consult just to discuss what they have to offer her. She had a lot of questions surrounding when to know when she should go on hospice.  I will mail out the end of life folder.  I will also mail out my introduction letter for her to have to refer back to at home.  I encouraged that she call me if needed for any resources or assistance.  She thanked me for all my time in discussion.

## 2021-06-02 NOTE — TELEPHONE ENCOUNTER
Medication Request  Medication name: Amoxicillin 500 mg capsules  Pharmacy Name and Location: [facility]  Reason for request: fax received from [facility] - the patient has a dental appointment next week, previous dentist would order prophylactic amoxicillin.  Patient is unsure why but is asking for PCP to order in preparation for her dental appointment next month.  If additional questions, please call [facility] at [phone]  When did you use medication last?:  unknown  Patient offered appointment:  n/a  Okay to leave a detailed message: yes

## 2021-06-02 NOTE — TELEPHONE ENCOUNTER
Who is calling:  RICARDO Foy from [facility]  Reason for Call:  Caller stated that the patient has an dental appointment in 10/23/19. Caller stated that the patient normally takes amoxicillin as a pre-medication prior to her dental appointment. Caller stated that the patient's current amoxicillin is . Caller is questioning if the patient will need to take the amoxicillin prior to her dental appointment. Caller stated to send a prescription to [facility] if the patient needs to take this medication prior to her dental appointment.  Date of last appointment with primary care: 19  Okay to leave a detailed message: NO    [phone]

## 2021-06-02 NOTE — TELEPHONE ENCOUNTER
Who is calling:  Tonya Ott From [facility]   Reason for Call:  Caller states patients blood pressure this morning is 158/77 nurse found the BP pill on the floor and instructed patient not to take it because the nurse is not sure when patient dropped it patient takes pills by her own . Please reach out caller for questions.  Date of last appointment with primary care: Unknown   Okay to leave a detailed message: No

## 2021-06-02 NOTE — PROGRESS NOTES
Patient here today for labs, OV with Dr. Britton and possible treatment for metastatic renal cell carcinoma/MICHAEL Posadas RN

## 2021-06-02 NOTE — TELEPHONE ENCOUNTER
Orders being requested: extend physical therapy    -2 times per week for 3 weeks    Reason service is needed/diagnosis: general deconditioning   When are orders needed by: ASAP  Where to send Orders: Phone:  [phone]  Okay to leave detailed message?  Yes        Orders being requested: extend order for skilled nursing evaluation   Reason service is needed/diagnosis: assess for catheter management   When are orders needed by: ASAP  Where to send Orders: Phone:  [phone]  Okay to leave detailed message?  Yes

## 2021-06-02 NOTE — PROGRESS NOTES
"[facility] Hematology and Oncology Progress Note    Patient: Dania Da Silva  MRN: [id number]  Date of Service:10/10/2019      Reason for Visit    Follow up metastatic clear-cell renal cell carcinoma    Assessment and Plan  Cancer Staging  Renal cell carcinoma of right kidney (H)  Staging form: Kidney, AJCC 8th Edition  - Clinical stage from 4/3/2018: Stage IV (cT1b, cNX, pM1) - Signed by Ernesto Britton MD on 4/3/2018      ECOG Performance   ECOG Performance Status: 2     Distress Assessment  Distress Assessment Score: Unable to rate(\"high\"-test results today, what's next?)    Pain  Currently in Pain: No/denies    #.  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 PDL1 expression 90%.   She reports that she has progressive fatigue and weakness.  Unclear it was related to her recent multiple hospitalization or cancer progression.  Labs were unremarkable and within normal range.  I reviewed the interval CT scan of chest, abdomen, pelvis and it showed slight progression of some of pulmonary nodules and retroperitoneal lymphadenopathy but no new lesions.  I explained to her that it appeared to be cancer is progressing, however there is a slight degree.  I discussed with her about 3 options of continuing current immunotherapy for another 2 cycles, then restage OR changing treatment to cabozantinib (will need to optimize her blood pressure adequately as her blood pressure is quite elevated today) OR changing focus of care to symptom management alone.  I explained the philosophy of hospice care if she chooses symptom focused care alone.   Dania Somers stated that she has not been feeling good and not able to enjoy time with her family as much as she would like. She is worried that continuing cancer treatment will not likely change that after all.  She has been on treatment for  1 1/2 years.  She feels that her daughter and her son-in-law in college are all the time about ongoing treatment and she did " not want them feel that she gave up.  I explained to her that she will need to talk to herself and listen to her body about what would be her best interests.  I also asked her to talk to her family openly about her wishes.  They will understand how she is feeling and I am fairly certain that they will honor her wishes.  I explained to her that patients always feel that they gave up if they don't continue cancer treatment, but actually the family understand the situation very well and this is a matter of letting it go rather than giving it up.   After a long heart-felt discussion, she wanted to think it over and will talk to her family.  In the meantime, she decides to continue nivolumab.   Continue current treatment plan for infusion appointment and provider visits. I invited calls or questions from her or her family if she wishes.    #. Hypertension, uncontrolled.   She is asymptomatic.  I encouraged her to follow-up with her PCP for blood pressure management.     #.  Metastatic bone disease   On Zometa every 12 weeks.     #.  Cancer related pain, controlled well.     #.  2 several centimeter bladder stones.   She is seeing a urologist in [place].  #.  Rheumatoid arthritis- on methotrexate  #.  Osteoporosis  #.  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  has a suprapubic catheter.    #.  Hypertension, controlled.      Problem List    1. Renal cell carcinoma of right kidney (H)  CC OFFICE VISIT LONG    Infusion Appointment    DISCONTINUED: sodium chloride 0.9% 250 mL infusion    DISCONTINUED: nivolumab 240 mg in sodium chloride 0.9% 100 mL chemo (OPDIVO)    DISCONTINUED: sodium chloride flush 20 mL (NS)    DISCONTINUED: heparin lockflush (PF) porcine 300-600 Units    DISCONTINUED: diphenhydrAMINE injection 50 mg (BENADRYL)    DISCONTINUED: famotidine 20 mg/2 mL injection 20 mg (PEPCID)    DISCONTINUED: hydrocortisone sod succ (PF) injection 100 mg     DISCONTINUED: acetaminophen tablet 1,000 mg (TYLENOL)    DISCONTINUED: sodium chloride 0.9% 500 mL      _____________________________________________________________________________  Diagnosis  March 2018- Metastatic renal cell carcinoma of the right kidney- clear cell type (2.5 cm pleural-based expansile destructive lesion in the right lateral seventh rib, pleural-based nodules, multiple bilateral pulmonary nodules with small right pleural effusion, 3.2 cm renal mass consistent with renal cell carcinoma)- PDL1 expression 90%.    Treatment to date  4/18/18- started pazopanib 400 mg daily (at 50% dose reduction with plan to escalate to 800 mg daily as tolerated), stop on 5/26/2018 due to grade 3 diarrhea   -CT scan show overall stable disease (mild progression about 20% increase in size of pleural-based metastases), stable or slightly smaller pulmonary nodules.   - 6/5/2018- restarted pazopanib 200 mg daily for better tolerability  12/12/2018-therapy changed to nivolumab due to intolerable diarrhea with pazopanib.    History of Present Illness    Mrs. Da Silva is here for follow up.  She is accompanied by her friend, Loni.  She has lost about 4 pounds.  Reported increasing fatigue, weakness and unsteady gait.  She is scheduled to have physical therapy but she is not participating very well and she has been feeling tired.  She denies any pain.  Appetite has been poor.    Pain Status  Currently in Pain: No/denies    Review of Systems    Constitutional  Constitutional (WDL): Exceptions to WDL  Fatigue: Fatigue not relieved by rest - Limiting instrumental ADL  Fever: None  Chills: None  Weight Gain: None  Weight Loss: to <10% from baseline, intervention not indicated(4# 9/11/19)  Neurosensory  Neurosensory (WDL): Exceptions to WDL  Peripheral Motor Neuropathy: Moderate symptoms, limiting instrumental ADL  Ataxia: Moderate symptoms, limiting instrumental ADL(increased unsteadiness)  Peripheral Sensory Neuropathy:  "Asymptomatic, loss of deep tendon reflexes or paresthesia(hands and feet)  Confusion: None  Syncope: None  Eye   Eye Disorder (WDL): Exceptions to WDL(wears glasses)  Blurred Vision: Intervention not indicated  Dry Eye: None  Eye Pain: None  Watering Eyes: None  Ear     Cardiovascular  Cardiovascular (WDL): All cardiovascular elements are within defined limits  Pulmonary  Respiratory (WDL): Exceptions to WDL  Cough: None  Dyspnea: Shortness of breath with moderate exertion  Hypoxia: None  Gastrointestinal  Gastrointestinal (WDL): Exceptions to WDL  Anorexia: Loss of appetite without alteration in eating habits  Constipation: Occasional or intermittent symptoms, occasional use of stool softeners, laxatives, dietary modification, or enema(occ)  Diarrhea: None  Dysphagia: Symptomatic, able to eat regular diet(intermittent)  Esophagitis: None  Nausea: None  Pharyngitis: None  Vomiting: None  Dysgeusia: None  Dry Mouth: None  Genitourinary  Genitourinary (WDL): Exceptions to WDL(indwelling suprabic catheter)  Lymphatic  Lymph (WDL): All lymph disorder elements are within defined limits  Musculoskeletal and Connective Tissue  Musculoskeletal and Connetive Tissue Disorders (WDL): Exceptions to WDL  Arthralgia: None  Bone Pain: None  Muscle Weakness : Symptomatic, evident on physical exam, limiting instrumental ADL  Myalgia: None  Integumentary  Integumentary (WDL): All integumentary elements are within defined limits  Patient Coping     Distress Assessment  Distress Assessment Score: Unable to rate(\"high\"-test results today, what's next?)  Accompanied by  Accompanied by: Friend(Loni)  Oral Chemo Adherence       Past History  Past Medical History:   Diagnosis Date     Cancer (H)      GERD (gastroesophageal reflux disease)      Hypertension      Osteoporosis      Renal cell carcinoma of right kidney (H)      Rheumatoid arthritis (H)        Physical Exam    Recent Vitals 10/10/2019   Height -   Weight 98 lbs 2 oz   BSA (m2) " 1.38 m2   /86   Pulse 77   Temp 97.5   Temp src 1   SpO2 96   Some recent data might be hidden     General: alert, awake, not in acute distress  HEENT: Head: Normal, normocephalic, atraumatic.  Eye: Normal external eye, conjunctiva, lids cornea, MAHESH.  Ears:  Non-tender.  Nose: Normal external nose, mucus membranes and septum.  Pharynx: Dental Hygiene adequate. Normal buccal mucosa. Normal pharynx.  Neck / Thyroid: Supple, no masses, nodes, nodules or enlargement.  Lymphatics: No abnormally enlarged lymph nodes.  Chest: Normal chest wall and respirations. Clear to auscultation.  Heart: S1 S2 RRR, no murmur.   Abdomen: abdomen is soft without significant tenderness, masses, organomegaly or guarding  Extremities: normal strength, tone, and muscle mass  Skin: normal. no rash or abnormalities  CNS: non focal.  Walks with a cane.    Lab Results    Recent Results (from the past 168 hour(s))   Comprehensive Metabolic Panel   Result Value Ref Range    Sodium 137 136 - 145 mmol/L    Potassium 4.2 3.5 - 5.0 mmol/L    Chloride 104 98 - 107 mmol/L    CO2 25 22 - 31 mmol/L    Anion Gap, Calculation 8 5 - 18 mmol/L    Glucose 77 70 - 125 mg/dL    BUN 22 8 - 28 mg/dL    Creatinine 0.81 0.60 - 1.10 mg/dL    GFR MDRD Af Amer >60 >60 mL/min/1.73m2    GFR MDRD Non Af Amer >60 >60 mL/min/1.73m2    Bilirubin, Total 0.7 0.0 - 1.0 mg/dL    Calcium 10.4 8.5 - 10.5 mg/dL    Protein, Total 6.8 6.0 - 8.0 g/dL    Albumin 3.8 3.5 - 5.0 g/dL    Alkaline Phosphatase 104 45 - 120 U/L    AST 16 0 - 40 U/L    ALT <9 0 - 45 U/L   TSH   Result Value Ref Range    TSH 1.94 0.30 - 5.00 uIU/mL   HM1 (CBC with Diff)   Result Value Ref Range    WBC 7.3 4.0 - 11.0 thou/uL    RBC 4.41 3.80 - 5.40 mill/uL    Hemoglobin 12.3 12.0 - 16.0 g/dL    Hematocrit 39.7 35.0 - 47.0 %    MCV 90 80 - 100 fL    MCH 27.9 27.0 - 34.0 pg    MCHC 31.0 (L) 32.0 - 36.0 g/dL    RDW 15.5 (H) 11.0 - 14.5 %    Platelets 303 140 - 440 thou/uL    MPV 10.6 8.5 - 12.5 fL     Neutrophils % 67 50 - 70 %    Lymphocytes % 22 20 - 40 %    Monocytes % 8 2 - 10 %    Eosinophils % 2 0 - 6 %    Basophils % 1 0 - 2 %    Neutrophils Absolute 4.9 2.0 - 7.7 thou/uL    Lymphocytes Absolute 1.6 0.8 - 4.4 thou/uL    Monocytes Absolute 0.6 0.0 - 0.9 thou/uL    Eosinophils Absolute 0.1 0.0 - 0.4 thou/uL    Basophils Absolute 0.1 0.0 - 0.2 thou/uL       Imaging    Ct Chest Abdomen Pelvis With Oral With Iv Cont    Result Date: 10/7/2019  EXAM: CT CHEST ABDOMEN PELVIS W ORAL W IV CONTRAST LOCATION: [facility] DATE/TIME: 10/7/2019 12:08 PM INDICATION: Renal cell carcinoma. COMPARISON: Chest CT 5/6/2019, CT abdomen and pelvis 8/11/2019. TECHNIQUE: Helical thin-section CT scan of the chest, abdomen, and pelvis was performed before and after injection of IV contrast. Multiplanar reformats were obtained. Dose reduction techniques were used. CONTRAST: Iohexol (Omni) 75mL. FINDINGS: CHEST: Multiple bilateral pulmonary nodules. Many of the nodules have increased in size when compared to previous. For example, a 3 mm left lower lobe pulmonary nodule adjacent to the diaphragm on image 109 measured less than 2 mm previous. A 4 mm right middle lobe pulmonary nodule on image 82 measured less than 2 mm previous. Enhancing pleural nodules on the right have also slightly increased. For example; on series 2 image 83, there is a 12 mm nodule, previously 9 mm. Stable subcutaneous nodules lateral upper right breast. No thoracic lymphadenopathy.  ABDOMEN: Stable right renal mass measuring 3.4 cm in maximum diameter. The mass surrounds a midpole calyx and posterior aspect of the renal pelvis, unchanged. There is less thickening and enhancement of the renal pelvis and ureter when compared to previous. Normal liver, spleen, pancreas, gallbladder, adrenal glands and left kidney. Retroperitoneal lymphadenopathy has increased in size. A retrocaval lymph node on image 94 measures 14 mm in short axis, previously 11 mm. A second  retrocaval lymph node on image 99 measures 1 cm in short axis, previously 8 mm. PELVIS: Suprapubic catheter and two bladder stones in the bladder, unchanged. Bilateral inguinal hernias containing nonobstructed bowel. Pelvic floor prolapse. MUSCULOSKELETAL: Left hip arthroplasty. Degenerative changes in the spine. Old bilateral rib fractures. Stable irregular sclerosis of several right ribs.     CONCLUSION: 1.  Interval increase in size of several small pulmonary metastases and retroperitoneal lymphadenopathy.    TT: 40 minutes: time consisted of medical record review, examination of patient, completing documentation and counseling time on the CT scan results, there extensive discussion about goals of care and management options.    Signed by: Ernesto Britton MD

## 2021-06-02 NOTE — TELEPHONE ENCOUNTER
I called Dania Somers for a navigator follow up.  She did not answer the phone so I LM for her to call me if I can be off assistance.

## 2021-06-02 NOTE — TELEPHONE ENCOUNTER
Who is calling:  Alex  Reason for Call:  checking status on below messages , I advised her orders approved by MD , no call back needed.   Date of last appointment with primary care:    Has the patient been recently seen:  Yes  Okay to leave a detailed message: Yes

## 2021-06-03 VITALS
DIASTOLIC BLOOD PRESSURE: 60 MMHG | SYSTOLIC BLOOD PRESSURE: 136 MMHG | HEART RATE: 91 BPM | WEIGHT: 98 LBS | OXYGEN SATURATION: 99 % | BODY MASS INDEX: 18.52 KG/M2

## 2021-06-03 VITALS — BODY MASS INDEX: 19.45 KG/M2 | HEIGHT: 61 IN | WEIGHT: 103 LBS

## 2021-06-03 VITALS — HEIGHT: 61 IN | WEIGHT: 101.25 LBS | BODY MASS INDEX: 19.12 KG/M2

## 2021-06-03 VITALS
WEIGHT: 96 LBS | OXYGEN SATURATION: 99 % | TEMPERATURE: 97.6 F | SYSTOLIC BLOOD PRESSURE: 186 MMHG | HEART RATE: 88 BPM | DIASTOLIC BLOOD PRESSURE: 78 MMHG | BODY MASS INDEX: 18.14 KG/M2

## 2021-06-03 VITALS
DIASTOLIC BLOOD PRESSURE: 86 MMHG | HEART RATE: 77 BPM | BODY MASS INDEX: 18.54 KG/M2 | OXYGEN SATURATION: 96 % | SYSTOLIC BLOOD PRESSURE: 212 MMHG | TEMPERATURE: 97.5 F | WEIGHT: 98.1 LBS

## 2021-06-03 VITALS
DIASTOLIC BLOOD PRESSURE: 63 MMHG | WEIGHT: 97.4 LBS | BODY MASS INDEX: 18.4 KG/M2 | HEART RATE: 79 BPM | TEMPERATURE: 97.5 F | OXYGEN SATURATION: 98 % | SYSTOLIC BLOOD PRESSURE: 154 MMHG

## 2021-06-03 VITALS — WEIGHT: 100.6 LBS | BODY MASS INDEX: 19.01 KG/M2

## 2021-06-03 VITALS
TEMPERATURE: 97.9 F | SYSTOLIC BLOOD PRESSURE: 124 MMHG | HEART RATE: 78 BPM | BODY MASS INDEX: 19.42 KG/M2 | DIASTOLIC BLOOD PRESSURE: 58 MMHG | OXYGEN SATURATION: 97 % | WEIGHT: 102.8 LBS

## 2021-06-03 VITALS — BODY MASS INDEX: 19.4 KG/M2 | WEIGHT: 102.7 LBS

## 2021-06-03 VITALS
SYSTOLIC BLOOD PRESSURE: 155 MMHG | OXYGEN SATURATION: 93 % | HEART RATE: 77 BPM | DIASTOLIC BLOOD PRESSURE: 65 MMHG | BODY MASS INDEX: 18.18 KG/M2 | TEMPERATURE: 97.9 F | WEIGHT: 96.2 LBS

## 2021-06-03 VITALS — BODY MASS INDEX: 19.07 KG/M2 | HEIGHT: 61 IN | WEIGHT: 101 LBS

## 2021-06-03 VITALS — WEIGHT: 101.7 LBS | BODY MASS INDEX: 19.22 KG/M2

## 2021-06-03 VITALS — BODY MASS INDEX: 19.48 KG/M2 | WEIGHT: 103.1 LBS

## 2021-06-03 NOTE — PATIENT INSTRUCTIONS - HE
Recent Results (from the past 24 hour(s))   Comprehensive Metabolic Panel   Result Value Ref Range    Sodium 139 136 - 145 mmol/L    Potassium 4.0 3.5 - 5.0 mmol/L    Chloride 103 98 - 107 mmol/L    CO2 28 22 - 31 mmol/L    Anion Gap, Calculation 8 5 - 18 mmol/L    Glucose 100 70 - 125 mg/dL    BUN 18 8 - 28 mg/dL    Creatinine 0.82 0.60 - 1.10 mg/dL    GFR MDRD Af Amer >60 >60 mL/min/1.73m2    GFR MDRD Non Af Amer >60 >60 mL/min/1.73m2    Bilirubin, Total 0.7 0.0 - 1.0 mg/dL    Calcium 10.2 8.5 - 10.5 mg/dL    Protein, Total 6.7 6.0 - 8.0 g/dL    Albumin 3.7 3.5 - 5.0 g/dL    Alkaline Phosphatase 115 45 - 120 U/L    AST 17 0 - 40 U/L    ALT 13 0 - 45 U/L   HM1 (CBC with Diff)   Result Value Ref Range    WBC 7.0 4.0 - 11.0 thou/uL    RBC 4.68 3.80 - 5.40 mill/uL    Hemoglobin 13.1 12.0 - 16.0 g/dL    Hematocrit 41.7 35.0 - 47.0 %    MCV 89 80 - 100 fL    MCH 28.0 27.0 - 34.0 pg    MCHC 31.4 (L) 32.0 - 36.0 g/dL    RDW 14.6 (H) 11.0 - 14.5 %    Platelets 288 140 - 440 thou/uL    MPV 10.7 8.5 - 12.5 fL    Neutrophils % 72 (H) 50 - 70 %    Lymphocytes % 19 (L) 20 - 40 %    Monocytes % 7 2 - 10 %    Eosinophils % 1 0 - 6 %    Basophils % 1 0 - 2 %    Neutrophils Absolute 5.0 2.0 - 7.7 thou/uL    Lymphocytes Absolute 1.3 0.8 - 4.4 thou/uL    Monocytes Absolute 0.5 0.0 - 0.9 thou/uL    Eosinophils Absolute 0.1 0.0 - 0.4 thou/uL    Basophils Absolute 0.1 0.0 - 0.2 thou/uL

## 2021-06-03 NOTE — TELEPHONE ENCOUNTER
Name of form/paperwork: Other:  Physician Communication Interim Order  Date form received by clinic:  Unknown was faxed to [phone] on 11/1, 11/4  and 11/11  When is the form needed by? Asap, please fax back to [employer] @ [phone]  Patient Notified form requests are processed in 3-5 business days: No  (If patient needs for sooner, please note that in this message)  Okay to leave a detailed message: Yes

## 2021-06-03 NOTE — PROGRESS NOTES
[facility] Hematology and Oncology Progress Note    Patient: Dania Da Silva  MRN: [id number]  Date of Service:11/7/2019      Reason for Visit:    D1C12 Nivolumab therapy    Assessment and Plan:    1) Renal cell carcinoma of right kidney (H)   Clinical Stage IV (cT1b, cNX, pM1)    2.5 cm pleural-based expansile destructive lesion in the right lateral seventh rib, pleural-based nodules, multiple bilateral pulmonary nodules, small right pleural effusion, 3.2 cm renal mass   Clear cell type     PDL1 expression 90%    89 y.o.     2018, March - diagnosed.     03/09/18 CT chest - expansile destructive lesion in the right lateral 7th rib, consistent with a metastasis. Multiple enhancing pleural-based masses.  Small right pleural effusion.  Multiple pulmonary nodules in the right and left of varying size. (R) renal mass measuring 3.2 x 2.5 x 3.8 cm.    03/23/18 CT A&P - right kidney with 4 cm enhancing mass.  Patent renal vein.  No retroperitoneal lymphadenopathy.  Metastases in the lungs, right pleural space and right seventh rib.    03/26/18 CT lung biopsy - metastatic renal cell carcinoma - clear cell type.    04/18 - 05/26/18 - received pazopanib therapy @ 400 mg daily (at 50% dose reduction with plan to escalate to 800 mg daily as tolerated).  Stopped due to grade 3 diarrhea    06/04/18 CT CAP - overall stable disease (mild progression about 20% increase in size of pleural-based metastases), stable or slightly smaller pulmonary nodules.    06/05/18 - restarted pazopanib @ 200 mg daily for better tolerability.    08/14/18 CT CAP - Diffuse expansion of the anterior-lateral right seventh rib has improved.  Focally increased ovoid expansile appearance of the anterior right sixth rib.  No significant change of pulmonary nodules, pleural metastases and right renal mass.  Improved but persistent minimal right pleural effusion.  Small left inguinal hernia containing minimal small bowel.  No obstruction or bowel wall  thickening.    10/23/18 CT CAP - Mixed response to therapy. Some of the pulmonary nodules are larger than previous and some are smaller.  Others are unchanged in size.  Skeletal and pleural metastases, as well as right renal mass, have not appreciably changed.    12/12/18 - changed therapy to Nivolumab due to intolerable diarrhea with pazopanib.    02/05/19 CT CAP - a few regions of pleural thickening and some pulmonary nodules have slightly improved. However, a couple pulmonary nodules have also slightly increased. Remaining exam without significant change, including heterogeneous right renal mass.    05/06/19 CT CAP - Stable solid mass right kidney. Mixed response in the lungs with several nodules mildly increasing in size and a few nodules slightly improving.  No significant new findings.    07/19 - 07/22/19 hospitalized with UTI, constipation, (R)LQ ABD discomfort and leukocytosis.    Started on MirALAX daily.    Initially treated with Zosyn, switched to Rocephin.  Blood cultures - negative.  UA grew mixed organism.  Rocephin stopped.    07/19/19 CT CAP - no change compared to 5/6/2019 imaging.    08/11/19 CT A&P - Diffuse enhancement of the right renal pelvis and ureter may be neoplastic or due to a urinary tract infection. Known right renal cell carcinoma is unchanged. Pleural, pulmonary, and lymph node metastases are all stable. Large amount of stool throughout the colon indicating constipation. Appendix is not visualized however there are no signs of appendicitis.    09/03-09/06/19 hospitalized with UTI.    11/07/19:    CBC - basically WNL.    CMP - resolved hypoalbuminemia.  Otherwise WNL.    Instructed on need for continued increased protein through diet or supplements.  Samples given.    TSH - WNL.    Dania Somers continues to note progressive fatigue and weakness.  Concern remains for cancer progression.  Her appetite is only fair while her weight is stable.  Bowels continue to be an issue with intermittent  constipation.  She has been hospitalized in the past for constipation.  No pain.       Clinically very stable.  Excellent laboratories.    No concerning known side effects from nivolumab therapy    Proceed D1C12 Nivolumab therapy.      11/21/19 - D15C12 Nivolumab therapy.    12/05/19 - follow up D1C13 Nivolumab therapy with labs per Treatment Plan and CT CAP.  Son-in-lawZen - wishes to be called for conference with review of imaging and plan of care.      2) Metastatic bone disease and osteoporosis:    05/09/18 - started Zometa every 12 week therapy.    12/04/19 - next Zometa due.     3) Bladder stones:    06/27/18 cystoscopy by Dr. Trevino @ [facility].  2 several centimeter bladder stones.    Mild symptomatic bladder spasms about once a month.      Offered litholapaxy and bladder biopsy if she becomes more symptomatic.      Last follow-up with Urology in March, 2019.      4) Rheumatoid arthritis    Stable    OFF weekly methotrexate since 09/03/19 hospitalization per Rheumatologist.    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      Has a suprapubic catheter, changing the catheter herself monthly.    Followed by Urology in [facility], transferred from [facility].      6) Constipation:    Still a bit constipated but stopped daily generic MirALAX + Metamucil.    Instructed to be diligent with daily:    Senna-S 1-2 tabs.    MirALAX    Metamucil    Push oral fluids.    7) Pain:    Minimal occasional (R) chest wall:    Managed with 1 Aleve every morning and sometimes Tylenol at bedtime.    ECOG Performance:     ECOG Performance Status: 2     Distress Assessment:    Distress Assessment Score: No distress        TT > 25 minutes face to face with > 50% counseling and care coordination.    Aidan Tatum, CNP   ____________________________________________    Interim History (IH):    Ms. Da Silva presents in her usual pleasant mood, accompanied by her friend, anticipating D1C12  Opdivo therapy.  She looks and feels quite good and is tolerating Nivolumab therapy without incidence or apparent toxicities.  Her fatigue is chronic and stable.  She continues to reside @ [facility] and does well there receiving PT and OT.  She is s/p radical urethrectomy in 2003 and has since had a suprapubic catheter, changing the catheter herself monthly.  She denies cough, fever, chills, unusual headaches, visual or mentation disturbance, rash.  She still notes occasional mild (R) chest wall discomfort at times managed with 1 daily Aleve.  Her bowel movements are yet a bit constipated but she is not being consistent with her bowel regimen.     Pain Status:    Currently in Pain: Yes    Review of Systems    Constitutional  Constitutional (WDL): Exceptions to WDL  Fatigue: Fatigue not relieved by rest - Limiting instrumental ADL  Neurosensory  Neurosensory (WDL): Exceptions to WDL  Ataxia: Moderate symptoms, limiting instrumental ADL(uses a walker)  Eye   Eye Disorder (WDL): Exceptions to WDL  Blurred Vision: Intervention not indicated  Ear  Ear Disorder (WDL): All ear disorder elements are within defined limits  Cardiovascular  Cardiovascular (WDL): All cardiovascular elements are within defined limits  Pulmonary  Respiratory (WDL): Exceptions to WDL  Dyspnea: Shortness of breath with moderate exertion  Gastrointestinal  Gastrointestinal (WDL): Exceptions to WDL  Anorexia: Loss of appetite without alteration in eating habits  Genitourinary  Genitourinary (WDL): Exceptions to WDL(has indwelling catheter)  Lymphatic  Lymph (WDL): All lymph disorder elements are within defined limits  Musculoskeletal and Connective Tissue  Musculoskeletal and Connetive Tissue Disorders (WDL): Exceptions to WDL  Arthralgia: Mild pain(right hip)  Muscle Weakness : Symptomatic, evident on physical exam, limiting instrumental ADL  Integumentary  Integumentary (WDL): All integumentary elements are within defined  limits  Patient Coping  Patient Coping: Accepting;Open/discussion  Distress Assessment  Distress Assessment Score: No distress  Accompanied by  Accompanied by: Alone  Oral Chemo Adherence       Past History:    Past Medical History:   Diagnosis Date     Cancer (H)      GERD (gastroesophageal reflux disease)      Hypertension      Osteoporosis      Renal cell carcinoma of right kidney (H)      Rheumatoid arthritis (H)        Physical Exam:    Recent Vitals 11/7/2019   Weight 97 lbs 6 oz   BSA (m2) 1.38 m2   /63   Pulse 79   Temp 97.5   Temp src 1   SpO2 98   Some recent data might be hidden     General:  Pleasant.  Alert.  No distress.  Thin.  Accompanied by friend.  HEENT:  MAHESH. EOMI.  Anicteric sclera.  Normal buccal mucosa.   Lymphatics:  No abnormally enlarged lymph nodes.  Chest:   Normal chest wall and respirations. Clear to auscultation.  Heart:   S1 S2 heard.  RRR,  No murmur.   Abdomen:  Soft.  Not tender.  Not distended.  No palpable masses, organomegaly or guarding.  Suprapubic catheter - urine color is clear in the bag.  Extremities:  Normal strength, tone, and muscle mass  Skin:   No rash noted.    CNS:   Non focal.    Lab Results:    Reviewed with patient.    Recent Results (from the past 24 hour(s))   Comprehensive Metabolic Panel   Result Value Ref Range    Sodium 139 136 - 145 mmol/L    Potassium 4.0 3.5 - 5.0 mmol/L    Chloride 103 98 - 107 mmol/L    CO2 28 22 - 31 mmol/L    Anion Gap, Calculation 8 5 - 18 mmol/L    Glucose 100 70 - 125 mg/dL    BUN 18 8 - 28 mg/dL    Creatinine 0.82 0.60 - 1.10 mg/dL    GFR MDRD Af Amer >60 >60 mL/min/1.73m2    GFR MDRD Non Af Amer >60 >60 mL/min/1.73m2    Bilirubin, Total 0.7 0.0 - 1.0 mg/dL    Calcium 10.2 8.5 - 10.5 mg/dL    Protein, Total 6.7 6.0 - 8.0 g/dL    Albumin 3.7 3.5 - 5.0 g/dL    Alkaline Phosphatase 115 45 - 120 U/L    AST 17 0 - 40 U/L    ALT 13 0 - 45 U/L   TSH   Result Value Ref Range    TSH 1.36 0.30 - 5.00 uIU/mL   HM1 (CBC with Diff)    Result Value Ref Range    WBC 7.0 4.0 - 11.0 thou/uL    RBC 4.68 3.80 - 5.40 mill/uL    Hemoglobin 13.1 12.0 - 16.0 g/dL    Hematocrit 41.7 35.0 - 47.0 %    MCV 89 80 - 100 fL    MCH 28.0 27.0 - 34.0 pg    MCHC 31.4 (L) 32.0 - 36.0 g/dL    RDW 14.6 (H) 11.0 - 14.5 %    Platelets 288 140 - 440 thou/uL    MPV 10.7 8.5 - 12.5 fL    Neutrophils % 72 (H) 50 - 70 %    Lymphocytes % 19 (L) 20 - 40 %    Monocytes % 7 2 - 10 %    Eosinophils % 1 0 - 6 %    Basophils % 1 0 - 2 %    Neutrophils Absolute 5.0 2.0 - 7.7 thou/uL    Lymphocytes Absolute 1.3 0.8 - 4.4 thou/uL    Monocytes Absolute 0.5 0.0 - 0.9 thou/uL    Eosinophils Absolute 0.1 0.0 - 0.4 thou/uL    Basophils Absolute 0.1 0.0 - 0.2 thou/uL       Imaging:    No new imaging.

## 2021-06-04 VITALS
OXYGEN SATURATION: 99 % | TEMPERATURE: 97.3 F | HEART RATE: 79 BPM | WEIGHT: 98.8 LBS | DIASTOLIC BLOOD PRESSURE: 74 MMHG | SYSTOLIC BLOOD PRESSURE: 163 MMHG | RESPIRATION RATE: 17 BRPM | BODY MASS INDEX: 18.67 KG/M2

## 2021-06-04 VITALS
DIASTOLIC BLOOD PRESSURE: 74 MMHG | WEIGHT: 99 LBS | HEART RATE: 85 BPM | SYSTOLIC BLOOD PRESSURE: 154 MMHG | RESPIRATION RATE: 16 BRPM | TEMPERATURE: 98.8 F | BODY MASS INDEX: 18.71 KG/M2 | OXYGEN SATURATION: 97 %

## 2021-06-04 VITALS
DIASTOLIC BLOOD PRESSURE: 71 MMHG | OXYGEN SATURATION: 98 % | TEMPERATURE: 99.7 F | RESPIRATION RATE: 16 BRPM | BODY MASS INDEX: 19.27 KG/M2 | SYSTOLIC BLOOD PRESSURE: 160 MMHG | WEIGHT: 102 LBS | HEART RATE: 87 BPM

## 2021-06-04 VITALS
SYSTOLIC BLOOD PRESSURE: 168 MMHG | TEMPERATURE: 97.3 F | OXYGEN SATURATION: 99 % | WEIGHT: 99.2 LBS | HEART RATE: 100 BPM | DIASTOLIC BLOOD PRESSURE: 80 MMHG | BODY MASS INDEX: 18.74 KG/M2 | RESPIRATION RATE: 16 BRPM

## 2021-06-04 VITALS
HEART RATE: 83 BPM | BODY MASS INDEX: 18.14 KG/M2 | SYSTOLIC BLOOD PRESSURE: 143 MMHG | RESPIRATION RATE: 18 BRPM | WEIGHT: 96 LBS | DIASTOLIC BLOOD PRESSURE: 67 MMHG | TEMPERATURE: 97.8 F | OXYGEN SATURATION: 98 %

## 2021-06-04 VITALS
HEART RATE: 85 BPM | DIASTOLIC BLOOD PRESSURE: 69 MMHG | SYSTOLIC BLOOD PRESSURE: 125 MMHG | WEIGHT: 99.8 LBS | TEMPERATURE: 98.1 F | OXYGEN SATURATION: 98 % | BODY MASS INDEX: 18.86 KG/M2 | RESPIRATION RATE: 15 BRPM

## 2021-06-04 VITALS
HEART RATE: 79 BPM | SYSTOLIC BLOOD PRESSURE: 189 MMHG | DIASTOLIC BLOOD PRESSURE: 98 MMHG | OXYGEN SATURATION: 98 % | TEMPERATURE: 97.6 F | WEIGHT: 105.6 LBS | BODY MASS INDEX: 19.95 KG/M2

## 2021-06-04 VITALS
HEART RATE: 87 BPM | BODY MASS INDEX: 19.16 KG/M2 | DIASTOLIC BLOOD PRESSURE: 89 MMHG | OXYGEN SATURATION: 94 % | RESPIRATION RATE: 21 BRPM | WEIGHT: 101.4 LBS | TEMPERATURE: 98.9 F | SYSTOLIC BLOOD PRESSURE: 182 MMHG

## 2021-06-04 VITALS
BODY MASS INDEX: 19.27 KG/M2 | OXYGEN SATURATION: 97 % | SYSTOLIC BLOOD PRESSURE: 134 MMHG | HEART RATE: 90 BPM | DIASTOLIC BLOOD PRESSURE: 68 MMHG | TEMPERATURE: 98.3 F | WEIGHT: 102 LBS | RESPIRATION RATE: 16 BRPM

## 2021-06-04 NOTE — TELEPHONE ENCOUNTER
Left detailed message for Daphney Nurse from [facility] relaying Dr. Leija's verbal OK for PT eval.     KLP, CMA

## 2021-06-04 NOTE — PROGRESS NOTES
[facility] Hematology and Oncology Progress Note    Patient: Dania Da Silva  MRN: [id number]  Date of Service:12/4/2019      Reason for Visit    Follow up metastatic clear-cell renal cell carcinoma    Assessment and Plan  Cancer Staging  Renal cell carcinoma of right kidney (H)  Staging form: Kidney, AJCC 8th Edition  - Clinical stage from 4/3/2018: Stage IV (cT1b, cNX, pM1) - Signed by Ernesto Britton MD on 4/3/2018      ECOG Performance   ECOG Performance Status: 2     Distress Assessment  Distress Assessment Score: 5(due to having to walk on snow, ice-afraid of falling)    Pain  Currently in Pain: Yes  Pain Score (Initial OR Reassessment): 5  Location: R hip, R leg    #.  Metastatic renal cell carcinoma of the right kidney, clear-cell type. PDL1 expression 90%.   I reviewed her labs and they are fairly unremarkable and all in within normal range.  I reviewed the interval CT scan and it showed overall stable disease without any progression or new metastases.  Dania is clinically not feeling the best due to progressive fatigue and she is feeling tired of being tired all the time.  She has very limited mobility due to fatigue.  Her appetite has been poor as well.  She came to the clinic today in a wheelchair which she has been needing this for the last few visits.  She is wondering whether this progressive fatigue is due to cancer or nivolumab.  She is also feeling that she wants to take a break from the treatment, however she is not feeling ready to enroll in hospice.    We revisited the discussion as we did about 2 months ago.  I explained to her that treatment goal for her renal cell carcinoma is palliative, not cure.  Based on the current CT scan, there is no apparent cancer progression.  Therefore, I suspect this could be related to nivolumab, however I also agree that she has some active cancer that might be contributing to it.  I explained to her that it is very reasonable to take a treatment break to see  whether her fatigue is getting better.  I also reiterated that the quality of life is more important than quantity of life. Dania was really not sure what she wanted to do even though we had multiple discussions in the last several months.  After discussion with her family and friend,Loni who has been actively involved in her care in the last nearly 2 years with transportation/clinic visits/treatment regarding risk benefits and alternatives of ongoing cancer treatment, she decided to take a break from the treatment.   Will place  nivolumab treatment on hold.   follow-up with me in 2 months with labs and CT scan prior.     She is advised to call me anytime with any concern.    #.  Hypertension, controlled.      #.  Metastatic bone disease   On Zometa every 12 weeks. Last treatment in September 2019.    #.  Cancer related pain, controlled well.     #.  2 several centimeter bladder stones.   She is seeing a urologist in [place].  #.  Rheumatoid arthritis- on methotrexate  #.  Osteoporosis  #.  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  has a suprapubic catheter.      Her son-in-law, Zen attended that visit by phone who has been involved in the Mrs. Da Silva's cancer care since diagnosis.    Problem List    1. Renal cell carcinoma of right kidney (H)  Comprehensive Metabolic Panel    HM1(CBC and Differential)    CT Chest Abdomen Pelvis With Oral WO IV   2. Metastasis to bone (H)  Comprehensive Metabolic Panel    HM1(CBC and Differential)    CT Chest Abdomen Pelvis With Oral WO IV      _____________________________________________________________________________  Diagnosis  March 2018- Metastatic renal cell carcinoma of the right kidney- clear cell type (2.5 cm pleural-based expansile destructive lesion in the right lateral seventh rib, pleural-based nodules, multiple bilateral pulmonary nodules with small right pleural effusion, 3.2 cm renal mass consistent with  renal cell carcinoma)- PDL1 expression 90%.    Treatment to date  4/18/18- started pazopanib 400 mg daily (at 50% dose reduction with plan to escalate to 800 mg daily as tolerated), stop on 5/26/2018 due to grade 3 diarrhea   -CT scan show overall stable disease (mild progression about 20% increase in size of pleural-based metastases), stable or slightly smaller pulmonary nodules.   - 6/5/2018- restarted pazopanib 200 mg daily for better tolerability  12/12/2018-therapy changed to nivolumab due to intolerable diarrhea with pazopanib.  12/2019- nivolumab on hold/ treatment break due to progressive fatigue    History of Present Illness    Mrs. Da Silva presents today accompanied by her friend, Loni.  She reported that she has not been feeling good mainly from aggressive fatigue.  She feels that she does not have any energy to do things.  She does not go down for dinner but the food tray delivered to her room.  She had a fall last week as her legs are very weak and wobbly.  No headaches.  She cannot walk more than 10-20 steps at a time.  No fever, infection symptoms.  Denies any urinary symptoms.  Declined in appetite and weight.  She did not come to the treatment 2 weeks ago due to fatigue.    She has been thinking about taking a treatment break due to progressive fatigue and she does not have a good quality of life.  She has been talking to her son-in-law, Zen and her daughter who lives in [place] and the other daughter was not very close to her.      Pain Status  Currently in Pain: Yes    Review of Systems    Constitutional  Constitutional (WDL): Exceptions to WDL(nose bleed last night. Says she has hx of nose bleeds in winter. Said didn't bleed long and resolved on own.)  Fatigue: Fatigue not relieved by rest - Limiting instrumental ADL  Fever: None  Chills: None  Weight Gain: None  Weight Loss: to <10% from baseline, intervention not indicated(1$ 11/7/19)  Neurosensory  Neurosensory (WDL): Exceptions to  WDL  Peripheral Motor Neuropathy: None  Ataxia: Moderate symptoms, limiting instrumental ADL(uses walker, in w/c now)  Peripheral Sensory Neuropathy: Asymptomatic, loss of deep tendon reflexes or paresthesia(hands)  Confusion: None  Syncope: None  Eye   Eye Disorder (WDL): Exceptions to WDL  Blurred Vision: Intervention not indicated  Dry Eye: None  Eye Pain: None  Watering Eyes: None  Ear  Ear Disorder (WDL): Exceptions to WDL([place])  Ear Pain: None  Tinnitus: None  Cardiovascular  Cardiovascular (WDL): All cardiovascular elements are within defined limits  Pulmonary  Respiratory (WDL): Exceptions to WDL  Cough: None  Dyspnea: Shortness of breath with moderate exertion  Hypoxia: None  Gastrointestinal  Gastrointestinal (WDL): All gastrointestinal elements are within defined limits  Genitourinary  Genitourinary (WDL): Exceptions to WDL(indwelling catheter)  Urinary Frequency: None  Urinary Retention: None  Urinary Tract Pain: None  Lymphatic  Lymph (WDL): All lymph disorder elements are within defined limits  Musculoskeletal and Connective Tissue  Musculoskeletal and Connetive Tissue Disorders (WDL): Exceptions to WDL  Arthralgia: Moderate pain, limiting instrumental ADL(R hip)  Bone Pain: Moderate pain, limiting instrumental ADL(R hip)  Muscle Weakness : Symptomatic, evident on physical exam, limiting instrumental ADL  Myalgia: Moderate pain, limiting instrumental ADL(R leg)  Integumentary  Integumentary (WDL): All integumentary elements are within defined limits  Patient Coping  Patient Coping: Accepting;Open/discussion  Distress Assessment  Distress Assessment Score: 5(due to having to walk on snow, ice-afraid of falling)  Accompanied by  Accompanied by: Alone  Oral Chemo Adherence       Past History  Past Medical History:   Diagnosis Date     Cancer (H)      GERD (gastroesophageal reflux disease)      Hypertension      Osteoporosis      Renal cell carcinoma of right kidney (H)      Rheumatoid arthritis (H)         Physical Exam    Recent Vitals 12/4/2019   Weight 96 lbs 3 oz   BSA (m2) 1.37 m2   /65   Pulse 77   Temp 97.9   Temp src 1   SpO2 93   Some recent data might be hidden     General: alert, awake, not in acute distress  HEENT: Head: Normal, normocephalic, atraumatic.  Eye: Normal external eye, conjunctiva, lids cornea, MAHESH.  Ears:  Non-tender.  Nose: Normal external nose, mucus membranes and septum.  Pharynx: Dental Hygiene adequate. Normal buccal mucosa. Normal pharynx.  Neck / Thyroid: Supple, no masses, nodes, nodules or enlargement.  Lymphatics: No abnormally enlarged lymph nodes.  Chest: Normal chest wall and respirations. Clear to auscultation.  Heart: S1 S2 RRR, no murmur.   Abdomen: abdomen is soft without significant tenderness, masses, organomegaly or guarding  Extremities: normal strength, tone, and muscle mass  Skin: normal. no rash or abnormalities  CNS: non focal. Came in a wheel chair.    Lab Results    Recent Results (from the past 168 hour(s))   Comprehensive Metabolic Panel   Result Value Ref Range    Sodium 141 136 - 145 mmol/L    Potassium 3.9 3.5 - 5.0 mmol/L    Chloride 106 98 - 107 mmol/L    CO2 27 22 - 31 mmol/L    Anion Gap, Calculation 8 5 - 18 mmol/L    Glucose 118 70 - 125 mg/dL    BUN 28 8 - 28 mg/dL    Creatinine 0.92 0.60 - 1.10 mg/dL    GFR MDRD Af Amer >60 >60 mL/min/1.73m2    GFR MDRD Non Af Amer 57 (L) >60 mL/min/1.73m2    Bilirubin, Total 0.8 0.0 - 1.0 mg/dL    Calcium 10.0 8.5 - 10.5 mg/dL    Protein, Total 6.6 6.0 - 8.0 g/dL    Albumin 3.5 3.5 - 5.0 g/dL    Alkaline Phosphatase 106 45 - 120 U/L    AST 17 0 - 40 U/L    ALT 13 0 - 45 U/L   TSH   Result Value Ref Range    TSH 1.43 0.30 - 5.00 uIU/mL   HM1 (CBC with Diff)   Result Value Ref Range    WBC 7.7 4.0 - 11.0 thou/uL    RBC 4.62 3.80 - 5.40 mill/uL    Hemoglobin 12.8 12.0 - 16.0 g/dL    Hematocrit 40.6 35.0 - 47.0 %    MCV 88 80 - 100 fL    MCH 27.7 27.0 - 34.0 pg    MCHC 31.5 (L) 32.0 - 36.0 g/dL    RDW 14.2  11.0 - 14.5 %    Platelets 312 140 - 440 thou/uL    MPV 10.7 8.5 - 12.5 fL    Neutrophils % 72 (H) 50 - 70 %    Lymphocytes % 17 (L) 20 - 40 %    Monocytes % 7 2 - 10 %    Eosinophils % 2 0 - 6 %    Basophils % 1 0 - 2 %    Neutrophils Absolute 5.6 2.0 - 7.7 thou/uL    Lymphocytes Absolute 1.3 0.8 - 4.4 thou/uL    Monocytes Absolute 0.6 0.0 - 0.9 thou/uL    Eosinophils Absolute 0.1 0.0 - 0.4 thou/uL    Basophils Absolute 0.1 0.0 - 0.2 thou/uL       Imaging    Ct Chest Abdomen Pelvis With Oral With Iv Cont    Result Date: 12/2/2019  EXAM: CT CHEST ABDOMEN PELVIS W ORAL W IV CONTRAST LOCATION: [facility] DATE/TIME: 12/2/2019 2:12 PM INDICATION: Neoplasm: kidney or ureter COMPARISON: CT of the chest, abdomen, and pelvis 10/07/2019, 10/23/2018 TECHNIQUE: CT scan of the chest, abdomen, and pelvis was performed before and after injection of IV contrast. Multiplanar reformats were obtained. Dose reduction techniques were used. CONTRAST: Iohexol (Omni) 100 mL FINDINGS: LUNGS AND PLEURA: Small focus of enhancement with central low attenuation in the right posterior costophrenic sulcus has slightly increased in size from 08/11/2019 but unchanged from 10/07/2019 measuring 16 x 9 mm. No new pleural nodularity or fluid accumulation. Unchanged ovoid subpleural nodule in the lateral left lower lobe measuring 5 x 8 mm (series 2, image 109). Several sub-5 mm nodules described 10/07/2019 are no longer identified. No new lung nodules. The trachea and central airways remain patent are normal caliber. MEDIASTINUM: Mitral annular calcifications. Cardiac chambers are not enlarged. No pericardial effusion. Normal caliber thoracic aorta. Moderate arch, proximal great vessels, and descending aorta atheromatous calcifications. HEPATOBILIARY: Small cysts are unchanged. No new liver parenchymal lesions. Normal contrast opacification of the portal and hepatic veins. The gallbladder is not dilated. No biliary ductal dilatation. PANCREAS:  Normal. SPLEEN: Normal. ADRENAL GLANDS: Normal. KIDNEYS/BLADDER: The heterogeneous attenuation, enhancing mass centered on the right renal pelvis is slightly smaller and less avidly enhancing measuring up to 2.5 cm on coronal reconstruction (series 4, image 75), formerly 2.9 cm when measured in a similar fashion unchanged dilation of the right renal pelvis and mild thickening of the urothelium in the calyces. The left kidney is normal in size. A suprapubic catheter is present. The bladder is decompressed. Unchanged ovoid calcifications in the right posterior urinary bladder. BOWEL: Normal. Bilateral inguinal hernia are present containing nonobstructed bowel, left larger than right. LYMPH NODES: Abnormal enhancing lymph nodes posterior to the IVC at and caudal to the level of the main portal vein have decreased in size consistent with a positive therapeutic response. No new enlarged abdominal lymph nodes. VASCULATURE: Fairly extensive atheromatous calcifications of the abdominal aorta with contiguous extension into the common iliac arteries. No aortic aneurysm or stenosis. PELVIC ORGANS: Pelvic floor prolapse. OTHER: 2 small adjacent enhancing nodules in the lateral right breast are slightly larger compared to February of this year but unchanged in size from 10/07/2019. MUSCULOSKELETAL: Extensive thoracolumbar spondylosis including degenerative anterolisthesis of C7 on T1, T2 on T3 and reciprocal scoliotic curvature of the lumbar spine with associated inner convexity marginal osteophytes. There are no several old right anterior rib fracture deformities. There are no new lytic bone lesions. There is a left total hip arthroplasty.     1.  Diminished size and enhancement of the central right renal mass as well as decreased size of pericaval dick metastases and resolution of small bilateral lung nodules. A peripherally enhancing metastasis to the right posterior costophrenic sulcus/pleural space is unchanged in size  from 10/07/2019. There are no findings to suggest progressive metastatic disease.    TT: 40 minutes: time consisted of medical record review, examination of patient, completing documentation and counseling time on CT scan result, goal of care, follow up plans.     Signed by: Ernesto Britton MD

## 2021-06-04 NOTE — TELEPHONE ENCOUNTER
Orders being requested: physical therapy   -1 time per week for 2 weeks  -2 times per week for 3 weeks  -1 time per week for 2 weeks  Reason service is needed/diagnosis: weakness due to falls   When are orders needed by: ASAP  Where to send Orders: Phone:  [phone]  Okay to leave detailed message?  Yes

## 2021-06-04 NOTE — TELEPHONE ENCOUNTER
Caller requested to process the orders as soon as possible.  Orders being requested: Ok to Physical Therapy evaluation for week of 12/09/19.  Reason service is needed/diagnosis: Recent Fall   When are orders needed by: As soon as possible.  Where to send Orders: Phone:  [phone]  Okay to leave detailed message?  Yes

## 2021-06-05 NOTE — TELEPHONE ENCOUNTER
Orders being requested: okay to discharge home health aid week of 1/20/20  Reason service is needed/diagnosis: per patient's request. Caller stated the patient will be using the assisted living's home health aid  When are orders needed by: as soon as possible   Where to send Orders: Phone:  [phone]  Okay to leave detailed message?  Yes

## 2021-06-05 NOTE — TELEPHONE ENCOUNTER
[facility] Nurse Triage Telephone Note      Provider: PRESTON Burrell  Facility: [facility]     Facility Type: TCU    Caller: Alfonso  Call Back Number:  [phone]    Allergies: Sulfa (sulfonamide antibiotics) and Sulindac    Reason for call: BP is 176/78 P:76 has no C/O. Is on Lisin/HCTZ 10/12.5 daily. Other readings 154/74, 156/54, 182/89 at that time OC MD ordered Hydralazine 25mg X 1.      Verbal Order/Direction given by Provider: Hydralazine 25mg po q 6 hr PRN SBP>170.    Provider giving order: MARI Huber    Verbal order given to: Alfonso Hirsch RN

## 2021-06-05 NOTE — PROGRESS NOTES
"[facility]    Facility:   [facility] [[id number]]   Code Status: DNR/DNI and POLST AVAILABLE      CHIEF COMPLAINT/REASON FOR VISIT:  Chief Complaint   Patient presents with     Review Of Multiple Medical Conditions     TCU intake       HISTORY:      HPI: Dania is a 89 y.o. female who  has a past medical history of Cancer (H), GERD (gastroesophageal reflux disease), Hypertension, Osteoporosis, Renal cell carcinoma of right kidney (H), and Rheumatoid arthritis (H).  She was recently admitted to [facility] on 1/30/2020 for a fall with subsequent hip fracture.  She was discharged on 2/3/2020.  The discharging provider summarized the hospitalization as follows:    \"89-year-old female status post fall with a left proximal femur fracture and evaluated by orthopedic surgery who recommended no surgical intervention and conservative medical management. Her BP was transiently elevated but well-controlled with resumption of her regular home anti-hypertensive meds; her renal function was checked and normal. PT/OT recommended TCU. She was discharged to TCU with PCP follow-up. Her pain was well-controlled with just APAP by day of discharge. \"    Today Dania is being evaluated for an intake into the TCU.  She is accompanied by her son-in-law Zen.  And states she has been doing well has no new concerns or issues to report.  She has been participating in physical and occupational therapies.  She reports that she has been eating, drinking emanating without a problem.  She does admit to occasional constipation.  Nursing staff do have several order clarifications and issues to have resolved.  They would like to try a bladder trial given she is wearing a Briones catheter.  However upon discussing with Zen and Dania and it appears that this is a chronic indwelling Briones catheter that she has had for over 20+ years due to renal cancer with subsequent neurogenic bladder.  In addition she is currently " taking naproxen which carries a high risk for GI bleed.  We did discuss discontinuing naproxen and starting scheduled Tylenol.  She does agree to this and orders have been placed.  We also discussed the use of lactase.  She states that she has not used it for quite some time.  However we did discuss with Zen and Zen feels that it is best to have it kept on her medication list as a as needed drug for use with dairy products. Dania has no new concerns or issues to report. Dania denies any other concerns including fevers/chills, cough or cold symptoms, headaches, vision changes, chest pain/pressure, difficulty breathing, SOB, abdominal pain, nausea, vomiting, diarrhea, dysuria, increasing weakness, increasing pain.     Advanced Care Planning  Spoke with Dania and Zen regarding code status and advanced care planning. Discussed that she would NOT like to have full resuscitation efforts. Dania would like to be a DNR/DNI with a comfort focus. However, she would also like to have treatment if she were to fall ill with certain things. she would like medical treatment for select medical issues. Zen would decide for her if she was unable to decide.  Zen is her power of .  He does have an electronic advance directive that is uploaded to epic.      Past Medical History:   Diagnosis Date     Cancer (H)      GERD (gastroesophageal reflux disease)      Hypertension      Osteoporosis      Renal cell carcinoma of right kidney (H)      Rheumatoid arthritis (H)              Family History   Problem Relation Age of Onset     Thyroid cancer Daughter      Cervical cancer Daughter      Breast cancer Daughter      Uterine cancer Daughter      Testicular cancer Nephew      Lung cancer Cousin      Social History     Socioeconomic History     Marital status: Single     Spouse name: Not on file     Number of children: Not on file     Years of education: Not on file     Highest education level: Not on file   Occupational History  "    Not on file   Social Needs     Financial resource strain: Not on file     Food insecurity:     Worry: Not on file     Inability: Not on file     Transportation needs:     Medical: Not on file     Non-medical: Not on file   Tobacco Use     Smoking status: Never Smoker     Smokeless tobacco: Never Used   Substance and Sexual Activity     Alcohol use: Yes     Comment: \"very infrequent\"     Drug use: No     Sexual activity: Never   Lifestyle     Physical activity:     Days per week: Not on file     Minutes per session: Not on file     Stress: Not on file   Relationships     Social connections:     Talks on phone: Not on file     Gets together: Not on file     Attends Faith service: Not on file     Active member of club or organization: Not on file     Attends meetings of clubs or organizations: Not on file     Relationship status: Not on file     Intimate partner violence:     Fear of current or ex partner: Not on file     Emotionally abused: Not on file     Physically abused: Not on file     Forced sexual activity: Not on file   Other Topics Concern     Not on file   Social History Narrative     Not on file       REVIEW OF SYSTEM:  Pertinent items are noted in HPI.    PHYSICAL EXAM:   BP (!) 182/89   Pulse 87   Temp 98.9  F (37.2  C)   Resp 21   Wt 101 lb 6.4 oz (46 kg)   SpO2 94%   BMI 19.16 kg/m    General appearance: alert, appears stated age and cooperative  HEENT: Head is normocephalic with normal hair distribution. No evidence of trauma. Ears: Without lesions or deformity. No acute purulent discharge. Eyes: Conjunctivae pink with no scleral icterus or erythema. Nose: Normal mucosa and septum. Oropharnyx: mmm, no lesions present.  Lungs: clear to auscultation bilaterally, respirations without effort  Heart: regular rate and rhythm, S1, S2 normal, no murmur, click, rub or gallop  Abdomen: soft, non-tender; bowel sounds normal; no masses,  no organomegaly  : Indwelling Briones catheter draining clear " straw-colored urine  Extremities: extremities normal, atraumatic, no cyanosis or edema  Pulses: 2+ and symmetric   Skin: Skin color, texture, turgor normal. No rashes or lesions.  Neurologic: Grossly normal   Psych: interacts well with caregivers, exhibits logical thought processes and connections, pleasant      LABS:   None today.    ASSESSMENT:      ICD-10-CM    1. Fall, initial encounter W19.XXXA    2. Physical deconditioning R53.81    3. Closed left hip fracture, initial encounter (H) S72.002A    4. Renal cell carcinoma of right kidney (H) C64.1        PLAN:    Physical Deconditioning  -Continue PT/OT and other therapies as per care plan.  -Encouraged good nutrition and movement habits.   -Discussed care plan and expected course of stay.   -Continue to follow-up per routine schedule or sooner if needed.     Fall  -Please place on fall precautions and monitor patient routinely.  -Encouraged patient to ask for help with all transfers.   -Continue to assess for developing injuries and if patient reports any pain request provider follow-up.    Left hip fracture  -Discontinue naproxen.  -Tylenol 650 mg by mouth 4 times daily scheduled.  -Ice and/or heat to left hip for comfort and pain relief.  -PT/OT.  -ASA 81 mg by mouth daily.  -Follow with Ortho.    Renal cell carcinoma of right kidney/indwelling Briones catheter  -Indwelling Briones catheter to be changed monthly, bags to be changed every 2 weeks.  -Continue to follow with oncology.    Advanced Care Planning  -POLST reviewed and signed.   -DNR/DNI.    Admission history and physical per MD in the next 30 days. At this time continue current care plan for all chronic medical conditions, as they are stable. Encouraged patient to engage in PT/OT for strengthening and conditioning. Encouraged patient to work closely with nursing staff to ensure any medical complaints are quickly addressed. Follow up this week or sooner if needed. Will continue to monitor patient and work  with nursing staff collaboratively to work toward positive patient outcomes.    Total unit/floor time of 40 minutes time consisted of the following: time spent with patient, examination of patient, reviewing the record including pertinent labs and completing documentation. More than 50% of this time was spent in coordination of care time with nursing staff and other healthcare providers, this time was spent on discussion/counseling on current care plan including medical management of chronic health problems and acute health problems, education pertaining to plan, and discussion of the goals of care pertaining to the current outlined plan with nursing staff and patient. An additional 16 minutes of time was spent discussing code status, wishes for end of life care and reviewing POLST from 1300 to 1316. POLST signed and left with nursing staff.     Electronically signed by: Elisa Bell CNP

## 2021-06-05 NOTE — TELEPHONE ENCOUNTER
Patient's son-chelsi-, Zen Mota, called and left a message stating that patient fell and broke her hip 2 days ago.  She is being discharged to TCU-Rehab today.  He said patient was due to have CT tomorrow and see Dr. Britton on Wed. She is unable to have infusions while in Rehab so canceled appointments. He was wondering when she should follow-up next with Dr. Britton. He asked for call back to [phone]/MICHAEL Posadas RN    I called Zen back. Zen reports that she has had increased dizziness and has been falling a lot. She is expected to be in TCU 8-10 weeks. He will keep us updated and will call us back in a few weeks, once they see how Rehab is going and with expected discharge date. I told him I would update Dr. Britton. Message to Dr. Britton/MICHAEL Posadas RN

## 2021-06-05 NOTE — PROGRESS NOTES
"[facility]    Facility:   [facility] [[id number]]   Code Status: DNR      CHIEF COMPLAINT/REASON FOR VISIT:  Chief Complaint   Patient presents with     H & P       HISTORY:      HPI: Ms. 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    Hospital course patient was admitted to [facility] between January 30 and February 3.  She was admitted following a fall at her home (\"[place] [place] [address]\"), and assisted living facility.  She experienced left hip pain.  Fall is believed mechanical in nature (reaching for something throwing her off balance).  Evaluation included imaging of the hip and pelvis revealing periprosthetic femur fracture.  Patient was seen by orthopedics who determined the fracture was non-surgical and recommended conservative care.  They are allowing weightbearing as tolerated.  Patient additionally had head CT and C-spine CT without acute findings.  Her pain has been managed with acetaminophen only.  Hospital course was otherwise remarkable for elevated blood pressure at times.      Patient was seen by Ms. Bell on February 4 with discontinuation of naproxen with risks outweighing benefits at this point.    Subjective/review of systems: Patient is alert pleasant and conversant.  She has some insight to her memory impairment but says that it is mild.  Patient reports that she generally uses a walker at home.  She reports \"lots of falls\".  When asked what that means she guesses about 1 fall every 10 days.  She says she does not get lightheaded or syncopal but says that her legs are weak and her balance is " not good.  She reports no pain at rest.  She says the only time it really bothers her is with therapy.  She says the Tylenol is adequate to control that pain.  Says she is not sleeping well here but that is not new she is also had poor sleep.  Her main concern is constipation.  She has had significant constipation in the past and we eventually arrive at the conclusion that senna has been the most effective drug for her.  She denies headaches change in vision speaking swallowing hearing.  She denies neck or back pain.  She denies cough shortness of breath orthopnea PND wheezing.  She reports no change in appetite or weight loss to her knowledge.  She reports no chest pain exertional otherwise.  She reports no abdominal pain.  No nausea vomiting diarrhea melena or bright red blood per rectum.  She says her catheter is not bothering her.  Is changed once a month.  She cannot move the last time she had a UTI.  She reports her mood is stable.  She is not had fever sweats or chills.  She is not been exposed in the lining illness.  Remainder ROS is negative      Family history update: Patient reports that her mother had a CVA    Social history, as above additionally she has 2 daughters but they do not live locally one is in [place] which is.  She does have a granddaughter here named Trina walters but does not like to bother her as she is quite a busy life but would be available if need be.    Past Medical History:   Diagnosis Date     Cancer (H)      GERD (gastroesophageal reflux disease)      Hypertension      Osteoporosis      Renal cell carcinoma of right kidney (H)      Rheumatoid arthritis (H)              Family History   Problem Relation Age of Onset     Thyroid cancer Daughter      Cervical cancer Daughter      Breast cancer Daughter      Uterine cancer Daughter      Testicular cancer Nephew      Lung cancer Cousin      Social History     Socioeconomic History     Marital status: Single     Spouse name: None      "Number of children: None     Years of education: None     Highest education level: None   Occupational History     None   Social Needs     Financial resource strain: None     Food insecurity:     Worry: None     Inability: None     Transportation needs:     Medical: None     Non-medical: None   Tobacco Use     Smoking status: Never Smoker     Smokeless tobacco: Never Used   Substance and Sexual Activity     Alcohol use: Yes     Comment: \"very infrequent\"     Drug use: No     Sexual activity: Never   Lifestyle     Physical activity:     Days per week: None     Minutes per session: None     Stress: None   Relationships     Social connections:     Talks on phone: None     Gets together: None     Attends Taoism service: None     Active member of club or organization: None     Attends meetings of clubs or organizations: None     Relationship status: None     Intimate partner violence:     Fear of current or ex partner: None     Emotionally abused: None     Physically abused: None     Forced sexual activity: None   Other Topics Concern     None   Social History Narrative     None         Review of Systems as above    Vitals:    02/06/20 2228   BP: 154/74   Pulse: 85   Resp: 16   Temp: 98.8  F (37.1  C)   SpO2: 97%   Weight: (!) 99 lb (44.9 kg)       Physical Exam  Patient is alert and well oriented although she does not know the exact day/date.  She is thin and seated in a wheelchair.  She is well dressed and groomed.  She has normocephalic atraumatic sclera clear nonicteric oropharynx is clear neck is supple without tenderness or mass or thyromegaly.  Heart is regular S1-S2 without murmur gallop or rub lungs are clear she moves her easily.  Abdomen is soft without HSM.  Left-sided lower quadrant area with some mobile nodularity most likely stool.  Extremities show no edema with good perfusion and good cap refill.  She does have a scrape on the top of her right foot which appears several days old.  She says she does " not know how she got it.  No sign of cellulitis there.  LABS:   Results for CARTER SOLIS (MRN [id number]) as of 2/6/2020 22:42   Ref. Range 2/2/2020 07:20   Sodium Latest Ref Range: 136 - 145 mmol/L 137   Potassium Latest Ref Range: 3.5 - 5.0 mmol/L 4.0   Chloride Latest Ref Range: 98 - 107 mmol/L 103   CO2 Latest Ref Range: 22 - 31 mmol/L 28   Anion Gap, Calculation Latest Ref Range: 5 - 18 mmol/L 6   BUN Latest Ref Range: 8 - 28 mg/dL 23   Creatinine Latest Ref Range: 0.60 - 1.10 mg/dL 0.74   GFR MDRD Af Amer Latest Ref Range: >60 mL/min/1.73m2 >60   GFR MDRD Non Af Amer Latest Ref Range: >60 mL/min/1.73m2 >60   Calcium Latest Ref Range: 8.5 - 10.5 mg/dL 9.5   Glucose Latest Ref Range: 70 - 125 mg/dL 98   Hemoglobin Latest Ref Range: 12.0 - 16.0 g/dL 11.4 (L)     SIGNIFICANT FINDINGS (Imaging, labs):   Xr Chest 1 View Portable  Result Date: 1/31/2020  No acute abnormality.     Ct Head Without Contrast  Result Date: 1/30/2020  1.  No acute traumatic intracranial abnormality. 2.  Age-indeterminate lacunar type infarctions involving the right anterior limb of the internal capsule and left subinsular region. If the patient is experiencing an acute, focal, or ongoing neurologic deficit, an MRI may be indicated. 3.  Chronic senescent and ischemic changes as above.     Ct Cervical Spine Without Contrast  Result Date: 1/30/2020  1.  No acute fracture identified. 2.  Grade 1 anterolisthesis at the levels of the C2-C3, C7-T1 and T1-T2. Grade 1 retrolisthesis of C3-C4, C4-C5 and C5-C6. These findings are likely degenerative in etiology, however, ligamentous injury could have a similar appearance. 3.  Advanced multilevel degenerative changes of the cervical spine, as above. 4.  1 cm left thyroid lobe nodule.      Ct Pelvis Without Oral Without Iv Contrast  Result Date: 1/31/2020  1.  Comminuted oblique fracture involving the anterior cortex of the proximal left femur. This fracture is comminuted along the superior lateral  aspect of the proximal left femur at the left greater trochanter without significant displacement or angulation of the smaller fracture line/fracture fragments. This becomes a more linear oblique fracture line along the anterior cortex as it extends inferiorly over a short distance without significant angulation or displacement of the fracture more inferiorly. This fracture is intimately associated with the anterior margin of the the left LEEANNE femoral component. 2.  No other acutely displaced fractures. 3.  Left LEEANNE with components intact. No evidence for displacement of components or dislocation. 4.  Marked degenerative osteoarthrosis right hip. 5.  No significant subcutaneous or intramuscular hematoma. 6.  Fullness within the left inguinal canal with a minimal amount of herniated nonobstructed small bowel and fluid. No evidence for small bowel dilatation. Large amount of stool throughout colon. 7.  Briones catheter decompressing the bladder with 2 large calculi present within the bladder lumen.     Xr Pelvis W 2 Vw Hip Left  Result Date: 1/30/2020  Postoperative changes of a left total hip arthroplasty. Best visualized on the lateral view, there is an oblique fracture through the proximal aspect of the femur adjacent to the femoral prosthesis with minimal anterior displacement. There is  also a subtle fracture line along the posterior aspect of the proximal left femur. No evidence for dislocation of the components. No other acute fractures. Marked degenerative osteoarthrosis right hip. Minimal degenerative changes at the symphysis pubis  and both SI joints. Marked degenerative disc changes lumbar spine. Pelvic phleboliths. Larger calcifications involving the pelvis may reside within the bladder.       ASSESSMENT:      ICD-10-CM    1. Hypertension I10    2. Rheumatoid arthritis, involving unspecified site, unspecified rheumatoid factor presence (H) M06.9    3. Renal cell carcinoma of right kidney (H) C64.1    4.  Chronic suprapubic catheter (H) Z93.59    5. Pain of left hip joint M25.552      Assessment/plan    Fall with injury  Periprosthetic left femur fracture  Frequent falls  Unsteady gait     -PT, OT, social service   -Continue acetaminophen for pain control   -Agree that naproxen has suitable risk and she is not needing it for pain control at this point anyway   -Patient says her plan is to return to her assisted living facility   -Orthostatic BPs and pulses    Mild anemia, normochromic normocytic      patient is down to 11.4 but stable.  Looks like her baseline has been above 12.  This is likely acute blood loss.  No directed therapy at this time.  Recheck in a month or so.    Constipation     Mild at this point.  Senna is added to the MiraLAX which is still PRN.  8.6 mg twice daily with a hold for loose stool ordered.    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  Is unclear if this was a metastatic renal focus order some other cancer.  I cannot find primary documentation in the records which have access to this point.  She follows with urologist in [place].    Renal cell cancer  Metastatic bone disease     Looks like she was on immune modulators up until mid December 2019 (nivolumab she last saw oncology December 4, 2019.  Radiographically her cath was felt to be stable without new metastases.  Due to her fatigue and tiredness which might be medication related they decided to take a medication break.   For her medicine she is on somatic every 12 weeks with the last treatment December 2019.  No new meds noted.  I am not sure if I have additional medical therapy in the future.       Hypertension     Based on her current readings it looks like she needs an increase in her medication dosage.  However her blood pressure was high for tach in the hospital and settled down as well.  I favor watchful waiting at the moment she is not  having any dangerous highs, is an foreign, potentially stressful.  I think would make sense should she need an increase to go to Prinzide 20/12.5 effectively doubling her ACE inhibitor dose with follow-up creatinine.      Multiple medical issues reviewed with patient, additional changes to her therapy resulted.            Electronically signed by: Eliceo Lim MD

## 2021-06-06 NOTE — PROGRESS NOTES
[facility]    Facility:   [facility] [[id number]]   Code Status: DNR/DNI and POLST AVAILABLE  PCP: Jose Leija MD   Phone: [phone]   Fax: [phone]      CHIEF COMPLAINT/REASON FOR VISIT:  Chief Complaint   Patient presents with     Discharge Summary       HISTORY COURSE:  Dania is a 89 y.o. female who  has a past medical history of Cancer (H), GERD (gastroesophageal reflux disease), Hypertension, Osteoporosis, Renal cell carcinoma of right kidney (H), and Rheumatoid arthritis (H).  She was recently admitted to [facility] on 1/30/2020 for a fall with subsequent hip fracture.  She was discharged on 2/3/2020.  The discharging provider summarized the hospitalization in previous notes.     Today Dania is being evaluated for a discharge from the TCU. Dania has been doing quite well and has no new issues or problems to report. She is looking forward to going home, but states she will be worried about things, as she is a worrier. She will be returning to an assisted living facility. She completed her PT/OT program. Dania denies any other concerns including fevers/chills, cough or cold symptoms, headaches, vision changes, chest pain/pressure, difficulty breathing, SOB, abdominal pain, nausea, vomiting, diarrhea, dysuria, increasing weakness, increasing pain.     PHYSICAL EXAM:   /79   Pulse 94   Temp 98.4  F (36.9  C)   Resp 16   SpO2 97%   General appearance: alert, appears stated age and cooperative  HEENT: Head is normocephalic with normal hair distribution. No evidence of trauma. Ears: Without lesions or deformity. No acute purulent discharge. Eyes: Conjunctivae pink with no scleral icterus or erythema. Nose: Normal mucosa and septum. Oropharnyx: mmm, no lesions present.  Lungs: respirations without effort, LS CTA  Cardiovascular: S1, S2, RRR, no murmur/click/rubs  Abdomen: soft, nontender, normoactive bowel sounds  : Indwelling catheter draining clear  straw-colored urine  Extremities: extremities normal, atraumatic, no cyanosis or edema.  Skin: Skin color, texture, turgor normal. No rashes or lesions.  Neurologic: Grossly normal   Psych: interacts well with caregivers, exhibits logical thought processes and connections, pleasant    MEDICATION LIST:  Current Outpatient Medications   Medication Sig     acetaminophen (TYLENOL) 500 MG tablet Take 2 tablets (1,000 mg total) by mouth at bedtime as needed for pain. At BEDTIME (Patient taking differently: Take 1,000 mg by mouth 3 (three) times a day. And daily prn)     amoxicillin (AMOXIL) 500 MG capsule Take 4 capsules (2,000 mg total) by mouth as needed. Prior to dental appointments     aspirin 81 MG EC tablet Take 1 tablet (81 mg total) by mouth daily. In 6 AM     diclofenac sodium (VOLTAREN) 1 % Gel Apply 4 g topically 3 (three) times a day. To l thigh pain     hydrALAZINE (APRESOLINE) 25 MG tablet Take 25 mg by mouth every 6 (six) hours as needed (SBP>170).     lactase (LACTAID) 3,000 unit tablet Take 3,000 Units by mouth as needed.     lisinopril-hydrochlorothiazide (PRINZIDE,ZESTORETIC) 10-12.5 mg per tablet Take 1 tablet by mouth daily. At 6 AM     naproxen sodium (ALEVE) 220 MG tablet Take 1 tablet (220 mg total) by mouth daily. At 6 AM     polyethylene glycol (MIRALAX) 17 gram packet Take 17 g by mouth daily as needed (for constipation).     psyllium (METAMUCIL) 3.4 gram packet Take 1 packet by mouth 2 (two) times a day as needed.            senna (SENOKOT) 8.6 mg tablet Take 1 tablet by mouth 2 (two) times a day.     sennosides (SENNOSIDES) 8.8 mg/5 mL Syrp syrup Take 8.8 mg by mouth.       DISCHARGE DIAGNOSIS:    ICD-10-CM    1. Physical deconditioning  R53.81    2. Closed left hip fracture, initial encounter (H)  S72.002A    3. Fall, initial encounter  W19.XXXA    4. Metastasis to bone (H)  C79.51    5. Rheumatoid arthritis, involving unspecified site, unspecified rheumatoid factor presence (H)  M06.9       Physical Deconditioning  -Continue PT/OT and other therapies as per care plan.  -Encouraged good nutrition and movement habits.   -Discussed care plan and expected course of stay.   -Continue to follow-up per routine schedule or sooner if needed.     Fall  -Please place on fall precautions and monitor patient routinely.  -Encouraged patient to ask for help with all transfers.   -Continue to assess for developing injuries and if patient reports any pain request provider follow-up.    Left hip fracture/Left Hip Pain  -Recent x-ray negative.   -Tramadol 50 mg by mouth three times a day and three times a day as needed.    -May want to consider use of voltaren gel.   -Tylenol 650 mg by mouth 4 times daily scheduled.  -Ice and/or heat to left hip for comfort and pain relief.  -PT/OT.  -ASA 81 mg by mouth daily.  -Ortho follow-up sooner rather than later.     Otherwise continue current care plan for all other chronic medical conditions, as they are stable. Encouraged patient to engage in healthy lifestyle behaviors such as engaging in social activities, exercising (PT/OT), eating well, and following care plan. Follow up for routine check-up, or sooner if needed. Will continue to monitor patient and work with nursing staff collaboratively to work toward positive patient outcomes.    MEDICAL EQUIPMENT NEEDS:  Wheelchair    The patient has mobility limitation significantly impairing his/her ability to participate in mobility-related activities of daily living, such as toileting, feeding, dressing, grooming, and bathing in customary locations in the home. The mobility limitation cannot be sufficiently and safely resolved by use of an appropriately fitted cane or walker. The patient or caregiver is able to safely use a manual wheelchair. The patient's functional mobility deficit can be sufficiently resolved by the use of a manual wheelchair.    The patient has mobility limitations RA, falls, hip fracture that impairs them  to do activities for daily living without use of a wheelchair to be mobile in the home.      DISCHARGE PLAN/FACE TO FACE:  I certify that services are/were furnished while this patient was under the care of a physician and that a physician or an allowed non-physician practitioner (NPP), had a face-to-face encounter that meets the physician face-to-face encounter requirements. The encounter was in whole, or in part, related to the primary reason for home health. The patient is confined to his/her home and needs intermittent skilled nursing, physical therapy, speech-language pathology, or the continued need for occupational therapy. A plan of care has been established by a physician and is periodically reviewed by a physician.  Date of Face-to-Face Encounter: 3/13/2020    I certify that, based on my findings, the following services are medically necessary home health services: home health aid for bathing and ADLs, skilled nursing (RN) for medication set-up and teaching, symptoms and disease process monitoring and education, PT for strengthening, balance, endurance and safety within the home, OT for strengthening, ADL needs, adaptive equipment and safety.    My clinical findings support the need for the above skilled services because: home health aid for bathing and ADLs, skilled nursing (RN) for medication set-up and teaching, symptoms and disease process monitoring and education, PT for strengthening, balance, endurance and safety within the home, OT for strengthening, ADL needs, adaptive equipment and safety.    This patient is homebound because: he is a frail elderly gentleman with multiple comorbidities and recent hospitalizations making it unsafe for him to go out into the community for services.    The patient is, or has been, under my care and I have initiated the establishment of the plan of care. This patient will be followed by a physician who will periodically review the plan of care. Schedule follow up  visit with primary care provider within 7 days to reestablish care.    Total unit/floor time of 35 minutes time spent on discharge planning, discharge follow-up discussion and discharge medication review.    Electronically signed by: Elisa Bell CNP

## 2021-06-06 NOTE — PROGRESS NOTES
"[facility]    Facility:   [facility] [[id number]]   Code Status: DNR      CHIEF COMPLAINT/REASON FOR VISIT:  Chief Complaint   Patient presents with     Problem Visit       HISTORY:   HPI:    Recall that Ms. 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     Hospital course patient was admitted to [facility] between January 30 and February 3.  She was admitted following a fall at her home (\"[address]"), and assisted living facility.  She experienced left hip pain.  Fall is believed mechanical in nature (reaching for something throwing her off balance).  Evaluation included imaging of the hip and pelvis revealing periprosthetic femur fracture.  Patient was seen by orthopedics who determined the fracture was non-surgical and recommended conservative care.  They are allowing weightbearing as tolerated.  Patient additionally had head CT and C-spine CT without acute findings.  Her pain has been managed with acetaminophen only.  Hospital course was otherwise remarkable for elevated blood pressure at times.            Subjective/review of systems:  I am asked to see patient today by nursing staff as well as by physical therapist.  I discussed her situation with both of these specialties.  They are concerned by patient's increasing left hip pain and decreasing performance in therapy.   Patient reports that she thinks her pain is worse than when I last saw her, or in general early in her stay here.  The character of the pain has also changed.  The deep bony lateral pain persists and is more predictable.  In addition she is having some other pain that she has a hard time describing but it " "is definitely associated with weightbearing.  It is more in the anterior thigh radiating down towards the knee.  She also notes that the leg \"jumps around\" after moving it.  She has known back pain.  She has no numbness weakness tingling.  She has minimal rest pain.  Therapist reports that it is difficult to figure out because when I put her in the standing bracket machine she seems to be able to bear weight without unusual pain but at other times seems to be guarding and fearful.  There have been no additional falls or injuries.  There is been no new bruising.  There is no redness or swelling.  There is been no fever sweats or chills.  Patient has been eating okay.  She has been sleeping pretty well.  No constipation flank pain or dysuria.  No peripheral edema.    Past Medical History:   Diagnosis Date     Cancer (H)      GERD (gastroesophageal reflux disease)      Hypertension      Osteoporosis      Renal cell carcinoma of right kidney (H)      Rheumatoid arthritis (H)              Family History   Problem Relation Age of Onset     Thyroid cancer Daughter      Cervical cancer Daughter      Breast cancer Daughter      Uterine cancer Daughter      Testicular cancer Nephew      Lung cancer Cousin      Social History     Socioeconomic History     Marital status: Single     Spouse name: None     Number of children: None     Years of education: None     Highest education level: None   Occupational History     None   Social Needs     Financial resource strain: None     Food insecurity:     Worry: None     Inability: None     Transportation needs:     Medical: None     Non-medical: None   Tobacco Use     Smoking status: Never Smoker     Smokeless tobacco: Never Used   Substance and Sexual Activity     Alcohol use: Yes     Comment: \"very infrequent\"     Drug use: No     Sexual activity: Never   Lifestyle     Physical activity:     Days per week: None     Minutes per session: None     Stress: None   Relationships     Social " connections:     Talks on phone: None     Gets together: None     Attends Anglican service: None     Active member of club or organization: None     Attends meetings of clubs or organizations: None     Relationship status: None     Intimate partner violence:     Fear of current or ex partner: None     Emotionally abused: None     Physically abused: None     Forced sexual activity: None   Other Topics Concern     None   Social History Narrative     None         Review of Systems as above    Vitals:    02/13/20 1810   BP: 134/68   Pulse: 90   Resp: 16   Temp: 98.3  F (36.8  C)   SpO2: 97%   Weight: 102 lb (46.3 kg)       Physical Exam  Patient is alert oriented normally conversant in no distress.  She is sitting up in her chair.  She has soft belly without tenderness, bowel sounds within normal limits.  Catheter looks fine with clear urine in bag.  She has mild tenderness over the greater trochanteric area.  She is notably tender anteriorly over the hip joint itself.  There is no swelling redness.  Her exam is not reproducible in the sense that sometimes just brushing her skin on the anterior thigh will cause her to jump and tightening and complain of pain.  At other times she will even notice it.  Deeper palpation in the musculature of the thigh anteriorly medially posteriorly shows no reproducible tenderness.  Knee joint is not red swollen or tender.  CMS is intact to the toe tips  LABS:   X-ray is added and pending    ASSESSMENT:      ICD-10-CM    1. Pain of left hip joint M25.552        Left hip pain  Periprosthetic left hip fracture     Unusual course with patient having more trouble in therapy over the last few days that she had earlier.  Her symptom complex and exam was difficult to figure out.  There definitely seems to be some guarding/fearfulness.  Excessive guarding could be causing trouble in its own right with muscle pains.  I am not getting any sense of radicular pain and her strength is good hip  flexors extensors knee flexors extensors ankle flexors and extensors.  There is no swelling or venous distention or palpable cords.  Therapist is unsure what is going on and requests an x-ray to make sure that there has not been shifting of fracture fragments etc. which could be playing a role here.    -X-rays ordered  -Recommend and order orthopedic evaluation.  As far as I can tell she has not been seen by Ortho since discharge.  That is her understanding as well.    -I add Voltaren gel 3 times daily to her pre-existing acetaminophen, aspirin.  Ms. Bell increased her tramadol yesterday as well.    Electronically signed by: Eliceo Lim MD

## 2021-06-06 NOTE — PROGRESS NOTES
[facility]    Facility:   [facility] [[id number]]   Code Status: DNR/DNI and POLST AVAILABLE      CHIEF COMPLAINT/REASON FOR VISIT:  Chief Complaint   Patient presents with     Review Of Multiple Medical Conditions     Review of TCU problems       HISTORY:      HPI: Dania is a 89 y.o. female who  has a past medical history of Cancer (H), GERD (gastroesophageal reflux disease), Hypertension, Osteoporosis, Renal cell carcinoma of right kidney (H), and Rheumatoid arthritis (H).  She was recently admitted to [facility] on 1/30/2020 for a fall with subsequent hip fracture.  She was discharged on 2/3/2020.  The discharging provider summarized the hospitalization in previous notes.     Today Dania is being evaluated for a routine review of multiple medical problems while in the TCU.  She shares that on this visit she is frustrated because she has been have difficulty sleeping the last several nights.  She states that at first she thought it was just a 1 night thing, but now she feels it is a problem.  She says she is having trouble falling asleep and staying asleep.  She will wake multiple times throughout the night.  She states that it is not an issue with urination given her catheter.  She states that she cannot put a finger on why she is so agitated and unable to sleep. Dania shares that she is otherwise feeling well and has been participating in therapies.  She reports eating, drinking and eliminating without a problem.  Dania denies any other concerns including fevers/chills, cough or cold symptoms, headaches, vision changes, chest pain/pressure, difficulty breathing, SOB, abdominal pain, nausea, vomiting, diarrhea, dysuria, increasing weakness, increasing pain.     Past Medical History:   Diagnosis Date     Cancer (H)      GERD (gastroesophageal reflux disease)      Hypertension      Osteoporosis      Renal cell carcinoma of right kidney (H)      Rheumatoid arthritis (H)           "    Family History   Problem Relation Age of Onset     Thyroid cancer Daughter      Cervical cancer Daughter      Breast cancer Daughter      Uterine cancer Daughter      Testicular cancer Nephew      Lung cancer Cousin      Social History     Socioeconomic History     Marital status: Single     Spouse name: None     Number of children: None     Years of education: None     Highest education level: None   Occupational History     None   Social Needs     Financial resource strain: None     Food insecurity:     Worry: None     Inability: None     Transportation needs:     Medical: None     Non-medical: None   Tobacco Use     Smoking status: Never Smoker     Smokeless tobacco: Never Used   Substance and Sexual Activity     Alcohol use: Yes     Comment: \"very infrequent\"     Drug use: No     Sexual activity: Never   Lifestyle     Physical activity:     Days per week: None     Minutes per session: None     Stress: None   Relationships     Social connections:     Talks on phone: None     Gets together: None     Attends Rastafarian service: None     Active member of club or organization: None     Attends meetings of clubs or organizations: None     Relationship status: None     Intimate partner violence:     Fear of current or ex partner: None     Emotionally abused: None     Physically abused: None     Forced sexual activity: None   Other Topics Concern     None   Social History Narrative     None       REVIEW OF SYSTEM:  Pertinent items are noted in HPI.    PHYSICAL EXAM:   /68   Pulse 78   Temp 96.7  F (35.9  C)   Resp 18   SpO2 99%   General appearance: alert, appears stated age and cooperative  HEENT: Head is normocephalic with normal hair distribution. No evidence of trauma. Ears: Without lesions or deformity. No acute purulent discharge. Eyes: Conjunctivae pink with no scleral icterus or erythema. Nose: Normal mucosa and septum. Oropharnyx: mmm, no lesions present.  Lungs: clear to auscultation bilaterally, " respirations without effort  Heart: regular rate and rhythm, S1, S2 normal, no murmur, click, rub or gallop  Abdomen: soft, non-tender; bowel sounds normal; no masses,  no organomegaly  : Indwelling catheter draining clear straw-colored urine  Extremities: extremities normal, atraumatic, no cyanosis or edema  Pulses: 2+ and symmetric   Skin: Skin color, texture, turgor normal. No rashes or lesions.  Neurologic: Grossly normal   Psych: interacts well with caregivers, exhibits logical thought processes and connections, pleasant      LABS:   None today.    ASSESSMENT:      ICD-10-CM    1. Physical deconditioning R53.81    2. Fall, initial encounter W19.XXXA    3. Pain of left hip joint M25.552    4. Closed left hip fracture, initial encounter (H) S72.002A    5. Insomnia, unspecified type G47.00        PLAN:    Care plan reviewed and remains appropriate.  Adjustments made for insomnia.    Physical Deconditioning  -Continue PT/OT and other therapies as per care plan.  -Encouraged good nutrition and movement habits.   -Discussed care plan and expected course of stay.   -Continue to follow-up per routine schedule or sooner if needed.     Fall  -Please place on fall precautions and monitor patient routinely.  -Encouraged patient to ask for help with all transfers.   -Continue to assess for developing injuries and if patient reports any pain request provider follow-up.    Left hip fracture/Left Hip Pain  -Recent x-ray negative.   -Tramadol 25 mg by mouth three times a day.   -May want to consider use of voltaren gel.   -Tylenol 650 mg by mouth 4 times daily scheduled.  -Ice and/or heat to left hip for comfort and pain relief.  -PT/OT.  -ASA 81 mg by mouth daily.  -Ortho follow-up sooner rather than later.     Insomnia  -Melatonin 5 mg by mouth 1/2 hour before bedtime.   -Discussed sleep hygiene in depth-- including alternative methods for sleep including lavender essential oils, screen time and shutting screens off at  least an hour before bed, limiting day time napping and meditation.   -Follow-up as needed.    Otherwise continue current care plan for all other chronic medical conditions, as they are stable. Encouraged patient to engage in healthy lifestyle behaviors such as engaging in social activities, exercising (PT/OT), eating well, and following care plan. Follow up for routine check-up, or sooner if needed. Will continue to monitor patient and work with nursing staff collaboratively to work toward positive patient outcomes.    Electronically signed by: Elias Bell CNP

## 2021-06-06 NOTE — PROGRESS NOTES
"[facility]    Facility:   [facility] [[id number]]   Code Status: DNR/DNI and POLST AVAILABLE      CHIEF COMPLAINT/REASON FOR VISIT:  Chief Complaint   Patient presents with     Review Of Multiple Medical Conditions     TCU revidew of medical problems       HISTORY:      HPI: Dania is a 89 y.o. female who  has a past medical history of Cancer (H), GERD (gastroesophageal reflux disease), Hypertension, Osteoporosis, Renal cell carcinoma of right kidney (H), and Rheumatoid arthritis (H).  She was recently admitted to [facility] on 1/30/2020 for a fall with subsequent hip fracture.  She was discharged on 2/3/2020.  The discharging provider summarized the hospitalization in previous notes.     Today Dania is being evaluated for a routine review of multiple medical problems while in the TCU. She has continued to do well. She has had breakthrough pain and struggles with hip pain occasionally. She states she is \"just getting old\". She is able to work with PT/OT and is able to get through the pain. Dania has no new acute issues or problems to discuss. Current plan has been reviewed with Zen who is her POA, who is in agreement. No other problems noted. Report from nursing staff is positive. Dania denies any other concerns including fevers/chills, cough or cold symptoms, headaches, vision changes, chest pain/pressure, difficulty breathing, SOB, abdominal pain, nausea, vomiting, diarrhea, dysuria, increasing weakness, increasing pain.     Past Medical History:   Diagnosis Date     Cancer (H)      GERD (gastroesophageal reflux disease)      Hypertension      Osteoporosis      Renal cell carcinoma of right kidney (H)      Rheumatoid arthritis (H)              Family History   Problem Relation Age of Onset     Thyroid cancer Daughter      Cervical cancer Daughter      Breast cancer Daughter      Uterine cancer Daughter      Testicular cancer Nephew      Lung cancer Cousin      Social History " "    Socioeconomic History     Marital status: Single     Spouse name: None     Number of children: None     Years of education: None     Highest education level: None   Occupational History     None   Social Needs     Financial resource strain: None     Food insecurity:     Worry: None     Inability: None     Transportation needs:     Medical: None     Non-medical: None   Tobacco Use     Smoking status: Never Smoker     Smokeless tobacco: Never Used   Substance and Sexual Activity     Alcohol use: Yes     Comment: \"very infrequent\"     Drug use: No     Sexual activity: Never   Lifestyle     Physical activity:     Days per week: None     Minutes per session: None     Stress: None   Relationships     Social connections:     Talks on phone: None     Gets together: None     Attends Rastafari service: None     Active member of club or organization: None     Attends meetings of clubs or organizations: None     Relationship status: None     Intimate partner violence:     Fear of current or ex partner: None     Emotionally abused: None     Physically abused: None     Forced sexual activity: None   Other Topics Concern     None   Social History Narrative     None       REVIEW OF SYSTEM:  Pertinent items are noted in HPI.    PHYSICAL EXAM:   /71   Pulse 89   Temp 97.1  F (36.2  C)   Resp 16   SpO2 96%   General appearance: alert, appears stated age and cooperative  HEENT: Head is normocephalic with normal hair distribution. No evidence of trauma. Ears: Without lesions or deformity. No acute purulent discharge. Eyes: Conjunctivae pink with no scleral icterus or erythema. Nose: Normal mucosa and septum. Oropharnyx: mmm, no lesions present.  Lungs: clear to auscultation bilaterally, respirations without effort  Heart: regular rate and rhythm, S1, S2 normal, no murmur, click, rub or gallop  Abdomen: soft, non-tender; bowel sounds normal; no masses,  no organomegaly  : Indwelling catheter draining clear straw-colored " urine  Extremities: extremities normal, atraumatic, no cyanosis or edema  Pulses: 2+ and symmetric   Skin: Skin color, texture, turgor normal. No rashes or lesions.  Neurologic: Grossly normal   Psych: interacts well with caregivers, exhibits logical thought processes and connections, pleasant      LABS:   None today.    ASSESSMENT:      ICD-10-CM    1. Physical deconditioning R53.81    2. Fall, initial encounter W19.XXXA    3. Closed left hip fracture, initial encounter (H) S72.002A    4. Pain of left hip joint M25.552        PLAN:    Care plan reviewed and remains appropriate.     Physical Deconditioning  -Continue PT/OT and other therapies as per care plan.  -Encouraged good nutrition and movement habits.   -Discussed care plan and expected course of stay.   -Continue to follow-up per routine schedule or sooner if needed.     Fall  -Please place on fall precautions and monitor patient routinely.  -Encouraged patient to ask for help with all transfers.   -Continue to assess for developing injuries and if patient reports any pain request provider follow-up.    Left hip fracture/Left Hip Pain  -Recent x-ray negative.   -Tramadol 25 mg by mouth three times a day.   -May want to consider use of voltaren gel.   -Tylenol 650 mg by mouth 4 times daily scheduled.  -Ice and/or heat to left hip for comfort and pain relief.  -PT/OT.  -ASA 81 mg by mouth daily.  -Ortho follow-up sooner rather than later.     Otherwise continue current care plan for all other chronic medical conditions, as they are stable. Encouraged patient to engage in healthy lifestyle behaviors such as engaging in social activities, exercising (PT/OT), eating well, and following care plan. Follow up for routine check-up, or sooner if needed. Will continue to monitor patient and work with nursing staff collaboratively to work toward positive patient outcomes.    Electronically signed by: Elisa Bell CNP

## 2021-06-06 NOTE — TELEPHONE ENCOUNTER
Patient referred by [facility] recruitment    Outcome: PATRICIA    Thank you,    Shauna Alston MTM coordinator

## 2021-06-06 NOTE — PROGRESS NOTES
[facility]    DATE: 3/3/2020    NAME:  Dania Da Silva             :  5/10/1927  MRN: [id number]  CODE STATUS:  DNR/DNI    VISIT TYPE: Problem Visit (pain check, hip fracture)     FACILITY:  [facility] [[id number]]       CHIEF COMPLAIN/REASON FOR VISIT:    Chief Complaint   Patient presents with     Problem Visit     pain check, hip fracture               HISTORY OF PRESENT ILLNESS: Dania Da Silva is a 89 y.o. female who was admitted for fall and hip fracture. She has PMH of GERD, HTN, osteoporosis, renal cell carcinoma, rheumatoid arthritis.     Today Ms. Da Silva is seen for pain management and follow up on hip fracture. She is seen in her room alone today. She says her pain is doing much better. She is not needing to use the as needed pain meds and feels her pain is very well controlled. She is sleeping better but does still takes naps occasionally during the day. Her bowels are moving and she has a catheter in place. She says she is afraid to fall again and this can be an issue sometimes. She does have some nervousness but feels it is ok. She denies any fevers or other concerns recently.     REVIEW OF SYSTEMS:  PROBLEMS AND REVIEW OF SYSTEMS:   Today on ROS:   Currently, no fever, chills, or rigors. Decreased vision and hearing. Denies any chest pain, headaches, palpitations, lightheadedness, dizziness, shortness of breath, or cough. Appetite is good. Denies any GERD symptoms. Denies any difficulty with swallowing, nausea, or vomiting.  Denies any abdominal pain, diarrhea or constipation.  No active bleeding. No rash. Positive for weakness, anxiety at times, pain controlled, velázquez, insomnia improved       Allergies   Allergen Reactions     Sulfa (Sulfonamide Antibiotics)      Sulindac Rash     Current Outpatient Medications   Medication Sig     acetaminophen (TYLENOL) 500 MG tablet Take 2 tablets (1,000 mg total) by mouth at bedtime as needed for pain. At BEDTIME  (Patient taking differently: Take 1,000 mg by mouth 3 (three) times a day. And daily prn)     amoxicillin (AMOXIL) 500 MG capsule Take 4 capsules (2,000 mg total) by mouth as needed. Prior to dental appointments     aspirin 81 MG EC tablet Take 1 tablet (81 mg total) by mouth daily. In 6 AM     diclofenac sodium (VOLTAREN) 1 % Gel Apply 4 g topically 3 (three) times a day. To l thigh pain     hydrALAZINE (APRESOLINE) 25 MG tablet Take 25 mg by mouth every 6 (six) hours as needed (SBP>170).     lactase (LACTAID) 3,000 unit tablet Take 3,000 Units by mouth as needed.     lisinopril-hydrochlorothiazide (PRINZIDE,ZESTORETIC) 10-12.5 mg per tablet Take 1 tablet by mouth daily. At 6 AM     naproxen sodium (ALEVE) 220 MG tablet Take 1 tablet (220 mg total) by mouth daily. At 6 AM     polyethylene glycol (MIRALAX) 17 gram packet Take 17 g by mouth daily as needed (for constipation).     psyllium (METAMUCIL) 3.4 gram packet Take 1 packet by mouth 2 (two) times a day as needed.            senna (SENOKOT) 8.6 mg tablet Take 1 tablet by mouth 2 (two) times a day.     sennosides (SENNOSIDES) 8.8 mg/5 mL Syrp syrup Take 8.8 mg by mouth.     Past Medical History:    Past Medical History:   Diagnosis Date     Cancer (H)      GERD (gastroesophageal reflux disease)      Hypertension      Osteoporosis      Renal cell carcinoma of right kidney (H)      Rheumatoid arthritis (H)            PHYSICAL EXAMINATION  Vitals:    03/03/20 0700   BP: 143/67   Pulse: 83   Resp: 18   Temp: 97.8  F (36.6  C)   SpO2: 98%   Weight: (!) 96 lb (43.5 kg)       Today on physical exam:     GENERAL: Awake, Alert, oriented x3, not in any form of acute distress, answers questions appropriately, follows simple commands, conversant  HEENT: Head is normocephalic with normal hair distribution. No evidence of trauma. Ears: No acute purulent discharge. Eyes: Conjunctivae pink with no scleral jaundice. Nose: Normal mucosa and septum. NECK: Supple with no cervical or  supraclavicular lymphadenopathy. Trachea is midline. Decreased vision and hearing  CHEST: No tenderness or deformity, no crepitus  LUNG: Clear to auscultation with good chest expansion. There are no crackles or wheezes, normal AP diameter.  BACK: No kyphosis of the thoracic spine. Symmetric, no curvature, ROM normal, no CVA tenderness, no spinal tenderness   CVS: There is good S1  S2, regular rhythm, there are no murmurs, rubs, gallops, or heaves,  2+ pulses symmetric in all extremities.  ABDOMEN: Rounded and soft, nontender to palpation, non distended, no masses, no organomegaly, good bowel sounds, no rebound or guarding, no peritoneal signs. SP catheter in place, clear yellow urine, some sediment present  EXTREMITIES: no edema ble, hip incision c/d/i  SKIN: Warm and dry, no erythema noted.  Skin color, texture, no rashes or lesions.  NEUROLOGICAL: The patient is oriented to person, place and time.             LABS:   Recent Results (from the past 168 hour(s))   Basic Metabolic Panel   Result Value Ref Range    Sodium 133 (L) 136 - 145 mmol/L    Potassium 3.9 3.5 - 5.0 mmol/L    Chloride 100 98 - 107 mmol/L    CO2 26 22 - 31 mmol/L    Anion Gap, Calculation 7 5 - 18 mmol/L    Glucose 115 70 - 125 mg/dL    Calcium 9.5 8.5 - 10.5 mg/dL    BUN 18 8 - 28 mg/dL    Creatinine 0.77 0.60 - 1.10 mg/dL    GFR MDRD Af Amer >60 >60 mL/min/1.73m2    GFR MDRD Non Af Amer >60 >60 mL/min/1.73m2     Results for orders placed or performed in visit on 02/26/20   Basic Metabolic Panel   Result Value Ref Range    Sodium 133 (L) 136 - 145 mmol/L    Potassium 3.9 3.5 - 5.0 mmol/L    Chloride 100 98 - 107 mmol/L    CO2 26 22 - 31 mmol/L    Anion Gap, Calculation 7 5 - 18 mmol/L    Glucose 115 70 - 125 mg/dL    Calcium 9.5 8.5 - 10.5 mg/dL    BUN 18 8 - 28 mg/dL    Creatinine 0.77 0.60 - 1.10 mg/dL    GFR MDRD Af Amer >60 >60 mL/min/1.73m2    GFR MDRD Non Af Amer >60 >60 mL/min/1.73m2         Lab Results   Component Value Date    WBC 7.2  01/31/2020    HGB 11.4 (L) 02/02/2020    HCT 34.1 (L) 01/31/2020    MCV 87 01/31/2020     01/31/2020       Lab Results   Component Value Date    MPYKALBD26 402 09/20/2019     No results found for: HGBA1C  Lab Results   Component Value Date    INR 0.95 01/30/2020    INR 0.91 03/26/2018    INR 0.94 03/20/2018     Vitamin D, Total (25-Hydroxy)   Date Value Ref Range Status   07/16/2018 59.6 30.0 - 80.0 ng/mL Final     Lab Results   Component Value Date    TSH 1.43 12/04/2019           ASSESSMENT/PLAN:    Fall, periprosthetic Left hip fracture: pain much improved, no use of prn tramadol this month. On tylenol scheduled and tramadol scheduled. Will change tylenol to 1000mg three times a day and daily prn. Change tramadol to q6h prn. Discussed tramadol use and weaning off. Encourage ice prn, voltaren gel. On aspirin for dvt prophylaxis. Pt, ot following. F/u with ortho.   HTN: SBP 140s. Controlled on lisinopril hctz. Stable recently. Hydralazine prn.   Renal cell cell carcinoma: no current concerns. F/u with oncology.   SP catheter, h/o urethral cancer: velázquez in place. No recent concerns. F/u with urology.   Constipation: on metamucil two times a day prn, senna two times a day, miralax daily prn.   Rheumatoid arthritis: no recent concerns. F/u with rheumatology, stopped methotrexate in September.       Electronically signed by: Bibi Vaz NP

## 2021-06-06 NOTE — PROGRESS NOTES
[facility]    Facility:   [facility] [[id number]]   Code Status: DNR/DNI and POLST AVAILABLE      CHIEF COMPLAINT/REASON FOR VISIT:  Chief Complaint   Patient presents with     Review Of Multiple Medical Conditions     Review of TCU problems       HISTORY:      HPI: Dania is a 89 y.o. female who  has a past medical history of Cancer (H), GERD (gastroesophageal reflux disease), Hypertension, Osteoporosis, Renal cell carcinoma of right kidney (H), and Rheumatoid arthritis (H).  She was recently admitted to [facility] on 1/30/2020 for a fall with subsequent hip fracture.  She was discharged on 2/3/2020.  The discharging provider summarized the hospitalization in previous notes.     Today Dania is being evaluated for a routine review of multiple medical problems while in the TCU. Dania has been doing quite well and does not have any new issues to report. However, PT/OT does share that she does have quite a bit of pain. Dania states she does not want to have any further pain medication. She states the pain is tolerable and she feels that she can manage without additional pain medication. Dania states that she understands there is a bit of pain to come with working hard. Dania does not have any acute concerns. She has been eating, drinking and eliminating well (she does have catheter). Dania denies any other concerns including fevers/chills, cough or cold symptoms, headaches, vision changes, chest pain/pressure, difficulty breathing, SOB, abdominal pain, nausea, vomiting, diarrhea, dysuria, increasing weakness, increasing pain.       Past Medical History:   Diagnosis Date     Cancer (H)      GERD (gastroesophageal reflux disease)      Hypertension      Osteoporosis      Renal cell carcinoma of right kidney (H)      Rheumatoid arthritis (H)              Family History   Problem Relation Age of Onset     Thyroid cancer Daughter      Cervical cancer Daughter      Breast cancer Daughter       "Uterine cancer Daughter      Testicular cancer Nephew      Lung cancer Cousin      Social History     Socioeconomic History     Marital status: Single     Spouse name: None     Number of children: None     Years of education: None     Highest education level: None   Occupational History     None   Social Needs     Financial resource strain: None     Food insecurity:     Worry: None     Inability: None     Transportation needs:     Medical: None     Non-medical: None   Tobacco Use     Smoking status: Never Smoker     Smokeless tobacco: Never Used   Substance and Sexual Activity     Alcohol use: Yes     Comment: \"very infrequent\"     Drug use: No     Sexual activity: Never   Lifestyle     Physical activity:     Days per week: None     Minutes per session: None     Stress: None   Relationships     Social connections:     Talks on phone: None     Gets together: None     Attends Confucianist service: None     Active member of club or organization: None     Attends meetings of clubs or organizations: None     Relationship status: None     Intimate partner violence:     Fear of current or ex partner: None     Emotionally abused: None     Physically abused: None     Forced sexual activity: None   Other Topics Concern     None   Social History Narrative     None       REVIEW OF SYSTEM:  Pertinent items are noted in HPI.    PHYSICAL EXAM:   /69   Pulse 85   Temp 98.1  F (36.7  C)   Resp 15   Wt 99 lb 12.8 oz (45.3 kg)   SpO2 98%   BMI 18.86 kg/m    General appearance: alert, appears stated age and cooperative  HEENT: Head is normocephalic with normal hair distribution. No evidence of trauma. Ears: Without lesions or deformity. No acute purulent discharge. Eyes: Conjunctivae pink with no scleral icterus or erythema. Nose: Normal mucosa and septum. Oropharnyx: mmm, no lesions present.  Lungs: clear to auscultation bilaterally, respirations without effort  Heart: regular rate and rhythm, S1, S2 normal, no murmur, click, " rub or gallop  Abdomen: soft, non-tender; bowel sounds normal; no masses,  no organomegaly  : Indwelling Briones catheter draining clear straw-colored urine  Extremities: extremities normal, atraumatic, no cyanosis or edema  Pulses: 2+ and symmetric   Skin: Skin color, texture, turgor normal. No rashes or lesions.  Neurologic: Grossly normal   Psych: interacts well with caregivers, exhibits logical thought processes and connections, pleasant      LABS:   None today.    ASSESSMENT:      ICD-10-CM    1. Physical deconditioning R53.81    2. Fall, initial encounter W19.XXXA    3. Closed left hip fracture, initial encounter (H) S72.002A        PLAN:    Care plan reviewed and remains appropriate.     Physical Deconditioning  -Continue PT/OT and other therapies as per care plan.  -Encouraged good nutrition and movement habits.   -Discussed care plan and expected course of stay.   -Continue to follow-up per routine schedule or sooner if needed.     Fall  -Please place on fall precautions and monitor patient routinely.  -Encouraged patient to ask for help with all transfers.   -Continue to assess for developing injuries and if patient reports any pain request provider follow-up.    Left hip fracture  -Discontinue naproxen.  -Declines additional pain medication.   -Tylenol 650 mg by mouth 4 times daily scheduled.  -Ice and/or heat to left hip for comfort and pain relief.  -PT/OT.  -ASA 81 mg by mouth daily.  -Follow with Ortho.    Otherwise continue current care plan for all other chronic medical conditions, as they are stable. Encouraged patient to engage in healthy lifestyle behaviors such as engaging in social activities, exercising (PT/OT), eating well, and following care plan. Follow up for routine check-up, or sooner if needed. Will continue to monitor patient and work with nursing staff collaboratively to work toward positive patient outcomes.    Electronically signed by: Elisa Bell CNP

## 2021-06-06 NOTE — PROGRESS NOTES
[facility]    Facility:   [facility] [[id number]]   Code Status: DNR/DNI and POLST AVAILABLE      CHIEF COMPLAINT/REASON FOR VISIT:  Chief Complaint   Patient presents with     Review Of Multiple Medical Conditions     Review of TCU problems       HISTORY:      HPI: Dania is a 89 y.o. female who  has a past medical history of Cancer (H), GERD (gastroesophageal reflux disease), Hypertension, Osteoporosis, Renal cell carcinoma of right kidney (H), and Rheumatoid arthritis (H).  She was recently admitted to [facility] on 1/30/2020 for a fall with subsequent hip fracture.  She was discharged on 2/3/2020.  The discharging provider summarized the hospitalization in previous notes.     Today Dania is being evaluated for a routine review of multiple medical problems while in the TCU.  Dania continues to struggle with pain and is having a great deal of difficulty in PT/OT. She states that she thinks she is having a great deal of anxiety in relation to the pain. She shares the thought of having to participate in PT/OT causes her a great deal of anxiety and then subsequent pain. Dania states she has been also having trouble sleeping. She shares that the melatonin has been working, but she feels it is not strong enough. Dania states if she could get a little more she thinks it would work. Dania has no other concerns or problems to report. She states she is feeling well otherwise. Dania denies any other concerns including fevers/chills, cough or cold symptoms, headaches, vision changes, chest pain/pressure, difficulty breathing, SOB, abdominal pain, nausea, vomiting, diarrhea, dysuria, increasing weakness, increasing pain.     Report was called to Zen, her son-in-law.   Past Medical History:   Diagnosis Date     Cancer (H)      GERD (gastroesophageal reflux disease)      Hypertension      Osteoporosis      Renal cell carcinoma of right kidney (H)      Rheumatoid arthritis (H)             "  Family History   Problem Relation Age of Onset     Thyroid cancer Daughter      Cervical cancer Daughter      Breast cancer Daughter      Uterine cancer Daughter      Testicular cancer Nephew      Lung cancer Cousin      Social History     Socioeconomic History     Marital status: Single     Spouse name: None     Number of children: None     Years of education: None     Highest education level: None   Occupational History     None   Social Needs     Financial resource strain: None     Food insecurity:     Worry: None     Inability: None     Transportation needs:     Medical: None     Non-medical: None   Tobacco Use     Smoking status: Never Smoker     Smokeless tobacco: Never Used   Substance and Sexual Activity     Alcohol use: Yes     Comment: \"very infrequent\"     Drug use: No     Sexual activity: Never   Lifestyle     Physical activity:     Days per week: None     Minutes per session: None     Stress: None   Relationships     Social connections:     Talks on phone: None     Gets together: None     Attends Restorationist service: None     Active member of club or organization: None     Attends meetings of clubs or organizations: None     Relationship status: None     Intimate partner violence:     Fear of current or ex partner: None     Emotionally abused: None     Physically abused: None     Forced sexual activity: None   Other Topics Concern     None   Social History Narrative     None       REVIEW OF SYSTEM:  Pertinent items are noted in HPI.    PHYSICAL EXAM:   /80   Pulse 100   Temp 97.3  F (36.3  C)   Resp 16   Wt (!) 99 lb 3.2 oz (45 kg)   SpO2 99%   BMI 18.74 kg/m    General appearance: alert, appears stated age and cooperative  HEENT: Head is normocephalic with normal hair distribution. No evidence of trauma. Ears: Without lesions or deformity. No acute purulent discharge. Eyes: Conjunctivae pink with no scleral icterus or erythema. Nose: Normal mucosa and septum. Oropharnyx: mmm, no lesions " present.  Lungs: respirations without effort  : Indwelling catheter draining clear straw-colored urine  Extremities: extremities normal, atraumatic, no cyanosis or edema.  Skin: Skin color, texture, turgor normal. No rashes or lesions.  Neurologic: Grossly normal   Psych: interacts well with caregivers, exhibits logical thought processes and connections, pleasant      LABS:   None today.    ASSESSMENT:      ICD-10-CM    1. Physical deconditioning R53.81    2. Insomnia, unspecified type G47.00    3. Fall, initial encounter W19.XXXA    4. Closed left hip fracture, initial encounter (H) S72.002A        PLAN:    Care plan reviewed and remains appropriate.  Adjustments made for insomnia and pain.     Physical Deconditioning  -Continue PT/OT and other therapies as per care plan.  -Encouraged good nutrition and movement habits.   -Discussed care plan and expected course of stay.   -Continue to follow-up per routine schedule or sooner if needed.     Fall  -Please place on fall precautions and monitor patient routinely.  -Encouraged patient to ask for help with all transfers.   -Continue to assess for developing injuries and if patient reports any pain request provider follow-up.    Left hip fracture/Left Hip Pain  -Recent x-ray negative.   -Tramadol 50 mg by mouth three times a day and three times a day as needed.    -May want to consider use of voltaren gel.   -Tylenol 650 mg by mouth 4 times daily scheduled.  -Ice and/or heat to left hip for comfort and pain relief.  -PT/OT.  -ASA 81 mg by mouth daily.  -Ortho follow-up sooner rather than later.     Insomnia  -Increase Melatonin to 10 mg by mouth 1/2 hour before bedtime.   -Discussed sleep hygiene in depth-- including alternative methods for sleep including lavender essential oils, screen time and shutting screens off at least an hour before bed, limiting day time napping and meditation.   -Follow-up as needed.    Otherwise continue current care plan for all other  chronic medical conditions, as they are stable. Encouraged patient to engage in healthy lifestyle behaviors such as engaging in social activities, exercising (PT/OT), eating well, and following care plan. Follow up for routine check-up, or sooner if needed. Will continue to monitor patient and work with nursing staff collaboratively to work toward positive patient outcomes.    Electronically signed by: Elisa Bell CNP

## 2021-06-06 NOTE — PROGRESS NOTES
[facility]    Facility:   [facility] [[id number]]   Code Status: DNR/DNI and POLST AVAILABLE      CHIEF COMPLAINT/REASON FOR VISIT:  Chief Complaint   Patient presents with     Review Of Multiple Medical Conditions     TCU review of problems       HISTORY:      HPI: Dania is a 89 y.o. female who  has a past medical history of Cancer (H), GERD (gastroesophageal reflux disease), Hypertension, Osteoporosis, Renal cell carcinoma of right kidney (H), and Rheumatoid arthritis (H).  She was recently admitted to [facility] on 1/30/2020 for a fall with subsequent hip fracture.  She was discharged on 2/3/2020.  The discharging provider summarized the hospitalization in previous notes.     Today Dania is being evaluated for a routine review of multiple medical problems while in the TCU.  Germaine states that she has been doing well overall however she does endorse and left hip pain.  She states that it just hurts a great deal to be working out.  She states that she has no significant deficit, however the pain does limit her.  She does not participate fully in PT/OT.  Apparently she has been stating that she is just too tired or has too much pain.  Germaine states that she would be agreeable to trying a stronger pain medication.  Especially one that is administered prior to therapies.  We did discuss tramadol and she is agreeable to trying such pain medication.  Did speak with her son-in-law Zen who is also agreeable to trying this pain medication.  M he states that otherwise she is doing well.  She has no other concerns or problems to report.  She has been eating, drinking and eliminating well.  No blood pressure concerns at this time.  Dania denies any other concerns including fevers/chills, cough or cold symptoms, headaches, vision changes, chest pain/pressure, difficulty breathing, SOB, abdominal pain, nausea, vomiting, diarrhea, dysuria, increasing weakness, increasing pain.     Past  "Medical History:   Diagnosis Date     Cancer (H)      GERD (gastroesophageal reflux disease)      Hypertension      Osteoporosis      Renal cell carcinoma of right kidney (H)      Rheumatoid arthritis (H)              Family History   Problem Relation Age of Onset     Thyroid cancer Daughter      Cervical cancer Daughter      Breast cancer Daughter      Uterine cancer Daughter      Testicular cancer Nephew      Lung cancer Cousin      Social History     Socioeconomic History     Marital status: Single     Spouse name: Not on file     Number of children: Not on file     Years of education: Not on file     Highest education level: Not on file   Occupational History     Not on file   Social Needs     Financial resource strain: Not on file     Food insecurity:     Worry: Not on file     Inability: Not on file     Transportation needs:     Medical: Not on file     Non-medical: Not on file   Tobacco Use     Smoking status: Never Smoker     Smokeless tobacco: Never Used   Substance and Sexual Activity     Alcohol use: Yes     Comment: \"very infrequent\"     Drug use: No     Sexual activity: Never   Lifestyle     Physical activity:     Days per week: Not on file     Minutes per session: Not on file     Stress: Not on file   Relationships     Social connections:     Talks on phone: Not on file     Gets together: Not on file     Attends Yazdanism service: Not on file     Active member of club or organization: Not on file     Attends meetings of clubs or organizations: Not on file     Relationship status: Not on file     Intimate partner violence:     Fear of current or ex partner: Not on file     Emotionally abused: Not on file     Physically abused: Not on file     Forced sexual activity: Not on file   Other Topics Concern     Not on file   Social History Narrative     Not on file       REVIEW OF SYSTEM:  Pertinent items are noted in HPI.    PHYSICAL EXAM:   /71   Pulse 87   Temp 99.7  F (37.6  C)   Resp 16   Wt 102 " lb (46.3 kg)   SpO2 98%   BMI 19.27 kg/m    General appearance: alert, appears stated age and cooperative  HEENT: Head is normocephalic with normal hair distribution. No evidence of trauma. Ears: Without lesions or deformity. No acute purulent discharge. Eyes: Conjunctivae pink with no scleral icterus or erythema. Nose: Normal mucosa and septum. Oropharnyx: mmm, no lesions present.  Lungs: clear to auscultation bilaterally, respirations without effort  Heart: regular rate and rhythm, S1, S2 normal, no murmur, click, rub or gallop  Abdomen: soft, non-tender; bowel sounds normal; no masses,  no organomegaly  : Indwelling Briones catheter draining clear straw-colored urine  Extremities: extremities normal, atraumatic, no cyanosis or edema  Pulses: 2+ and symmetric   Skin: Skin color, texture, turgor normal. No rashes or lesions.  Neurologic: Grossly normal   Psych: interacts well with caregivers, exhibits logical thought processes and connections, pleasant      LABS:   None today.    ASSESSMENT:      ICD-10-CM    1. Physical deconditioning R53.81    2. Fall, initial encounter W19.XXXA    3. Closed left hip fracture, initial encounter (H) S72.002A    4. Pain in extremity, unspecified extremity M79.609        PLAN:    Care plan reviewed and remains appropriate.     Physical Deconditioning  -Continue PT/OT and other therapies as per care plan.  -Encouraged good nutrition and movement habits.   -Discussed care plan and expected course of stay.   -Continue to follow-up per routine schedule or sooner if needed.     Fall  -Please place on fall precautions and monitor patient routinely.  -Encouraged patient to ask for help with all transfers.   -Continue to assess for developing injuries and if patient reports any pain request provider follow-up.    Left hip fracture  -Tramadol 25 mg by mouth   -Declines additional pain medication.   -Tylenol 650 mg by mouth 4 times daily scheduled.  -Ice and/or heat to left hip for comfort  and pain relief.  -PT/OT.  -ASA 81 mg by mouth daily.  -Ortho follow-up sooner rather than later.     Otherwise continue current care plan for all other chronic medical conditions, as they are stable. Encouraged patient to engage in healthy lifestyle behaviors such as engaging in social activities, exercising (PT/OT), eating well, and following care plan. Follow up for routine check-up, or sooner if needed. Will continue to monitor patient and work with nursing staff collaboratively to work toward positive patient outcomes.    Electronically signed by: Elisa Bell CNP

## 2021-06-07 NOTE — TELEPHONE ENCOUNTER
Question following Office Visit  When did you see your provider: 03/24/20  What is your question: Caller states patient did not had Bowel moment for a week patient Is feeling full ,no nausea patient is on Polyethylene Glycol ,psyllium (METAMUCIL) 3.4 gram packet per caller patient was taking  MIRALAX  Daily . Caller stated she will be there with patient until 11:30 am after she leaves its hard for patient to answer the phone please call  At [phone] .  Okay to leave a detailed message: No

## 2021-06-07 NOTE — TELEPHONE ENCOUNTER
Spoke with [facility] and advised them PRN Senna no longer take on a schedule per Dr. Leija.     KLP, CMA

## 2021-06-07 NOTE — TELEPHONE ENCOUNTER
Orders being requested: fungal medication prefers cream  Reason service is needed/diagnosis: rash around stoma, please advise!  When are orders needed by: asap  Where to send Orders: Fax: [phone] and Phone:  [phone]  Okay to leave detailed message?  Yes

## 2021-06-07 NOTE — TELEPHONE ENCOUNTER
Any further action needed?  If nothing further needed please sign encounter.    Zuly Cohen, RN   [facility] RN Triage

## 2021-06-07 NOTE — TELEPHONE ENCOUNTER
Orders being requested: OT home visit one visit for one week starting today;  Patient is not able to absorb any more skills for ADL's per therapist.   Reason service is needed/diagnosis: cognition re: ability to do self  ADL's have decreased after hip fracture and surgery  When are orders needed by: ASAP  Where to send Orders: Phone:  [phone]   Okay to leave detailed message?  Yes

## 2021-06-07 NOTE — PROGRESS NOTES
Patient here today for follow-up visit and CT results with Dr. Britton for metastatic renal CA/MICHAEL Posadas RN

## 2021-06-07 NOTE — TELEPHONE ENCOUNTER
Spoke with Jeanette Bejarano.  Went 4-5 days without any BM.  Feeling full all the timer.  Metamucil once a day qam.  Miralax once a week.    Stopped Senna and cut back on Metamucil due to accidents in the bed.    Also, Jeanette Notes that they are continuing the three times a day BP checks with hydralazine prn.      Please fax the following orders to [facility]: ASAP.    1) Increase miralax to three times a week:  M/W/F.  2) Take BP once daily AFTER lisinopril has been given.  3) Discontinue hydralazine.      -Génesis Coronado M.D.

## 2021-06-07 NOTE — TELEPHONE ENCOUNTER
Okay I updated the med list to discontinue hydralazine, this was clear from Dr. Leija's notes.  We can fax that.     Currently she does have prn immodium and prn dulcolax ordered. So we can fax this as well. What is she doing currently with the Miralax (once weekly or once daily) and how many days since her last bowel movement?

## 2021-06-07 NOTE — PROGRESS NOTES
[facility] Patient Outreach:     Discharge Date::  3/17/20      Reason for TCU stay (discharge diagnosis)::  Fall with left hip fx      Are you feeling better, the same or worse since your discharge?:  Patient is feeling the same (doing ok.  )          As part of your discharge plan, did they discuss home care with you?: Yes        Have your seen them yet, or are they scheduled to visit?: Yes                Do you have any follow up visits scheduled with your PCP or Specialist?:  Yes, with PCP      (RN) Is it scheduled soon enough (3-5 days)?: No        (RN) Is the patient okay with moving appointment up (if RN feels appropriate)?: No    I'm glad to hear you're doing well and we want you to continue to do well. Your PCP would like to see you for a follow-up visit. Can we help set that up for your today?: No        (RN) Provided patient the PCP's phone number to call if they have any questions or concerns?: No (Patient is seeing PCP on 3/24/20 )

## 2021-06-07 NOTE — TELEPHONE ENCOUNTER
"Who is calling:  Jeanette Griffin, \" caregiver\" for the patient along with the patient's verbal consent to communicate.  Reason for Call:  The patient is complaining of constipation. The caller states the patient used to take Miralax everyday which has been changed to once a week along with Metamucil and Senna PRN which the caller states would like to get an order for Miralax more often , depending on what Jose Leija MD recommends. The caller requests that Jose Leija MD's team give the caregiver, Yudi a call to discuss the patient's medications. The patient complains of bloating and always feeling full. Caller reports the patient is receiving Hydralazine which the caller states was supposed to be discontinued.   Date of last appointment with primary care:   Okay to leave a detailed message: Yes  Call Yudi the caregiver to discuss above. The caller states she is available by phone until 2PM today. [phone].      "

## 2021-06-07 NOTE — TELEPHONE ENCOUNTER
Left message to call back for: African, nursing director  Information to relay to patient:  Please see Shawnee's message below and get answers to her questions.  Will fax what we do have to fax number provided.

## 2021-06-07 NOTE — TELEPHONE ENCOUNTER
Spoke with Jeanette Bejarano and she has provided me with [facility] fax [phone].    Orders have been faxed.     NANCY/ELIAZAR

## 2021-06-07 NOTE — TELEPHONE ENCOUNTER
Orders being requested: Physical Therapy  2 times a week for 3 weeks, once a week for 2 weeks   Reason service is needed/diagnosis: left hip fracture  When are orders needed by: asap  Where to send Orders: Phone:  [phone]  Okay to leave detailed message?  Yes

## 2021-06-07 NOTE — TELEPHONE ENCOUNTER
Joan states they received message from Dr. Leija regarding recommendations for Dania.   Need signed orders for okay to:    Discontinue hydralazine.   PRN order for Immodium and Dulcolax.     Please fax signed orders to #: [phone]      Zuly Cohen, RN   [facility] RN Triage    Reason for Disposition    [1] Follow-up call from patient regarding patient's clinical status AND [2] information NON-URGENT    Protocols used: PCP CALL - NO TRIAGE-A-

## 2021-06-07 NOTE — TELEPHONE ENCOUNTER
FYI - Status Update  Who is Calling: Assisted living  Update: patient isn't on services for patient medication administration .Meaning they have no medication list for the patient   Okay to leave a detailed message?:  No return call needed

## 2021-06-07 NOTE — TELEPHONE ENCOUNTER
Orders being requested: Skilled Nursing   One Time a Month for Two Months   - Four PRN Visits   Reason service is needed/diagnosis: For Cathter care   When are orders needed by: As soon as possible   Where to send Orders: Phone:  [phone]  Okay to leave detailed message?  Yes       How Severe Are Your Spot(S)?: mild Have Your Spot(S) Been Treated In The Past?: has not been treated Hpi Title: Evaluation of Skin Lesions

## 2021-06-07 NOTE — TELEPHONE ENCOUNTER
Orders being requested:  Caller is requesting orders for :  1. Nursing twice a week for three weeks, then once a week for three weeks and every other week for two weeks and three PRN visits.   2. Change the Suprapubic catheter every two weeks  3. Occupational therapy evaluation  4. Physical therapy evaluation   5. Medical Social Worker evaluation for possible Hospice services.  Reason service is needed/diagnosis:    1. Catheter related issues .   2. Strengthening , ADL needs, adaptive   equipment and safety  3. Balance, endurance and mobility  4. Medication set up and teaching  5. Disease process monitoring   When are orders needed by: tomorrow   Where to send Orders: Phone:  [phone]  Okay to leave detailed message?  Yes  ________________________________________Caller also has a concern regarding medication:  Should the patient continue on the Hydralazine?  This would require Blood pressure checks every six hours. Order states the medication to be given if the Systolic Blood pressure is > 170.

## 2021-06-07 NOTE — PROGRESS NOTES
"[facility] Hematology and Oncology Progress Note    Patient: Dania Da Silva  MRN: [id number]  Date of Service:4/21/2020      Reason for Visit    Follow up metastatic clear-cell renal cell carcinoma    Assessment and Plan  Cancer Staging  Renal cell carcinoma of right kidney (H)  Staging form: Kidney, AJCC 8th Edition  - Clinical stage from 4/3/2018: Stage IV (cT1b, cNX, pM1) - Signed by Ernesto Britton MD on 4/3/2018      ECOG Performance   ECOG Performance Status: 3(needs assist with ADLs and assist 1, with walker, when ambulating)     Distress Assessment  Distress Assessment Score: Unable to rate(\"up and down, ok right now\")    Pain  Currently in Pain: No/denies  Pain Score (Initial OR Reassessment): No/Denies Pain  Location: hips. L>R    #.  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 PDL1 expression 90%.   She has been off treatment for about 4 months.  Treatment was stopped due to progressive decline in physical strength and fatigue.  Unfortunately she has not improved much although she had a major setbacks from left proximal femur fracture in January.     Because of her abdominal distention and intermittent pain in the right chest, we completed CT scan of chest abdomen pelvis and it showed a very minimal change in the right posterior costophrenic recess however pulmonary nodules, and right renal mass are stable.  I informed her that this is overall stable disease.  We can see such a sustained response with immunotherapy after of treatments.     I recommended to continue monitoring clinically and radiographically in about 4-6 months.  In the meantime I recommended her to continue to improve her activity level and enjoying her time as much as she can.    #.  Hypertension, controlled.      #.  Metastatic bone disease   On Zometa every 12 weeks. Last treatment in September 2019.    #.  Cancer related pain, controlled well.  Very sporadic pain.     #. Bladder stones.   She is seeing a " urologist in [place].  #.  Rheumatoid arthritis-   #.  Osteoporosis  #.  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  has a suprapubic catheter.      Problem List    1. Renal cell carcinoma of right kidney (H)  HM2(CBC w/o Differential)    Comprehensive Metabolic Panel    TSH   2. Metastasis to bone (H)  HM2(CBC w/o Differential)    Comprehensive Metabolic Panel    TSH   3. Slow transit constipation   TSH      _____________________________________________________________________________  Diagnosis  March 2018- Metastatic renal cell carcinoma of the right kidney- clear cell type (2.5 cm pleural-based expansile destructive lesion in the right lateral seventh rib, pleural-based nodules, multiple bilateral pulmonary nodules with small right pleural effusion, 3.2 cm renal mass consistent with renal cell carcinoma)- PDL1 expression 90%.    Treatment to date  4/18/18- started pazopanib 400 mg daily (at 50% dose reduction with plan to escalate to 800 mg daily as tolerated), stop on 5/26/2018 due to grade 3 diarrhea   -CT scan show overall stable disease (mild progression about 20% increase in size of pleural-based metastases), stable or slightly smaller pulmonary nodules.   - 6/5/2018- restarted pazopanib 200 mg daily for better tolerability  12/12/2018-therapy changed to nivolumab due to intolerable diarrhea with pazopanib.  12/2019- nivolumab on hold/ treatment break due to progressive fatigue    History of Present Illness    Mrs. Lala presents today accompanied by her caregiver, Levy Zaldivar, her son-in-law conferenced in.    She reports that she has been having abdominal distention and bloating.  She has constipation and usually get bowel movement every 3-5 days.  She has intermittent pain.  She has been feeling weak.  She did not feel a lot improvement in terms of physical strength after stopping immunotherapy in December.  She fell and had left proximal  femur fracture in January which she is undergoing conservative medical management.      Pain Status  Currently in Pain: No/denies    Review of Systems    Constitutional  Constitutional (WDL): Exceptions to WDL  Fatigue: Fatigue relieved by rest  Fever: None  Chills: None  Neurosensory  Neurosensory (WDL): Exceptions to WDL  Peripheral Motor Neuropathy: Moderate symptoms, limiting instrumental ADL(feet)  Ataxia: Moderate symptoms, limiting instrumental ADL(user walker, needs 1 assist)  Peripheral Sensory Neuropathy: Moderate symptoms, limiting instrumental ADL(R index finger, feet)  Confusion: Mild disorientation(can be forgetful)  Syncope: None  Eye   Eye Disorder (WDL): Assessment not pertinent to visit  Ear  Ear Disorder (WDL): Assessment not pertinent to visit  Cardiovascular  Cardiovascular (WDL): Exceptions to WDL  Palpitations: None  Edema: Yes(intermittent feet)  Phlebitis: None  Superficial thrombophlebitis: None  Pulmonary  Respiratory (WDL): Exceptions to WDL  Cough: None  Dyspnea: Shortness of breath with moderate exertion(with walking)  Hypoxia: None  Gastrointestinal  Gastrointestinal (WDL): Exceptions to WDL  Constipation: Persistent symptoms with regular use of laxatives or enemas, limiting instrumental ADL(Last BM 3 days ago)  Diarrhea: None  Dysphagia: None  Esophagitis: None  Nausea: None  Pharyngitis: None  Vomiting: None  Dysgeusia: None  Dry Mouth: None  Genitourinary  Genitourinary (WDL): Exceptions to WDL(Urinary catheter)  Urinary Frequency: None  Urinary Retention: None  Urinary Tract Pain: None  Lymphatic  Lymph (WDL): Assessment not pertinent to visit  Musculoskeletal and Connective Tissue  Musculoskeletal and Connetive Tissue Disorders (WDL): Exceptions to WDL  Arthralgia: Mild pain(hips; L>R)  Bone Pain: None  Muscle Weakness : Symptomatic, evident on physical exam, limiting instrumental ADL  Myalgia: Mild pain  Integumentary  Integumentary (WDL): All integumentary elements are within  "defined limits  Patient Coping  Patient Coping: Accepting;Open/discussion  Distress Assessment  Distress Assessment Score: Unable to rate(\"up and down, ok right now\")  Accompanied by  Accompanied by: Caregiver(Yudi.  Qamar-n-, Zen will conference call during Dr. Britton's visit)  Oral Chemo Adherence       Past History  Past Medical History:   Diagnosis Date     Cancer (H)      GERD (gastroesophageal reflux disease)      Hypertension      Osteoporosis      Renal cell carcinoma of right kidney (H)      Rheumatoid arthritis (H)        Physical Exam    Recent Vitals 4/21/2020   Weight -   BSA (m2) -   /72   Pulse 77   Temp 98.4   Temp src 1   SpO2 99   Some recent data might be hidden     General: alert, awake, not in acute distress.  Came in a wheelchair.  HEENT: Head: Normal, normocephalic, atraumatic.  Eye: Normal external eye, conjunctiva, lids cornea, MAHESH.  Ears:  Non-tender.  Nose: Normal external nose, mucus membranes and septum.  Pharynx: Dental Hygiene adequate. Normal buccal mucosa. Normal pharynx.  Neck / Thyroid: Supple, no masses, nodes, nodules or enlargement.  Lymphatics: No abnormally enlarged lymph nodes.  Chest: Normal chest wall and respirations. Clear to auscultation.  Heart: S1 S2 RRR, no murmur.   Abdomen: Full but not tender.  No rebound or guarding.  She has indwelling catheter.  Extremities: normal strength, tone, and muscle mass  Skin: normal. no rash or abnormalities  CNS: non focal.    Lab Results    Recent Results (from the past 168 hour(s))   POCT CREATININE   Result Value Ref Range    iSTAT Creatinine 0.8 0.6 - 1.1 mg/dL    iSTAT GFR MDRD Af Amer >60 >60 mL/min/1.73m2    iSTAT GFR MDRD Non Af Amer >60 >60 mL/min/1.73m2       Imaging    Ct Chest Abdomen Pelvis With Oral With Iv Cont    Result Date: 4/17/2020  EXAM: CT CHEST ABDOMEN PELVIS W ORAL W IV CONTRAST LOCATION: [facility] DATE/TIME: 4/17/2020 11:16 AM INDICATION: Metastatic renal cell cancer, staging COMPARISON: CT " pelvis 01/30/2020, CT 12/02/2019 TECHNIQUE: CT scan of the chest, abdomen, and pelvis was performed before and after injection of IV contrast. Multiplanar reformats were obtained. Dose reduction techniques were used. CONTRAST: Iohexol (Omni) 350 100 mL FINDINGS: LUNGS AND PLEURA: The central airways are clear. Bilateral lower and right middle lobar bronchiectasis. Bibasilar scarring. Stable 7 mm left lower lobe subpleural solid nodule image 116 series 3. Rim-enhancing lesion in the right posterior costophrenic recess measuring 1.1 x 1.9 cm, previously 0.9 x 1.6 cm. Stable 5 mm right minor fissural nodule image 159. Stable 4 mm right lower lobe subpleural nodule image 24. Stable 3 mm lingular nodule image 143. MEDIASTINUM/AXILLAE: Subcentimeter left thyroid lobe nodule. Stable prominent right hilar node measuring 9 mm short axis dimension. No mediastinal or axillary adenopathy. Normal heart size and no pericardial effusion. Left-sided fat-containing Bochdalek hernia. HEPATOBILIARY: Mild hepatic steatosis. Unremarkable gallbladder and bile ducts. PANCREAS: Normal. SPLEEN: Normal. ADRENAL GLANDS: Normal. KIDNEYS/BLADDER: Heterogeneously enhancing lower right renal mass measuring 2.1 x 2.3 cm image 80 of the coronal series, previously 1.9 x 2.2 cm. Stable prominence of the right renal pelvis. Suprapubic catheter. Stable diffuse bladder wall thickening. BOWEL: Normal caliber small bowel and colon. LYMPH NODES: Previously noted abnormal retrocaval lymph nodes are not seen on this exam. VASCULATURE: Moderate to severe atherosclerosis. PELVIC ORGANS: Limited due to streak artifact from left total hip arthroplasty. MUSCULOSKELETAL: Nexium left total hip arthroplasty. Interval healing of the previously seen proximal left femur fracture. Osseous demineralization. No definite dominant suspicious lytic lesion.     1.  Slight interval increase in size of the rim-enhancing lesion in the right posterior costophrenic recess. Stable  small bilateral solid pulmonary nodules. 2.  Interval resolution of the previously seen retrocaval adenopathy. 3.  Essentially stable right renal mass consistent with renal cell carcinoma. 4.  No new sites of metastatic disease within the chest, abdomen or pelvis.    Signed by: Ernesto Britton MD

## 2021-06-07 NOTE — TELEPHONE ENCOUNTER
"Caller is Andreina at pt's assisted living facility (\"[facility]\").    Needs specific orders on:  Metamucil -> noted as \"twice daily as needed\"  - keep same?  Miralax -> has been taking \"once daily\" + \"additional prn daily\"   - keep same?    Other QUESTIONS:  Hydralazine -> still on pt's current med as a prn \"q 6 hours\" per pt's systolic BP  However pt's BP is not checked q 6 hours, just twice daily.  - keep Sig same as noted on pt's current med list?  Systolic goes as high as \"186\".  Goes higher than 170 approx 3 days per week.  Systolic \"otherwise 120s to 160s.\"    Melatonin -> ordered as 5 mg every hs  However pt has been taking \"10 mg\" capsule of melatonin at bedtime simply because this is the dose per capsule.  No s/s of extra drowsiness  - Okay to change Melatonin to 10 mg (for ease of administration)?     Imodium and Dulcolax  Pt had these as 'prn' meds at [facility].  These were not re-ordered for \"[facility]\" facility.  Should prn orders be stated for these as well?  Otherwise, these would be disposed of.    Please fax orders on each of these above medications to:  Fax # [phone]  Attn:  Anitra at [facility]    Thanks so much-    Peace Chowdary RN  [facility] Triage     Reason for Disposition    [1] Follow-up call from patient regarding patient's clinical status AND [2] information NON-URGENT    Protocols used: PCP CALL - NO TRIAGE-A-AH      "

## 2021-06-07 NOTE — PROGRESS NOTES
[facility]    Facility:   [facility] [[id number]]   Code Status: DNR/DNI and POLST AVAILABLE      CHIEF COMPLAINT/REASON FOR VISIT:  Chief Complaint   Patient presents with     Follow Up     discharge coordination       HISTORY:      HPI: Dania is a 89 y.o. female who  has a past medical history of Cancer (H), GERD (gastroesophageal reflux disease), Hypertension, Osteoporosis, Renal cell carcinoma of right kidney (H), and Rheumatoid arthritis (H).  She was recently admitted to [facility] on 1/30/2020 for a fall with subsequent hip fracture.  She was discharged on 2/3/2020.  The discharging provider summarized the hospitalization in previous notes.     Today we are having a discussion regarding discharge planning and follow up. Dania's son in law Zen is here. Dania has been in the facility for quite some time and her pending discharge has made her somewhat anxious. We did discuss that this is a normal feeling and that we can work through it together. We did discuss that medication would not be optimal at this time. It would probably be detrimental to her her health. Dania understands. Zen also feels this is the best plan forward. Dania has several supports in place to be able to successful. She has PT/OT and home health care set up at her assisted living facility.     Past Medical History:   Diagnosis Date     Cancer (H)      GERD (gastroesophageal reflux disease)      Hypertension      Osteoporosis      Renal cell carcinoma of right kidney (H)      Rheumatoid arthritis (H)              Family History   Problem Relation Age of Onset     Thyroid cancer Daughter      Cervical cancer Daughter      Breast cancer Daughter      Uterine cancer Daughter      Testicular cancer Nephew      Lung cancer Cousin      Social History     Socioeconomic History     Marital status: Single     Spouse name: Not on file     Number of children: Not on file     Years of education: Not on file      "Highest education level: Not on file   Occupational History     Not on file   Social Needs     Financial resource strain: Not on file     Food insecurity     Worry: Not on file     Inability: Not on file     Transportation needs     Medical: Not on file     Non-medical: Not on file   Tobacco Use     Smoking status: Never Smoker     Smokeless tobacco: Never Used   Substance and Sexual Activity     Alcohol use: Yes     Comment: \"very infrequent\"     Drug use: No     Sexual activity: Never   Lifestyle     Physical activity     Days per week: Not on file     Minutes per session: Not on file     Stress: Not on file   Relationships     Social connections     Talks on phone: Not on file     Gets together: Not on file     Attends [employer] service: Not on file     Active member of club or organization: Not on file     Attends meetings of clubs or organizations: Not on file     Relationship status: Not on file     Intimate partner violence     Fear of current or ex partner: Not on file     Emotionally abused: Not on file     Physically abused: Not on file     Forced sexual activity: Not on file   Other Topics Concern     Not on file   Social History Narrative     Not on file       REVIEW OF SYSTEM:  Pertinent items are noted in HPI.    PHYSICAL EXAM:   /74   Pulse 79   Temp 97.3  F (36.3  C)   Resp 17   Wt (!) 98 lb 12.8 oz (44.8 kg)   SpO2 99%   BMI 18.67 kg/m    General appearance: alert, appears stated age and cooperative  HEENT: Head is normocephalic with normal hair distribution. No evidence of trauma. Ears: Without lesions or deformity. No acute purulent discharge. Eyes: Conjunctivae pink with no scleral icterus or erythema. Nose: Normal mucosa and septum. Oropharnyx: mmm, no lesions present.  Lungs: respirations without effort  : Indwelling catheter draining clear straw-colored urine  Extremities: extremities normal, atraumatic, no cyanosis or edema.  Skin: Skin color, texture, turgor normal. No rashes " or lesions.  Neurologic: Grossly normal   Psych: interacts well with caregivers, exhibits logical thought processes and connections, pleasant      LABS:   None today.    ASSESSMENT:      ICD-10-CM    1. Physical deconditioning  R53.81    2. Fall, initial encounter  W19.XXXA    3. Closed left hip fracture, initial encounter (H)  S72.002A        PLAN:    Discussed care plan with NAOMY Zen. We discussed the anxieties surrounding discharging home and the ways to combat this anxiety. We also discussed the plans for discharge home. Zen verbalized understanding. He was able to ask any questions he would like.     Physical Deconditioning  -Continue PT/OT and other therapies as per care plan.  -Encouraged good nutrition and movement habits.   -Discussed care plan and expected course of stay.   -Continue to follow-up per routine schedule or sooner if needed.     Fall  -Please place on fall precautions and monitor patient routinely.  -Encouraged patient to ask for help with all transfers.   -Continue to assess for developing injuries and if patient reports any pain request provider follow-up.    Left hip fracture/Left Hip Pain  -Tramadol 50 mg by mouth three times a day and three times a day as needed.    -Tylenol 650 mg by mouth 4 times daily scheduled.  -Ice and/or heat to left hip for comfort and pain relief.  -ASA 81 mg by mouth daily.    Otherwise continue current care plan for all other chronic medical conditions, as they are stable. Encouraged patient to engage in healthy lifestyle behaviors such as engaging in social activities, exercising (PT/OT), eating well, and following care plan. Follow up for routine check-up, or sooner if needed. Will continue to monitor patient and work with nursing staff collaboratively to work toward positive patient outcomes.    Electronically signed by: Elisa Bell CNP

## 2021-06-07 NOTE — TELEPHONE ENCOUNTER
FYI - Status Update  Who is Calling: Home Care  Update: Faustina, PT, stated they are putting the patient's home physical therapy visit on hold because they doing this for non-essential visits, due to the COVID-19 outbreak.  Okay to leave a detailed message?:  Yes  [phone]

## 2021-06-07 NOTE — PROGRESS NOTES
Hospital Follow-up Visit:    Assessment/Plan:     Problem List Items Addressed This Visit     Slow transit constipation    Relevant Medications    polyethylene glycol (MIRALAX) 17 gram packet    psyllium (METAMUCIL) 3.4 gram packet    Metastasis to bone (H)    Relevant Medications    acetaminophen (TYLENOL) 500 MG tablet    Memory difficulties    Hypertension    Complicated UTI (urinary tract infection)      Other Visit Diagnoses     Hip fracture, left, closed, initial encounter (H)    -  Primary               Subjective:     Dania Da Silva is a 89 y.o. female who presents for a hospital discharge follow up.      Hospital/Nursing Home/IP Rehab Facility: [facility]  Date of Admission: 1/30/20  Date of Discharge:2/3/20  Reason(s) for Admission:Left hip fracture             Do you have any problems taking your medication regularly?  None       Have you had any changes in your medication since discharge? Acetaminophen three times a day and Senna two times a day.        Have you had any difficulty following your discharge or treatment plan?  No    Summary of hospitalization:  Hospital discharge summary reviewed  Diagnostic Tests/Treatments reviewed.  Follow up needed: We will do labs  Other Healthcare Providers Involved in Patient's Care: Patient Care Team:  Jose Leija MD as PCP - General (Family Medicine)  Ernesto Britton MD as Physician (Hematology and Oncology)  Lia Melendrez, RN as Oncology Nurse Navigator  Jeff Loyd MD as Physician (Rheumatology)  Jose Leija MD as Assigned PCP  Isabel Patel, PharmD as Pharmacist (Pharmacist)      Update since discharge: {improved   Information from family, SNF, care coordination: susan and [place] and patient      Post Discharge Medication Reconciliation: discharge medications reconciled and changed, per note/orders (see AVS)  Plan of care communicated with: patient, family and care giver [place]    Objective:     There were no vitals filed for this  "visit.      Physical Exam:  None       Coding guidelines for this visit:  Type of Medical   Decision Making Face-to-Face Visit       within 7 Days of discharge Face-to-Face Visit        within 14 days of discharge   Moderate Complexity [id number]   High Complexity [id number]       Electronically signed by Jose Leija MD 03/24/20 10:31 AM       Dania Da Silva is a 89 y.o. female who is being evaluated via a billable telephone visit.      The patient has been notified of following:     \"This telephone visit will be conducted via a call between you and your physician/provider. We have found that certain health care needs can be provided without the need for a physical exam.  This service lets us provide the care you need with a short phone conversation.  If a prescription is necessary we can send it directly to your pharmacy.  If lab work is needed we can place an order for that and you can then stop by our lab to have the test done at a later time.    If during the course of the call the physician/provider feels a telephone visit is not appropriate, you will not be charged for this service.\"         Dania Da Silva complains of    Chief Complaint   Patient presents with     Hospital Visit Follow Up     1/30-2/3. Left hip fracture, [facility]     Memory Loss       I have reviewed and updated the patient's Past Medical History, Social History, Family History and Medication List.    ALLERGIES  Sulfa (sulfonamide antibiotics) and Sulindac    Additional provider notes:    Phone visit today  Kelli Zaldivar  Caregiver Yudi    Patient currently at the [facility] in place  Hospitalized anywhere 30th 2020 through February 3, 2020 at [facility] hip fracture  Nonsurgical management    Transition to [facility]  At [facility] from February 4, 2020 through March 17, 2020  Currently living at the [facility]  Has interim home care  Concerns  Left hip  Managed with Tylenol feels like she is doing well " occupational therapy 1 time a week physical therapy 2 times a week  Walking with a walker as well as a wheelchair    Tramadol has not been using how to manage we will change to PRN care  Add Voltaren gel as it was in the hospital  Continue Tylenol scheduled 3 times a day  Discontinue naproxen    Bowels been overly active  She is had accidents  She has scheduled MiraLAX senna and Metamucil    Discussed  Changing to Metamucil scheduled  MiraLAX scheduled 1 time weekly  Discontinue senna  Check in with the patient regarding whether she is constipated  Consider adding yogurt    Hypertension  She is had hydralazine on a as needed basis they are monitoring her blood pressures I will discontinue this as a seem to be in the parameters acceptable  She is currently on lisinopril hydrochlorothiazide    Indwelling catheter    Concerns about memory recent events not surprising that the patient has had changes in her memory with at least 3 episodes of changing her place of living  Did not have a surgical intervention or anesthesia  Reviewed her medications no real anticholinergic medications    We talked about high nutrition food  Checking some basic labs  Urinalysis    Patient and family in agreement with this plan        Assessment/Plan:  1. Hip fracture, left, closed, initial encounter (H)  Continue physical therapy  Continue occupational therapy  For pain control  Tylenol 500 mg 2 tablets 3 times a day  Tramadol 50 mg 1 tablet every 8 hours as needed for breakthrough pain  - diclofenac sodium (VOLTAREN) 1 % Gel; Apply three times a day as needed to affected area  Dispense: 100 g; Refill: 3    2. Complicated UTI (urinary tract infection)  History of an indwelling catheter  Check urine culture  Consider berberine 1 g daily as prophylaxis for UTI  Consider d-mannose 1.5 g daily as prophylaxis for UTI    3. Hypertension  Discontinue hydralazine parameters  Check daily blood pressures  Goal blood pressures less than 160 systolic  and less than 90 diastolic  Currently on lisinopril hydrochlorothiazide  Last basic metabolic profile within normal limits  Results for orders placed or performed in visit on 02/26/20   Basic Metabolic Panel   Result Value Ref Range    Sodium 133 (L) 136 - 145 mmol/L    Potassium 3.9 3.5 - 5.0 mmol/L    Chloride 100 98 - 107 mmol/L    CO2 26 22 - 31 mmol/L    Anion Gap, Calculation 7 5 - 18 mmol/L    Glucose 115 70 - 125 mg/dL    Calcium 9.5 8.5 - 10.5 mg/dL    BUN 18 8 - 28 mg/dL    Creatinine 0.77 0.60 - 1.10 mg/dL    GFR MDRD Af Amer >60 >60 mL/min/1.73m2    GFR MDRD Non Af Amer >60 >60 mL/min/1.73m2           4. Memory difficulties  Likely multifactorial  Recent stressors with multiple wounds  Recent clinical injury and recovery  Support with nutrition  Check basic parameters for blood work  Vitamin D level  Vitamin B12 level  Thyroid function  Urine culture  Magnesium level  Serum electrophoresis  Folic acid        Phone call duration:  55 minutes 11:10 AM through 12:05 AM    Jose Leija MD

## 2021-06-07 NOTE — TELEPHONE ENCOUNTER
Spoke with Tha Arriaga at [facility], and relayed Dr. Goemz verbal orders below. Tha had no further questions.    BR, CMA

## 2021-06-07 NOTE — TELEPHONE ENCOUNTER
Left message to call back for: Yudi at [facility]  Information to relay to patient:  Advised I have faxed progress note and medication list to [phone], and to call back with any addition questions.    BR, CMA

## 2021-06-07 NOTE — TELEPHONE ENCOUNTER
FYI - Status Update  Who is Calling:   Update: Caller stated that she is waiting for the order to faxed to her. Caller stated that the patient still has not had a bowel movement.  Okay to leave a detailed message?:  no

## 2021-06-07 NOTE — TELEPHONE ENCOUNTER
I received a call from Dania Somers's son in law Zen Mota.Dania Somers had fallen and broken her hip but is now out of rehab and back at [facility]. Zen feels she is stronger now from the rehab than she was before the fall. He is inquiring whether they should now get the Ct scan done that Dr Britton talked about in December and get a visit set up. Should she set it up now or wait because of the covid 19. Please advise Dr Britton.  Danielle Perry RN

## 2021-06-07 NOTE — TELEPHONE ENCOUNTER
1017-Received a message from Zen, patient's son-n-law, stating that patient having increased abdominal fullness. He asked to be called back at [phone] or call Dania Somers before 1130, when caregiver, Yudi, is still there with here.    1129-I called Dania Somers. Yudi was there. Dania Somers reports increased abdominal fullness, bloating, decreased appetite, feels full quickly.  She reports she has been having normal BMs. Denies fevers.  I told her I would discuss her symptoms with Dr. Britton. She and Yudi asked that I call Zen with plan and he will notify them.     Reviewed with Dr. Britton. She asked for CT and follow-up with her a couple of days later.  CT order already in.    1443-Called Zen to let him know Dr. Britton's recommended for CT scan and follow-up with Dr. Britton. He agreed. He will call Dania Somers and Yudi tomorrow and have them call me back to schedule/HANSA Posadas RN    14/15/20-Dania Somers and Yudi called back and said she would like to have CT.  I told Dania Somers and Yudi that we would schedule an in person visit with Dr. Britton for now and if Dr. Britton is ok with a Televisit, we will notify them of the change. I reminded them of no visitors but we would be able to conference Yudi and Zen into the visit, if wanted. I transferred them to [facility] to schedule CT and follow-up appointment Dr. Britton/RICARDO Pugh Dr. would like appointment to remain as in office visit/MICHAEL Posadas RN

## 2021-06-07 NOTE — TELEPHONE ENCOUNTER
Medication:   bisacodyL (DULCOLAX, BISACODYL,) 5 mg EC tablet  diclofenac sodium (VOLTAREN) 1 % Gel  folic acid (FOLVITE) 1 MG tablet  loperamide (IMODIUM A-D) 2 mg tablet  sennosides (SENNOSIDES) 8.8 mg/5 mL Syrp syrup   Pharmacy: [facility], [place]  Last OV: 3/24/20      SHANNON Merritt

## 2021-06-07 NOTE — TELEPHONE ENCOUNTER
I would suggest to wait for another 2-3 months, hopefully when the COVID-19 slows down... then we should restage because we are not in urgency to restart cancer treatment as she is doing well.      Called Zen with Dr. Britton's message above. He agreed. I told him I would put Dania Somers on the recall for 2 months and have someone call to schedule appointment when approved to see patients. H said she has a regular caregiver now so he will notify us of any changes, concerns before her next appt/MICHAEL Posadas RN

## 2021-06-07 NOTE — TELEPHONE ENCOUNTER
Orders being requested:   Charlee Abarca  Reason service is needed/diagnosis:   Occasional constipation  When are orders needed by:   Asap  Where to send Orders: Phone:  Verbal orders to , DON with [facility], [phone]  Okay to leave detailed message?  Yes

## 2021-06-07 NOTE — TELEPHONE ENCOUNTER
Spoke with Danii, another nurse at the care facility and she stated that Dania did have a very small bowel movement yesterday.  But still feels very constipated and uncomfortable.  Currently she is getting the miralax once weekly, and the metamucil once daily.    Will follow up with Dr. Leija when he is back in clinic tomorrow.

## 2021-06-07 NOTE — TELEPHONE ENCOUNTER
Who is calling:  Danii Wen assisted living  Reason for Call:  Received a second medication list that has been changed and not signed. Wondering why it's changed and not signed. Please advise  Date of last appointment with primary care: 03/24/20  Okay to leave a detailed message: Yes

## 2021-06-07 NOTE — TELEPHONE ENCOUNTER
Orders being requested: for miralax and meatmucil per the conversation on the conference call. Updated recommendations.  Reason service is needed/diagnosis: constipation   When are orders needed by: asap  Where to send Orders: Fax: [phone]  Okay to leave detailed message?  Yes

## 2021-06-08 NOTE — TELEPHONE ENCOUNTER
Patient Returning Call  Reason for call:  Alicia called back.  Information relayed to patient:  n/a  Patient has additional questions:  Yes  If YES, what are your questions/concerns:  States she does not need the sleep aide as she already has one.  Okay to leave a detailed message?: No call back needed

## 2021-06-08 NOTE — TELEPHONE ENCOUNTER
Orders being requested: Skilled Nursing 2 times a week for 3 weeks, 1 time a week for 1 week, 2 times a month for 1 month and 4 PRN  Reason service is needed/diagnosis: catheter care and wound care   When are orders needed by: as soon as possible   Where to send Orders: Phone:  [phone]  Okay to leave detailed message?  Yes

## 2021-06-08 NOTE — TELEPHONE ENCOUNTER
Who is calling:  Greer nurse with [facility]  Reason for Call:  Caller stated the order for the cream needs to be faxed to [facility] also. Please fax to the fax number listed below.  Date of last appointment with primary care: 3/24/20  Okay to leave a detailed message: No

## 2021-06-08 NOTE — TELEPHONE ENCOUNTER
Who is calling:  Austin with [facility], [phone]  Reason for Call:    Austin states they are not able to do start of care for patient until Sunday, 5/24/2020.  Does Provider want to refer patient to a different home care agency or stay with [facility].  Please reach out to Austin and advise.  Thank you.  Date of last appointment with primary care: Today, 5/22/2020, Virtual visit  Okay to leave a detailed message: Yes

## 2021-06-08 NOTE — TELEPHONE ENCOUNTER
Dr. Leija sent nystatin and triamcinolone seperately and verified this is covered by pharmacy. Relayed information to Yudi.     Spoke with Austin and he stated due to high volumes and staffing with [facility] that this order needed to be transferred and has been successfully transferred to [facility] to take care of this on Sunday.     Dr. Leija confirmed waiting until Sunday, 5/24 will be just fine.     KLP, CMA

## 2021-06-08 NOTE — TELEPHONE ENCOUNTER
Orders being requested:   Order/Prescriptions for:  Anti Itch body lotion  Sleep Aid    Reason service is needed/diagnosis:   Itchy skin  Difficulty sleeping at night  For patient medical administration record    When are orders needed by: Asap    Where to send Orders: Fax: To: Nursing with [facility], [phone]     Okay to leave detailed message?  No, please fax order/prescriptions and Nursing will bring to Pharmacy.

## 2021-06-08 NOTE — TELEPHONE ENCOUNTER
Who is calling:  Yahaira   Reason for Call:  Caller stated that she will be faxing over a form for wound care for Jose Leija MD to fill out. To please keep an eye out for it.   Date of last appointment with primary care:   Okay to leave a detailed message: Yes  [phone]

## 2021-06-08 NOTE — TELEPHONE ENCOUNTER
Central PA team  [phone]  Pool: HE PA MED ([id number])          PA has been initiated.       PA form completed and faxed insurance via Cover My Meds     Key:  [id number]     Medication:  Nystatin [id number] ointment    Insurance:  [employer]        Response will be received via fax and may take up to 5-10 business days depending on plan

## 2021-06-08 NOTE — TELEPHONE ENCOUNTER
Who is calling:  Patient and caregiver   Reason for Call:  Home care orders for wound care should of been sent to [facility] not the other home care facilities. Please send wound care orders to [facility]. Fax number [phone]  Date of last appointment with primary care:   Okay to leave a detailed message: Yes

## 2021-06-08 NOTE — PROGRESS NOTES
"Dania Da Silva is a 89 y.o. female who is being evaluated via a billable video visit.      The patient has been notified of following:     \"This video visit will be conducted via a call between you and your physician/provider. We have found that certain health care needs can be provided without the need for an in-person physical exam.  This service lets us provide the care you need with a video conversation.  If a prescription is necessary we can send it directly to your pharmacy.  If lab work is needed we can place an order for that and you can then stop by our lab to have the test done at a later time.    Video visits are billed at different rates depending on your insurance coverage. Please reach out to your insurance provider with any questions.    If during the course of the call the physician/provider feels a video visit is not appropriate, you will not be charged for this service.\"    Patient has given verbal consent to a Video visit? Yes    Patient would like to receive their AVS by AVS Preference: [email or web address].    Patient would like the video invitation sent by: Text to cell phone: [phone]    Will anyone else be joining your video visit? No        Video Start Time: 9:50     Additional provider notes: complaints      Rash Around catheter.      Pruritus Wounds on bilateral arm from itching. Wondering about referral for wound clinic.     Abdominal Pain Right flank.     Fatigue Less energy more frequently.     Questions For Providers/results Son in law wondering about hospice.     Vaginal Pain Patient complains of dryness.      Right flank pain on and off known history of cancer right side sees Dr. johnson  Can actually press on it  Unclear whether it is of bone or soft tissue  Has not worsened    Bowels been okay  Increase MiraLAX to 3 times weekly  Doing Metamucil daily  Also been soft and formed  Not going every day but that is okay    Fatigue is been ongoing    Vaginal dryness purchase some over-the-counter " "K-Y gómezlly has improved  We talked about using Aquaphor    Also we talked about hospice patient is an excellent candidate for hospice issues get a discussed this with her family        No problem-specific Assessment & Plan notes found for this encounter.      Dania Da Silva is a 89 y.o. female who is being evaluated via a billable telephone visit.      The patient has been notified of following:     \"This telephone visit will be conducted via a call between you and your physician/provider. We have found that certain health care needs can be provided without the need for a physical exam.  This service lets us provide the care you need with a short phone conversation.  If a prescription is necessary we can send it directly to your pharmacy.  If lab work is needed we can place an order for that and you can then stop by our lab to have the test done at a later time.    If during the course of the call the physician/provider feels a telephone visit is not appropriate, you will not be charged for this service.\"     Physician has received verbal consent for a Telephone Visit from the patient? Yes    Dania Da Silva complains of    Chief Complaint   Patient presents with     Rash     Around catheter.      Pruritus     Wounds on bilateral arm from itching. Wondering about referral for wound clinic.      Abdominal Pain     Right flank.      Fatigue     Less energy more frequently.      Questions For Providers/results     Son in law wondering about hospice.      Vaginal Pain     Patient complains of dryness.        I have reviewed and updated the patient's Past Medical History, Social History, Family History and Medication List.    ALLERGIES  Sulfa (sulfonamide antibiotics) and Sulindac    Additional provider notes: insert own note template here see above      Assessment/Plan:  1. At risk for complication of stoma  Redness around the stoma looks like a 3 cm area  Likely contact irritation from moisture with yeast  Use " ointment  Wound care nurse ordered through interim care    - Ambulatory referral to Home Health  - nystatin-triamcinolone (MYCOLOG) ointment; Apply to stoma area twice daily until resolved then use sparingly  Dispense: 60 g; Refill: 0    2. Wound, open  Open wounds on arms  Suspicious for staph bacteria  Wound nurse  Start doxycycline  Cover with hydrocolloid bandages    - Ambulatory referral to Home Health  - doxycycline (VIBRAMYCIN) 100 MG capsule; Take 1 capsule (100 mg total) by mouth 2 (two) times a day for 10 days.  Dispense: 20 capsule; Refill: 0  - mupirocin (BACTROBAN) 2 % ointment; Apply to affected area of arm and trunk sores daily  Dispense: 22 g; Refill: 0    3. Dermatitis  Mycolog to the peristomal area wound care nurse input  Hydrocolloid bandages to the open sores on arms and trunk  Mupirocin once daily  Doxycycline to cover staph    - Ambulatory referral to Home Health  - nystatin-triamcinolone (MYCOLOG) ointment; Apply to stoma area twice daily until resolved then use sparingly  Dispense: 60 g; Refill: 0    4. Renal cell carcinoma of right kidney (H)  Is not a candidate for hospice discussed today      5. Insomnia, unspecified type    - melatonin 5 mg Tab tablet; 1 tablet at target bedtime  Dispense: 30 each; Refill: 5    6. Vaginal dryness  Continued use of K-Y jelly  Could use Astra glide  Could use Aquaphor          Video-Visit Details    Type of service:  Video Visit    Video End Time (time video stopped): 10:32 AM  Originating Location (pt. Location): [facility] Care    Distant Location (provider location):  [facility]      Platform used for Video Visit: Chad Leija MD      No problem-specific Assessment & Plan notes found for this encounter.

## 2021-06-08 NOTE — TELEPHONE ENCOUNTER
1.Cultures always helpful  2. IF persistent, yes and have a would care visit.; is a wound nurse seeing Dania?    I will Rx antibiotics  Cultures would be helpful    Robi

## 2021-06-08 NOTE — TELEPHONE ENCOUNTER
Who is calling:    Erwin Pardo    Reason for Call:    Checking on hospice orders.  Orders given per PCP.    Date of last appointment with primary care: 05/22/2020    Okay to leave a detailed message: No

## 2021-06-08 NOTE — TELEPHONE ENCOUNTER
Prior Authorization Request  Who s requesting:  Pharmacy  Pharmacy Name and Location: [id number] - [place], [place] - [address] AT [facility] [address]   Medication Name: nystatin (MYCOSTATIN) ointment     Insurance Plan:   Insurance Member ID Number:  [id number]  CoverMyMeds Key: N/A  Informed patient that prior authorizations can take up to 10 business days for response:   NA  Okay to leave a detailed message: Yes

## 2021-06-08 NOTE — TELEPHONE ENCOUNTER
Medication Question or Clarification  Who is calling: Yudi from [facility]   What medication are you calling about (include dose and sig)?: Pt restarted antibotic  Who prescribed the medication?: PCP  What is your question/concern?: Pt has blood red blisters on the palm of her hands and sole of her feet.  Itchy, Yudi is concerned it is an alergic reaction.  Would like to speak to the MD or Nurse if possible.  She will be with the patient until 1:30 PM today.   Requested Pharmacy: N/A  Okay to leave a detailed message?: Yes

## 2021-06-08 NOTE — TELEPHONE ENCOUNTER
Orders being requested: Hospice start up care; Ok for [facility] to evaluation and treat   Reason service is needed/diagnosis: renal cell carcinoma; mets to bone  When are orders needed by: 7 Pm 06/11/2020  Where to send Orders: Fax: [phone] and Phone:  [phone]  Okay to leave detailed message?  Yes

## 2021-06-08 NOTE — TELEPHONE ENCOUNTER
We received a home care referral for this patient and due to capacity we had to vend this referral to [facility].

## 2021-06-08 NOTE — TELEPHONE ENCOUNTER
Who is calling:  Home Care Provider Yudi - participated in the virtual visit this morning with patient and PCP  Reason for Call:  Contacted Pharmacy - they received the orders regarding doxyycline, melatonin, and mupirocin.    However they do not have the order for nystatin/triamcin ointment for stoma. Can someone contact them, then call back to caller (Yudi Elias [phone]) to let her know it has been sent and she can get them for patient. Her day ends soon.  Date of last appointment with primary care: today, 5/22/20  Okay to leave a detailed message: Yes

## 2021-06-08 NOTE — TELEPHONE ENCOUNTER
Orders being requested:   Wound Culture  Antibiotic therapy  Reason service is needed/diagnosis: Patient has just completed the course of Doxycycline, and has developed 2 new open areas, 1 is on her chest, the other her left leg. Questioning is cultures should be obtained, and or start new round of antibiotics.  Please advise  When are orders needed by: ASAP  Where to send Orders: Fax: [phone]  Okay to leave detailed message?  Yes

## 2021-06-09 NOTE — TELEPHONE ENCOUNTER
RN Triage  RICARDO Charles with [facility] calling today concerned for an allergic reaction- widespread rash on abdomen, glutes, back. Rash is red, flat, and blotchy. Rash just started today. No med changes or product changes. No difficulty breathing, no facial swelling.  No fever, no ill contacts. The rash is very itchy and distressing for Dania. Araceli states she has been using triamcinolone for other skin problems on the rash. Wonders if an antihistamine can be recommended.    Given Dania's age, I feel uncomfortable recommending Benadryl without review by a provider. Offered virtual visit. Araceli would be interested in having message sent to PCP first.    Please review and advise RICARDO Charles [phone]    Meghana Jasso RN  [employer] Nurse Advisor    0161: Followed up again with [facility], was told message has been routed appropriately to Dr. Leija for his review.      Reason for Disposition    Mild widespread rash    Additional Information    Negative: [1] Life-threatening reaction (anaphylaxis) in the past to similar substance (e.g., food, insect bite/sting, chemical, etc.) AND [2] < 2 hours since exposure    Negative: [1] Sudden onset of rash (within last 2 hours) AND [2] difficulty with breathing or swallowing    Negative: Shock suspected (e.g., cold/pale/clammy skin, too weak to stand, low BP, rapid pulse)    Negative: Difficult to awaken or acting confused (e.g., disoriented, slurred speech)    Negative: [1] Purple or blood-colored spots or dots AND [2] fever    Negative: Sounds like a life-threatening emergency to the triager    Negative: [1] Widespread rash AND [2] bright red, sunburn-like AND [3] current tampon use or nasal packing    Negative: [1] Widespread rash AND [2] bright red, sunburn-like AND [3] wound infection or recent surgery    Negative: [1] Bright red skin AND [2] peels off in sheets    Negative: Stiff neck (unable to touch chin to chest)    Negative: Fever    Negative: Joint pain or  "swelling    Negative: Rash looks like large or small blisters (i.e., fluid filled bubbles or sacs on the skin)    Negative: Patient sounds very sick or weak to the triager    Negative: [1] Purple or blood-colored rash (spots or dots) AND [2] no fever AND [3] sounds well to triager    Negative: Sores in mouth    Negative: Face becomes swollen    Negative: [1] Headache AND [2] no fever    Negative: SEVERE itching (i.e., interferes with sleep, normal activities or school)    Negative: Sore throat    Negative: Ring-like appearance of rash (or ask: does it look like a  \"target\" or \"bulls- eye\")    Negative: Pregnant    Protocols used: RASH OR REDNESS - WIDESPREAD-A-AH    COVID 19 Nurse Triage Plan/Patient Instructions    Please be aware that novel coronavirus (COVID-19) may be circulating in the community. If you develop symptoms such as fever, cough, or SOB or if you have concerns about the presence of another infection including coronavirus (COVID-19), please contact your health care provider or visit [email or web address].     Disposition/Instructions    Home care recommended. Follow home care protocol based instructions.    Thank you for taking steps to prevent the spread of this virus.  o Limit your contact with others.  o Wear a simple mask to cover your cough.  o Wash your hands well and often.    Resources    [facility]: About COVID-19: [email or web address]    CDC: What to Do If You're Sick: www.cdc.gov/coronavirus/2019-ncov/about/steps-when-sick.html    CDC: Ending Home Isolation: www.cdc.gov/coronavirus/2019-ncov/hcp/disposition-in-home-patients.html     CDC: Caring for Someone: www.cdc.gov/coronavirus/2019-ncov/if-you-are-sick/care-for-someone.html     [facility]: [facility] Guidance for Hospital Discharge to Home: www.health.[facility].mn.us/diseases/coronavirus/hcp/hospdischarge.pdf    [facility] clinical trials (COVID-19 research studies): clinicalaffairs.[facility]-clinical-trials     Below are " the COVID-19 hotlines at the [place] Department of Health ([facility]). Interpreters are available.   o For health questions: Call 396-155-8033 or 1-231.517.6732 (7 a.m. to 7 p.m.)  o For questions about schools and childcare: Call 591-855-0952 or 1-600.626.6263 (7 a.m. to 7 p.m.)

## 2021-06-09 NOTE — TELEPHONE ENCOUNTER
Who is calling:  Alicia   Reason for Call:  Caller stated that patient has been complaining of Right hip pain for couple weeks now and that its pretty constant of a pain. Caller is needing to know if Jose Leija MD would like for patient to be seen first or did her want her to go ahead and get xrays.   Date of last appointment with primary care:   Okay to leave a detailed message: Yes  [phone]

## 2021-06-09 NOTE — TELEPHONE ENCOUNTER
Called and spoke with Alicia to let her know that Dr. Leija wants to order some imaging and then schedule a follow up appointment to discuss.  Alicia said to fax the orders to [phone].

## 2021-06-09 NOTE — TELEPHONE ENCOUNTER
RN triage call from   Danii at [facility]  Residents are being test for COVID19 per [facility] guidelines  Danii would need orders faxed to [phone]  Would need orders for point of care testing for dates of  7/8/20  7/15/20  7/22/20  Thank you  Any questions please call Danii at [phone]  Marylou Becerra, RN  [facility] Triage Nurse  3:10 PM  7/7/2020

## 2021-06-09 NOTE — TELEPHONE ENCOUNTER
Orders being requested: Skilled nursing one time a month for two months and five when ever needed visits.  Reason service is needed/diagnosis: Catheter management and wound assessment   When are orders needed by: Today  Where to send Orders: Phone:  [phone]  Okay to leave detailed message?  Yes

## 2021-06-09 NOTE — TELEPHONE ENCOUNTER
Orders being requested: Physical therapy to eval and treat.   Reason service is needed/diagnosis: Increased weakness.   When are orders needed by: asap  Where to send Orders: Phone:  [phone]  Okay to leave detailed message?  Yes

## 2021-06-09 NOTE — TELEPHONE ENCOUNTER
Zyrtec 10 mg two times a day for Rash up to 5 days then 10 mg Daily As needed    Continue the Triamcinolone     DanL

## 2021-06-09 NOTE — TELEPHONE ENCOUNTER
Orders being requested: Physical therapy twice a week for 3 weeks and once a week for 4 weeks   Reason service is needed/diagnosis: strengthening, mobility, and balance  When are orders needed by: non-urgent  Where to send Orders: Phone:  [phone]  Okay to leave detailed message?  Yes

## 2021-06-09 NOTE — TELEPHONE ENCOUNTER
Who is calling:  Yudi, OT, private pay care giver  Reason for Call:  Patient is complaining of an increase of right hip pain.  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  Last visit with oncologist was in April, 2020.  At that time there was no increase in the spread of cancer.  Dania is no longer getting chemotherapy.      Yudi is not sure if the right hip pain is related to cancer.  Patient is not on pain medication other than tylenol.     What does Dr. Leija recommend patient do next?    Date of last appointment with primary care: 5/22/20  Okay to leave a detailed message: Yes    Yudi is not on consent to communicate.  No information regarding Dania was given to her.

## 2021-06-09 NOTE — TELEPHONE ENCOUNTER
I would like to set up an Xray to evaluate the right hip    Then let's do a video or phone visit.    I ordered Right Hip Xray >> will need this scheduled  After prefer the Xray in the AM Friday and Video Visit PM Friday or AM Monday     I can also fax something for Pain in the [facility].    Robi

## 2021-06-10 NOTE — TELEPHONE ENCOUNTER
Controlled Substance Refill Request  Medication Name:   Requested Prescriptions     Pending Prescriptions Disp Refills     traMADoL (ULTRAM) 50 mg tablet [Pharmacy Med Name: TRAMADOL 50MG TABLET] 18 tablet 0     Sig: TAKE 1 TAB BY MOUTH EVERY 8 HOURS AS NEEDED     Date Last Fill: 7/2/20  [facility]  Submit electronically to pharmacy  Controlled Substance Agreement on file:   Encounter-Level CSA Scan Date:    There are no encounter-level csa scan date.        Last office visit:  5/22/20

## 2021-06-10 NOTE — TELEPHONE ENCOUNTER
Refill Approved    Rx renewed per Medication Renewal Policy. Medication was last renewed on 9./20/19    Dottie Arvizu, [facility] Connection Triage/Med Refill 8/25/2020     Requested Prescriptions   Pending Prescriptions Disp Refills     lisinopriL-hydrochlorothiazide (PRINZIDE,ZESTORETIC) 10-12.5 mg per tablet [Pharmacy Med Name: LISINOPRIL-HCTZ 10/12.5MG TABLETS] 90 tablet 3     Sig: TAKE 1 TABLET BY MOUTH EVERY DAY       Diuretics/Combination Diuretics Refill Protocol  Passed - 8/23/2020  6:06 AM        Passed - Visit with PCP or prescribing provider visit in past 12 months     Last office visit with prescriber/PCP: 9/20/2019 Jose Leija MD OR same dept: 9/20/2019 Jose Leija MD OR same specialty: 9/20/2019 Jose Leija MD  Last physical: Visit date not found Last MTM visit: Visit date not found   Next visit within 3 mo: Visit date not found  Next physical within 3 mo: Visit date not found  Prescriber OR PCP: Jose Leija MD  Last diagnosis associated with med order: 1. Hypertension  - lisinopriL-hydrochlorothiazide (PRINZIDE,ZESTORETIC) 10-12.5 mg per tablet [Pharmacy Med Name: LISINOPRIL-HCTZ 10/12.5MG TABLETS]; TAKE 1 TABLET BY MOUTH EVERY DAY  Dispense: 90 tablet; Refill: 3    If protocol passes may refill for 12 months if within 3 months of last provider visit (or a total of 15 months).             Passed - Serum Potassium in past 12 months      Lab Results   Component Value Date    Potassium 4.3 08/21/2020             Passed - Serum Sodium in past 12 months      Lab Results   Component Value Date    Sodium 134 (L) 08/21/2020             Passed - Blood pressure on file in past 12 months     BP Readings from Last 1 Encounters:   04/21/20 162/72             Passed - Serum Creatinine in past 12 months      Creatinine   Date Value Ref Range Status   08/21/2020 0.73 0.60 - 1.10 mg/dL Final

## 2021-06-10 NOTE — TELEPHONE ENCOUNTER
Medication Request  Medication name: TRAMADOL 50MG TABLETS  [facility]  Reason for request: Electronic request  When did you use medication last?:  N/A  Patient offered appointment:  N/A - electronic request  Okay to leave a detailed message: no

## 2021-06-10 NOTE — PROGRESS NOTES
Assessment/Plan:        HTN - good control at present in this very healthy 89 year old lady   PLAN - Continue current regimen and recheck in 6 months         Subjective:    Patient ID: Dania Da Silva is a 89 y.o. female.    Hypertension   This is a chronic problem. The current episode started more than 1 year ago. The problem is unchanged. The problem is controlled. Pertinent negatives include no anxiety, blurred vision, chest pain, headaches, palpitations, peripheral edema or shortness of breath. There are no associated agents to hypertension. Risk factors for coronary artery disease include dyslipidemia and post-menopausal state. Past treatments include ACE inhibitors, calcium channel blockers and diuretics. The current treatment provides significant improvement. There are no compliance problems.  Identifiable causes of hypertension include hyperparathyroidism.       The following portions of the patient's history were reviewed and updated as appropriate: allergies, current medications, past family history, past medical history, past social history, past surgical history and problem list.    Review of Systems   Constitutional: Negative for activity change, appetite change, fatigue and unexpected weight change.   Eyes: Negative for blurred vision.   Respiratory: Negative for cough and shortness of breath.    Cardiovascular: Negative for chest pain, palpitations and leg swelling.   Neurological: Negative for headaches.   All other systems reviewed and are negative.            Objective:    Physical Exam   Constitutional: She is oriented to person, place, and time. She appears well-developed and well-nourished. No distress.   Cardiovascular: Normal rate, regular rhythm and normal heart sounds.  Exam reveals no gallop and no friction rub.    No murmur heard.  Pulmonary/Chest: Effort normal and breath sounds normal. No respiratory distress. She has no wheezes. She has no rales.   Musculoskeletal: She exhibits no  edema.   Neurological: She is alert and oriented to person, place, and time.   Skin: Skin is warm and dry.   Nursing note and vitals reviewed.

## 2021-06-10 NOTE — TELEPHONE ENCOUNTER
RN cannot approve Refill Request    RN can NOT refill this medication med is not covered by policy/route to provider. Last office visit: 9/20/2019 Jose Leija MD Last Physical: Visit date not found Last MTM visit: Visit date not found Last visit same specialty: 9/20/2019 Jose Leija MD.  Next visit within 3 mo: Visit date not found  Next physical within 3 mo: Visit date not found      Dottie Arvizu, [facility] Triage/Med Refill 8/27/2020    Requested Prescriptions   Pending Prescriptions Disp Refills     diclofenac sodium (VOLTAREN) 1 % Gel [Pharmacy Med Name: DICLOFENAC SODIUM 1% GEL] 100 g 12     Sig: APPLY TOPICALLY TO BOTH HIPS THREE TIMES DAILY FOR PAIN       There is no refill protocol information for this order

## 2021-06-10 NOTE — TELEPHONE ENCOUNTER
Patient's son-n-law, Zen Mota, called and left message.  He said that patient's caregiver tested positive for Covid19.  Patient's results pending and is quarantined for next 14 days.  He is wondering if Dr. Britton might be able to call him before their appt on 9/8.  He said patient is not good with following on phone. He asked for call back to [phone].    I called Zen back to acknowledge that I received his message. I kept getting cut off so unable to give him complete message that  is out the office and I sent his request for call back with results to her.    Message to /MICHAEL Posadas RN    8/20/20-Dr. Britton talked to Zen/MICHAEL Posadas RN

## 2021-06-11 NOTE — PROGRESS NOTES
Patient here today for follow-up visit with Dr. Britton for renal cell carcinoma/MICHAEL Posadas RN

## 2021-06-11 NOTE — PROGRESS NOTES
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  PDL1 expression 90%.   She has been off treatment for about 9 months due to progressive fatigue and physical deconditioning.  Today, I reviewed her CT scan and it showed mild progression of the disease.however, she has not regain her physical strength much but more decline in physical condition.  I explained to her that her current cancer status does not need immediate treatment nor should not cause imminent symptoms.  At the same time, her physical deconditioning is not likely secondary to treatment or malignancy as it has not improve over several months.  Her current performance status is not adequate for ongoing cancer directed therapy, in addition to that she has been falling.  I strongly recommended to consider hospice for symptom focused care.  She and her family reported that she met with hospice a couple months ago but Dania was not quite confident to enroll in hospice as she does not feel she need much assistance.  I discussed about the benefit of only enrollment in hospice.  We discussed about other benefits and limitations of being in hospice care.  After a long discussion, Dania is feeling comfortable meeting with hospice again to enroll in hospice program.      #.  Metastatic  bone disease   On Zometa every 12 weeks. Last treatment in September 2019.    #.  Cancer related pain, controlled well.  Very sporadic pain.  Currently not on any pain medication on regular basis except as needed Tylenol.     #. Bladder stones.   She is seeing a urologist in [place].  #.  Rheumatoid arthritis-   #.  Osteoporosis  #.  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  has a suprapubic catheter.      Problem List    1. Renal cell carcinoma of right kidney (H)     2. Physical deconditioning        _____________________________________________________________________________  Diagnosis  March 2018- Metastatic renal cell carcinoma of the right kidney- clear cell type (2.5 cm pleural-based expansile destructive lesion in the right lateral seventh rib, pleural-based nodules, multiple bilateral pulmonary nodules with small right pleural effusion, 3.2 cm renal mass consistent with renal cell carcinoma)- PDL1 expression 90%.    Treatment to date  4/18/18- started pazopanib 400 mg daily (at 50% dose reduction with plan to escalate to 800 mg daily as tolerated), stop on 5/26/2018 due to grade 3 diarrhea   -CT scan show overall stable disease (mild progression about 20% increase in size of pleural-based metastases), stable or slightly smaller pulmonary nodules.   - 6/5/2018- restarted pazopanib 200 mg daily for better tolerability  12/12/2018-therapy changed to nivolumab due to intolerable diarrhea with pazopanib.  12/2019- nivolumab on hold/ treatment break due to progressive fatigue    History of Present Illness    Mrs. Da Silva presented today accompanied by her caregiver, Levy Zaldivar, her son-in-law conferenced in.    Reported she has continued decline in physical strength.  She was able to walk very short distance at this point.  Increasing fatigue.  However she has gained weight.  She has been intermittent cough with greenish sputum.  No fever.  Denies any  persistent pain in the right chest wall.  She fell a couple times and did not know why.  She thinks that she felt dizzy when she got up in the morning.     Pain Status  Currently in Pain: No/denies    Review of Systems    Constitutional  Constitutional (WDL): Exceptions to WDL  Fatigue: Fatigue relieved by rest  Fever: None  Chills: None  Weight Gain: 5 - <10% from baseline(7# 3/16/20)  Weight Loss: None  Neurosensory  Neurosensory (WDL): Exceptions to WDL  Peripheral Motor Neuropathy: Moderate symptoms, limiting instrumental ADL  Ataxia: Moderate symptoms, limiting instrumental ADL(uses w/c most of the time, walker with assistance)  Peripheral Sensory Neuropathy: Asymptomatic, loss of deep tendon reflexes or paresthesia(hands and feet)  Confusion: Mild disorientation  Syncope: None  Eye   Eye Disorder (WDL): Assessment not pertinent to visit  Ear  Ear Disorder (WDL): Exceptions to WDL(very [place]-wears hearing aids)  Cardiovascular  Palpitations: None  Edema: Yes  Edema Limbs: 5 - 10% inter-limb discrepancy in volume or circumference at point of greatest visible difference, swelling or obscuration of anatomic architecture on close inspection(bilat feet)  Phlebitis: None  Superficial thrombophlebitis: None  Pulmonary  Respiratory (WDL): Exceptions to WDL  Cough: Mild symptoms, nonprescription intervention indicated(greenish, yellow phlegm)  Dyspnea: Shortness of breath with minimal exertion, limiting instrumental ADL  Hypoxia: None  Gastrointestinal  Gastrointestinal (WDL): Exceptions to WDL  Anorexia: None  Constipation: Occasional or intermittent symptoms, occasional use of stool softeners, laxatives, dietary modification, or enema(Last BM 9/14/20-using meds to manage)  Diarrhea: None  Dysphagia: None  Esophagitis: None  Nausea: None  Pharyngitis: None  Vomiting: None  Dysgeusia: None  Dry Mouth: None  Genitourinary  Genitourinary (WDL): Exceptions to WDL(suprapubic cathetar-she feels like she intermittently needs to  urinate)  Lymphatic  Lymph (WDL): Assessment not pertinent to visit  Musculoskeletal and Connective Tissue  Musculoskeletal and Connetive Tissue Disorders (WDL): Exceptions to WDL  Arthralgia: Mild pain(hips R>L)  Bone Pain: None  Muscle Weakness : Symptomatic, evident on physical exam, limiting instrumental ADL  Myalgia: Mild pain(hips, legs)  Integumentary  Integumentary (WDL): All integumentary elements are within defined limits  Patient Coping  Patient Coping: Accepting;Open/discussion  Distress Assessment  Distress Assessment Score: Unable to rate  Accompanied by  Accompanied by: Caregiver(Yudi)  Oral Chemo Adherence       Past History  Past Medical History:   Diagnosis Date     Cancer (H)      GERD (gastroesophageal reflux disease)      Hypertension      Osteoporosis      Renal cell carcinoma of right kidney (H)      Rheumatoid arthritis (H)        Physical Exam    Recent Vitals 9/16/2020   Weight 105 lbs 10 oz   BSA (m2) 1.44 m2   /98   Pulse 79   Temp 97.6   Temp src 1   SpO2 98   Some recent data might be hidden     General: alert, awake, not in acute distress.  Came in a wheelchair.  HEENT: Head: Normal, normocephalic, atraumatic.  Eye: Normal external eye, conjunctiva, lids cornea, MAHESH.  Ears:  Non-tender.  Nose: Normal external nose, mucus membranes and septum.  Pharynx: Dental Hygiene adequate. Normal buccal mucosa. Normal pharynx.  Neck / Thyroid: Supple, no masses, nodes, nodules or enlargement.  Lymphatics: No abnormally enlarged lymph nodes.  Chest: Normal chest wall and respirations. Clear to auscultation.  Heart: S1 S2 RRR, no murmur.   Abdomen: Full but not tender.  No rebound or guarding.  She has indwelling catheter.  Extremities: normal strength, tone, and muscle mass  Skin: normal. no rash or abnormalities  CNS: non focal.    Lab Results    No results found for this or any previous visit (from the past 168 hour(s)).    Imaging    No results found.  Signed by: Ernesto Britton MD

## 2021-06-11 NOTE — TELEPHONE ENCOUNTER
I got a call from Char at [facility]. Dania Somers has and appt scheduled with them on Friday. They need a referral , our FAX # and what date Dr Britton last saw Dania. I called Char back and gave a verbal order per Dr Britton for hospice referral . Danielle Perry RN

## 2021-06-11 NOTE — TELEPHONE ENCOUNTER
RN cannot approve Refill Request    RN can NOT refill this medication med is not covered by policy/route to provider. Last office visit: 9/20/2019 Jose Leija MD Last Physical: Visit date not found Last MTM visit: Visit date not found Last visit same specialty: 9/20/2019 Jose Leija MD.  Next visit within 3 mo: Visit date not found  Next physical within 3 mo: Visit date not found      Danna Spann, [facility] Triage/Med Refill 9/29/2020    Requested Prescriptions   Pending Prescriptions Disp Refills     METAMUCIL, WITH SUGAR, 3.4 gram PwPk [Pharmacy Med Name: METAMUCIL PACKET]  0     Sig: MIX 1 PACKET W/6OZ OF WATER /JUICE AND TAKE BY MOUTH ONCE DAILY DX:CONSTIPATION       There is no refill protocol information for this order

## 2021-06-12 NOTE — TELEPHONE ENCOUNTER
RN cannot approve Refill Request    RN can NOT refill this medication medication not on med list. Last office visit: 9/20/2019 Jose Leija MD Last Physical: Visit date not found Last MTM visit: Visit date not found Last visit same specialty: 9/20/2019 Jose Leija MD.  Next visit within 3 mo: Visit date not found  Next physical within 3 mo: Visit date not found      Dottie Arvizu, [facility] Connection Triage/Med Refill 10/14/2020    Requested Prescriptions   Pending Prescriptions Disp Refills     doxepin (SINEQUAN) 25 MG capsule [Pharmacy Med Name: DOXEPIN 25MG CAPSULE] 30 capsule 0     Sig: TAKE 1 CAP BY MOUTH AT BEDTIME FOR ITCH       Tricyclics/Misc Antidepressant/Antianxiety Meds Refill Protocol Passed - 10/12/2020  2:11 PM        Passed - PCP or prescribing provider visit in last year     Last office visit with prescriber/PCP: 9/20/2019 Jose Leija MD OR same dept: Visit date not found OR same specialty: 9/20/2019 Jose Leija MD  Last physical: Visit date not found Last MTM visit: Visit date not found   Next visit within 3 mo: Visit date not found  Next physical within 3 mo: Visit date not found  Prescriber OR PCP: Jose Leija MD  Last diagnosis associated with med order: There are no diagnoses linked to this encounter.  If protocol passes may refill for 12 months if within 3 months of last provider visit (or a total of 15 months).

## 2021-06-14 NOTE — TELEPHONE ENCOUNTER
Medication Request  Medication name: diclofenac sodium 1%  Requested Pharmacy: [facility]   Reason for request: Refill   When did you use medication last?:  Unknown   Patient offered appointment:  N/A - electronic request  Okay to leave a detailed message: no

## 2021-06-14 NOTE — TELEPHONE ENCOUNTER
RN cannot approve Refill Request    RN can NOT refill this medication med is not covered by policy/route to provider. Last office visit: 9/20/2019 Jose Leija MD Last Physical: Visit date not found Last MTM visit: Visit date not found Last visit same specialty: 9/20/2019 Jose Leija MD.  Next visit within 3 mo: Visit date not found  Next physical within 3 mo: Visit date not found      Danna Spann, [facility] Connection Triage/Med Refill 2/2/2021    Requested Prescriptions   Pending Prescriptions Disp Refills     diclofenac sodium (VOLTAREN) 1 % Gel 100 g 12     Sig: APPLY TOPICALLY TO BOTH HIPS THREE TIMES DAILY FOR PAIN       There is no refill protocol information for this order

## 2021-06-15 NOTE — PROGRESS NOTES
SUBJECTIVE: Dania Da Silva is a 89 y.o. female with:  Chief Complaint   Patient presents with     Sore Throat     pt states really sore throat, since new years      Nasal Congestion     pt states being congested, had bloody nose twice about 3 weeks ago     1) Sore throat- Started on Jan 1 and has been persistent.  No fevers/ chills.  She does have rhinorrhea and some eye discharge.  No post nasal drip.  She was exposed to children over the holidays.  No significant cough.  No shortness of breath.  Taking Anacin for the sore throat.  No rash or abdominal pain.    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  She has a urinary suprapubic catheter placed at the [facility].  She flushes her catheter twice a day.  She will get cramps and urge to have a bowel movement periodically.  She will take Imodium.      OBJECTIVE: /60 (Patient Site: Right Arm, Patient Position: Sitting, Cuff Size: Adult Regular)  Pulse (!) 114  Resp 20  Wt 105 lb 8 oz (47.9 kg)  SpO2 98%  BMI 20.6 kg/m2   No acute distress.  HEENT: Head is atraumatic and/normocephalic.  PERRL.  Conjunctiva clear. Lids are mildly erythematous bilaterally with no swelling. No nasal discharge.  Oropharynx is pink and moist.  Sinuses nontender.  Neck: Supple.  No lymphadenopathy or thyromegaly.  Lungs: Clear to auscultation.  No wheezing, retractions, or tachypnea.  Heart: RRR. S1 and S2 normal.  No murmurs, rubs, or gallops.  Neuro: Awake, alert, oriented x 3.  Normal strength and tone.  Normal gait.     Recent Results (from the past 240 hour(s))   Rapid Strep A Screen-Throat   Result Value Ref Range    Rapid Strep A Antigen No Group A Strep detected, presumptive negative No Group A Strep detected, presumptive negative      Dania Somers was seen today for sore throat and nasal congestion.    Diagnoses and all orders for this visit:    Sore throat - Viral illness. No indication for antibiotics.  Salt water gargles/  soups/warm fluids/ rest.  -     Rapid Strep A Screen-Throat  -     Group A Strep, RNA Direct Detection, Throat    Squamous Cell Carcinoma Of The Urethra - F/U with [facility] yearly and PRN catheter problems.      Nicolasa Mcclellan

## 2021-06-16 NOTE — PROGRESS NOTES
03/26/18 1015   Provider Pre-Sedation Assessment   Difficult Airway?  No   Plan for Sedation Moderate   Assessment reviewed by proceduralist Yes   Update H&P I have performed an assessment and examined the patient, as necessary, to update the patient's current status that may have changed since the prior History and Physical. The History and Physical has been reviewed and the patient's status is unchanged.

## 2021-06-16 NOTE — PROGRESS NOTES
Assessment & Plan   1. Fracture of rib of right side:  Her chest xray shows a nontraumatic fracture of the lateral sixth rib with a possible expansile mass with destructive changes. Discussed findings with Dania Somers today and will move forward with a CT to assess this.  She is currently not having any other respiratory symptoms. Her pain has been well controlled with acetaminophen.  Recommended scheduled 1000mg TID and to call if pain worsens or she develops shortness of breath.  Will follow up with results of CT.    - XR Ribs Right W PA Chest; Future  - CT Chest With and Without Contrast; Future    2. Chest mass  - CT Chest With and Without Contrast; Future    3. Constipation  - docusate sodium (COLACE) 100 MG capsule; Take 1 capsule (100 mg total) by mouth 2 (two) times a day.  Dispense: 60 capsule; Refill: 11    4. Hypertension:  Well controlled, labs today  - Basic Metabolic Panel    5. Hyperparathyroidism  - Parathyroid Hormone Intact    6. Screening for deficiency anemia  - HM2(CBC w/o Differential)    Shawnee Taveras CNP    Subjective   Chief Complaint:  Rib pain (R sided rib pain x 2 days; no shortness of breath, has a small cough, takes tylenol and this helps very much; pt noted that she did have a fall in early February, had some pain on the R side of her body but that pain went away) and Abdominal Cramping    HPI:   Dania Da Silva is a 89 y.o. female who presents for rib pain.   PMH notable for HTN, osteoporosis, squamous cell carcinoma of the urethra, hyperparathyroidism, chronic constipation.     She complains of pain on the right side of the ribs for the last two days.  Did fall in February on a snowbank though did not have pain at that time.  She states the right side is quite uncomfortable.  She has a slight cough but not often, no fever. Hurts with taking a deep breath, coughing. She does not feel short of breath.   She notes when pain started she had been straining on the toilet to have a bowel  movement.  Long history of intermittent constipation, not currently taking anything for this.     H/o squamous cell carcinoma of the urethra, now has urinary suprapubic catheter. Follows with [place].      HTN:  Lisinopril 5mg, Maxzide-25 and amlodipine 5mg.  Due for labs.      Allergies:  is allergic to sulfa (sulfonamide antibiotics) and sulindac.    SH/FH:  Social History and Family History reviewed and updated.   Tobacco Status:  She  reports that she has never smoked. She has never used smokeless tobacco.    Review of Systems:  A complete head to toe ROS is negative unless otherwise noted in HPI    Objective     Vitals:    03/08/18 1347   BP: 114/48   Patient Site: Right Arm   Patient Position: Sitting   Cuff Size: Adult Regular   Pulse: 76   SpO2: 98%   Weight: 107 lb (48.5 kg)       Physical Exam:  GENERAL: Alert, frail though well appearing.    SKIN: No ecchymosis or abrasions  CV: Regular rate and rhythm without murmurs, rubs or gallops.  RESP: Lung sounds clear, no rhonchi or rales  MSK: TTP of right ribs at nipple line, 2 inches lateral to nipple.  Full ROM of back, no spinal or paraspinal tenderness.

## 2021-06-16 NOTE — PROGRESS NOTES
Patient here today for initial consult with Dr. Britton for renal mass and lesion on the 7th rib and pulmonary nodules.  MICHAEL Boland RN

## 2021-06-16 NOTE — CONSULTS
[facility] Hematology and Oncology Consult Note    Patient: Dania Da Silva  MRN: [id number]  Date of Service: 03/20/2018      Reason for Visit    Referred by Dr. Leija regarding probable metastatic renal cell carcinoma    Assessment/Plan    ECOG Performance   ECOG Performance Status: 0  Distress Assessment  Distress Assessment Score: 5 (due to today's visit/provider notified)    #.  2.5 cm pleural-based expansile destructive lesion in the right lateral seventh rib  #.  3.2 cm renal mass consistent with renal cell carcinoma  #.  Multiple bilateral pulmonary nodules with small right pleural effusion  #.  Rheumatoid arthritis- on methotrexate  #.  Osteoporosis  #.  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       I reviewed the CT scan in detail with the patient and showed the area of abnormalities.  I explained to them that we would need to have further imaging and biopsy to confirm our suspicion of metastatic renal cell carcinoma.  She currently has pain in the right chest wall and back pain, or managing well with Tylenol 1000 mg twice a day and Aleve twice a day.  Will continue current pain management for now.   All the questions were answered to their stated satisfaction.    Plan:  -Labs today-LFT, LDH, INR.  -CT scan of abdomen or pelvis.  -Nuclear medicine bone scan  -Biopsy of the right chest wall lesion near the 7th rib.  -Follow-up with me in about a week after completion of biopsy to discuss pathology and next step in care.  -Continue current pain management as it is adequately controlling her pain.      Problem List    1. Mass of right chest wall  Hepatic Profile    Lactate Dehydrogenase (LDH)    INR    CT Abdomen Pelvis Without Oral With IV Contrast    NM Bone Scan Whole Body    US Soft Tissue Biopsy   2. Renal mass, right  Hepatic Profile    Lactate Dehydrogenase (LDH)    INR    CT Abdomen Pelvis Without Oral With IV Contrast    NM Bone Scan Whole Body     US Soft Tissue Biopsy     ______________________________________________________________________________    Staging History    No matching staging information was found for the patient.    History  Ms. Dania Da Silva is a very pleasant 89-year-old female accompanied by her son-in-law and her friend, Loni.  She went to check in for right chest wall pain for a few weeks duration earlier this month and found to have a mass in the right chest wall and right renal mass.  She reported that she has mild back pain but not appears to be new.  She has been taking Tylenol and Aleve alternately about 4 times per day.  Denies headache, vision changes.  She has good appetite.  Good energy level.  She lives in a senior apartment.  She manages her medication and finances herself.    March 2018- presented with right chest wall pain.  X-ray showed fracture involving lateral 6th rib, expansile mass with destructive changes in the lateral aspect of the right 7th rib.   CT scan of chest with contrast showed small right pleural effusion, enhancing pleural-based nodules.  Small nodules of varying sizes in the lungs bilaterally.  2.5 x 1 cm pleural-based lesion in density on the chest x-ray correspond to the rib lesion expanding and destroying the lateral right seventh rib.  Pretracheal lymph nodes slightly prominent.  Enhancing mass in the right kidney measuring 3.2 x 2.5 x 3.8 cm consistent with renal cell carcinoma.    March 2005-urothelial cell carcinoma of the urethra with squamous differentiation, grade 4. s/p radical urethrectomy.  Suprapubic tube placement.  Followed by [facility]. Angelica Trevino at [facility].  His exam in June 2016 with normal cystoscopy for indwelling suprapubic tube, NIMESH.  Plan for follow-up in 2018.    Past History    Past Medical History:   Diagnosis Date     Cancer      GERD (gastroesophageal reflux disease)      Hypertension      Osteoporosis      Rheumatoid arthritis     Family History   Problem Relation  "Age of Onset     Thyroid cancer Daughter      Cervical cancer Daughter      Breast cancer Daughter      Uterine cancer Daughter      Testicular cancer Nephew      Lung cancer Cousin       Past Surgical History:   Procedure Laterality Date     MO CYSTO/URETERO/PYELOSCOPY,W/RESECT TUMOR      Description: Cystoscopy With Resection Of Tumor;  Proc Date: 12/01/2003;     MO CYSTOURETHROSCOPY      Description: Cystoscopy (Diagnostic);  Proc Date: 01/13/2005;     MO REMOVAL OF TONSILS,<13 Y/O      Description: Tonsillectomy;  Recorded: 06/03/2013;     MO TOTAL HIP ARTHROPLASTY      Description: Total Hip Replacement;  Recorded: 04/23/2008;    Social History     Social History     Marital status:      Spouse name: N/A     Number of children: N/A     Years of education: N/A     Occupational History     Not on file.     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  She is .  Never smoker.  She drinks alcohol very infrequently.      Allergies    Allergies   Allergen Reactions     Sulfa (Sulfonamide Antibiotics)      Sulindac        Review of Systems    General  General (WDL): Exceptions to WDL  Fatigue: Yes - Chronic (Greater than 3 months)  Fever: None  Generalized Muscle Weakness: Yes - Chronic (Greater than 3 months)  Weight Loss: No  ENT  ENT (WDL): Exceptions to WDL  Vertigo (Dizziness): None  Changes in vision: None  Glasses or Contacts: Yes - Chronic (Greater than 3 months)  Hearing loss: Yes - Chronic (Greater than 3 months)  Hearing Aids: Yes - Chronic (Greater than 3 months)  Tinnitus: None  Pain/Pressure in ears: None  Sinus Congestion/Drainage: None  Hoarseness: None  Sore Throat: None  Dental Problems: None  Dentures: " "None  Respiratory  Respiratory (WDL): Exceptions to WDL  Dyspnea: Yes - Recent (Less than 3 months) (with exertion, \"sometimes\")  hemoptysis: None  Is patient on O2?: None  Cough: None  Non-Cardiac Chest Pain: Yes - Recent (Less than 3 months)  Exacerbating Activity: Other (Comment) (cough)  Relieved by: Medication  Medications: Other (Comment) (See med list)  Cardiovascular  Cardiovascular (WDL): Exceptions to WDL  Palpitations: None  Edema Limbs: None  Irregular Heart Beat: None  Chest Pain: Yes - Recent (Less than 3 months)  Lightheadedness: Yes - Chronic (Greater than 3 months) (intermittent)  Endocrine  Endocrine (WDL): Exceptions to WDL  Heat Intolerance: None  Excessive Thirst: None  Cold Intolerance: None  Excessive Urination: None  Hotflashes: None  Gastrointestinal  Gastrointestinal (WDL): Exceptions to WDL  Difficulty Swallowing: None  Heartburn: None  Constipation: Yes - Chronic (Greater than 3 months)  Yellowish skin and/or eyes: None  Blood from rectum: None  Nausea and Vomiting: None  Abdominal Pain: Yes - Recent (Less than 3 months) (\"sometimes\")  Diarrhea: Yes - Chronic (Greater than 3 months) (intermittent)  Have had black or tan stools?: None;Yes - Chronic (Greater than 3 months) (\"dark brown\" \"hard\")  Hemorrhoids: None  Poor Appetite: None  Musculoskeletal  Musculoskeletal (WDL): Exceptions to WDL  Range of Motion Limitation: None  Joint pain: Yes - Chronic (Greater than 3 months) (right hip)  Back Pain: Yes - Recent (Less than 3 months)  Activity Assistance: None  Difficulty to lie flat for more than 30 minutes: None  Pain interfering with walking: Yes - Chronic (Greater than 3 months) (hip)  Muscle pain or stiffness: Yes - Chronic (Greater than 3 months)  Recent fall: Yes - Recent (Less than 3 months)  Assistive device: Yes - Chronic (Greater than 3 months) (cane)  Neurological  Neurological (WDL): Exceptions to WDL  History of LOC?: None  Headaches: Yes - Chronic (Greater than 3 months) " (intermittent, sharp R temple)  Difficulty walking: Yes - Recent (Less than 3 months)  Difficulty with speech: None  Difficulty with memory: None  Vertigo (Dizziness): None  Dominant Hand: Right  Seizures: None  Difficulty with Balance: Yes - Chronic (Greater than 3 months)  Numbness and/or tingling: Yes - Chronic (Greater than 3 months) (feet)  Psychological/Emotional  Psychological/Emotional (WDL): Exceptions to WDL  Depression: None  Insomnia: Yes - Chronic (Greater than 3 months)  Panic attacks: None  Anxiety: Yes - Recent (Less than 3 months)  Daytime sleepiness: Yes - Recent (Less than 3 months)  Hallucinations: None  Psychosocial needs or not coping well: None  Hematological/Lymphatic  Hematological/Lymphatic (WDL): Exceptions to WDL  Bleeding gums: None  Bruising: None  Epistaxis: Yes - Recent (Less than 3 months)  Swollen glands: None  Dermatological  Dermatologic (WDL): Exceptions to WDL  Open wounds: None  Rash : None  Hair Loss: None  Healing Incision: Yes - Chronic (Greater than 3 months)  Changes in moles: Yes - Chronic (Greater than 3 months) (on back-dermatologist as assessed)  Significant sunburn: Yes - Chronic (Greater than 3 months) (1 time-when sat in sun at tennis match)  Genitourinary/Reproductive  Genitourinary/Reproductive (WDL): Exceptions to WDL (suprapubic catheter)  Urinary Frequency: None  Urinary Incontinence: None  Painful urination: None  Urination more than 2 times a night: None  Urinary Urgency: None  Difficulty Initiating Urine Stream: None  Blood in urine: None  Sensation of incomplete emptying of bladder: None  Sexual concerns: None  Reproductive (Females only)     Pain  Currently in Pain: No/denies  Pain Score (Initial OR Reassessment): No/Denies Pain  Pain Frequency: Intermittent  Location: R ribs/side, radiates under R breast and into upper back  Pain Characteristics : Other (Comment)  Pain Intervention(s): Home medication;Rest (varies )  Response to Interventions:  "improved    Physical Exam    Recent Vitals 3/20/2018   Height 5' 0\"   Weight 108 lbs 2 oz   BSA (m2) 1.44 m2   /77   Pulse 88   SpO2 96   Some recent data might be hidden     General: alert, awake, not in acute distress. Healthy appearing female.  HEENT: Head: Normal, normocephalic, atraumatic.  Eye: Normal external eye, conjunctiva, lids cornea, MAHESH.  Ears: Normal auditory canals and external ears. Non-tender.  Nose: Normal external nose, mucus membranes and septum.  Pharynx: Normal buccal mucosa. Normal pharynx.  Neck / Thyroid: Supple, no masses, nodes, nodules or enlargement.  Lymphatics: No abnormally enlarged lymph nodes.  Chest: Normal chest wall and respirations. Clear to auscultation.  No reproducible tenderness on the right side of chest wall at the time of my exam.    Heart: S1 S2 RRR, no murmur.   Abdomen: abdomen is soft without significant tenderness, masses, organomegaly or guarding  Extremities: normal strength, tone, and muscle mass. Hand joint deformity.  Skin: normal. no rash or abnormalities  CNS: non focal.      Lab Results    Recent Results (from the past 168 hour(s))   INR   Result Value Ref Range    INR 0.94 0.90 - 1.10       Imaging Results    Xr Ribs Right W Pa Chest    Result Date: 3/8/2018  XR RIBS RIGHT W PA CHEST 3/8/2018 2:38 PM INDICATION: Pleurodynia COMPARISON: None. FINDINGS: Heart size and pulmonary vascularity normal. Hyperinflated lungs, otherwise clear. There is a fracture involving the lateral sixth rib. There is a possible expansile mass with destructive changes involving the lateral aspect of the right seventh rib. CT chest exam recommended for further evaluation. Presumed old fracture involving the lateral left ninth rib.     Ct Chest With Contrast    Result Date: 3/9/2018  CT CHEST W CONTRAST 3/9/2018 9:12 AM INDICATION: Possible expansile mass on chest xray, nontraumatic rib fracture. TECHNIQUE: Routine chest. Dose reduction techniques were used. IV CONTRAST: " Iohexol (Omni) 100 mL. COMPARISON: None. FINDINGS: LUNGS AND PLEURA: There is a small right pleural effusion and there are enhancing pleural-based nodules consistent with metastasis. One can be seen on image 49, series 2, measuring 11 x 9 mm. There are small nodules of varying sizes in the lungs, two can  be seen in the right upper lobe image 25 measuring a few millimeters in diameter. There is a 4 mm nodule in the right major fissure on image 30. There is a 5 mm nodule anterior to the right major fissure on image 38. There is an 8 mm left lower lobe nodule on image 71. There is a 12 mm nodule in the left upper lobe on image 34. On image 58, series 2, there is a 2.5 x 1.0 cm enhancing pleural-based lesion and the density seen on the chest x-ray corresponds to a rib lesion expanding and destroying the  lateral right seventh rib. MEDIASTINUM: There is a pretracheal lymph node on image 41 that measures 11 mm in short axis diameter, slightly prominent. LIMITED UPPER ABDOMEN: There is an enhancing mass in the right kidney that measures 3.2 x 2.5 x 3.8 cm, consistent with a renal cell carcinoma. MUSCULOSKELETAL: The lesion seen on the patient's chest x-ray corresponds to a destructive expansile lesion of the right lateral 7th rib, consistent with a metastasis likely from the patient's right renal mass.     CONCLUSION: 1.  The lesion seen on the patient's chest x-ray corresponds to an expansile destructive lesion in the right lateral 7th rib, consistent with a metastasis. Patient has renal mass, this is likely from that lesion. There also are multiple enhancing pleural-based masses, and a small right pleural effusion. 2.  There are multiple pulmonary nodules in the right and left of varying size, left likely representing metastases. 3.  There is a right renal mass measuring 3.2 x 2.5 x 3.8 cm, consistent with a renal cell carcinoma.        Signed by: Ernesto Britton MD

## 2021-06-17 NOTE — PROGRESS NOTES
Assessment & Plan   1. Hypertension:  Variable readings though majority over goal range.  Increase lisinopril to 10mg, continue other medications as prescribed.  She will monitor with her home cuff and return in 2-3 weeks for recheck.      2. Loose stools:  Alternating with constipation, has been a long-term problem for her.  Imodium is effective though worsens constipation.  Recommended trial of daily fiber supplement and probiotic.      Shawnee Taveras CNP    Subjective   Chief Complaint:  Follow-up and Hypertension    HPI:   Dania Da Silva is a 89 y.o. female who presents for HTN and bowel concerns.      Following with oncology for renal cell carcinoma that was diagnosed earlier this year.  She has begun chemotherapy and tolerating fairly well.  On pazopanib which does carry a warning of increasing hypertension.  She has had elevated pressures at her oncology visits.  140-173/60-71.  She believes her elevated readings at these visits are largely related to anxiety.  Today initially within good range at 128/58, recheck 146/98.  She does have this checked at her assisted living facility daily though she feels readings are inaccurate and highly variable.  Use wrist cuff.  Systolics range 120-210.  She did purchase her own arm cuff yesterday and intends to start using this.  Currently on lisinopril 5mg, amlodipine 5mg, Maxzide 37.5-25.     Intermittent abdominal cramping and loose stools.  Particularly when she is due for a catheter change.  She has a suprapubic catheter in place and changes this every 20-21 days.  Takes imodium as needed though this can cause constipation.  She is mostly bothered by the urgency and inability to leave her apartment.        Allergies:  is allergic to sulfa (sulfonamide antibiotics) and sulindac.    SH/FH:  Social History and Family History reviewed and updated.   Tobacco Status:  She  reports that she has never smoked. She has never used smokeless tobacco.    Review of Systems:  A  complete head to toe ROS is negative unless otherwise noted in HPI    Objective     Vitals:    04/26/18 1337 04/26/18 1410   BP: 128/58 (!) 146/98   Patient Site: Right Arm    Patient Position: Sitting    Cuff Size: Adult Regular    Pulse: 82    SpO2: 99%    Weight: 107 lb 12 oz (48.9 kg)        Physical Exam:  GENERAL: Alert, well-appearing female.   CV: Regular rate and rhythm without murmurs, rubs or gallops.  RESP: Lung sounds clear

## 2021-06-17 NOTE — PROGRESS NOTES
"[facility] Hematology and Oncology Progress Note    Patient: Dania Da Silva  MRN: [id number]  Date of Service: 04/03/2018        Reason for Visit    Follow up metastatic clear-cell renal cell carcinoma    Assessment and Plan  Renal cell carcinoma of right kidney    Staging form: Kidney, AJCC 8th Edition    - Clinical stage from 4/3/2018: Stage IV (cT1b, cNX, pM1) - Signed by Ernesto Britton MD on 4/3/2018    ECOG Performance   ECOG Performance Status: 0     Distress Assessment  Distress Assessment Score: 8 (due to today's visit)    Pain  Currently in Pain: No/denies  Pain Score (Initial OR Reassessment): No/Denies Pain  Location: R ribs/side, radiates to mdd upper back    #.  Metastatic renal cell carcinoma of the right kidney \"clear cell type (2.5 cm pleural-based expansile destructive lesion in the right lateral seventh rib, pleural-based nodules, multiple bilateral pulmonary nodules with small right pleural effusion, 3.2 cm renal mass consistent with renal cell carcinoma)  #.  Rheumatoid arthritis- on methotrexate  #.  Osteoporosis  #.  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       Reviewed the pathology result, CT scan and bone scan results in detail with her, her son-in-law and her friend, Loni.  I informed them that she has clear cell type kidney cancer which is stage IV.  Surgically unresectable.  Probably good risk with normal hemogram, LDH and good performance status.     We discussed about treatment goals which is palliative meaning we will not be able to cure the cancer but can keep the symptoms under control and prolong survival.  She has mild pain in the right chest wall which is adequately controlled with 4 tablets of Tylenol, or 2 tablets of Advil a day.  She does not have significant exertional dyspnea.  I reviewed the treatment options of 1) observation with short interval follow-up, 2) radiation treatment to the right chest wall mass if " painful 3) starting systemic treatment option with pazopanib (preferred) or nivolumab.  I reviewed the risks and benefits of all approaches.  Reviewed the pazopanib side effects and handout given today.  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  However we will not be able to tell the growth rate of the right chest wall mass, pulmonary nodules or pleural masses. Again, she does not have significant pulmonary symptoms at this point. She will think about the treatment options.    In the meantime, I will obtain echocardiogram in preparation for starting pazoanib.    Plan:  - Pathology additional testing-PDL 1.  - Echo for assessment of dyspnea and baseline cardiac function prior to TKI therapy.  -Continue current pain regimen of as needed Tylenol and Advil as needed.  -Tentative plan is a follow-up exam with CT scan and bone scan in 2 months.  She will call me with her decision on treatment.  She is advised to call me if any concerns arise.  -She might need to restart back on bisphosphonate therapy based on osteoporosis and some degree of metastatic bone disease.  She was previously treated with alendronate and zoledronic acid many years ago.  I will readdress that in next visit.    Problem List    1. Renal cell carcinoma of right kidney  CT Chest Abdomen Pelvis Without Oral Without IV Contrast   2. Dyspnea, unspecified type  Echo Complete      ______________________________________________________________________________    History of Present Illness    Ms. Da Silva is here for follow up biopsy report.   She is doing okay.  She has bilateral feet numbness intermittently.  She has a dry cough in the morning.  She was able to walk to the clinic normal without stopping.  Currently no pain however she  intermittently has pain in the right side of the chest which she is taking alternating Tylenol and Advil about 2 times each day.  No bleeding.  No weight loss.  Appetite is good.    Pain Status  Currently in Pain: No/denies    Review of Systems    Constitutional  Constitutional (WDL): All constitutional elements are within defined limits  Neurosensory  Neurosensory (WDL): Exceptions to WDL  Peripheral Motor Neuropathy: None  Ataxia: None  Peripheral Sensory Neuropathy: Asymptomatic, loss of deep tendon reflexes or paresthesia (bilat feet-numbness in the mornings)  Confusion: None  Syncope: None  Eye   Eye Disorder (WDL): Exceptions to WDL (wears glasses)  Blurred Vision: None  Dry Eye: Asymptomatic, clinical or diagnostic observations only, mild symptoms relieved by lubricants  Eye Pain: None  Watering Eyes: None  Ear  Ear Disorder (WDL): All ear disorder elements are within defined limits  Cardiovascular  Cardiovascular (WDL): All cardiovascular elements are within defined limits  Pulmonary  Respiratory (WDL): Exceptions to WDL  Cough: Mild symptoms, nonprescription intervention indicated (in the mornings-dry cough)  Dyspnea: Shortness of breath with moderate exertion  Hypoxia: None  Gastrointestinal  Gastrointestinal (WDL): Exceptions to WDL  Anorexia: None  Constipation: None  Diarrhea: Increase of <4 stools per day over baseline, mild increase in ostomy output compared to baseline (alternates between diarrhea and constipation)  Dysphagia: None  Esophagitis: None  Nausea: None  Pharyngitis: None  Vomiting: None  Dysgeusia: None  Dry Mouth: None  Genitourinary  Genitourinary (WDL): Exceptions to WDL ([place])  Urinary Frequency: None  Urinary Retention: None  Urinary Tract Pain: None  Lymphatic  Lymph (WDL): All lymph disorder elements are within defined limits  Musculoskeletal and Connective Tissue  Musculoskeletal and Connetive Tissue Disorders (WDL): All Musculoskeletal and Connetive Tissue Disorder elements are  within defined limits  Integumentary  Integumentary (WDL): All integumentary elements are within defined limits  Patient Coping  Patient Coping: Accepting  Distress Assessment  Distress Assessment Score: 8 (due to today's visit)  Accompanied by  Accompanied by: Family Member (Son-n-law and friend)  Oral Chemo Adherence       Past History  Past Medical History:   Diagnosis Date     Cancer      GERD (gastroesophageal reflux disease)      Hypertension      Osteoporosis      Rheumatoid arthritis        Physical Exam    Recent Vitals 4/3/2018   Height -   Weight -   BSA (m2) -   /71   Pulse 83   Temp -   Temp src -   SpO2 96   Some recent data might be hidden     General: alert, awake, not in acute distress  HEENT: Head: Normal, normocephalic, atraumatic.  Eye: Normal external eye, conjunctiva, lids cornea, MAHESH.  Ears: Normal TM's bilaterally. Normal auditory canals and external ears. Non-tender.  Nose: Normal external nose, mucus membranes and septum.  Pharynx: Dental Hygiene adequate. Normal buccal mucosa. Normal pharynx.  Neck / Thyroid: Supple, no masses, nodes, nodules or enlargement.  Lymphatics: No abnormally enlarged lymph nodes.  Chest: Normal chest wall and respirations. Clear to auscultation.  Heart: S1 S2 RRR, no murmur.   Abdomen: abdomen is soft without significant tenderness, masses, organomegaly or guarding  Extremities: normal strength, tone, and muscle mass  Skin: normal. no rash or abnormalities  CNS: non focal.    Lab Results    No results found for this or any previous visit (from the past 168 hour(s)).    Imaging    Xr Ribs Right W Pa Chest    Result Date: 3/8/2018  XR RIBS RIGHT W PA CHEST 3/8/2018 2:38 PM INDICATION: Pleurodynia COMPARISON: None. FINDINGS: Heart size and pulmonary vascularity normal. Hyperinflated lungs, otherwise clear. There is a fracture involving the lateral sixth rib. There is a possible expansile mass with destructive changes involving the lateral aspect of the right  seventh rib. CT chest exam recommended for further evaluation. Presumed old fracture involving the lateral left ninth rib.     Ct Chest With Contrast    Result Date: 3/9/2018  CT CHEST W CONTRAST 3/9/2018 9:12 AM INDICATION: Possible expansile mass on chest xray, nontraumatic rib fracture. TECHNIQUE: Routine chest. Dose reduction techniques were used. IV CONTRAST: Iohexol (Omni) 100 mL. COMPARISON: None. FINDINGS: LUNGS AND PLEURA: There is a small right pleural effusion and there are enhancing pleural-based nodules consistent with metastasis. One can be seen on image 49, series 2, measuring 11 x 9 mm. There are small nodules of varying sizes in the lungs, two can  be seen in the right upper lobe image 25 measuring a few millimeters in diameter. There is a 4 mm nodule in the right major fissure on image 30. There is a 5 mm nodule anterior to the right major fissure on image 38. There is an 8 mm left lower lobe nodule on image 71. There is a 12 mm nodule in the left upper lobe on image 34. On image 58, series 2, there is a 2.5 x 1.0 cm enhancing pleural-based lesion and the density seen on the chest x-ray corresponds to a rib lesion expanding and destroying the  lateral right seventh rib. MEDIASTINUM: There is a pretracheal lymph node on image 41 that measures 11 mm in short axis diameter, slightly prominent. LIMITED UPPER ABDOMEN: There is an enhancing mass in the right kidney that measures 3.2 x 2.5 x 3.8 cm, consistent with a renal cell carcinoma. MUSCULOSKELETAL: The lesion seen on the patient's chest x-ray corresponds to a destructive expansile lesion of the right lateral 7th rib, consistent with a metastasis likely from the patient's right renal mass.     CONCLUSION: 1.  The lesion seen on the patient's chest x-ray corresponds to an expansile destructive lesion in the right lateral 7th rib, consistent with a metastasis. Patient has renal mass, this is likely from that lesion. There also are multiple enhancing  pleural-based masses, and a small right pleural effusion. 2.  There are multiple pulmonary nodules in the right and left of varying size, left likely representing metastases. 3.  There is a right renal mass measuring 3.2 x 2.5 x 3.8 cm, consistent with a renal cell carcinoma.    Nm Bone Scan Whole Body    Result Date: 3/26/2018  NM BONE SCAN WHOLE BODY 3/23/2018 2:47 PM INDICATION: Bone pain. Metastatic right renal mass likely renal cell cancer. TECHNIQUE: 25.8 mCi technetium-99m MDP were injected intravenously. Anterior and posterior delayed whole body images were obtained with additional lateral images of the skull. COMPARISON: CT chest 03/09/2018 and CT abdomen 03/23/2018. FINDINGS: At the site of right seventh rib lesion there is only subtle increased activity mostly a photopenic area. Just above this there is subtle increased activity consistent with the known rib fractures seen on CT. 2 areas of mild increased activity in the left ninth and tenth ribs consistent with rib fractures seen on prior CT. Degenerative uptake in the spine. Postop right hip.     CONCLUSION: 1.  Mostly photopenic area right seventh rib at the large metastasis seen on CT. 2.  Increased activity right sixth rib anteriorly and the left ninth and tenth ribs posteriorly consistent with rib fractures as seen on CT. 3.  No other significant findings.    Ct Lung Biopsy    Result Date: 3/26/2018  1. PERCUTANEOUS BIOPSY RIGHT CHEST WALL MASS 2. CT GUIDANCE 3. CONSCIOUS SEDATION 3/26/2018 10:39 AM INDICATION: Chest wall mass. TECHNIQUE: Dose reduction techniques were used. PROCEDURE: Informed consent obtained. Time out performed. The site was prepped and draped in sterile fashion. 10 mL of 1% lidocaine was infused into the local soft tissues. Using standard technique and under direct CT guidance, a 20 gauge biopsy device was used to obtain 2 core biopsies. Tissue was submitted to Pathology and was adequate by preliminary review by a pathologist.  The patient tolerated the procedure well. No immediate complications. RADIOLOGIC SUPERVISION AND INTERPRETATION: Redemonstrated chest wall mass centered in the bowel the right seventh rib anterior-laterally. CT GUIDANCE: Images demonstrate the biopsy needle to be in good position. SEDATION: Versed 0.5 mg. Fentanyl 25 mcg. The procedure was performed with administration intravenous conscious sedation with appropriate preoperative, intraoperative, and postoperative evaluation. 13 minutes of supervised face to face conscious sedation time was provided by a radiology nurse under my direct supervision.     CONCLUSION: 1.  Successful CT-guided biopsy right chest wall mass.    Ct Abdomen Pelvis Without Oral With Without Iv Contrast    Result Date: 3/23/2018  CT ABDOMEN PELVIS WO ORAL W WO IV CONTRAST 3/23/2018 11:47 AM INDICATION: Abnormal CTA chest showing lung and pleural metastases, seventh rib lytic lesion, and mass in the right kidney likely a renal cell cancer. CT abdomen pelvis to further characterize. TECHNIQUE: CT abdomen and pelvis without contrast and with IV contrast with delayed images. Multiplanar reformation images (MPR). Dose reduction techniques were used.? IV CONTRAST: Iohexol (Omni) 100 mL COMPARISON: CT chest 03/09/2018 FINDINGS: LUNG BASES: Expansile lytic lesion right anterior lateral seventh rib again noted. Multiple pleural metastases right lower chest also seen on prior CT chest. ABDOMEN: 4 cm enhancing slightly infiltrative mass posterior right kidney consistent with renal cell cancer. Renal vein is widely patent. No retroperitoneal lymphadenopathy. Normal liver, spleen, left kidney, pancreas, and adrenal glands. PELVIS: Negative. No masses. MUSCULOSKELETAL: Moderate degenerative change right hip. Postop arthroplasty left hip. Lytic lesion right seventh rib. No other bone lesions. Advanced degenerative change in the lumbar spine.     CONCLUSION: 1.  Findings of renal cell cancer right kidney with  4 cm enhancing mass. Patent renal vein. No retroperitoneal lymphadenopathy. 2.  Metastases in the lungs, right pleural space and right seventh rib as noted on CT chest. 3.  No additional areas of metastatic disease in the abdomen or pelvis. 4.  If tissue diagnosis is needed, right seventh rib biopsy would be the most straightforward.         Signed by: Ernesto Britton MD

## 2021-06-17 NOTE — PROGRESS NOTES
[facility] Hematology and Oncology Progress Note    Patient: Dania Da Silva  MRN: [id number]  Date of Service: 5/9/2018      Reason for Visit    Follow up metastatic clear-cell renal cell carcinoma    Assessment and Plan  Renal cell carcinoma of right kidney    Staging form: Kidney, AJCC 8th Edition    - Clinical stage from 4/3/2018: Stage IV (cT1b, cNX, pM1) - Signed by Ernesto Britton MD on 4/3/2018    ECOG Performance   ECOG Performance Status: 1     Distress Assessment  Distress Assessment Score: No distress    Pain  Currently in Pain: Yes  Pain Score (Initial OR Reassessment): No/Denies Pain  Location: R side/ribs    #.  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     Her hypertension is much improved from home reading with in systolic 154-149/diastolic 72-84.  However her blood pressure is slightly elevated during the visit today.  Overall her blood pressure has improved control.Her pain in the right ribs area has significantly improved and she is tapering down her pain medication.  No other recall side effects.  I advised her to continue pazopanib 400 mg daily.   We will plan for follow-up CT scan in June 2018, and follow-up with me day after with labs to review the results.     #.  Metastatic bone disease   She will start Zometa today and will continue every 12 weeks.     #.  Cancer related pain   She has been alternating Tylenol and ibuprofen about 2 times a day and now it is even less.  Continue current pain medication    #.  Rheumatoid arthritis- on methotrexate  #.  Osteoporosis  #.  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  has a suprapubic.  She is follow by urology  at [facility].  #.  Hypertension, improved    Problem List    1. Renal cell carcinoma of right kidney  CT Chest Abdomen Pelvis With Oral With IV Cont    HM1(CBC and Differential)    Comprehensive Metabolic Panel      ______________________________________________________________________________  Diagnosis  March 2018- Metastatic renal cell carcinoma of the right kidney- clear cell type (2.5 cm pleural-based expansile destructive lesion in the right lateral seventh rib, pleural-based nodules, multiple bilateral pulmonary nodules with small right pleural effusion, 3.2 cm renal mass consistent with renal cell carcinoma)- PDL1 expression 90%.    Treatment to date  4/18/18- started pazopanib 400 mg daily (at 50% dose reduction with plan to escalate to 800 mg daily as tolerated)    History of Present Illness    Ms. Da Silva is here for follow up, accompanied by her friend, Mandy.     She reported her blood pressure has improved.  She brought a note in her blood pressure were reading in the mid to high 1 40 x 70-80.  She denies any chest pain, shortness of breath.  She has a follow-up for urothelial cancer at [facility] in mid June 2018 and she would like to have the CT scan of abdomen pelvis with contrast to be included in our next staging scan.  She has some fatigue.  No other concerns.      Pain Status  Currently in Pain: Yes    Review of Systems    Constitutional  Constitutional (WDL): Exceptions to WDL  Fatigue: Fatigue relieved by rest  Fever: None  Chills: None  Weight Gain: None  Weight Loss: to <10% from baseline, intervention not indicated (2# 4/26/18)  Neurosensory  Neurosensory (WDL): Exceptions to WDL  Peripheral Motor Neuropathy: Asymptomatic, clinical or diagnostic observations only, intervention not indicated  Ataxia: Asymptomatic, clinical or diagnostic observations only, intervention not indicated  Peripheral Sensory Neuropathy: Asymptomatic, loss of deep tendon reflexes or paresthesia ( bilat  "feet)  Confusion: None  Syncope: None  Eye   Eye Disorder (WDL): Exceptions to WDL (itchy)  Blurred Vision: None  Dry Eye: Asymptomatic, clinical or diagnostic observations only, mild symptoms relieved by lubricants (chronic)  Eye Pain: None  Watering Eyes: None  Ear  Ear Disorder (WDL): All ear disorder elements are within defined limits  Cardiovascular  Cardiovascular (WDL): Exceptions to WDL  Edema: Yes  Edema Limbs: 5 - 10% inter-limb discrepancy in volume or circumference at point of greatest visible difference, swelling or obscuration of anatomic architecture on close inspection (bilat feet)  Phlebitis: None  Superficial thrombophlebitis: None  Pulmonary  Respiratory (WDL): Exceptions to WDL  Cough: Mild symptoms, nonprescription intervention indicated (occ, in the morning)  Dyspnea: Shortness of breath with moderate exertion  Hypoxia: None  Gastrointestinal  Gastrointestinal (WDL): Exceptions to WDL  Anorexia: Loss of appetite without alteration in eating habits (\"fair\")  Constipation: Persistent symptoms with regular use of laxatives or enemas, limiting instrumental ADL (metamucil and probotic helsp)  Diarrhea: None  Dysphagia: None  Esophagitis: None  Nausea: Loss of appetite without alteration in eating habits  Pharyngitis: None  Vomiting: None  Dysgeusia: None  Dry Mouth: Symptomatic (e.g., dry or thick saliva) without significant dietary alteration, unstimulated saliva flow >0.2 ml/min (chronic)  Genitourinary  Genitourinary (WDL): Exceptions to WDL (suprapubic catheter)  Urinary Frequency: None  Urinary Retention: None  Urinary Tract Pain: None  Lymphatic  Lymph (WDL): All lymph disorder elements are within defined limits  Musculoskeletal and Connective Tissue  Musculoskeletal and Connetive Tissue Disorders (WDL): All Musculoskeletal and Connetive Tissue Disorder elements are within defined limits  Integumentary  Integumentary (WDL): Exceptions to WDL  Alopecia: None  Rash Maculo-Papular: " "None  Pruritus: Mild or localized, topical intervention indicated  Urticaria: None  Palmar-Plantar Erythrodysesthesia Syndrome: None  Flushing: None  Patient Coping  Patient Coping: Accepting  Distress Assessment  Distress Assessment Score: No distress  Accompanied by  Accompanied by: Friend (Mandy)  Oral Chemo Adherence  Oral Chemo Adherence  What Oral Chemotherapy is the patient taking?: Pazopanib  Did the patient fill and  the prescription as written?: Yes  Did patient start taking oral chemo on time?: Yes, patient started taking oral chemo on time  Does the patient report missing any doses?: No    Past History  Past Medical History:   Diagnosis Date     Cancer      GERD (gastroesophageal reflux disease)      Hypertension      Osteoporosis      Rheumatoid arthritis        Physical Exam    Recent Vitals 5/9/2018   Height 5' 0\"   Weight 105 lbs 11 oz   BSA (m2) 1.42 m2   /75   Pulse 75   Temp 97.7   Temp src 1   SpO2 97   Some recent data might be hidden     General: alert, awake, not in acute distress. Thin female. Very sharp.  HEENT: Head: Normal, normocephalic, atraumatic.  Eye: Normal external eye, conjunctiva, lids cornea, MAHESH.  Ears:  Non-tender.  Nose: Normal external nose, mucus membranes and septum.  Pharynx: Dental Hygiene adequate. Normal buccal mucosa. Normal pharynx.  Neck / Thyroid: Supple, no masses, nodes, nodules or enlargement.  Lymphatics: No abnormally enlarged lymph nodes.  Chest: Normal chest wall and respirations. Clear to auscultation.  Heart: S1 S2 RRR, no murmur.   Abdomen: abdomen is soft without significant tenderness, masses, organomegaly or guarding  Extremities: normal strength, tone, and muscle mass  Skin: normal. no rash or abnormalities  CNS: non focal.    Lab Results    Recent Results (from the past 168 hour(s))   Basic Metabolic Panel   Result Value Ref Range    Sodium 135 (L) 136 - 145 mmol/L    Potassium 4.6 3.5 - 5.0 mmol/L    Chloride 100 98 - 107 mmol/L    CO2 " 24 22 - 31 mmol/L    Anion Gap, Calculation 11 5 - 18 mmol/L    Glucose 94 70 - 125 mg/dL    Calcium 10.0 8.5 - 10.5 mg/dL    BUN 18 8 - 28 mg/dL    Creatinine 0.79 0.60 - 1.10 mg/dL    GFR MDRD Af Amer >60 >60 mL/min/1.73m2    GFR MDRD Non Af Amer >60 >60 mL/min/1.73m2       Imaging    No results found.      Signed by: Ernesto Britton MD

## 2021-06-17 NOTE — PROGRESS NOTES
[facility] Hematology and Oncology Progress Note    Patient: Dania Da Silva  MRN: [id number]  Date of Service: 4/24/2018        Reason for Visit    Follow up metastatic clear-cell renal cell carcinoma    Assessment and Plan  Renal cell carcinoma of right kidney    Staging form: Kidney, AJCC 8th Edition    - Clinical stage from 4/3/2018: Stage IV (cT1b, cNX, pM1) - Signed by Ernesto Britton MD on 4/3/2018    ECOG Performance   ECOG Performance Status: 0     Distress Assessment  Distress Assessment Score: No distress    Pain  Currently in Pain: Yes  Pain Score (Initial OR Reassessment): No/Denies Pain  Location: right side    #.  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  #.  Rheumatoid arthritis- on methotrexate  #.  Osteoporosis  #.  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  has a suprapubic.     She has elevated blood pressure on multiple readings at her assisted living facility and also noted elevation during the visit.  Her blood pressure has clearly increased from her baseline.  She is currently on amlodipine 5 mg and lisinopril 5 mg daily.  She has an appointment with Dr. Mata later today.  I advised her to closely follow-up with her and adjust her blood pressure medication at this point.  I reminded her that this is known that affects from pazopanib. Her labs are adequate. At this point I will continue 400 mg of pazopanib and not to increase further.  New prescription given today.  Overall plan is to increase the dose as tolerated, specifically her blood pressure is adequately controlled.  She does not have any other side effects from it, however.   Discussed about  initiating bisphosphonate therapy.  She was previously on, likely zoledronic acid.  She agreed to Zolendronic acid every 12 weeks. .      Plan:  - Continue pazopanib 400 mg daily.  - follow up in 3 week with labs and [employer] appt.  -She is advised to call me with any concern or question arise.  -Continue current pain regimen of as needed Tylenol and Advil as needed.  - Discussed about palliative care and she would hold off for now.  -Plan for follow-up CT scan and bone scan in June 2018.      Problem List    1. Renal cell carcinoma of right kidney     2. Metastasis to bone        ______________________________________________________________________________  Diagnosis  March 2018- Metastatic renal cell carcinoma of the right kidney- clear cell type (2.5 cm pleural-based expansile destructive lesion in the right lateral seventh rib, pleural-based nodules, multiple bilateral pulmonary nodules with small right pleural effusion, 3.2 cm renal mass consistent with renal cell carcinoma)- PDL1 expression 90%.    Treatment to date  4/18/18- started pazopanib 400 mg daily (at 50% dose reduction with plan to escalate to 800 mg daily as tolerated)    History of Present Illness    Ms. Da Silva is here for follow up, accompanied by her friend, Loni.     She does not have any side effects that she could recall from pazopanib.  She reported her blood pressure was monitor at her assisted living and has been consistently elevated with systolic 140-160 by diastolic in 90s.  One reading of systolic greater than 200 but she felt that was an error.  She did not have any associated symptoms.  Her pain is fairly the same.  Her current pain is the same intermittent pain in the right side of the chest which she is taking alternating Tylenol and Advil about 2 times each day.  No bleeding.  No weight loss.  Appetite is good.    Pain Status  Currently in Pain: Yes    Review of Systems    Constitutional  Constitutional (WDL): Exceptions to  WDL  Fatigue: Fatigue relieved by rest  Weight Loss: to <10% from baseline, intervention not indicated (down 6 lbs since 4/3/18)  Neurosensory  Neurosensory (WDL): Exceptions to WDL  Peripheral Sensory Neuropathy: Asymptomatic, loss of deep tendon reflexes or paresthesia (bilateral feet especially in the morning)  Eye   Eye Disorder (WDL): Exceptions to WDL (wears glasses)  Dry Eye: Asymptomatic, clinical or diagnostic observations only, mild symptoms relieved by lubricants  Ear  Ear Disorder (WDL): All ear disorder elements are within defined limits  Cardiovascular  Cardiovascular (WDL): All cardiovascular elements are within defined limits  Pulmonary  Respiratory (WDL): Exceptions to WDL  Dyspnea: Shortness of breath with moderate exertion  Gastrointestinal  Gastrointestinal (WDL): Exceptions to WDL  Diarrhea: Increase of <4 stools per day over baseline, mild increase in ostomy output compared to baseline (occasionally)  Genitourinary  Genitourinary (WDL): Exceptions to WDL (indwelling catheter)  Lymphatic  Lymph (WDL): All lymph disorder elements are within defined limits  Musculoskeletal and Connective Tissue  Musculoskeletal and Connetive Tissue Disorders (WDL): All Musculoskeletal and Connetive Tissue Disorder elements are within defined limits  Integumentary  Integumentary (WDL): All integumentary elements are within defined limits  Patient Coping  Patient Coping: Accepting  Distress Assessment  Distress Assessment Score: No distress  Accompanied by  Accompanied by: Friend (friend Loni)  Oral Chemo Adherence  Oral Chemo Adherence  What Oral Chemotherapy is the patient taking?: Pazopanib  Did the patient fill and  the prescription as written?: Yes  Did patient start taking oral chemo on time?: Yes, patient started taking oral chemo on time  Oral chemo start date: 04/18/18  Does the patient report missing any doses?: No    Past History  Past Medical History:   Diagnosis Date     Cancer      GERD  (gastroesophageal reflux disease)      Hypertension      Osteoporosis      Rheumatoid arthritis        Physical Exam    Recent Vitals 4/26/2018   Height -   Weight -   BSA (m2) -   /98   Pulse -   Temp -   Temp src -   SpO2 -   Some recent data might be hidden     General: alert, awake, not in acute distress  HEENT: Head: Normal, normocephalic, atraumatic.  Eye: Normal external eye, conjunctiva, lids cornea, MAHESH.  Ears:  Non-tender.  Nose: Normal external nose, mucus membranes and septum.  Pharynx: Dental Hygiene adequate. Normal buccal mucosa. Normal pharynx.  Neck / Thyroid: Supple, no masses, nodes, nodules or enlargement.  Lymphatics: No abnormally enlarged lymph nodes.  Chest: Normal chest wall and respirations. Clear to auscultation.  Heart: S1 S2 RRR, no murmur.   Abdomen: abdomen is soft without significant tenderness, masses, organomegaly or guarding  Extremities: normal strength, tone, and muscle mass  Skin: normal. no rash or abnormalities  CNS: non focal.    Lab Results    Recent Results (from the past 168 hour(s))   Comprehensive Metabolic Panel   Result Value Ref Range    Sodium 136 136 - 145 mmol/L    Potassium 4.1 3.5 - 5.0 mmol/L    Chloride 102 98 - 107 mmol/L    CO2 24 22 - 31 mmol/L    Anion Gap, Calculation 10 5 - 18 mmol/L    Glucose 81 70 - 125 mg/dL    BUN 27 8 - 28 mg/dL    Creatinine 0.84 0.60 - 1.10 mg/dL    GFR MDRD Af Amer >60 >60 mL/min/1.73m2    GFR MDRD Non Af Amer >60 >60 mL/min/1.73m2    Bilirubin, Total 1.3 (H) 0.0 - 1.0 mg/dL    Calcium 10.1 8.5 - 10.5 mg/dL    Protein, Total 6.2 6.0 - 8.0 g/dL    Albumin 3.3 (L) 3.5 - 5.0 g/dL    Alkaline Phosphatase 103 45 - 120 U/L    AST 27 0 - 40 U/L    ALT 31 0 - 45 U/L   HM1 (CBC with Diff)   Result Value Ref Range    WBC 8.4 4.0 - 11.0 thou/uL    RBC 4.31 3.80 - 5.40 mill/uL    Hemoglobin 12.6 12.0 - 16.0 g/dL    Hematocrit 38.1 35.0 - 47.0 %    MCV 88 80 - 100 fL    MCH 29.2 27.0 - 34.0 pg    MCHC 33.1 32.0 - 36.0 g/dL    RDW 15.3  (H) 11.0 - 14.5 %    Platelets 294 140 - 440 thou/uL    MPV 9.6 8.5 - 12.5 fL    Neutrophils % 77 (H) 50 - 70 %    Lymphocytes % 16 (L) 20 - 40 %    Monocytes % 6 2 - 10 %    Eosinophils % 1 0 - 6 %    Basophils % 1 0 - 2 %    Neutrophils Absolute 6.4 2.0 - 7.7 thou/uL    Lymphocytes Absolute 1.3 0.8 - 4.4 thou/uL    Monocytes Absolute 0.5 0.0 - 0.9 thou/uL    Eosinophils Absolute 0.0 0.0 - 0.4 thou/uL    Basophils Absolute 0.1 0.0 - 0.2 thou/uL       Imaging    No results found.      Signed by: Ernesto Britton MD

## 2021-06-17 NOTE — PROGRESS NOTES
Pt amb into clinic with friend for Zometa infusion. PIV patent. Discussed med, potential side effects; hand-out given to pt. Pt tolerated infusion very well. PIV discontinued. Pt amb out of clinic with friend.

## 2021-06-17 NOTE — PROGRESS NOTES
Patient here today for imaging and biopsy results with Dr. Britton.  Renal Cell Carcinoma.  MICHAEL Posadas RN

## 2021-06-17 NOTE — TELEPHONE ENCOUNTER
Telephone Encounter by Araceli Harris at 5/29/2020  8:45 AM     Author: Araceli Harris Service: -- Author Type: --    Filed: 5/29/2020  8:46 AM Encounter Date: 5/28/2020 Status: Signed    : Araceli Harris not needed, medication is covered.

## 2021-06-17 NOTE — PROGRESS NOTES
[facility] Hematology and Oncology Progress Note    Patient: Dania Da Silva  MRN: [id number]  Date of Service: 4/11/2018        Reason for Visit    Follow up metastatic clear-cell renal cell carcinoma    Assessment and Plan  Renal cell carcinoma of right kidney    Staging form: Kidney, AJCC 8th Edition    - Clinical stage from 4/3/2018: Stage IV (cT1b, cNX, pM1) - Signed by Ernesto Britton MD on 4/3/2018    ECOG Performance   ECOG Performance Status: 0     Distress Assessment  Distress Assessment Score: 5 (due to today's visit)    Pain  Currently in Pain: No/denies  Pain Score (Initial OR Reassessment): No/Denies Pain    #.  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  #.  Rheumatoid arthritis- on methotrexate  #.  Osteoporosis  #.  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       I reviewed the PDL1 report and informed her that she has high PDL 1 expression and likelihood of responding to immunotherapy is fairly good.  I explained to her that we could consider anti angiogenic agent or immunotherapy.  After reviewing side effects, she would like to try with pazopanib.  She is fear of flaring up her rheumatologic disorder with immune checkpoint inhibitors therapy.  I reviewed the side effects of pazopanib again today.  She will continue to watch her blood pressure about twice a week at her [facility] living and keep the record of it.  Echocardiogram is within normal limits.  I explained to her that I will start with 50% dose reduction to see how she would tolerate therapy and increase the dose to the standard dose as tolerated.  She signed a consent today.      Plan:  - Rx for pazopanib  written.  - follow up in 1 week with labs after starting pazopanib.  -She is advised to call me with any concern or question arise.  -Continue current pain regimen of as needed Tylenol and Advil as needed.  Discussed about palliative care.  -Plan for follow-up CT scan and bone scan in 2 months.    -She might need to restart back on bisphosphonate therapy based on osteoporosis and some degree of metastatic bone disease.  She was previously treated with alendronate and zoledronic acid many years ago.  I will readdress that in next visit.    Ms. Watters has several questions that she wrote down and all the questions were answered to her stated satisfaction.    Problem List    1. Renal cell carcinoma of right kidney  HM1(CBC and Differential)    Comprehensive Metabolic Panel      ______________________________________________________________________________    History of Present Illness    Ms. Da Silva is here for follow up, accompanied by her friend, Loni.   She reported that she is thinking of the treatment options and surveillance, she decided she wanted to start treatment.  She has gone over postoperative side effects and she would like to try it.  Her current pain is the same intermittent pain in the right side of the chest which she is taking alternating Tylenol and Advil about 2 times each day.  No bleeding.  No weight loss.  Appetite is good.    Pain Status  Currently in Pain: No/denies    Review of Systems    Constitutional  Constitutional (WDL): Exceptions to WDL  Fatigue: Fatigue relieved by rest (didn't sleep well last night)  Fever: None  Chills: None  Weight Gain: None  Weight Loss: to <10% from baseline, intervention not indicated (3# 4/3/18)  Neurosensory  Neurosensory (WDL): Exceptions to WDL  Peripheral Motor Neuropathy: None  Ataxia: None  Peripheral Sensory Neuropathy: Asymptomatic, loss of deep tendon reflexes or paresthesia (bilat feet-numbness in the mornings)  Confusion: None  Syncope:  "None  Eye   Eye Disorder (WDL): Exceptions to WDL  Blurred Vision: None  Dry Eye: Asymptomatic, clinical or diagnostic observations only, mild symptoms relieved by lubricants  Eye Pain: None  Watering Eyes: None  Ear  Ear Disorder (WDL): All ear disorder elements are within defined limits  Cardiovascular  Cardiovascular (WDL): All cardiovascular elements are within defined limits  Pulmonary  Respiratory (WDL): Exceptions to WDL  Cough: None  Dyspnea: Shortness of breath with moderate exertion  Hypoxia: None  Gastrointestinal  Gastrointestinal (WDL): Exceptions to WDL  Anorexia: None  Constipation: None  Diarrhea: Increase of <4 stools per day over baseline, mild increase in ostomy output compared to baseline (has been going frequently)  Dysphagia: None  Esophagitis: None  Nausea: None  Pharyngitis: None  Vomiting: None  Dysgeusia: None  Dry Mouth: None  Genitourinary  Genitourinary (WDL): Exceptions to WDL ([place])  Urinary Frequency: None  Urinary Retention: None  Urinary Tract Pain: None  Lymphatic  Lymph (WDL): All lymph disorder elements are within defined limits  Musculoskeletal and Connective Tissue  Musculoskeletal and Connetive Tissue Disorders (WDL): All Musculoskeletal and Connetive Tissue Disorder elements are within defined limits  Integumentary  Integumentary (WDL): All integumentary elements are within defined limits  Patient Coping  Patient Coping: Accepting;Open/discussion  Distress Assessment  Distress Assessment Score: 5 (due to today's visit)  Accompanied by     Oral Chemo Adherence       Past History  Past Medical History:   Diagnosis Date     Cancer      GERD (gastroesophageal reflux disease)      Hypertension      Osteoporosis      Rheumatoid arthritis        Physical Exam    Recent Vitals 4/11/2018   Height 5' 0\"   Weight 108 lbs 5 oz   BSA (m2) 1.44 m2   /65   Pulse 90   Temp -   Temp src -   SpO2 99   Some recent data might be hidden     General: alert, awake, not in acute " distress  HEENT: Head: Normal, normocephalic, atraumatic.  Eye: Normal external eye, conjunctiva, lids cornea, MAHESH.  Ears: Normal TM's bilaterally. Normal auditory canals and external ears. Non-tender.  Nose: Normal external nose, mucus membranes and septum.  Pharynx: Dental Hygiene adequate. Normal buccal mucosa. Normal pharynx.  Neck / Thyroid: Supple, no masses, nodes, nodules or enlargement.  Lymphatics: No abnormally enlarged lymph nodes.  Chest: Normal chest wall and respirations. Clear to auscultation.  Heart: S1 S2 RRR, no murmur.   Abdomen: abdomen is soft without significant tenderness, masses, organomegaly or guarding  Extremities: normal strength, tone, and muscle mass  Skin: normal. no rash or abnormalities  CNS: non focal.    Lab Results    No results found for this or any previous visit (from the past 168 hour(s)).    Imaging    Nm Bone Scan Whole Body    Result Date: 3/26/2018  NM BONE SCAN WHOLE BODY 3/23/2018 2:47 PM INDICATION: Bone pain. Metastatic right renal mass likely renal cell cancer. TECHNIQUE: 25.8 mCi technetium-99m MDP were injected intravenously. Anterior and posterior delayed whole body images were obtained with additional lateral images of the skull. COMPARISON: CT chest 03/09/2018 and CT abdomen 03/23/2018. FINDINGS: At the site of right seventh rib lesion there is only subtle increased activity mostly a photopenic area. Just above this there is subtle increased activity consistent with the known rib fractures seen on CT. 2 areas of mild increased activity in the left ninth and tenth ribs consistent with rib fractures seen on prior CT. Degenerative uptake in the spine. Postop right hip.     CONCLUSION: 1.  Mostly photopenic area right seventh rib at the large metastasis seen on CT. 2.  Increased activity right sixth rib anteriorly and the left ninth and tenth ribs posteriorly consistent with rib fractures as seen on CT. 3.  No other significant findings.    Ct Lung Biopsy    Result  Date: 3/26/2018  1. PERCUTANEOUS BIOPSY RIGHT CHEST WALL MASS 2. CT GUIDANCE 3. CONSCIOUS SEDATION 3/26/2018 10:39 AM INDICATION: Chest wall mass. TECHNIQUE: Dose reduction techniques were used. PROCEDURE: Informed consent obtained. Time out performed. The site was prepped and draped in sterile fashion. 10 mL of 1% lidocaine was infused into the local soft tissues. Using standard technique and under direct CT guidance, a 20 gauge biopsy device was used to obtain 2 core biopsies. Tissue was submitted to Pathology and was adequate by preliminary review by a pathologist. The patient tolerated the procedure well. No immediate complications. RADIOLOGIC SUPERVISION AND INTERPRETATION: Redemonstrated chest wall mass centered in the bowel the right seventh rib anterior-laterally. CT GUIDANCE: Images demonstrate the biopsy needle to be in good position. SEDATION: Versed 0.5 mg. Fentanyl 25 mcg. The procedure was performed with administration intravenous conscious sedation with appropriate preoperative, intraoperative, and postoperative evaluation. 13 minutes of supervised face to face conscious sedation time was provided by a radiology nurse under my direct supervision.     CONCLUSION: 1.  Successful CT-guided biopsy right chest wall mass.    Ct Abdomen Pelvis Without Oral With Without Iv Contrast    Result Date: 3/23/2018  CT ABDOMEN PELVIS WO ORAL W WO IV CONTRAST 3/23/2018 11:47 AM INDICATION: Abnormal CTA chest showing lung and pleural metastases, seventh rib lytic lesion, and mass in the right kidney likely a renal cell cancer. CT abdomen pelvis to further characterize. TECHNIQUE: CT abdomen and pelvis without contrast and with IV contrast with delayed images. Multiplanar reformation images (MPR). Dose reduction techniques were used.? IV CONTRAST: Iohexol (Omni) 100 mL COMPARISON: CT chest 03/09/2018 FINDINGS: LUNG BASES: Expansile lytic lesion right anterior lateral seventh rib again noted. Multiple pleural metastases  right lower chest also seen on prior CT chest. ABDOMEN: 4 cm enhancing slightly infiltrative mass posterior right kidney consistent with renal cell cancer. Renal vein is widely patent. No retroperitoneal lymphadenopathy. Normal liver, spleen, left kidney, pancreas, and adrenal glands. PELVIS: Negative. No masses. MUSCULOSKELETAL: Moderate degenerative change right hip. Postop arthroplasty left hip. Lytic lesion right seventh rib. No other bone lesions. Advanced degenerative change in the lumbar spine.     CONCLUSION: 1.  Findings of renal cell cancer right kidney with 4 cm enhancing mass. Patent renal vein. No retroperitoneal lymphadenopathy. 2.  Metastases in the lungs, right pleural space and right seventh rib as noted on CT chest. 3.  No additional areas of metastatic disease in the abdomen or pelvis. 4.  If tissue diagnosis is needed, right seventh rib biopsy would be the most straightforward.         Signed by: Ernesto Britton MD

## 2021-06-17 NOTE — PROGRESS NOTES
Patient here ambulatory accompanied by friend Loni for follow up on her renal cancer.  Patient is taking her oral chemotherapy as prescribed and seems to be tolerating well.  Patient states she feels no different than before starting the chemotherapy.  Seen by Dr. Britton.

## 2021-06-18 NOTE — PROGRESS NOTES
Patient here today for labs, 2 week follow-up with Dr. Britton for renal cell carcinoma.  MICHAEL Posadas RN

## 2021-06-18 NOTE — PROGRESS NOTES
Patient is here for  Follow-up of diarrhea. Has dx of Rt renal cell CA. Patient says she talked with on-call Oncologist on 5/26/18 and was advised to stop Votrient. Diarrhea resolved immediately. (had used seven tabs of Imodium) and has had no BM since. Accompanied by friend, Mandy. Edel Geiger RN

## 2021-06-18 NOTE — PROGRESS NOTES
[facility] Hematology and Oncology Progress Note    Patient: Dania Da Silva  MRN: [id number]  Date of Service: 5/29/18      Reason for Visit    Follow up metastatic clear-cell renal cell carcinoma    Assessment and Plan  Renal cell carcinoma of right kidney    Staging form: Kidney, AJCC 8th Edition    - Clinical stage from 4/3/2018: Stage IV (cT1b, cNX, pM1) - Signed by Ernesto Britton MD on 4/3/2018    ECOG Performance   ECOG Performance Status: 1     Distress Assessment  Distress Assessment Score: 9 (anticipation of results)    Pain  Currently in Pain: No/denies    #.  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     Reviewed the CT scan result with the patient.  Her pulmonary nodules are overall stable or may be slightly smaller.  Renal mass is stable.  Pleural-based metastases are marginally larger with about 20% increase in size from prior study.  I explained to her that I would take this study as a stable disease.  She is actually clinically better in her right-sided pain and less requiring pain medication.  She is not tolerable with 400 mg of pazopanib due to grade 3 diarrhea.  We discussed about temporarily interrupting the treatment and to resume treatment once she has a clear progression or more symptomatic (by cancer directed therapy or symptom focus care) or further decreasing dose of pazopanib to 200 mg daily for better tolerability.  She is not comfortable with observation alone.   Will check urine protein to assure no evidence of proteinuria.   Follow-up with me in 2 weeks with labs and exam.      #. Acute diarrhea - secondary to pazopanib   She had about grade 2 diarrhea.  Immediately resolved after stopping pazopanib.   We discussed about Metamucil, Imodium use if necessary as we restarted back on pazopanib at a lower dose.     #.  Metastatic bone disease   On Zometa every 12 weeks.     #.  Cancer related pain   She has been alternating Tylenol and ibuprofen about 1 times a day.  Continue current pain medication.    #.  Rheumatoid arthritis- on methotrexate  #.  Osteoporosis  #.  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  has a suprapubic.  She is follow by urology at [facility].  #.  Hypertension, improved    Problem List    1. Renal cell carcinoma of right kidney  Protein, Random Urine    HM1(CBC and Differential)    Comprehensive Metabolic Panel    Protein, Random Urine   2. Chemotherapy management, encounter for  Protein, Random Urine    HM1(CBC and Differential)    Comprehensive Metabolic Panel    Protein, Random Urine      ______________________________________________________________________________  Diagnosis  March 2018- Metastatic renal cell carcinoma of the right kidney- clear cell type (2.5 cm pleural-based expansile destructive lesion in the right lateral seventh rib, pleural-based nodules, multiple bilateral pulmonary nodules with small right pleural effusion, 3.2 cm renal mass consistent with renal cell carcinoma)- PDL1 expression 90%.    Treatment to date  4/18/18- started pazopanib 400 mg daily (at 50% dose reduction with plan to escalate to 800 mg daily as tolerated), stop on 5/26/2018 due to grade 3 diarrhea   -CT scan show overall stable disease (mild progression about 20% increase in size of pleural-based metastases), stable or slightly smaller pulmonary nodules.   - 6/5/2018- restarted pazopaninb 200 mg daily for better tolerability    History of Present Illness    Ms. Da Silva is here for follow up, accompanied by her friend, Mandy.     She is feeling better.  Her bowels are better off chemotherapy.  She continued to have some fatigue.  She thinks that her  fatigue was more pronounced after starting Votrient.  Now her bowel is good.  No abdominal cramps.  Appetite is okay.  Her pain is absolutely better and needing about 1 Tylenol or naproxen per day.    Pain Status  Currently in Pain: No/denies    Review of Systems    Constitutional  Constitutional (WDL): Exceptions to WDL  Fatigue: Fatigue relieved by rest  Neurosensory  Neurosensory (WDL): Exceptions to WDL  Peripheral Motor Neuropathy: Asymptomatic, clinical or diagnostic observations only, intervention not indicated  Ataxia: Asymptomatic, clinical or diagnostic observations only, intervention not indicated (walks with cane)  Peripheral Sensory Neuropathy: Asymptomatic, loss of deep tendon reflexes or paresthesia (feet)  Eye   Eye Disorder (WDL): Exceptions to WDL (wears glasses)  Dry Eye: Asymptomatic, clinical or diagnostic observations only, mild symptoms relieved by lubricants (chronic)  Ear  Ear Disorder (WDL): All ear disorder elements are within defined limits  Cardiovascular  Cardiovascular (WDL): Exceptions to WDL  Edema: Yes  Edema Limbs: 5 - 10% inter-limb discrepancy in volume or circumference at point of greatest visible difference, swelling or obscuration of anatomic architecture on close inspection  Pulmonary  Respiratory (WDL): Exceptions to WDL  Dyspnea: Shortness of breath with moderate exertion  Gastrointestinal  Gastrointestinal (WDL): Exceptions to WDL  Anorexia: Loss of appetite without alteration in eating habits  Constipation: Occasional or intermittent symptoms, occasional use of stool softeners, laxatives, dietary modification, or enema (goes between constipation & diarrhea)  Diarrhea: Increase of <4 stools per day over baseline, mild increase in ostomy output compared to baseline (goes between constipation & diarrhea, uses imodium)  Dry Mouth: Symptomatic (e.g., dry or thick saliva) without significant dietary alteration, unstimulated saliva flow >0.2 ml/min  "(chronic)  Genitourinary  Genitourinary (WDL): Exceptions to WDL (suprapubic catheter)  Lymphatic  Lymph (WDL): All lymph disorder elements are within defined limits  Musculoskeletal and Connective Tissue  Musculoskeletal and Connetive Tissue Disorders (WDL): Exceptions to WDL  Arthralgia: Mild pain (arthritic)  Bone Pain: Mild pain  Muscle Weakness : Symptomatic, evident on physical exam, limiting instrumental ADL  Myalgia: Mild pain  Integumentary  Integumentary (WDL): Exceptions to WDL (dry )  Pruritus: Mild or localized, topical intervention indicated  Patient Coping  Patient Coping: Accepting;Open/discussion  Distress Assessment  Distress Assessment Score: 9 (anticipation of results)  Accompanied by  Accompanied by: Friend  Oral Chemo Adherence       Past History  Past Medical History:   Diagnosis Date     Cancer      GERD (gastroesophageal reflux disease)      Hypertension      Osteoporosis      Rheumatoid arthritis        Physical Exam    Recent Vitals 6/5/2018   Height 5' 0\"   Weight 106 lbs 8 oz   BSA (m2) 1.43 m2   /66   Pulse 89   Temp -   Temp src -   SpO2 96   Some recent data might be hidden     General: alert, awake, not in acute distress. Thin female.   HEENT: Head: Normal, normocephalic, atraumatic.  Eye: Normal external eye, conjunctiva, lids cornea, MAHESH.  Ears:  Non-tender.  Nose: Normal external nose, mucus membranes and septum.  Pharynx: Dental Hygiene adequate. Normal buccal mucosa. Normal pharynx.  Neck / Thyroid: Supple, no masses, nodes, nodules or enlargement.  Lymphatics: No abnormally enlarged lymph nodes.  Chest: Normal chest wall and respirations. Clear to auscultation.  Heart: S1 S2 RRR, no murmur.   Abdomen: abdomen is soft without significant tenderness, masses, organomegaly or guarding  Extremities: normal strength, tone, and muscle mass  Skin: normal. no rash or abnormalities  CNS: non focal.    Lab Results    Recent Results (from the past 168 hour(s))   Comprehensive " Metabolic Panel   Result Value Ref Range    Sodium 131 (L) 136 - 145 mmol/L    Potassium 3.0 (L) 3.5 - 5.0 mmol/L    Chloride 94 (L) 98 - 107 mmol/L    CO2 25 22 - 31 mmol/L    Anion Gap, Calculation 12 5 - 18 mmol/L    Glucose 108 70 - 125 mg/dL    BUN 17 8 - 28 mg/dL    Creatinine 0.76 0.60 - 1.10 mg/dL    GFR MDRD Af Amer >60 >60 mL/min/1.73m2    GFR MDRD Non Af Amer >60 >60 mL/min/1.73m2    Bilirubin, Total 0.7 0.0 - 1.0 mg/dL    Calcium 9.4 8.5 - 10.5 mg/dL    Protein, Total 6.1 6.0 - 8.0 g/dL    Albumin 2.8 (L) 3.5 - 5.0 g/dL    Alkaline Phosphatase 155 (H) 45 - 120 U/L    AST 20 0 - 40 U/L    ALT 25 0 - 45 U/L   Magnesium (add-ons/treatment plan)   Result Value Ref Range    Magnesium 1.9 1.8 - 2.6 mg/dL   HM1 (CBC with Diff)   Result Value Ref Range    WBC 5.9 4.0 - 11.0 thou/uL    RBC 3.79 (L) 3.80 - 5.40 mill/uL    Hemoglobin 11.4 (L) 12.0 - 16.0 g/dL    Hematocrit 33.1 (L) 35.0 - 47.0 %    MCV 87 80 - 100 fL    MCH 30.1 27.0 - 34.0 pg    MCHC 34.4 32.0 - 36.0 g/dL    RDW 17.4 (H) 11.0 - 14.5 %    Platelets 307 140 - 440 thou/uL    MPV 9.0 8.5 - 12.5 fL    Neutrophils % 73 (H) 50 - 70 %    Lymphocytes % 17 (L) 20 - 40 %    Monocytes % 9 2 - 10 %    Eosinophils % 1 0 - 6 %    Basophils % 0 0 - 2 %    Neutrophils Absolute 4.3 2.0 - 7.7 thou/uL    Lymphocytes Absolute 1.0 0.8 - 4.4 thou/uL    Monocytes Absolute 0.5 0.0 - 0.9 thou/uL    Eosinophils Absolute 0.0 0.0 - 0.4 thou/uL    Basophils Absolute 0.0 0.0 - 0.2 thou/uL   POCT creatinine   Result Value Ref Range    POC Creatinine 0.7 mg/dL   Protein, Random Urine   Result Value Ref Range     Protein, Random Urine 39 mg/dL       Imaging    Ct Chest Abdomen Pelvis With Oral With Iv Cont    Result Date: 6/4/2018  CT CHEST, ABDOMEN, AND PELVIS 6/4/2018 10:27 AM      INDICATION: Metastatic renal cell carcinoma, treatment follow-up. TECHNIQUE: CT chest, abdomen, and pelvis. Dose reduction techniques were used. IV CONTRAST: 100 mL Omnipaque 350. COMPARISON: CT chest  dated 3/9/2018, CT abdomen and pelvis dated 3/23/2018. FINDINGS: CHEST: Again noted are multiple small pleural-based metastases along the anterior aspect of the right upper lobe and the lateral aspect of the right upper lobe and right middle lobe. There are multiple pulmonary nodules involving both lungs. Pleural nodule in the right medial lower lung on image 70 of series 3 measures 1.3 x 1 cm. On the prior study it measured 1.1 x 0.9 cm. Representative nodule in the left upper lobe on image 45 of series 3 measures 0.9 x 0.7 cm. On the prior study it measured 12 mm in greatest dimension. The 4 mm nodule seen in the major fissure on the right on image 39 of series 3 measures 5 x 4 mm. Left lower lobe nodule on image 104 of series 3 measures 6 x 3 mm versus 8 mm in greatest dimension on the prior study. There is atelectasis at the right lung base. Expansile lesion partially destroying the right seventh rib is again noted and shows lower density centrally which may represent central necrosis. There are multiple smaller pulmonary nodules in both lungs. There is a pretracheal lymph node on image 42 of series 2 that measures 1.6 x 1.3 cm. On the prior study it had a short axis diameter of 11 mm. No other mediastinal or hilar lymphadenopathy. No pericardial effusion. There is mild coronary artery calcification.  ABDOMEN: Enhancing right renal mass measuring 3.2 cm in diameter is again noted consistent with renal cell carcinoma. The right renal vein appears patent. There is no focal hepatic lesion. There is mild intrahepatic biliary dilatation. The spleen, pancreas, adrenal glands and left kidney are normal. There is calcification of the abdominal aorta without evidence for aneurysm. No retroperitoneal lymphadenopathy. There is fecal material throughout the colon. No bowel obstruction or free intraperitoneal air. PELVIS: Bladder is decompressed by a suprapubic catheter. There are stones in the bladder, the largest measures  1.3 cm in diameter unchanged. There is a left femoral hernia containing a loop of bowel, no bowel obstruction is seen. MUSCULOSKELETAL: Left hip arthroplasty. Degenerative joint disease right hip. Scoliosis and degenerative change in the lumbar spine. Again noted is expansile lesion in the right seventh rib. No other suspicious bony lesions are identified.     CONCLUSION: 1.  Mild progression of pleural-based metastases on the right with slight increase in right pleural effusion. Pulmonary nodules are either stable or slightly smaller. Right renal mass is stable from the prior studies.         Signed by: Ernesto Britton MD

## 2021-06-18 NOTE — PROGRESS NOTES
Patient here ambulatory with cane accompanied by friend for follow up on CT results for her renal cancer.  Patient states bowels have improved since being off the chemotherapy.  Still some fatigue.  Seen by Dr. Britton.

## 2021-06-18 NOTE — PATIENT INSTRUCTIONS - HE
Patient Instructions by Jose Leija MD at 3/24/2020 11:00 AM     Author: Jose Lieja MD Service: -- Author Type: Physician    Filed: 3/24/2020  3:41 PM Encounter Date: 3/24/2020 Status: Addendum    : Jose Leija MD (Physician)    Related Notes: Original Note by Jose Leija MD (Physician) filed at 3/24/2020  3:34 PM         Hi all     Here is a summary      Phone visit conducted today with  Caregiver Yudi Zaldivar son-in-law    For left hip  Tylenol 1000 mg schedule III times daily  Voltaren gel apply 3 times a day as needed to left hip or other joint as needed for pain    Tramadol 50 mg 1 every 8 hours as needed for severe pain exacerbation    Discontinue naproxen    Incontinence    Metamucil/psyllium 1 packet daily scheduled  MiraLAX/polyethylene glycol 17 g 1 time weekly every Sunday  Consider a serving of yogurt daily for the probiotics    Discontinue sennosides/senna    Blood pressure  Discontinue hydralazine        Memory concerns  Ensure that Dania is getting adequate high nutrient foods  Regular engagement and interaction with folks  Shoot for 5-10 servings of fruits and/or vegetables daily  Continue regular check ins, intentional orientation of day, week, current events and surroundings  Side note if Dania  has evidence of a colonized bladder infection, we could consider the supplement berberine 1 g daily plus minus d-mannose 1.5 grams daily  as directed daily these can be helpful to prevent recurrent urinary tract infections and have natural antibacterial effects    I would like interim health to check in the home  Vitamin D level  Vitamin B12 level  Thyroid function TSH/free T4/total T3  Urine culture  Magnesium level  Serum protein electrophoresis  Folic acid        Goal blood pressure for Dania is less than 160 and the top number less than 90 and the bottom number consistently      DanL         Medications       Taking?  Start Date  End Date  Provider      acetaminophen (TYLENOL)  500 MG tablet     03/24/20   --   Jose Leija MD     Take 2 tablets (1,000 mg total) by mouth 3 (three) times a day. At BEDTIME      amoxicillin (AMOXIL) 500 MG capsule     10/14/19   --   Jose Leija MD     Take 4 capsules (2,000 mg total) by mouth as needed. Prior to dental appointments      aspirin 81 MG EC tablet     09/20/19   --   Jose Leija MD     Take 1 tablet (81 mg total) by mouth daily. In 6 AM      diclofenac sodium (VOLTAREN) 1 % Gel    --   --   PROVIDER, HISTORICAL      folic acid (FOLVITE) 1 MG tablet     11/08/19   --   PROVIDER, HISTORICAL        02/07/20   --   PROVIDER, HISTORICAL      lisinopril-hydrochlorothiazide (PRINZIDE,ZESTORETIC) 10-12.5 mg per tablet     09/20/19   --   Jose Leija MD     Take 1 tablet by mouth daily. At 6 AM     Notes:  Replaces Lisinopril and Amlodipine      polyethylene glycol (MIRALAX) 17 gram packet     03/24/20   --   Jose Leija MD     Take 1 packet (17 g total) by mouth once a week. Every Sunday      psyllium (METAMUCIL) 3.4 gram packet     03/24/20   --   Jose Leija MD     Take 1 packet by mouth daily.        --   --   PROVIDER, HISTORICAL      acetaminophen (TYLENOL) 500 MG tablet     09/20/19 03/24/20   Jose Leija MD     : Take 1,000 mg by mouth 3 (three) times a day scheduled          diclofenac sodium (VOLTAREN) 1 % Gel    03/24/20 03/24/20   Jose Leija MD     Apply three times a day as needed to affected area      lactase (LACTAID) 3,000 unit tablet as needed     --   03/24/20   PROVIDER, HISTORICAL        09/20/19 03/24/20   Jose Leija MD          polyethylene glycol (MIRALAX) 17 gram packet  1 weekly     --   03/24/20   PROVIDER, HISTORICAL      psyllium (METAMUCIL) 3.4 gram packet 1 daily     --   03/24/20   PROVIDER, HISTORICAL      Tramadol 50 mg 1 every 12 hours as needed for severe pain such as hip    --   03/24/20   PROVIDER

## 2021-06-18 NOTE — PROGRESS NOTES
[facility] Hematology and Oncology Progress Note    Patient: Danai Da Silva  MRN: [id number]  Date of Service: 5/29/18      Reason for Visit    Follow up metastatic clear-cell renal cell carcinoma    Assessment and Plan  Renal cell carcinoma of right kidney    Staging form: Kidney, AJCC 8th Edition    - Clinical stage from 4/3/2018: Stage IV (cT1b, cNX, pM1) - Signed by Ernesto Britton MD on 4/3/2018    ECOG Performance   ECOG Performance Status: 1     Distress Assessment  Distress Assessment Score: 4    Pain  Currently in Pain: No/denies    #.  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     Because of the significant diarrhea, she has stopped pazopanib.  I advised to continue to hold until we have a restaging scan next week.  We will revisit restarting the medication or changing therapy or observation alone.    Follow-up with me next week after the scan.    #. Acute diarrhea - secondary to pazopanib   She had about grade 2 diarrhea.  Continue to hold pazopanib.      #.  Metastatic bone disease   On Zometa every 12 weeks.     #.  Cancer related pain   She has been alternating Tylenol and ibuprofen about 2 times a day and now it is even less.  Continue current pain medication.    #.  Rheumatoid arthritis- on methotrexate  #.  Osteoporosis  #.  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  has a suprapubic.  She is follow by urology at [facility].  #.  Hypertension, improved    Problem List    1. Renal cell carcinoma of right kidney  Comprehensive Metabolic Panel    HM1(CBC and Differential)    Magnesium (add-ons/treatment plan)    HM1 (CBC with Diff)    VIOLETA MORGAN       ______________________________________________________________________________  Diagnosis  March 2018- Metastatic renal cell carcinoma of the right kidney- clear cell type (2.5 cm pleural-based expansile destructive lesion in the right lateral seventh rib, pleural-based nodules, multiple bilateral pulmonary nodules with small right pleural effusion, 3.2 cm renal mass consistent with renal cell carcinoma)- PDL1 expression 90%.    Treatment to date  4/18/18- started pazopanib 400 mg daily (at 50% dose reduction with plan to escalate to 800 mg daily as tolerated)    History of Present Illness    Ms. Da Silva is here for follow up, accompanied by her friend, Mandy.     Was having significant diarrhea or (watery, large volume, frequent) starting 5/22/2018.  She called over the weekend and she was advised to stop Votrient on 5/26/2018.  She reported that she was taking Imodium and did not help much.  After she is stop Votrient, diarrhea has stopped as well.  Now she has not had bowel movement since.  Her pain is much controlled in her right chest wall.     Pain Status  Currently in Pain: No/denies    Review of Systems    Constitutional  Constitutional (WDL): Exceptions to WDL  Fatigue: Fatigue relieved by rest  Fever: 38.0 - 39.0 degrees C (100.4 - 102.2 degrees F) (5/24/18)  Chills: None  Weight Gain: None  Weight Loss: to <10% from baseline, intervention not indicated  Neurosensory  Neurosensory (WDL): Exceptions to WDL  Peripheral Motor Neuropathy: Asymptomatic, clinical or diagnostic observations only, intervention not indicated  Ataxia: Asymptomatic, clinical or diagnostic observations only, intervention not indicated  Peripheral Sensory Neuropathy: Asymptomatic, loss of deep tendon reflexes or paresthesia (bilat feet)  Confusion: None  Syncope: None  Eye   Eye Disorder (WDL): Exceptions to WDL (slight itch)  Blurred Vision: None  Dry Eye: Asymptomatic, clinical or diagnostic observations only, mild symptoms  relieved by lubricants (chronic)  Eye Pain: None  Watering Eyes: None  Ear  Ear Disorder (WDL): All ear disorder elements are within defined limits  Cardiovascular  Cardiovascular (WDL): Exceptions to WDL  Palpitations: None  Edema: Yes  Edema Limbs: 5 - 10% inter-limb discrepancy in volume or circumference at point of greatest visible difference, swelling or obscuration of anatomic architecture on close inspection  Phlebitis: None  Superficial thrombophlebitis: None  Pulmonary  Respiratory (WDL): Exceptions to WDL  Cough: None  Dyspnea: Shortness of breath with moderate exertion  Hypoxia: None  Gastrointestinal  Gastrointestinal (WDL): Exceptions to WDL  Anorexia: Loss of appetite without alteration in eating habits  Constipation: None  Diarrhea: None  Dysphagia: None  Esophagitis: None  Nausea: None  Pharyngitis: None (resolved with stopping Votrient)  Vomiting: None  Dysgeusia: None  Dry Mouth: Symptomatic (e.g., dry or thick saliva) without significant dietary alteration, unstimulated saliva flow >0.2 ml/min (chronic)  Genitourinary  Genitourinary (WDL): Exceptions to WDL (suprapubic catheter)  Urinary Frequency: None  Urinary Retention: None  Urinary Tract Pain: None  Lymphatic  Lymph (WDL): All lymph disorder elements are within defined limits  Musculoskeletal and Connective Tissue  Musculoskeletal and Connetive Tissue Disorders (WDL): Exceptions to WDL (age-related joint)  Arthralgia: Mild pain  Bone Pain: Mild pain  Muscle Weakness : Symptomatic, evident on physical exam, limiting instrumental ADL  Myalgia: Mild pain  Integumentary  Integumentary (WDL): Exceptions to WDL (dry)  Alopecia: None  Rash Maculo-Papular: None  Pruritus: Mild or localized, topical intervention indicated  Urticaria: None  Palmar-Plantar Erythrodysesthesia Syndrome: None  Flushing: None  Patient Coping  Patient Coping: Open/discussion  Distress Assessment  Distress Assessment Score: 4  Accompanied by  Accompanied by: Friend  "(Mandy)  Oral Chemo Adherence       Past History  Past Medical History:   Diagnosis Date     Cancer      GERD (gastroesophageal reflux disease)      Hypertension      Osteoporosis      Rheumatoid arthritis        Physical Exam    Recent Vitals 5/29/2018   Height 5' 0\"   Weight 104 lbs 6 oz   BSA (m2) 1.42 m2   /68   Pulse 74   Temp 97.6   Temp src 1   SpO2 95   Some recent data might be hidden     General: alert, awake, not in acute distress. Thin female.   HEENT: Head: Normal, normocephalic, atraumatic.  Eye: Normal external eye, conjunctiva, lids cornea, MAHESH.  Ears:  Non-tender.  Nose: Normal external nose, mucus membranes and septum.  Pharynx: Dental Hygiene adequate. Normal buccal mucosa. Normal pharynx.  Neck / Thyroid: Supple, no masses, nodes, nodules or enlargement.  Lymphatics: No abnormally enlarged lymph nodes.  Chest: Normal chest wall and respirations. Clear to auscultation.  Heart: S1 S2 RRR, no murmur.   Abdomen: abdomen is soft without significant tenderness, masses, organomegaly or guarding  Extremities: normal strength, tone, and muscle mass  Skin: normal. no rash or abnormalities  CNS: non focal.    Lab Results    Recent Results (from the past 168 hour(s))   Comprehensive Metabolic Panel   Result Value Ref Range    Sodium 131 (L) 136 - 145 mmol/L    Potassium 3.0 (L) 3.5 - 5.0 mmol/L    Chloride 94 (L) 98 - 107 mmol/L    CO2 25 22 - 31 mmol/L    Anion Gap, Calculation 12 5 - 18 mmol/L    Glucose 108 70 - 125 mg/dL    BUN 17 8 - 28 mg/dL    Creatinine 0.76 0.60 - 1.10 mg/dL    GFR MDRD Af Amer >60 >60 mL/min/1.73m2    GFR MDRD Non Af Amer >60 >60 mL/min/1.73m2    Bilirubin, Total 0.7 0.0 - 1.0 mg/dL    Calcium 9.4 8.5 - 10.5 mg/dL    Protein, Total 6.1 6.0 - 8.0 g/dL    Albumin 2.8 (L) 3.5 - 5.0 g/dL    Alkaline Phosphatase 155 (H) 45 - 120 U/L    AST 20 0 - 40 U/L    ALT 25 0 - 45 U/L   Magnesium (add-ons/treatment plan)   Result Value Ref Range    Magnesium 1.9 1.8 - 2.6 mg/dL   HM1 (CBC " with Diff)   Result Value Ref Range    WBC 5.9 4.0 - 11.0 thou/uL    RBC 3.79 (L) 3.80 - 5.40 mill/uL    Hemoglobin 11.4 (L) 12.0 - 16.0 g/dL    Hematocrit 33.1 (L) 35.0 - 47.0 %    MCV 87 80 - 100 fL    MCH 30.1 27.0 - 34.0 pg    MCHC 34.4 32.0 - 36.0 g/dL    RDW 17.4 (H) 11.0 - 14.5 %    Platelets 307 140 - 440 thou/uL    MPV 9.0 8.5 - 12.5 fL    Neutrophils % 73 (H) 50 - 70 %    Lymphocytes % 17 (L) 20 - 40 %    Monocytes % 9 2 - 10 %    Eosinophils % 1 0 - 6 %    Basophils % 0 0 - 2 %    Neutrophils Absolute 4.3 2.0 - 7.7 thou/uL    Lymphocytes Absolute 1.0 0.8 - 4.4 thou/uL    Monocytes Absolute 0.5 0.0 - 0.9 thou/uL    Eosinophils Absolute 0.0 0.0 - 0.4 thou/uL    Basophils Absolute 0.0 0.0 - 0.2 thou/uL       Imaging    No results found.      Signed by: Ernesto Britton MD

## 2021-06-18 NOTE — PROGRESS NOTES
[facility] Hematology and Oncology Progress Note    Patient: Dania Da Silva  MRN: [id number]  Date of Service: 6/20/2018      Reason for Visit    Follow up metastatic clear-cell renal cell carcinoma    Assessment and Plan  Renal cell carcinoma of right kidney (H)    Staging form: Kidney, AJCC 8th Edition    - Clinical stage from 4/3/2018: Stage IV (cT1b, cNX, pM1) - Signed by Ernesto Britton MD on 4/3/2018    ECOG Performance   ECOG Performance Status: 1     Distress Assessment  Distress Assessment Score: 2    Pain  Currently in Pain: No/denies    #.  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     Her granddaughter was present over the phone to review the last CT scan results.  I explained to the patient and her again that her pulmonary nodules are overall stable or may be slightly smaller.  Renal mass is stable.  Pleural-based metastases are marginally larger with about 20% increase in size from prior study.  I explained to her that I would take this study as a stable disease.  She is actually clinically better in her right-sided pain and less/not requiring pain medication.  She did not tolerable with 400 mg of pazopanib due to grade 3 diarrhea holding temporary treatment interruption.  We decided to resume treatment with further decreasing dose of pazopanib to 200 mg daily and it appears better tolerability for her.  She is not comfortable with observation alone.   Her labs today were fairly good.  Will continue current dose of pazopanib.    Follow-up with in 4 weeks with labs and exam.   Plan for another restaging scan in about 2-3 months or sooner if clinically indicated.    #. Acute diarrhea grade 2-3 - secondary to pazopanib.     Resolved with lowering dose of pazopanib. She contiues Metamucil. Not needing Imodium anymore.     #.  Metastatic bone disease   On Zometa every 12 weeks.     #.  Cancer related pain   She has been alternating Tylenol and ibuprofen about 1 times a day.  Continue current pain medication to change to as needed instead of scheduled.  She will try that.    #.  Rheumatoid arthritis- on methotrexate  #.  Osteoporosis  #.  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  has a suprapubic.  She is follow by urology at [facility].  She is going to follow-up in the next few weeks.  #.  Hypertension, improved    Problem List    1. Renal cell carcinoma of right kidney (H)  HM1(CBC and Differential)    HM1 (CBC with Diff)    CANCELED: Comprehensive Metabolic Panel      ______________________________________________________________________________  Diagnosis  March 2018- Metastatic renal cell carcinoma of the right kidney- clear cell type (2.5 cm pleural-based expansile destructive lesion in the right lateral seventh rib, pleural-based nodules, multiple bilateral pulmonary nodules with small right pleural effusion, 3.2 cm renal mass consistent with renal cell carcinoma)- PDL1 expression 90%.    Treatment to date  4/18/18- started pazopanib 400 mg daily (at 50% dose reduction with plan to escalate to 800 mg daily as tolerated), stop on 5/26/2018 due to grade 3 diarrhea   -CT scan show overall stable disease (mild progression about 20% increase in size of pleural-based metastases), stable or slightly smaller pulmonary nodules.   - 6/5/2018- restarted pazopaninb 200 mg daily for better tolerability    History of Present Illness    Ms. Da Sliva is here for follow up, accompanied by her friend, Mandy.     She is feeling better.  She feels that her bowels are much better with 200 mg dose of pazopanib.  She has lost about 1 pound.  Her right sided chest wall pain has significantly  improved and she feels that she may not need to take 1 tablet of Aleve anymore.  She would like to give it a try.  No other concerns.      Pain Status  Currently in Pain: No/denies    Review of Systems    Constitutional  Constitutional (WDL): Exceptions to WDL  Fatigue: Fatigue relieved by rest  Fever: None  Chills: None  Weight Gain: None  Weight Loss: to <10% from baseline, intervention not indicated (1# 6/5/18)  Neurosensory  Neurosensory (WDL): Exceptions to WDL  Peripheral Motor Neuropathy: Asymptomatic, clinical or diagnostic observations only, intervention not indicated  Ataxia: Asymptomatic, clinical or diagnostic observations only, intervention not indicated (uses walker in mornings, cane when out and about)  Peripheral Sensory Neuropathy: Asymptomatic, loss of deep tendon reflexes or paresthesia (every morning, feet)  Confusion: None  Syncope: None  Eye   Eye Disorder (WDL): Exceptions to WDL  Blurred Vision: None  Dry Eye: Asymptomatic, clinical or diagnostic observations only, mild symptoms relieved by lubricants (chronic)  Eye Pain: None  Watering Eyes: None  Ear  Ear Disorder (WDL): All ear disorder elements are within defined limits  Cardiovascular  Palpitations: None  Edema: No (intermittent, ankles)  Phlebitis: None  Superficial thrombophlebitis: None  Pulmonary  Respiratory (WDL): Exceptions to WDL  Cough: None  Dyspnea: Shortness of breath with moderate exertion  Hypoxia: None  Gastrointestinal  Gastrointestinal (WDL): Exceptions to WDL (intermittent abd. cramping)  Anorexia: Loss of appetite without alteration in eating habits  Constipation: None  Diarrhea: None  Dysphagia: None  Esophagitis: None  Nausea: None  Pharyngitis: None  Vomiting: None  Dysgeusia: None  Dry Mouth: Symptomatic (e.g., dry or thick saliva) without significant dietary alteration, unstimulated saliva flow >0.2 ml/min (crhonic)  Genitourinary  Genitourinary (WDL): Exceptions to WDL (suprapubic catheter)  Urinary Frequency:  "None  Urinary Retention: None  Urinary Tract Pain: None  Lymphatic  Lymph (WDL): All lymph disorder elements are within defined limits  Musculoskeletal and Connective Tissue  Musculoskeletal and Connetive Tissue Disorders (WDL): Exceptions to WDL  Arthralgia: Mild pain  Bone Pain: Mild pain  Muscle Weakness : Symptomatic, perceived by patient but not evident on physical exam  Myalgia: Mild pain  Integumentary  Integumentary (WDL): All integumentary elements are within defined limits  Patient Coping  Patient Coping: Accepting;Open/discussion  Distress Assessment  Distress Assessment Score: 2  Accompanied by  Accompanied by: Friend (Mandy)  Oral Chemo Adherence  Oral Chemo Adherence  What Oral Chemotherapy is the patient taking?: Pazopanib  Did patient start taking oral chemo on time?: Yes, patient started taking oral chemo on time  Does the patient report missing any doses?: No    Past History  Past Medical History:   Diagnosis Date     Cancer (H)      GERD (gastroesophageal reflux disease)      Hypertension      Osteoporosis      Rheumatoid arthritis (H)        Physical Exam    Recent Vitals 6/19/2018   Height 5' 0\"   Weight 105 lbs 6 oz   BSA (m2) 1.42 m2   /77   Pulse 78   Temp -   Temp src -   SpO2 100   Some recent data might be hidden     General: alert, awake, not in acute distress. Thin female.   HEENT: Head: Normal, normocephalic, atraumatic.  Eye: Normal external eye, conjunctiva, lids cornea, MAHESH.  Ears:  Non-tender.  Nose: Normal external nose, mucus membranes and septum.  Pharynx: Dental Hygiene adequate. Normal buccal mucosa. Normal pharynx.  Neck / Thyroid: Supple, no masses, nodes, nodules or enlargement.  Lymphatics: No abnormally enlarged lymph nodes.  Chest: Normal chest wall and respirations. Clear to auscultation.  Heart: S1 S2 RRR, no murmur.   Abdomen: abdomen is soft without significant tenderness, masses, organomegaly or guarding  Extremities: normal strength, tone, and muscle " mass  Skin: normal. no rash or abnormalities  CNS: non focal.    Lab Results    Recent Results (from the past 168 hour(s))   Comprehensive Metabolic Panel   Result Value Ref Range    Sodium 132 (L) 136 - 145 mmol/L    Potassium 4.0 3.5 - 5.0 mmol/L    Chloride 99 98 - 107 mmol/L    CO2 23 22 - 31 mmol/L    Anion Gap, Calculation 10 5 - 18 mmol/L    Glucose 111 70 - 125 mg/dL    BUN 15 8 - 28 mg/dL    Creatinine 0.80 0.60 - 1.10 mg/dL    GFR MDRD Af Amer >60 >60 mL/min/1.73m2    GFR MDRD Non Af Amer >60 >60 mL/min/1.73m2    Bilirubin, Total 1.0 0.0 - 1.0 mg/dL    Calcium 9.9 8.5 - 10.5 mg/dL    Protein, Total 6.3 6.0 - 8.0 g/dL    Albumin 3.4 (L) 3.5 - 5.0 g/dL    Alkaline Phosphatase 107 45 - 120 U/L    AST 19 0 - 40 U/L    ALT 16 0 - 45 U/L   HM1 (CBC with Diff)   Result Value Ref Range    WBC 6.0 4.0 - 11.0 thou/uL    RBC 4.15 3.80 - 5.40 mill/uL    Hemoglobin 12.6 12.0 - 16.0 g/dL    Hematocrit 37.3 35.0 - 47.0 %    MCV 90 80 - 100 fL    MCH 30.4 27.0 - 34.0 pg    MCHC 33.8 32.0 - 36.0 g/dL    RDW 16.8 (H) 11.0 - 14.5 %    Platelets 334 140 - 440 thou/uL    MPV 9.6 8.5 - 12.5 fL    Neutrophils % 71 (H) 50 - 70 %    Lymphocytes % 22 20 - 40 %    Monocytes % 6 2 - 10 %    Eosinophils % 1 0 - 6 %    Basophils % 1 0 - 2 %    Neutrophils Absolute 4.3 2.0 - 7.7 thou/uL    Lymphocytes Absolute 1.3 0.8 - 4.4 thou/uL    Monocytes Absolute 0.3 0.0 - 0.9 thou/uL    Eosinophils Absolute 0.0 0.0 - 0.4 thou/uL    Basophils Absolute 0.1 0.0 - 0.2 thou/uL       Imaging    Ct Chest Abdomen Pelvis With Oral With Iv Cont    Result Date: 6/4/2018  CT CHEST, ABDOMEN, AND PELVIS 6/4/2018 10:27 AM      INDICATION: Metastatic renal cell carcinoma, treatment follow-up. TECHNIQUE: CT chest, abdomen, and pelvis. Dose reduction techniques were used. IV CONTRAST: 100 mL Omnipaque 350. COMPARISON: CT chest dated 3/9/2018, CT abdomen and pelvis dated 3/23/2018. FINDINGS: CHEST: Again noted are multiple small pleural-based metastases along the  anterior aspect of the right upper lobe and the lateral aspect of the right upper lobe and right middle lobe. There are multiple pulmonary nodules involving both lungs. Pleural nodule in the right medial lower lung on image 70 of series 3 measures 1.3 x 1 cm. On the prior study it measured 1.1 x 0.9 cm. Representative nodule in the left upper lobe on image 45 of series 3 measures 0.9 x 0.7 cm. On the prior study it measured 12 mm in greatest dimension. The 4 mm nodule seen in the major fissure on the right on image 39 of series 3 measures 5 x 4 mm. Left lower lobe nodule on image 104 of series 3 measures 6 x 3 mm versus 8 mm in greatest dimension on the prior study. There is atelectasis at the right lung base. Expansile lesion partially destroying the right seventh rib is again noted and shows lower density centrally which may represent central necrosis. There are multiple smaller pulmonary nodules in both lungs. There is a pretracheal lymph node on image 42 of series 2 that measures 1.6 x 1.3 cm. On the prior study it had a short axis diameter of 11 mm. No other mediastinal or hilar lymphadenopathy. No pericardial effusion. There is mild coronary artery calcification.  ABDOMEN: Enhancing right renal mass measuring 3.2 cm in diameter is again noted consistent with renal cell carcinoma. The right renal vein appears patent. There is no focal hepatic lesion. There is mild intrahepatic biliary dilatation. The spleen, pancreas, adrenal glands and left kidney are normal. There is calcification of the abdominal aorta without evidence for aneurysm. No retroperitoneal lymphadenopathy. There is fecal material throughout the colon. No bowel obstruction or free intraperitoneal air. PELVIS: Bladder is decompressed by a suprapubic catheter. There are stones in the bladder, the largest measures 1.3 cm in diameter unchanged. There is a left femoral hernia containing a loop of bowel, no bowel obstruction is seen. MUSCULOSKELETAL:  Left hip arthroplasty. Degenerative joint disease right hip. Scoliosis and degenerative change in the lumbar spine. Again noted is expansile lesion in the right seventh rib. No other suspicious bony lesions are identified.     CONCLUSION: 1.  Mild progression of pleural-based metastases on the right with slight increase in right pleural effusion. Pulmonary nodules are either stable or slightly smaller. Right renal mass is stable from the prior studies.         Signed by: Ernesto Britton MD

## 2021-06-19 NOTE — PROGRESS NOTES
[facility] Hematology and Oncology Progress Note    Patient: Dania Da Silva  MRN: [id number]  Date of Service: 8/16/2018      Reason for Visit    Follow up metastatic clear-cell renal cell carcinoma    Assessment and Plan  Renal cell carcinoma of right kidney (H)    Staging form: Kidney, AJCC 8th Edition    - Clinical stage from 4/3/2018: Stage IV (cT1b, cNX, pM1) - Signed by Ernesto Britton MD on 4/3/2018    ECOG Performance   ECOG Performance Status: 1     Distress Assessment  Distress Assessment Score: 7    Pain  Currently in Pain: No/denies  Pain Score (Initial OR Reassessment): No/Denies Pain  Location: R abd, legs, R arm    #.  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   Review her labs and they are fairly good.  Normal renal function, kidney function and blood counts.  CT scan of chest abdomen pelvis were reviewed.  Overall stable disease with possible lesion the right expansile chest wall mass has slightly decreased in size.  Explained to her that her current chemotherapy is working and will continue the current dose of pazopanib to 200 mg daily which she tolerates fairly well as well.  She agreed.  She would like to continue therapy as long as it is working.   Follow-up with in 4 weeks with labs and exam.   Plan for restaging CT scan in about 2-3 months.     #. Acute diarrhea grade 2-3 - secondary to pazopanib, resolved with lowering dose of pazopanib. , resolved      #.  Metastatic bone disease   On Zometa every 12 weeks. She is due now, however because of her schedule, she will complete in next visit.    #.  Cancer related pain   She is now taking Aleve 1 tablet daily in the morning with food.  No additional pain  medication needed at this point.      #.  Rheumatoid arthritis- on methotrexate  #.  Osteoporosis  #.  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  has a suprapubic.  She is follow by urology at [facility].    #.  Hypertension, improved    Problem List    1. Renal cell carcinoma of right kidney (H)  HM1(CBC and Differential)    Comprehensive Metabolic Panel      _  TT: 40 minutes and more than 50% was spent on coordination and counseling of care.  _____________________________________________________________________________  Diagnosis  March 2018- Metastatic renal cell carcinoma of the right kidney- clear cell type (2.5 cm pleural-based expansile destructive lesion in the right lateral seventh rib, pleural-based nodules, multiple bilateral pulmonary nodules with small right pleural effusion, 3.2 cm renal mass consistent with renal cell carcinoma)- PDL1 expression 90%.    Treatment to date  4/18/18- started pazopanib 400 mg daily (at 50% dose reduction with plan to escalate to 800 mg daily as tolerated), stop on 5/26/2018 due to grade 3 diarrhea   -CT scan show overall stable disease (mild progression about 20% increase in size of pleural-based metastases), stable or slightly smaller pulmonary nodules.   - 6/5/2018- restarted pazopaninb 200 mg daily for better tolerability    History of Present Illness    Ms. Da Silva is here for follow up, accompanied by her friend.     She is overall doing well.  Her loose stool has been well controlled with Metamucil.  No intolerable side effects.  Her right-sided chest wall pain has been very minimal and she usually needs to take one Aleve a day with Tylenol 1 dose if needed.    Pain Status  Currently in Pain: No/denies    Review of Systems    Constitutional  Constitutional (WDL): Exceptions to WDL  Fatigue: Fatigue relieved by rest (increases in the afternoons)  Fever: None  Chills: None  Weight Gain: None  Weight Loss:  None  Neurosensory  Neurosensory (WDL): Exceptions to WDL  Peripheral Motor Neuropathy: None  Ataxia: Asymptomatic, clinical or diagnostic observations only, intervention not indicated  Peripheral Sensory Neuropathy: Asymptomatic, loss of deep tendon reflexes or paresthesia (bilat feet, worse at noc, mornings.  Uses walker 1st thing in morning when gets up)  Confusion: None  Syncope: None  Eye   Eye Disorder (WDL): Exceptions to WDL  Blurred Vision: Intervention not indicated  Dry Eye: Asymptomatic, clinical or diagnostic observations only, mild symptoms relieved by lubricants (chronic, dry eyes)  Eye Pain: None  Watering Eyes: None  Ear  Ear Disorder (WDL): All ear disorder elements are within defined limits  Cardiovascular  Cardiovascular (WDL): All cardiovascular elements are within defined limits  Pulmonary  Respiratory (WDL): Exceptions to WDL  Cough: None  Dyspnea: Shortness of breath with moderate exertion  Hypoxia: None  Gastrointestinal  Gastrointestinal (WDL): Exceptions to WDL  Anorexia: Loss of appetite without alteration in eating habits  Constipation: None  Diarrhea: Increase of <4 stools per day over baseline, mild increase in ostomy output compared to baseline (metamucil bid helps)  Dysphagia: None  Esophagitis: None  Nausea: None  Pharyngitis: None  Vomiting: None  Dysgeusia: None  Dry Mouth: Symptomatic (e.g., dry or thick saliva) without significant dietary alteration, unstimulated saliva flow >0.2 ml/min (chronic)  Genitourinary  Genitourinary (WDL): Exceptions to WDL (suprapubic catheter)  Lymphatic  Lymph (WDL): All lymph disorder elements are within defined limits  Musculoskeletal and Connective Tissue  Musculoskeletal and Connetive Tissue Disorders (WDL): Exceptions to WDL  Arthralgia: Mild pain (intermittent R arm)  Bone Pain: Mild pain (intermitent R arm)  Muscle Weakness : Symptomatic, perceived by patient but not evident on physical exam  Myalgia: Mild pain (leg cramps in evenings, @  "noc)  Integumentary  Integumentary (WDL): All integumentary elements are within defined limits  Patient Coping  Patient Coping: Accepting  Distress Assessment  Distress Assessment Score: 7  Accompanied by  Accompanied by: Family Member (Friend, Tip)  Oral Chemo Adherence       Past History  Past Medical History:   Diagnosis Date     Cancer (H)      GERD (gastroesophageal reflux disease)      Hypertension      Osteoporosis      Rheumatoid arthritis (H)        Physical Exam    Recent Vitals 8/16/2018   Height 5' 0\"   Weight 105 lbs 11 oz   BSA (m2) 1.42 m2   /64   Pulse 81   Temp -   Temp src -   SpO2 98   Some recent data might be hidden     General: alert, awake, not in acute distress. Thin female.   HEENT: Head: Normal, normocephalic, atraumatic.  Eye: Normal external eye, conjunctiva, lids cornea, MAHESH.  Ears:  Non-tender.  Nose: Normal external nose, mucus membranes and septum.  Pharynx: Dental Hygiene adequate. Normal buccal mucosa. Normal pharynx.  Neck / Thyroid: Supple, no masses, nodes, nodules or enlargement.  Lymphatics: No abnormally enlarged lymph nodes.  Chest: Normal chest wall and respirations. Clear to auscultation.  Heart: S1 S2 RRR, no murmur.   Abdomen: abdomen is soft without significant tenderness, masses, organomegaly or guarding  Extremities: normal strength, tone, and muscle mass.  No reproducible pain in the pelvis.  Skin: normal. no rash or abnormalities  CNS: non focal.    Lab Results    Recent Results (from the past 168 hour(s))   Comprehensive Metabolic Panel   Result Value Ref Range    Sodium 132 (L) 136 - 145 mmol/L    Potassium 4.2 3.5 - 5.0 mmol/L    Chloride 97 (L) 98 - 107 mmol/L    CO2 26 22 - 31 mmol/L    Anion Gap, Calculation 9 5 - 18 mmol/L    Glucose 103 70 - 125 mg/dL    BUN 22 8 - 28 mg/dL    Creatinine 0.87 0.60 - 1.10 mg/dL    GFR MDRD Af Amer >60 >60 mL/min/1.73m2    GFR MDRD Non Af Amer >60 >60 mL/min/1.73m2    Bilirubin, Total 0.9 0.0 - 1.0 mg/dL    Calcium 10.2 " 8.5 - 10.5 mg/dL    Protein, Total 6.7 6.0 - 8.0 g/dL    Albumin 3.8 3.5 - 5.0 g/dL    Alkaline Phosphatase 90 45 - 120 U/L    AST 23 0 - 40 U/L    ALT 21 0 - 45 U/L   HM1 (CBC with Diff)   Result Value Ref Range    WBC 6.1 4.0 - 11.0 thou/uL    RBC 4.32 3.80 - 5.40 mill/uL    Hemoglobin 13.6 12.0 - 16.0 g/dL    Hematocrit 39.8 35.0 - 47.0 %    MCV 92 80 - 100 fL    MCH 31.5 27.0 - 34.0 pg    MCHC 34.2 32.0 - 36.0 g/dL    RDW 15.4 (H) 11.0 - 14.5 %    Platelets 288 140 - 440 thou/uL    MPV 9.7 8.5 - 12.5 fL    Neutrophils % 74 (H) 50 - 70 %    Lymphocytes % 18 (L) 20 - 40 %    Monocytes % 7 2 - 10 %    Eosinophils % 1 0 - 6 %    Basophils % 1 0 - 2 %    Neutrophils Absolute 4.5 2.0 - 7.7 thou/uL    Lymphocytes Absolute 1.1 0.8 - 4.4 thou/uL    Monocytes Absolute 0.4 0.0 - 0.9 thou/uL    Eosinophils Absolute 0.0 0.0 - 0.4 thou/uL    Basophils Absolute 0.0 0.0 - 0.2 thou/uL       Imaging    Ct Chest Abdomen Pelvis Without Oral With Iv Contrast    Result Date: 8/14/2018  CT CHEST, ABDOMEN, AND PELVIS 8/14/2018 11:17 AM      INDICATION: Metastatic renal cell cancer, treatment follow up. TECHNIQUE: CT chest, abdomen, and pelvis. Dose reduction techniques were used. IV CONTRAST: Iohexol (Omni) 100 mL. COMPARISON: 6/4/2018. FINDINGS: CHEST: Scattered right pleural metastases and bilateral pulmonary nodules show no significant change. Improved but persistent minimal right pleural effusion. No new or enlarging adenopathy.  ABDOMEN: Allowing for small differences in measurement, no significant change of 3.3 cm right renal mass. Unremarkable liver, pancreas, spleen, adrenals and left kidney. No adenopathy. PELVIS: No adenopathy. No bowel obstruction. Suprapubic bladder catheter. Left inguinal hernia containing minimal small bowel. MUSCULOSKELETAL: Focal ovoid expansion of the anterior right sixth rib has mildly increased, with overall dimensions remaining similar at 13 x 15 mm (series 2, image 61). Diffuse expansion with  peripheral thickening involving the lateral to anterior right seventh rib has significantly improved since prior. Suspect pathologic fractures of the anterior right fifth through seventh ribs. Left hip arthroplasty.     CONCLUSION: 1.  Diffuse expansion of the anterior-lateral right seventh rib has improved since prior. Focally increased ovoid expansile appearance of the anterior right sixth rib. Otherwise, no significant change of pulmonary nodules, pleural metastases and right renal mass since prior. 2.  Improved but persistent minimal right pleural effusion. 3.  Small left inguinal hernia containing minimal small bowel. No obstruction or bowel wall thickening.         Signed by: Ernesto Britton MD

## 2021-06-19 NOTE — PROGRESS NOTES
Patient here today for labs and follow-up visit and CT resuts with Dr. Britton for renal cell carcinoma with mets to bone/MICHAEL Posadas RN

## 2021-06-19 NOTE — PROGRESS NOTES
Patient here today for labs and 2 week follow-up with Dr. Britton for renal carcinoma/MICHAEL Posadas RN

## 2021-06-19 NOTE — PROGRESS NOTES
SUBJECTIVE: Dania Da Silva is a 89 y.o. 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    2) Metastatic clear cell renal carcinoma - She is taking Votrient for cancer- has cut down dose to 1/4 of what she was taking.  Had diarrhea with higher dose. Fiber supplement was recommended.  Helping.  She still gets cramps. She does take Imodium which works.  Takes Aleve for pain in the am.  She takes Tylenol as needed. Going to see oncology later this week.    3) HTN - She is taking amlodipine/ lisinopril / Maxide for BP control.  Has been running high at home with home cuff.    4) Vitamin D deficiency - Has osteoporosis.  Wonders what dose she should be on.  Has not taken any supplement for awhile.    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  Followed by urology.    SH: Lives at [facility].  Uses cane to ambulate.  .  Has daughter with multiple sclerosis in [place].  Has granddaughter in [facility].    OBJECTIVE: /62 (Patient Site: Right Arm, Patient Position: Sitting, Cuff Size: Adult Regular)  Pulse 73  Resp 16  Ht 5' (1.524 m)  Wt 106 lb 4 oz (48.2 kg)  SpO2 97%  BMI 20.75 kg/m2 no distress    Dania Somers was seen today for establish care, cancer and needs to know what unit to take for vit d3.    Diagnoses and all orders for this visit:    Renal cell carcinoma of right kidney (H) - Currently stable on Votrient.  To f/u with oncology.  -     Comprehensive Metabolic Panel / CBC  I recommend she try taking probiotics for the diarrhea.  -         Sensory hearing loss, bilateral  -     Ambulatory referral to Audiology    Hypertension- Well controlled.  Continue on  antihypertensives.    Vitamin D deficiency- Will check a level and then recommend dose.  -     Vitamin D, Total (25-Hydroxy)    Follow-up in 3 months.     Nicolasa Mcclellan

## 2021-06-19 NOTE — PROGRESS NOTES
[facility]ology [facility] Oncology Progress Note    Patient: Dania Da Silva  MRN: [id number]  Date of Service: 7/17/2018      Reason for Visit    Follow up metastatic clear-cell renal cell carcinoma    Assessment and Plan  Renal cell carcinoma of right kidney (H)    Staging form: Kidney, AJCC 8th Edition    - Clinical stage from 4/3/2018: Stage IV (cT1b, cNX, pM1) - Signed by Ernesto Britton MD on 4/3/2018    ECOG Performance   ECOG Performance Status: 1     Distress Assessment  Distress Assessment Score: 3    Pain  Currently in Pain: No/denies  Pain Score (Initial OR Reassessment): No/Denies Pain  Location: abd, legs    #.  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     Tolerates current dose of pazopanib to 200 mg daily fairly well.  Labs were reviewed and they were adequate.  Noted improvement in albumin.  She has mild elevation of bilirubin of 1.1. Will continue current dose of pazopanib.    Follow-up with in 4 weeks with labs and exam and CT scan prior.  I explained to the patient that if she developed new bone pain or any new symptoms outside the chest, abdomen and pelvis, she is advised to call us to have reevaluation and imaging a CT scan will not be included her neck.    #. Acute diarrhea grade 2-3 - secondary to pazopanib, resolved with lowering dose of pazopanib.       #.  Metastatic bone disease   On Zometa every 12 weeks.     #.  Cancer related pain   She is now taking Aleve 1 tablet daily in the morning with food.  No additional pain medication needed at this point.      #.  Rheumatoid arthritis- on methotrexate  #.  Osteoporosis  #.  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  has a suprapubic.  She is follow [facility] urology at [facility].    #.  Hypertension, improved    Problem List    1. Renal cell carcinoma of right kidney (H)  HM1(CBC and Differential)    Comprehensive Metabolic Panel    HM1 (CBC with Diff)   2. Chemotherapy management, encounter for  HM1(CBC and Differential)    Comprehensive Metabolic Panel    HM1 (CBC with Diff)      ______________________________________________________________________________  Diagnosis  March 2018- Metastatic renal cell carcinoma of the right kidney- clear cell type (2.5 cm pleural-based expansile destructive lesion in the right lateral seventh rib, pleural-based nodules, multiple bilateral pulmonary nodules with small right pleural effusion, 3.2 cm renal mass consistent with renal cell carcinoma)- PDL1 expression 90%.    Treatment to date  4/18/18- started pazopanib 400 mg daily (at 50% dose reduction with plan to escalate to 800 mg daily as tolerated), stop on 5/26/2018 due to grade 3 diarrhea   -CT scan show overall stable disease (mild progression about 20% increase in size of pleural-based metastases), stable or slightly smaller pulmonary nodules.   - 6/5/2018- restarted pazopaninb 200 mg daily for better tolerability    History of Present Illness    Ms. Da Silva is here for follow up, accompanied by her friend, Mandy.     She is feeling ok.  She noted new pain in the right pelvis area although it did not last very long.  She continues to have abdominal cramps.  She had one stool a day in the morning.  She does not have diarrhea anymore.  She has been taking half a banana a day and keeping her potassium in normal range.  She has chronic numbness in bilateral feet, worse in the morning.  She takes Aleve 1 a day.  She has not been taking Tylenol anymore.     Pain Status  Currently in Pain: No/denies    Review of Systems    Constitutional  Constitutional (WDL): Exceptions to WDL  Fatigue: Fatigue relieved by rest (afternoon)  Fever:  None  Chills: None  Weight Gain: None  Weight Loss: None  Neurosensory  Neurosensory (WDL): Exceptions to WDL  Peripheral Motor Neuropathy: Asymptomatic, clinical or diagnostic observations only, intervention not indicated  Ataxia: Asymptomatic, clinical or diagnostic observations only, intervention not indicated (uses walker in the mornings, cane when out and about)  Peripheral Sensory Neuropathy: Asymptomatic, loss of deep tendon reflexes or paresthesia (bilat feet, numb, worse in the mornings)  Confusion: None  Syncope: None  Eye   Eye Disorder (WDL): Exceptions to WDL  Blurred Vision: Intervention not indicated (don't see like did a year ago, due to get new glasses)  Dry Eye: Asymptomatic, clinical or diagnostic observations only, mild symptoms relieved by lubricants (chronic)  Eye Pain: None  Watering Eyes: None  Ear  Ear Disorder (WDL): All ear disorder elements are within defined limits  Cardiovascular  Cardiovascular (WDL): All cardiovascular elements are within defined limits  Pulmonary  Respiratory (WDL): Exceptions to WDL  Cough: None  Dyspnea: Shortness of breath with moderate exertion  Hypoxia: None  Gastrointestinal  Gastrointestinal (WDL): Exceptions to WDL (intermittent abd cramping)  Anorexia: Loss of appetite without alteration in eating habits  Constipation: None  Diarrhea: Increase of <4 stools per day over baseline, mild increase in ostomy output compared to baseline  Dysphagia: None  Esophagitis: None  Nausea: None  Pharyngitis: None  Vomiting: None  Dysgeusia: None  Dry Mouth: Symptomatic (e.g., dry or thick saliva) without significant dietary alteration, unstimulated saliva flow >0.2 ml/min (chronic)  Genitourinary  Genitourinary (WDL): Exceptions to WDL (suprapubic catheter)  Lymphatic  Lymph (WDL): All lymph disorder elements are within defined limits  Musculoskeletal and Connective Tissue  Musculoskeletal and Connetive Tissue Disorders (WDL): Exceptions to WDL  Arthralgia: Mild pain  Bone  Pain: Mild pain  Muscle Weakness : Symptomatic, perceived by patient but not evident on physical exam  Myalgia: Mild pain (intermittent leg cramping)  Integumentary  Integumentary (WDL): All integumentary elements are within defined limits  Patient Coping  Patient Coping: Accepting;Open/discussion  Distress Assessment  Distress Assessment Score: 3  Accompanied by  Accompanied by: Friend (Mandy)  Oral Chemo Adherence  Oral Chemo Adherence  What Oral Chemotherapy is the patient taking?: Pazopanib  Did the patient fill and  the prescription as written?: Yes  Did patient start taking oral chemo on time?: Yes, patient started taking oral chemo on time  Does the patient report missing any doses?: No    Past History  Past Medical History:   Diagnosis Date     Cancer (H)      GERD (gastroesophageal reflux disease)      Hypertension      Osteoporosis      Rheumatoid arthritis (H)        Physical Exam    Recent Vitals 7/17/2018   Height -   Weight -   BSA (m2) -   /69   Pulse -   Temp -   Temp src -   SpO2 -   Some recent data might be hidden     General: alert, awake, not in acute distress. Thin female.   HEENT: Head: Normal, normocephalic, atraumatic.  Eye: Normal external eye, conjunctiva, lids cornea, MAHESH.  Ears:  Non-tender.  Nose: Normal external nose, mucus membranes and septum.  Pharynx: Dental Hygiene adequate. Normal buccal mucosa. Normal pharynx.  Neck / Thyroid: Supple, no masses, nodes, nodules or enlargement.  Lymphatics: No abnormally enlarged lymph nodes.  Chest: Normal chest wall and respirations. Clear to auscultation.  Heart: S1 S2 RRR, no murmur.   Abdomen: abdomen is soft without significant tenderness, masses, organomegaly or guarding  Extremities: normal strength, tone, and muscle mass.  No reproducible pain in the pelvis.  Skin: normal. no rash or abnormalities  CNS: non focal.    Lab Results    Recent Results (from the past 168 hour(s))   HM1 (CBC with Diff)   Result Value Ref Range     WBC 7.6 4.0 - 11.0 thou/uL    RBC 4.32 3.80 - 5.40 mill/uL    Hemoglobin 13.0 12.0 - 16.0 g/dL    Hematocrit 39.9 35.0 - 47.0 %    MCV 92 80 - 100 fL    MCH 30.1 27.0 - 34.0 pg    MCHC 32.7 32.0 - 36.0 g/dL    RDW 16.0 (H) 11.0 - 14.5 %    Platelets 323 140 - 440 thou/uL    MPV 7.2 7.0 - 10.0 fL    Neutrophils % 73 (H) 50 - 70 %    Lymphocytes % 16 (L) 20 - 40 %    Monocytes % 9 2 - 10 %    Eosinophils % 1 0 - 6 %    Basophils % 1 0 - 2 %    Neutrophils Absolute 5.5 2.0 - 7.7 thou/uL    Lymphocytes Absolute 1.3 0.8 - 4.4 thou/uL    Monocytes Absolute 0.7 0.0 - 0.9 thou/uL    Eosinophils Absolute 0.1 0.0 - 0.4 thou/uL    Basophils Absolute 0.0 0.0 - 0.2 thou/uL   Comprehensive Metabolic Panel   Result Value Ref Range    Sodium 134 (L) 136 - 145 mmol/L    Potassium 4.4 3.5 - 5.0 mmol/L    Chloride 99 98 - 107 mmol/L    CO2 22 22 - 31 mmol/L    Anion Gap, Calculation 13 5 - 18 mmol/L    Glucose 86 70 - 125 mg/dL    BUN 23 8 - 28 mg/dL    Creatinine 0.85 0.60 - 1.10 mg/dL    GFR MDRD Af Amer >60 >60 mL/min/1.73m2    GFR MDRD Non Af Amer >60 >60 mL/min/1.73m2    Bilirubin, Total 1.1 (H) 0.0 - 1.0 mg/dL    Calcium 10.5 8.5 - 10.5 mg/dL    Protein, Total 6.1 6.0 - 8.0 g/dL    Albumin 3.7 3.5 - 5.0 g/dL    Alkaline Phosphatase 110 45 - 120 U/L    AST 27 0 - 40 U/L    ALT 23 0 - 45 U/L   Vitamin D, Total (25-Hydroxy)   Result Value Ref Range    Vitamin D, Total (25-Hydroxy) 59.6 30.0 - 80.0 ng/mL       Imaging    No results found.      Signed by: Ernesto Britton MD

## 2021-06-19 NOTE — LETTER
Letter by Isabel Patel, Keily at      Author: Isabel Patel, Keily Service: -- Author Type: --    Filed:  Encounter Date: 2019 Status: (Other)             2019      Dania Da Silva  [address]   [place] [address]            Dear Dania Da Silva     Thank you for talking with me on 19 about your health and medications. [facility] MTM (Medication Therapy Management) program helps you understand your medications and use them safely.    Along with this letter are an action plan (Medication Action Plan) and a medication list (Personal Medication List). The action plan has steps you should take to help you get the best results from your medications. The medication list will help you keep track of your medications and how to use them the right way.       Have your action plan and medication list with you when you talk with your doctors, pharmacists, and other health care providers.    Ask your doctors, pharmacists, and other healthcare providers to update them at every visit.     Take your medication list with you if you go to the hospital or emergency room.     Give a copy of the action plan and medication list to your family or caregivers.     If you want to talk about this letter or any of the papers with it, please call [phone]. We look forward to working with you, your doctors, and other healthcare providers to help you stay healthy.    Sincerely,     Isabel Patel    _  Medication Action Plan For Dania Da Silva, : 5/10/1927     This action plan will help you get the best results from your medications if you:     1. Read What we talked about.   2. Take the steps listed in the What I need to do boxes.   3. Fill in What I did and when I did it.   4. Fill in My follow-up plan and Questions I want to ask.     Have this action plan with you when you talk with your doctors, pharmacists, and other healthcare providers in your care team. Share this with your  family or caregivers too.    Date Prepared: 4/23/2019      What we talked about:  Before considering restarting triamterene-hydrochlorothiazide, we should recheck your sodium level.      What I need to do:  We will check your sodium level.      What I did and when I did it:          What we talked about:       What I need to do:       What I did and when I did it:          What we talked about:       What I need to do:       What I did and when I did it:         My follow-up plan (add notes about next steps):            Questions I want to ask (include topics about medications or therapy):          If you have any questions about your action plan, call Isabel LYNN Santoshclementina at [phone], Monday-Friday 8:00-4:30pm  _  This medication list was made for you after we talked. We also used information from your doctors chart.      Use blank rows to add new medications. Then fill in the dates you started using them.     Cross out medications when you no longer use them. Then write the date and why you stopped using them.     Ask your doctors, pharmacists, and other healthcare providers to update this list at every visit.  Keep this list up-to-date with:  ? prescription medications  ? over the counter drugs  ? herbals  ? vitamins  ? minerals          If you go to the hospital or emergency room, take this list with you. Share this with your family or caregivers too.    Date Prepared: 4/23/2019      Allergies or side effects: sulfa (sulfonamide antibiotics); sulindac      Medication: acetaminophen (TYLENOL) 500 MG tablet      How I use it: Take 1,000 mg by mouth every 6 (six) hours as needed for pain.      Why I use it: Pain    Prescriber: Jose Leija MD      Date I started using it:     Date I stopped using it:       Why I stopped using it:          Medication: amLODIPine (NORVASC) 5 MG tablet      How I use it: Take 1 tablet (5 mg total) by mouth daily. As directed      Why I use it: Essential Hypertension     Prescriber: Shawnee Taveras CNP      Date I started using it:     Date I stopped using it:       Why I stopped using it:          Medication: amoxicillin (AMOXIL) 500 MG capsule      How I use it: Take 4 capsules by mouth as needed. Prior to dental appointments      Why I use it: KNOWN/SUSPECTED INFECTION    Prescriber: Jose Leija MD      Date I started using it:     Date I stopped using it:       Why I stopped using it:          Medication: aspirin 81 MG EC tablet      How I use it: Take 81 mg by mouth daily.      Why I use it: Stroke Prevention    Prescriber: Jose Leija MD      Date I started using it:     Date I stopped using it:       Why I stopped using it:          Medication: cholecalciferol, vitamin D3, 1,000 unit tablet      How I use it: Take 3,000 Units by mouth daily.      Why I use it: Supplement    Prescriber: Jose Leija MD      Date I started using it:     Date I stopped using it:       Why I stopped using it:          Medication: folic acid (FOLVITE) 1 MG tablet      How I use it: TAKE 1 TABLET BY MOUTH EVERY DAY      Why I use it: Hypercalcemia    Prescriber: Jose Leija MD      Date I started using it:     Date I stopped using it:       Why I stopped using it:          Medication: lactase (LACTAID) 3,000 unit tablet      How I use it: Take 3,000 Units by mouth as needed.      Why I use it: Diarrhea    Prescriber: Jose Leija MD      Date I started using it:     Date I stopped using it:       Why I stopped using it:          Medication: lisinopril (PRINIVIL,ZESTRIL) 10 MG tablet      How I use it: TAKE 1 TABLET BY MOUTH EVERY DAY      Why I use it: HTN (Hypertension)    Prescriber: Nicolasa Mcclellan MD      Date I started using it:     Date I stopped using it:       Why I stopped using it:          Medication: loperamide (IMODIUM) 2 mg capsule      How I use it: Take 2 mg by mouth 4 (four) times a day as needed for diarrhea.      Why I use it: Diarrhea     Prescriber: Jose Leija MD      Date I started using it:     Date I stopped using it:       Why I stopped using it:          Medication: methotrexate sodium (METHOTREXATE) 2.5 mg DsPk      How I use it: Take 15 mg by mouth once a week.      Why I use it: Rheumatoid Arthritis     Prescriber: Jose Leija MD      Date I started using it:     Date I stopped using it:       Why I stopped using it:          Medication: multivitamin (MULTIPLE VITAMINS) per tablet      How I use it: Take 1 tablet by mouth daily.      Why I use it: Supplement    Prescriber: Jose Leija MD      Date I started using it:     Date I stopped using it:       Why I stopped using it:          Medication: NaCl 0.9% IR (NS) 0.9 % irrigation      How I use it: USE FOR IRRIGATION.      Why I use it: Urinary catheter in place    Prescriber: Angelica Mata MD      Date I started using it:     Date I stopped using it:       Why I stopped using it:          Medication: naproxen sodium (ALEVE) 220 MG tablet      How I use it: Take 220 mg by mouth daily.       Why I use it: Pain    Prescriber: Jose Leija MD      Date I started using it:     Date I stopped using it:       Why I stopped using it:          Medication: prochlorperazine (COMPAZINE) 10 MG tablet      How I use it: Take 1 tablet (10 mg total) by mouth every 6 (six) hours as needed (For breakthrough nausea/vomiting).      Why I use it: Renal cell carcinoma of right kidney     Prescriber: Ernesto Britton MD      Date I started using it:     Date I stopped using it:       Why I stopped using it:          Medication: psyllium (METAMUCIL) 3.4 gram packet      How I use it: Take 1 packet by mouth 2 (two) times a day as needed.             Why I use it: Diarrhea    Prescriber: Jose Leija MD      Date I started using it:     Date I stopped using it:       Why I stopped using it:          Medication: SIMETHICONE (GAS-X EXTRA STRENGTH ORAL)      How I use it: Take 1  tablet by mouth as needed.      Why I use it: Diarrhea    Prescriber: Jose Leija MD      Date I started using it:     Date I stopped using it:       Why I stopped using it:          Medication:       How I use it:       Why I use it:     Prescriber:       Date I started using it:     Date I stopped using it:       Why I stopped using it:          Medication:       How I use it:       Why I use it:     Prescriber:       Date I started using it:     Date I stopped using it:       Why I stopped using it:          Medication:       How I use it:       Why I use it:     Prescriber:       Date I started using it:     Date I stopped using it:       Why I stopped using it:          Other Information:      If you have any questions about your medication list, call [phone]    According to the Paperwork Reduction Act of 1995, no persons are required to respond to a collection of information unless it displays a valid OMB control number. The valid OMB number for this information collection is [id number]. The time required to complete this information collection is estimated to average 37.76 minutes per response, including the time to review instructions, searching existing data resources, gather the data needed, and complete and review the information collection. If you have any comments concerning the accuracy of the time estimate(s) or suggestions for improving this form, please write to: CMS, Attn: JUAN RAMON Reports Clearance Officer, [address] [place] [address], [place], [place] [address].

## 2021-06-20 NOTE — LETTER
Letter by Elisa Bell CNP at      Author: Elisa Bell CNP Service: -- Author Type: --    Filed:  Encounter Date: 3/13/2020 Status: (Other)         Patient: Dania Da Silva   MR Number: [id number]   YOB: 1927   Date of Visit: 3/13/2020     [facility]    Facility:   [facility] [[id number]]   Code Status: DNR/DNI and POLST AVAILABLE  PCP: Jose Leija MD   Phone: [phone]   Fax: [phone]      CHIEF COMPLAINT/REASON FOR VISIT:  Chief Complaint   Patient presents with   ? Discharge Summary       HISTORY COURSE:  Dania is a 89 y.o. female who  has a past medical history of Cancer (H), GERD (gastroesophageal reflux disease), Hypertension, Osteoporosis, Renal cell carcinoma of right kidney (H), and Rheumatoid arthritis (H).  She was recently admitted to [facility] on 1/30/2020 for a fall with subsequent hip fracture.  She was discharged on 2/3/2020.  The discharging provider summarized the hospitalization in previous notes.     Today Dania is being evaluated for a discharge from the TCU. Dania has been doing quite well and has no new issues or problems to report. She is looking forward to going home, but states she will be worried about things, as she is a worrier. She will be returning to an assisted living facility. She completed her PT/OT program. Dania denies any other concerns including fevers/chills, cough or cold symptoms, headaches, vision changes, chest pain/pressure, difficulty breathing, SOB, abdominal pain, nausea, vomiting, diarrhea, dysuria, increasing weakness, increasing pain.     PHYSICAL EXAM:   /79   Pulse 94   Temp 98.4  F (36.9  C)   Resp 16   SpO2 97%   General appearance: alert, appears stated age and cooperative  HEENT: Head is normocephalic with normal hair distribution. No evidence of trauma. Ears: Without lesions or deformity. No acute purulent discharge. Eyes: Conjunctivae pink with no scleral icterus or  erythema. Nose: Normal mucosa and septum. Oropharnyx: mmm, no lesions present.  Lungs: respirations without effort, LS CTA  Cardiovascular: S1, S2, RRR, no murmur/click/rubs  Abdomen: soft, nontender, normoactive bowel sounds  : Indwelling catheter draining clear straw-colored urine  Extremities: extremities normal, atraumatic, no cyanosis or edema.  Skin: Skin color, texture, turgor normal. No rashes or lesions.  Neurologic: Grossly normal   Psych: interacts well with caregivers, exhibits logical thought processes and connections, pleasant    MEDICATION LIST:  Current Outpatient Medications   Medication Sig   ? acetaminophen (TYLENOL) 500 MG tablet Take 2 tablets (1,000 mg total) by mouth at bedtime as needed for pain. At BEDTIME (Patient taking differently: Take 1,000 mg by mouth 3 (three) times a day. And daily prn)   ? amoxicillin (AMOXIL) 500 MG capsule Take 4 capsules (2,000 mg total) by mouth as needed. Prior to dental appointments   ? aspirin 81 MG EC tablet Take 1 tablet (81 mg total) by mouth daily. In 6 AM   ? diclofenac sodium (VOLTAREN) 1 % Gel Apply 4 g topically 3 (three) times a day. To l thigh pain   ? hydrALAZINE (APRESOLINE) 25 MG tablet Take 25 mg by mouth every 6 (six) hours as needed (SBP>170).   ? lactase (LACTAID) 3,000 unit tablet Take 3,000 Units by mouth as needed.   ? lisinopril-hydrochlorothiazide (PRINZIDE,ZESTORETIC) 10-12.5 mg per tablet Take 1 tablet by mouth daily. At 6 AM   ? naproxen sodium (ALEVE) 220 MG tablet Take 1 tablet (220 mg total) by mouth daily. At 6 AM   ? polyethylene glycol (MIRALAX) 17 gram packet Take 17 g by mouth daily as needed (for constipation).   ? psyllium (METAMUCIL) 3.4 gram packet Take 1 packet by mouth 2 (two) times a day as needed.          ? senna (SENOKOT) 8.6 mg tablet Take 1 tablet by mouth 2 (two) times a day.   ? sennosides (SENNOSIDES) 8.8 mg/5 mL Syrp syrup Take 8.8 mg by mouth.       DISCHARGE DIAGNOSIS:    ICD-10-CM    1. Physical  deconditioning  R53.81    2. Closed left hip fracture, initial encounter (H)  S72.002A    3. Fall, initial encounter  W19.XXXA    4. Metastasis to bone (H)  C79.51    5. Rheumatoid arthritis, involving unspecified site, unspecified rheumatoid factor presence (H)  M06.9      Physical Deconditioning  -Continue PT/OT and other therapies as per care plan.  -Encouraged good nutrition and movement habits.   -Discussed care plan and expected course of stay.   -Continue to follow-up per routine schedule or sooner if needed.     Fall  -Please place on fall precautions and monitor patient routinely.  -Encouraged patient to ask for help with all transfers.   -Continue to assess for developing injuries and if patient reports any pain request provider follow-up.    Left hip fracture/Left Hip Pain  -Recent x-ray negative.   -Tramadol 50 mg by mouth three times a day and three times a day as needed.    -May want to consider use of voltaren gel.   -Tylenol 650 mg by mouth 4 times daily scheduled.  -Ice and/or heat to left hip for comfort and pain relief.  -PT/OT.  -ASA 81 mg by mouth daily.  -Ortho follow-up sooner rather than later.     Otherwise continue current care plan for all other chronic medical conditions, as they are stable. Encouraged patient to engage in healthy lifestyle behaviors such as engaging in social activities, exercising (PT/OT), eating well, and following care plan. Follow up for routine check-up, or sooner if needed. Will continue to monitor patient and work with nursing staff collaboratively to work toward positive patient outcomes.    MEDICAL EQUIPMENT NEEDS:  Wheelchair    The patient has mobility limitation significantly impairing his/her ability to participate in mobility-related activities of daily living, such as toileting, feeding, dressing, grooming, and bathing in customary locations in the home. The mobility limitation cannot be sufficiently and safely resolved by use of an appropriately fitted  cane or walker. The patient or caregiver is able to safely use a manual wheelchair. The patient's functional mobility deficit can be sufficiently resolved by the use of a manual wheelchair.    The patient has mobility limitations RA, falls, hip fracture that impairs them to do activities for daily living without use of a wheelchair to be mobile in the home.      DISCHARGE PLAN/FACE TO FACE:  I certify that services are/were furnished while this patient was under the care of a physician and that a physician or an allowed non-physician practitioner (NPP), had a face-to-face encounter that meets the physician face-to-face encounter requirements. The encounter was in whole, or in part, related to the primary reason for home health. The patient is confined to his/her home and needs intermittent skilled nursing, physical therapy, speech-language pathology, or the continued need for occupational therapy. A plan of care has been established by a physician and is periodically reviewed by a physician.  Date of Face-to-Face Encounter: 3/13/2020    I certify that, based on my findings, the following services are medically necessary home health services: home health aid for bathing and ADLs, skilled nursing (RN) for medication set-up and teaching, symptoms and disease process monitoring and education, PT for strengthening, balance, endurance and safety within the home, OT for strengthening, ADL needs, adaptive equipment and safety.    My clinical findings support the need for the above skilled services because: home health aid for bathing and ADLs, skilled nursing (RN) for medication set-up and teaching, symptoms and disease process monitoring and education, PT for strengthening, balance, endurance and safety within the home, OT for strengthening, ADL needs, adaptive equipment and safety.    This patient is homebound because: he is a frail elderly gentleman with multiple comorbidities and recent hospitalizations making it unsafe  for him to go out into the community for services.    The patient is, or has been, under my care and I have initiated the establishment of the plan of care. This patient will be followed by a physician who will periodically review the plan of care. Schedule follow up visit with primary care provider within 7 days to reestablish care.    Total unit/floor time of 35 minutes time spent on discharge planning, discharge follow-up discussion and discharge medication review.    Electronically signed by: Elisa Bell CNP

## 2021-06-20 NOTE — LETTER
Letter by Elisa Bell CNP at      Author: Elisa Bell CNP Service: -- Author Type: --    Filed:  Encounter Date: 2/24/2020 Status: (Other)         Patient: Dania Da Silva   MR Number: [id number]   YOB: 1927   Date of Visit: 2/24/2020     [facility]    Facility:   [facility] [[id number]]   Code Status: DNR/DNI and POLST AVAILABLE      CHIEF COMPLAINT/REASON FOR VISIT:  Chief Complaint   Patient presents with   ? Review Of Multiple Medical Conditions     Review of TCU problems       HISTORY:      HPI: Dania is a 89 y.o. female who  has a past medical history of Cancer (H), GERD (gastroesophageal reflux disease), Hypertension, Osteoporosis, Renal cell carcinoma of right kidney (H), and Rheumatoid arthritis (H).  She was recently admitted to [facility] on 1/30/2020 for a fall with subsequent hip fracture.  She was discharged on 2/3/2020.  The discharging provider summarized the hospitalization in previous notes.     Today Dania is being evaluated for a routine review of multiple medical problems while in the TCU.  Dania continues to struggle with pain and is having a great deal of difficulty in PT/OT. She states that she thinks she is having a great deal of anxiety in relation to the pain. She shares the thought of having to participate in PT/OT causes her a great deal of anxiety and then subsequent pain. Dania states she has been also having trouble sleeping. She shares that the melatonin has been working, but she feels it is not strong enough. Dania states if she could get a little more she thinks it would work. Dania has no other concerns or problems to report. She states she is feeling well otherwise. Dania denies any other concerns including fevers/chills, cough or cold symptoms, headaches, vision changes, chest pain/pressure, difficulty breathing, SOB, abdominal pain, nausea, vomiting, diarrhea, dysuria, increasing weakness, increasing pain.     Report  "was called to Zen, her son-in-law.   Past Medical History:   Diagnosis Date   ? Cancer (H)    ? GERD (gastroesophageal reflux disease)    ? Hypertension    ? Osteoporosis    ? Renal cell carcinoma of right kidney (H)    ? Rheumatoid arthritis (H)              Family History   Problem Relation Age of Onset   ? Thyroid cancer Daughter    ? Cervical cancer Daughter    ? Breast cancer Daughter    ? Uterine cancer Daughter    ? Testicular cancer Nephew    ? Lung cancer Cousin      Social History     Socioeconomic History   ? Marital status: Single     Spouse name: None   ? Number of children: None   ? Years of education: None   ? Highest education level: None   Occupational History   ? None   Social Needs   ? Financial resource strain: None   ? Food insecurity:     Worry: None     Inability: None   ? Transportation needs:     Medical: None     Non-medical: None   Tobacco Use   ? Smoking status: Never Smoker   ? Smokeless tobacco: Never Used   Substance and Sexual Activity   ? Alcohol use: Yes     Comment: \"very infrequent\"   ? Drug use: No   ? Sexual activity: Never   Lifestyle   ? Physical activity:     Days per week: None     Minutes per session: None   ? Stress: None   Relationships   ? Social connections:     Talks on phone: None     Gets together: None     Attends Zoroastrian service: None     Active member of club or organization: None     Attends meetings of clubs or organizations: None     Relationship status: None   ? Intimate partner violence:     Fear of current or ex partner: None     Emotionally abused: None     Physically abused: None     Forced sexual activity: None   Other Topics Concern   ? None   Social History Narrative   ? None       REVIEW OF SYSTEM:  Pertinent items are noted in HPI.    PHYSICAL EXAM:   /80   Pulse 100   Temp 97.3  F (36.3  C)   Resp 16   Wt (!) 99 lb 3.2 oz (45 kg)   SpO2 99%   BMI 18.74 kg/m     General appearance: alert, appears stated age and cooperative  HEENT: Head " is normocephalic with normal hair distribution. No evidence of trauma. Ears: Without lesions or deformity. No acute purulent discharge. Eyes: Conjunctivae pink with no scleral icterus or erythema. Nose: Normal mucosa and septum. Oropharnyx: mmm, no lesions present.  Lungs: respirations without effort  : Indwelling catheter draining clear straw-colored urine  Extremities: extremities normal, atraumatic, no cyanosis or edema.  Skin: Skin color, texture, turgor normal. No rashes or lesions.  Neurologic: Grossly normal   Psych: interacts well with caregivers, exhibits logical thought processes and connections, pleasant      LABS:   None today.    ASSESSMENT:      ICD-10-CM    1. Physical deconditioning R53.81    2. Insomnia, unspecified type G47.00    3. Fall, initial encounter W19.XXXA    4. Closed left hip fracture, initial encounter (H) S72.002A        PLAN:    Care plan reviewed and remains appropriate.  Adjustments made for insomnia and pain.     Physical Deconditioning  -Continue PT/OT and other therapies as per care plan.  -Encouraged good nutrition and movement habits.   -Discussed care plan and expected course of stay.   -Continue to follow-up per routine schedule or sooner if needed.     Fall  -Please place on fall precautions and monitor patient routinely.  -Encouraged patient to ask for help with all transfers.   -Continue to assess for developing injuries and if patient reports any pain request provider follow-up.    Left hip fracture/Left Hip Pain  -Recent x-ray negative.   -Tramadol 50 mg by mouth three times a day and three times a day as needed.    -May want to consider use of voltaren gel.   -Tylenol 650 mg by mouth 4 times daily scheduled.  -Ice and/or heat to left hip for comfort and pain relief.  -PT/OT.  -ASA 81 mg by mouth daily.  -Ortho follow-up sooner rather than later.     Insomnia  -Increase Melatonin to 10 mg by mouth 1/2 hour before bedtime.   -Discussed sleep hygiene in depth-- including  alternative methods for sleep including lavender essential oils, screen time and shutting screens off at least an hour before bed, limiting day time napping and meditation.   -Follow-up as needed.    Otherwise continue current care plan for all other chronic medical conditions, as they are stable. Encouraged patient to engage in healthy lifestyle behaviors such as engaging in social activities, exercising (PT/OT), eating well, and following care plan. Follow up for routine check-up, or sooner if needed. Will continue to monitor patient and work with nursing staff collaboratively to work toward positive patient outcomes.    Electronically signed by: Elisa Bell CNP

## 2021-06-20 NOTE — LETTER
Letter by Jose Leija MD at      Author: Jose Leija MD Service: -- Author Type: --    Filed:  Encounter Date: 5/22/2020 Status: (Other)         May 22, 2020     Patient: Dania Da Silva   YOB: 1927   Date of Visit: 5/22/2020       To Whom It May Concern at the [facility]:    These are my recommendations after today's video visit.        Please use nystatin ointment twice daily to area of redness around the stoma for up to 21 days.  Diagnosis stomal dermatitis    Please use triamcinolone ointment twice daily to the area of redness around the stoma for up to 21 days.  Diagnosis stomal dermatitis    Please apply mupirocin once daily to open sores and hydro-colloid bandage over the areas.  Diagnosis open sores arms and trunk probable staph impetigo.  Please administer doxycycline 100 mg twice daily for a total of 10 days.  Diagnosis open sores arms and trunk probable impetigo.  Please give melatonin 5 mg at target bedtime nightly.  Diagnosis insomnia not otherwise specified        If you have any questions or concerns, please don't hesitate to call.    Sincerely,        Electronically signed by Jose Leija MD

## 2021-06-20 NOTE — LETTER
Letter by Elisa Bell CNP at      Author: Elisa Bell CNP Service: -- Author Type: --    Filed:  Encounter Date: 2/19/2020 Status: (Other)         Patient: Dania Da Silva   MR Number: [id number]   YOB: 1927   Date of Visit: 2/19/2020     [facility]    Facility:   [facility] [[id number]]   Code Status: DNR/DNI and POLST AVAILABLE      CHIEF COMPLAINT/REASON FOR VISIT:  Chief Complaint   Patient presents with   ? Review Of Multiple Medical Conditions     Review of TCU problems       HISTORY:      HPI: Dania is a 89 y.o. female who  has a past medical history of Cancer (H), GERD (gastroesophageal reflux disease), Hypertension, Osteoporosis, Renal cell carcinoma of right kidney (H), and Rheumatoid arthritis (H).  She was recently admitted to [facility] on 1/30/2020 for a fall with subsequent hip fracture.  She was discharged on 2/3/2020.  The discharging provider summarized the hospitalization in previous notes.     Today Dania is being evaluated for a routine review of multiple medical problems while in the TCU.  She shares that on this visit she is frustrated because she has been have difficulty sleeping the last several nights.  She states that at first she thought it was just a 1 night thing, but now she feels it is a problem.  She says she is having trouble falling asleep and staying asleep.  She will wake multiple times throughout the night.  She states that it is not an issue with urination given her catheter.  She states that she cannot put a finger on why she is so agitated and unable to sleep. Dania shares that she is otherwise feeling well and has been participating in therapies.  She reports eating, drinking and eliminating without a problem.  Dania denies any other concerns including fevers/chills, cough or cold symptoms, headaches, vision changes, chest pain/pressure, difficulty breathing, SOB, abdominal pain, nausea, vomiting, diarrhea,  "dysuria, increasing weakness, increasing pain.     Past Medical History:   Diagnosis Date   ? Cancer (H)    ? GERD (gastroesophageal reflux disease)    ? Hypertension    ? Osteoporosis    ? Renal cell carcinoma of right kidney (H)    ? Rheumatoid arthritis (H)              Family History   Problem Relation Age of Onset   ? Thyroid cancer Daughter    ? Cervical cancer Daughter    ? Breast cancer Daughter    ? Uterine cancer Daughter    ? Testicular cancer Nephew    ? Lung cancer Cousin      Social History     Socioeconomic History   ? Marital status: Single     Spouse name: None   ? Number of children: None   ? Years of education: None   ? Highest education level: None   Occupational History   ? None   Social Needs   ? Financial resource strain: None   ? Food insecurity:     Worry: None     Inability: None   ? Transportation needs:     Medical: None     Non-medical: None   Tobacco Use   ? Smoking status: Never Smoker   ? Smokeless tobacco: Never Used   Substance and Sexual Activity   ? Alcohol use: Yes     Comment: \"very infrequent\"   ? Drug use: No   ? Sexual activity: Never   Lifestyle   ? Physical activity:     Days per week: None     Minutes per session: None   ? Stress: None   Relationships   ? Social connections:     Talks on phone: None     Gets together: None     Attends Hoahaoism service: None     Active member of club or organization: None     Attends meetings of clubs or organizations: None     Relationship status: None   ? Intimate partner violence:     Fear of current or ex partner: None     Emotionally abused: None     Physically abused: None     Forced sexual activity: None   Other Topics Concern   ? None   Social History Narrative   ? None       REVIEW OF SYSTEM:  Pertinent items are noted in HPI.    PHYSICAL EXAM:   /68   Pulse 78   Temp 96.7  F (35.9  C)   Resp 18   SpO2 99%   General appearance: alert, appears stated age and cooperative  HEENT: Head is normocephalic with normal hair " distribution. No evidence of trauma. Ears: Without lesions or deformity. No acute purulent discharge. Eyes: Conjunctivae pink with no scleral icterus or erythema. Nose: Normal mucosa and septum. Oropharnyx: mmm, no lesions present.  Lungs: clear to auscultation bilaterally, respirations without effort  Heart: regular rate and rhythm, S1, S2 normal, no murmur, click, rub or gallop  Abdomen: soft, non-tender; bowel sounds normal; no masses,  no organomegaly  : Indwelling catheter draining clear straw-colored urine  Extremities: extremities normal, atraumatic, no cyanosis or edema  Pulses: 2+ and symmetric   Skin: Skin color, texture, turgor normal. No rashes or lesions.  Neurologic: Grossly normal   Psych: interacts well with caregivers, exhibits logical thought processes and connections, pleasant      LABS:   None today.    ASSESSMENT:      ICD-10-CM    1. Physical deconditioning R53.81    2. Fall, initial encounter W19.XXXA    3. Pain of left hip joint M25.552    4. Closed left hip fracture, initial encounter (H) S72.002A    5. Insomnia, unspecified type G47.00        PLAN:    Care plan reviewed and remains appropriate.  Adjustments made for insomnia.    Physical Deconditioning  -Continue PT/OT and other therapies as per care plan.  -Encouraged good nutrition and movement habits.   -Discussed care plan and expected course of stay.   -Continue to follow-up per routine schedule or sooner if needed.     Fall  -Please place on fall precautions and monitor patient routinely.  -Encouraged patient to ask for help with all transfers.   -Continue to assess for developing injuries and if patient reports any pain request provider follow-up.    Left hip fracture/Left Hip Pain  -Recent x-ray negative.   -Tramadol 25 mg by mouth three times a day.   -May want to consider use of voltaren gel.   -Tylenol 650 mg by mouth 4 times daily scheduled.  -Ice and/or heat to left hip for comfort and pain relief.  -PT/OT.  -ASA 81 mg by  mouth daily.  -Ortho follow-up sooner rather than later.     Insomnia  -Melatonin 5 mg by mouth 1/2 hour before bedtime.   -Discussed sleep hygiene in depth-- including alternative methods for sleep including lavender essential oils, screen time and shutting screens off at least an hour before bed, limiting day time napping and meditation.   -Follow-up as needed.    Otherwise continue current care plan for all other chronic medical conditions, as they are stable. Encouraged patient to engage in healthy lifestyle behaviors such as engaging in social activities, exercising (PT/OT), eating well, and following care plan. Follow up for routine check-up, or sooner if needed. Will continue to monitor patient and work with nursing staff collaboratively to work toward positive patient outcomes.    Electronically signed by: Elisa Bell CNP          3 yo male with pmh of asthma presenting with high fevers and worsening cough since Saturday. As per mom, patient developed a fever with tmax of 104.3 measured otically on Saturday. He also developed a dry cough on Saturday with 2 episodes of post-tussive emesis and increased work of breathing. Mom has been giving albuterol nebulizer treatments every 4 hours since Saturday with minimal relief. She took him to urgent care center on Sunday and was discharged on cough medicine which did not provide relief. Patient continued to have high fevers and his cough worsened. Mom also endorses decreased appetite since symptoms started and decreased wet diapers since Sunday. He only had 1 wet diaper on day of admission. Patient was brought to PMD on morning of admission and had crackles on exam so was sent to ED for CXR and further evaluation. Mom endorses that patient has had runny nose for past 2 weeks. His last admission for asthma exacerbation secondary to viral illness was last september. Mom denies any history of ICU admissions or intubation. She denies any ear tugging, abdominal pain, diarrhea, or rash. She denies any known sick contacts however patient does attend .     ED- CXR, RSV, Flu A/B, CXR, decadron x1, duonebs x2, NS bolus x1, tylenol    Floor Course 1/15/2019  Resp  Patient came up to the floor on room and was saturating > 95%, on initial exam he had no work of breathing and appeared comfortable.  Due to increased work of breathing, patient was given back to back combivents on the floor and one racemic epinephrine.  Albuterol was continued to q2h.  This stabilized the patient and ameliorated the work of breathing.    FENGI  Patient was given a bolus in the ED, followed by placement of maintenance fluids on the patient.  Patient began to eat and drink, IV was infiltrated and subsequently removed.     ID  Patient was swabbed, with a + RSV on RVP.  CXR was done which was consistent with viral syndrome rather than focal pneumonia. 1 yo male with pmh of asthma presenting with high fevers and worsening cough since Saturday. As per mom, patient developed a fever with tmax of 104.3 measured otically on Saturday. He also developed a dry cough on Saturday with 2 episodes of post-tussive emesis and increased work of breathing. Mom has been giving albuterol nebulizer treatments every 4 hours since Saturday with minimal relief. She took him to urgent care center on Sunday and was discharged on cough medicine which did not provide relief. Patient continued to have high fevers and his cough worsened. Mom also endorses decreased appetite since symptoms started and decreased wet diapers since Sunday. He only had 1 wet diaper on day of admission. Patient was brought to PMD on morning of admission and had crackles on exam so was sent to ED for CXR and further evaluation. Mom endorses that patient has had runny nose for past 2 weeks. His last admission for asthma exacerbation secondary to viral illness was last september. Mom denies any history of ICU admissions or intubation. She denies any ear tugging, abdominal pain, diarrhea, or rash. She denies any known sick contacts however patient does attend .     ED- CXR, RSV, Flu A/B, CXR, decadron x1, duonebs x2, NS bolus x1, tylenol    Floor Course 1/15/2019  Resp  Patient came up to the floor on room and was saturating > 95%, on initial exam he had no work of breathing and appeared comfortable.  Due to increased work of breathing, patient was given back to back combivents on the floor and one racemic epinephrine.  Albuterol was continued to q2h.  This stabilized the patient and ameliorated the work of breathing.  Over the remainder of the patient's stay he was weaned to q4h albuterol, this occured on 1/17.  He tolerated without any decompensation.      FENGI  Patient was given a bolus in the ED, followed by placement of maintenance fluids on the patient.  Patient began to eat and drink, IV was infiltrated and subsequently removed.  Grandparents endorsed constipation, miralax 8.5 g was subsequently started on 1/17, advised to continue OD when discharged from hospital.    ID  Patient was swabbed, with a + RSV on RVP.  CXR was done which was consistent with viral syndrome rather than focal pneumonia. 3 yo male with pmh of asthma presenting with high fevers and worsening cough since Saturday. As per mom, patient developed a fever with tmax of 104.3 measured otically on Saturday. He also developed a dry cough on Saturday with 2 episodes of post-tussive emesis and increased work of breathing. Mom has been giving albuterol nebulizer treatments every 4 hours since Saturday with minimal relief. She took him to urgent care center on Sunday and was discharged on cough medicine which did not provide relief. Patient continued to have high fevers and his cough worsened. Mom also endorses decreased appetite since symptoms started and decreased wet diapers since Sunday. He only had 1 wet diaper on day of admission. Patient was brought to PMD on morning of admission and had crackles on exam so was sent to ED for CXR and further evaluation. Mom endorses that patient has had runny nose for past 2 weeks. His last admission for asthma exacerbation secondary to viral illness was last september. Mom denies any history of ICU admissions or intubation. She denies any ear tugging, abdominal pain, diarrhea, or rash. She denies any known sick contacts however patient does attend .     ED- CXR, RSV, Flu A/B, CXR, decadron x1, duonebs x2, NS bolus x1, tylenol    Floor Course 1/15/2019  Resp  Patient came up to the floor on room and was saturating > 95%, on initial exam he had no work of breathing and appeared comfortable. Due to increased work of breathing, patient was given back to back combivents on the floor, one racemic epinephrine and required 1L supplemental O2.  Albuterol was continued to q2h.  This stabilized the patient and ameliorated the work of breathing.  Over the remainder of the patient's stay he was weaned to q4h albuterol, this occurred on 1/17.  He tolerated without any decompensation.      FENGI  Patient was given a bolus in the ED, followed by placement of maintenance fluids on the patient.  Patient began to eat and drink, IV was infiltrated and subsequently removed and replaced.  Grandparents endorsed constipation, miralax 8.5 g was started on 1/17, advised to continue when discharged from hospital.    ID  Patient was swabbed, with a + RSV and +coronavirus on RVP.  CXR was done which was consistent with viral syndrome rather than focal pneumonia.  Fever was controlled with tylenol and motrin. Patient remained afebrile for >24 hours prior to discharge.     Patient was cleared for discharge on HD#3. His respiratory status was much improved and he was tolerating albuterol q4h. Appetite was improved and patient was tolerating fluids. He was voiding and stooling appropriately. He was cleared for discharge to follow up with Dr. Kirby in 2-3 days. 1 yo male with pmh of asthma presenting with high fevers and worsening cough since Saturday. As per mom, patient developed a fever with tmax of 104.3 measured otically on Saturday. He also developed a dry cough on Saturday with 2 episodes of post-tussive emesis and increased work of breathing. Mom has been giving albuterol nebulizer treatments every 4 hours since Saturday with minimal relief. She took him to urgent care center on Sunday and was discharged on cough medicine which did not provide relief. Patient continued to have high fevers and his cough worsened. Mom also endorses decreased appetite since symptoms started and decreased wet diapers since Sunday. He only had 1 wet diaper on day of admission. Patient was brought to PMD on morning of admission and had crackles on exam so was sent to ED for CXR and further evaluation. Mom endorses that patient has had runny nose for past 2 weeks. His last admission for asthma exacerbation secondary to viral illness was last september. Mom denies any history of ICU admissions or intubation. She denies any ear tugging, abdominal pain, diarrhea, or rash. She denies any known sick contacts however patient does attend .     ED- CXR, RSV, Flu A/B, CXR, decadron x1, duonebs x2, NS bolus x1, tylenol    Floor Course 1/15/2019  Resp  Patient came up to the floor on room and was saturating > 95%, on initial exam he had no work of breathing and appeared comfortable. Due to increased work of breathing, patient was given back to back combivents on the floor, one racemic epinephrine and required 1L supplemental O2.  Albuterol was continued to q2h.  This stabilized the patient and ameliorated the work of breathing.  Over the remainder of the patient's stay he was weaned to q4h albuterol, this occurred on 1/17.  He tolerated without any decompensation.      FENGI  Patient was given a bolus in the ED, followed by placement of maintenance fluids on the patient.  Patient began to eat and drink, IV was infiltrated and subsequently removed and replaced.  Grandparents endorsed constipation, miralax 8.5 g was started on 1/17, advised to continue when discharged from hospital.    ID  Patient was swabbed, with a + RSV and +coronavirus on RVP.  CXR was done which was consistent with viral syndrome rather than focal pneumonia.  Fever was controlled with tylenol and motrin. Patient remained afebrile for >24 hours prior to discharge.     Patient was cleared for discharge on HD#3. His respiratory status was much improved and he was tolerating albuterol q4h. Appetite was improved and patient was tolerating fluids. He was voiding and stooling appropriately. He was cleared for discharge to follow up with Dr. Kirby in 1 day.

## 2021-06-20 NOTE — LETTER
Letter by Bibi Vaz NP at      Author: Bibi Vaz NP Service: -- Author Type: --    Filed:  Encounter Date: 3/3/2020 Status: (Other)         Patient: Dania Da Silva   MR Number: [id number]   YOB: 1927   Date of Visit: 3/3/2020                 [facility]    DATE: 3/3/2020    NAME:  Dania Da Silva             :  5/10/1927  MRN: [id number]  CODE STATUS:  DNR/DNI    VISIT TYPE: Problem Visit (pain check, hip fracture)     FACILITY:  [facility] [[id number]]       CHIEF COMPLAIN/REASON FOR VISIT:    Chief Complaint   Patient presents with   ? Problem Visit     pain check, hip fracture               HISTORY OF PRESENT ILLNESS: Dania Da Silva is a 89 y.o. female who was admitted for fall and hip fracture. She has PMH of GERD, HTN, osteoporosis, renal cell carcinoma, rheumatoid arthritis.     Today Ms. Da Silva is seen for pain management and follow up on hip fracture. She is seen in her room alone today. She says her pain is doing much better. She is not needing to use the as needed pain meds and feels her pain is very well controlled. She is sleeping better but does still takes naps occasionally during the day. Her bowels are moving and she has a catheter in place. She says she is afraid to fall again and this can be an issue sometimes. She does have some nervousness but feels it is ok. She denies any fevers or other concerns recently.     REVIEW OF SYSTEMS:  PROBLEMS AND REVIEW OF SYSTEMS:   Today on ROS:   Currently, no fever, chills, or rigors. Decreased vision and hearing. Denies any chest pain, headaches, palpitations, lightheadedness, dizziness, shortness of breath, or cough. Appetite is good. Denies any GERD symptoms. Denies any difficulty with swallowing, nausea, or vomiting.  Denies any abdominal pain, diarrhea or constipation.  No active bleeding. No rash. Positive for weakness, anxiety at times, pain controlled, velázquez, insomnia improved        Allergies   Allergen Reactions   ? Sulfa (Sulfonamide Antibiotics)    ? Sulindac Rash     Current Outpatient Medications   Medication Sig   ? acetaminophen (TYLENOL) 500 MG tablet Take 2 tablets (1,000 mg total) by mouth at bedtime as needed for pain. At BEDTIME (Patient taking differently: Take 1,000 mg by mouth 3 (three) times a day. And daily prn)   ? amoxicillin (AMOXIL) 500 MG capsule Take 4 capsules (2,000 mg total) by mouth as needed. Prior to dental appointments   ? aspirin 81 MG EC tablet Take 1 tablet (81 mg total) by mouth daily. In 6 AM   ? diclofenac sodium (VOLTAREN) 1 % Gel Apply 4 g topically 3 (three) times a day. To l thigh pain   ? hydrALAZINE (APRESOLINE) 25 MG tablet Take 25 mg by mouth every 6 (six) hours as needed (SBP>170).   ? lactase (LACTAID) 3,000 unit tablet Take 3,000 Units by mouth as needed.   ? lisinopril-hydrochlorothiazide (PRINZIDE,ZESTORETIC) 10-12.5 mg per tablet Take 1 tablet by mouth daily. At 6 AM   ? naproxen sodium (ALEVE) 220 MG tablet Take 1 tablet (220 mg total) by mouth daily. At 6 AM   ? polyethylene glycol (MIRALAX) 17 gram packet Take 17 g by mouth daily as needed (for constipation).   ? psyllium (METAMUCIL) 3.4 gram packet Take 1 packet by mouth 2 (two) times a day as needed.          ? senna (SENOKOT) 8.6 mg tablet Take 1 tablet by mouth 2 (two) times a day.   ? sennosides (SENNOSIDES) 8.8 mg/5 mL Syrp syrup Take 8.8 mg by mouth.     Past Medical History:    Past Medical History:   Diagnosis Date   ? Cancer (H)    ? GERD (gastroesophageal reflux disease)    ? Hypertension    ? Osteoporosis    ? Renal cell carcinoma of right kidney (H)    ? Rheumatoid arthritis (H)            PHYSICAL EXAMINATION  Vitals:    03/03/20 0700   BP: 143/67   Pulse: 83   Resp: 18   Temp: 97.8  F (36.6  C)   SpO2: 98%   Weight: (!) 96 lb (43.5 kg)       Today on physical exam:     GENERAL: Awake, Alert, oriented x3, not in any form of acute distress, answers questions appropriately,  follows simple commands, conversant  HEENT: Head is normocephalic with normal hair distribution. No evidence of trauma. Ears: No acute purulent discharge. Eyes: Conjunctivae pink with no scleral jaundice. Nose: Normal mucosa and septum. NECK: Supple with no cervical or supraclavicular lymphadenopathy. Trachea is midline. Decreased vision and hearing  CHEST: No tenderness or deformity, no crepitus  LUNG: Clear to auscultation with good chest expansion. There are no crackles or wheezes, normal AP diameter.  BACK: No kyphosis of the thoracic spine. Symmetric, no curvature, ROM normal, no CVA tenderness, no spinal tenderness   CVS: There is good S1  S2, regular rhythm, there are no murmurs, rubs, gallops, or heaves,  2+ pulses symmetric in all extremities.  ABDOMEN: Rounded and soft, nontender to palpation, non distended, no masses, no organomegaly, good bowel sounds, no rebound or guarding, no peritoneal signs. SP catheter in place, clear yellow urine, some sediment present  EXTREMITIES: no edema ble, hip incision c/d/i  SKIN: Warm and dry, no erythema noted.  Skin color, texture, no rashes or lesions.  NEUROLOGICAL: The patient is oriented to person, place and time.             LABS:   Recent Results (from the past 168 hour(s))   Basic Metabolic Panel   Result Value Ref Range    Sodium 133 (L) 136 - 145 mmol/L    Potassium 3.9 3.5 - 5.0 mmol/L    Chloride 100 98 - 107 mmol/L    CO2 26 22 - 31 mmol/L    Anion Gap, Calculation 7 5 - 18 mmol/L    Glucose 115 70 - 125 mg/dL    Calcium 9.5 8.5 - 10.5 mg/dL    BUN 18 8 - 28 mg/dL    Creatinine 0.77 0.60 - 1.10 mg/dL    GFR MDRD Af Amer >60 >60 mL/min/1.73m2    GFR MDRD Non Af Amer >60 >60 mL/min/1.73m2     Results for orders placed or performed in visit on 02/26/20   Basic Metabolic Panel   Result Value Ref Range    Sodium 133 (L) 136 - 145 mmol/L    Potassium 3.9 3.5 - 5.0 mmol/L    Chloride 100 98 - 107 mmol/L    CO2 26 22 - 31 mmol/L    Anion Gap, Calculation 7 5 - 18  mmol/L    Glucose 115 70 - 125 mg/dL    Calcium 9.5 8.5 - 10.5 mg/dL    BUN 18 8 - 28 mg/dL    Creatinine 0.77 0.60 - 1.10 mg/dL    GFR MDRD Af Amer >60 >60 mL/min/1.73m2    GFR MDRD Non Af Amer >60 >60 mL/min/1.73m2         Lab Results   Component Value Date    WBC 7.2 01/31/2020    HGB 11.4 (L) 02/02/2020    HCT 34.1 (L) 01/31/2020    MCV 87 01/31/2020     01/31/2020       Lab Results   Component Value Date    [id number] 402 09/20/2019     No results found for: HGBA1C  Lab Results   Component Value Date    INR 0.95 01/30/2020    INR 0.91 03/26/2018    INR 0.94 03/20/2018     Vitamin D, Total (25-Hydroxy)   Date Value Ref Range Status   07/16/2018 59.6 30.0 - 80.0 ng/mL Final     Lab Results   Component Value Date    TSH 1.43 12/04/2019           ASSESSMENT/PLAN:    Fall, periprosthetic Left hip fracture: pain much improved, no use of prn tramadol this month. On tylenol scheduled and tramadol scheduled. Will change tylenol to 1000mg three times a day and daily prn. Change tramadol to q6h prn. Discussed tramadol use and weaning off. Encourage ice prn, voltaren gel. On aspirin for dvt prophylaxis. Pt, ot following. F/u with ortho.   HTN: SBP 140s. Controlled on lisinopril hctz. Stable recently. Hydralazine prn.   Renal cell cell carcinoma: no current concerns. F/u with oncology.   SP catheter, h/o urethral cancer: velázquez in place. No recent concerns. F/u with urology.   Constipation: on metamucil two times a day prn, senna two times a day, miralax daily prn.   Rheumatoid arthritis: no recent concerns. F/u with rheumatology, stopped methotrexate in September.       Electronically signed by: Bibi Vaz NP

## 2021-06-20 NOTE — LETTER
Letter by Elisa Bell CNP at      Author: Elisa Bell CNP Service: -- Author Type: --    Filed:  Encounter Date: 2/12/2020 Status: (Other)         Patient: Dania Da Silva   MR Number: [id number]   YOB: 1927   Date of Visit: 2/12/2020     [facility]    Facility:   [facility] [[id number]]   Code Status: DNR/DNI and POLST AVAILABLE      CHIEF COMPLAINT/REASON FOR VISIT:  Chief Complaint   Patient presents with   ? Review Of Multiple Medical Conditions     TCU review of problems       HISTORY:      HPI: Dania is a 89 y.o. female who  has a past medical history of Cancer (H), GERD (gastroesophageal reflux disease), Hypertension, Osteoporosis, Renal cell carcinoma of right kidney (H), and Rheumatoid arthritis (H).  She was recently admitted to [facility] on 1/30/2020 for a fall with subsequent hip fracture.  She was discharged on 2/3/2020.  The discharging provider summarized the hospitalization in previous notes.     Today Dania is being evaluated for a routine review of multiple medical problems while in the TCU.  Germaine states that she has been doing well overall however she does endorse and left hip pain.  She states that it just hurts a great deal to be working out.  She states that she has no significant deficit, however the pain does limit her.  She does not participate fully in PT/OT.  Apparently she has been stating that she is just too tired or has too much pain.  Germaine states that she would be agreeable to trying a stronger pain medication.  Especially one that is administered prior to therapies.  We did discuss tramadol and she is agreeable to trying such pain medication.  Did speak with her son-in-law Zen who is also agreeable to trying this pain medication.  M he states that otherwise she is doing well.  She has no other concerns or problems to report.  She has been eating, drinking and eliminating well.  No blood pressure concerns at this  "time.  Dania denies any other concerns including fevers/chills, cough or cold symptoms, headaches, vision changes, chest pain/pressure, difficulty breathing, SOB, abdominal pain, nausea, vomiting, diarrhea, dysuria, increasing weakness, increasing pain.     Past Medical History:   Diagnosis Date   ? Cancer (H)    ? GERD (gastroesophageal reflux disease)    ? Hypertension    ? Osteoporosis    ? Renal cell carcinoma of right kidney (H)    ? Rheumatoid arthritis (H)              Family History   Problem Relation Age of Onset   ? Thyroid cancer Daughter    ? Cervical cancer Daughter    ? Breast cancer Daughter    ? Uterine cancer Daughter    ? Testicular cancer Nephew    ? Lung cancer Cousin      Social History     Socioeconomic History   ? Marital status: Single     Spouse name: Not on file   ? Number of children: Not on file   ? Years of education: Not on file   ? Highest education level: Not on file   Occupational History   ? Not on file   Social Needs   ? Financial resource strain: Not on file   ? Food insecurity:     Worry: Not on file     Inability: Not on file   ? Transportation needs:     Medical: Not on file     Non-medical: Not on file   Tobacco Use   ? Smoking status: Never Smoker   ? Smokeless tobacco: Never Used   Substance and Sexual Activity   ? Alcohol use: Yes     Comment: \"very infrequent\"   ? Drug use: No   ? Sexual activity: Never   Lifestyle   ? Physical activity:     Days per week: Not on file     Minutes per session: Not on file   ? Stress: Not on file   Relationships   ? Social connections:     Talks on phone: Not on file     Gets together: Not on file     Attends Anglican service: Not on file     Active member of club or organization: Not on file     Attends meetings of clubs or organizations: Not on file     Relationship status: Not on file   ? Intimate partner violence:     Fear of current or ex partner: Not on file     Emotionally abused: Not on file     Physically abused: Not on file     " Forced sexual activity: Not on file   Other Topics Concern   ? Not on file   Social History Narrative   ? Not on file       REVIEW OF SYSTEM:  Pertinent items are noted in HPI.    PHYSICAL EXAM:   /71   Pulse 87   Temp 99.7  F (37.6  C)   Resp 16   Wt 102 lb (46.3 kg)   SpO2 98%   BMI 19.27 kg/m     General appearance: alert, appears stated age and cooperative  HEENT: Head is normocephalic with normal hair distribution. No evidence of trauma. Ears: Without lesions or deformity. No acute purulent discharge. Eyes: Conjunctivae pink with no scleral icterus or erythema. Nose: Normal mucosa and septum. Oropharnyx: mmm, no lesions present.  Lungs: clear to auscultation bilaterally, respirations without effort  Heart: regular rate and rhythm, S1, S2 normal, no murmur, click, rub or gallop  Abdomen: soft, non-tender; bowel sounds normal; no masses,  no organomegaly  : Indwelling Briones catheter draining clear straw-colored urine  Extremities: extremities normal, atraumatic, no cyanosis or edema  Pulses: 2+ and symmetric   Skin: Skin color, texture, turgor normal. No rashes or lesions.  Neurologic: Grossly normal   Psych: interacts well with caregivers, exhibits logical thought processes and connections, pleasant      LABS:   None today.    ASSESSMENT:      ICD-10-CM    1. Physical deconditioning R53.81    2. Fall, initial encounter W19.XXXA    3. Closed left hip fracture, initial encounter (H) S72.002A    4. Pain in extremity, unspecified extremity M79.609        PLAN:    Care plan reviewed and remains appropriate.     Physical Deconditioning  -Continue PT/OT and other therapies as per care plan.  -Encouraged good nutrition and movement habits.   -Discussed care plan and expected course of stay.   -Continue to follow-up per routine schedule or sooner if needed.     Fall  -Please place on fall precautions and monitor patient routinely.  -Encouraged patient to ask for help with all transfers.   -Continue to  assess for developing injuries and if patient reports any pain request provider follow-up.    Left hip fracture  -Tramadol 25 mg by mouth   -Declines additional pain medication.   -Tylenol 650 mg by mouth 4 times daily scheduled.  -Ice and/or heat to left hip for comfort and pain relief.  -PT/OT.  -ASA 81 mg by mouth daily.  -Ortho follow-up sooner rather than later.     Otherwise continue current care plan for all other chronic medical conditions, as they are stable. Encouraged patient to engage in healthy lifestyle behaviors such as engaging in social activities, exercising (PT/OT), eating well, and following care plan. Follow up for routine check-up, or sooner if needed. Will continue to monitor patient and work with nursing staff collaboratively to work toward positive patient outcomes.    Electronically signed by: Elisa Bell CNP

## 2021-06-20 NOTE — LETTER
"Letter by Elisa Bell CNP at      Author: Elisa Bell CNP Service: -- Author Type: --    Filed:  Encounter Date: 2/18/2020 Status: (Other)         Patient: Dania Da Silva   MR Number: [id number]   YOB: 1927   Date of Visit: 2/18/2020     [facility]    Facility:   [facility] [[id number]]   Code Status: DNR/DNI and POLST AVAILABLE      CHIEF COMPLAINT/REASON FOR VISIT:  Chief Complaint   Patient presents with   ? Review Of Multiple Medical Conditions     TCU revidew of medical problems       HISTORY:      HPI: Dania is a 89 y.o. female who  has a past medical history of Cancer (H), GERD (gastroesophageal reflux disease), Hypertension, Osteoporosis, Renal cell carcinoma of right kidney (H), and Rheumatoid arthritis (H).  She was recently admitted to [facility] on 1/30/2020 for a fall with subsequent hip fracture.  She was discharged on 2/3/2020.  The discharging provider summarized the hospitalization in previous notes.     Today Dania is being evaluated for a routine review of multiple medical problems while in the TCU. She has continued to do well. She has had breakthrough pain and struggles with hip pain occasionally. She states she is \"just getting old\". She is able to work with PT/OT and is able to get through the pain. Dania has no new acute issues or problems to discuss. Current plan has been reviewed with Zen who is her POA, who is in agreement. No other problems noted. Report from nursing staff is positive. Dania denies any other concerns including fevers/chills, cough or cold symptoms, headaches, vision changes, chest pain/pressure, difficulty breathing, SOB, abdominal pain, nausea, vomiting, diarrhea, dysuria, increasing weakness, increasing pain.     Past Medical History:   Diagnosis Date   ? Cancer (H)    ? GERD (gastroesophageal reflux disease)    ? Hypertension    ? Osteoporosis    ? Renal cell carcinoma of right kidney (H)    ? Rheumatoid " "arthritis (H)              Family History   Problem Relation Age of Onset   ? Thyroid cancer Daughter    ? Cervical cancer Daughter    ? Breast cancer Daughter    ? Uterine cancer Daughter    ? Testicular cancer Nephew    ? Lung cancer Cousin      Social History     Socioeconomic History   ? Marital status: Single     Spouse name: None   ? Number of children: None   ? Years of education: None   ? Highest education level: None   Occupational History   ? None   Social Needs   ? Financial resource strain: None   ? Food insecurity:     Worry: None     Inability: None   ? Transportation needs:     Medical: None     Non-medical: None   Tobacco Use   ? Smoking status: Never Smoker   ? Smokeless tobacco: Never Used   Substance and Sexual Activity   ? Alcohol use: Yes     Comment: \"very infrequent\"   ? Drug use: No   ? Sexual activity: Never   Lifestyle   ? Physical activity:     Days per week: None     Minutes per session: None   ? Stress: None   Relationships   ? Social connections:     Talks on phone: None     Gets together: None     Attends Yarsanism service: None     Active member of club or organization: None     Attends meetings of clubs or organizations: None     Relationship status: None   ? Intimate partner violence:     Fear of current or ex partner: None     Emotionally abused: None     Physically abused: None     Forced sexual activity: None   Other Topics Concern   ? None   Social History Narrative   ? None       REVIEW OF SYSTEM:  Pertinent items are noted in HPI.    PHYSICAL EXAM:   /71   Pulse 89   Temp 97.1  F (36.2  C)   Resp 16   SpO2 96%   General appearance: alert, appears stated age and cooperative  HEENT: Head is normocephalic with normal hair distribution. No evidence of trauma. Ears: Without lesions or deformity. No acute purulent discharge. Eyes: Conjunctivae pink with no scleral icterus or erythema. Nose: Normal mucosa and septum. Oropharnyx: mmm, no lesions present.  Lungs: clear to " auscultation bilaterally, respirations without effort  Heart: regular rate and rhythm, S1, S2 normal, no murmur, click, rub or gallop  Abdomen: soft, non-tender; bowel sounds normal; no masses,  no organomegaly  : Indwelling catheter draining clear straw-colored urine  Extremities: extremities normal, atraumatic, no cyanosis or edema  Pulses: 2+ and symmetric   Skin: Skin color, texture, turgor normal. No rashes or lesions.  Neurologic: Grossly normal   Psych: interacts well with caregivers, exhibits logical thought processes and connections, pleasant      LABS:   None today.    ASSESSMENT:      ICD-10-CM    1. Physical deconditioning R53.81    2. Fall, initial encounter W19.XXXA    3. Closed left hip fracture, initial encounter (H) S72.002A    4. Pain of left hip joint M25.552        PLAN:    Care plan reviewed and remains appropriate.     Physical Deconditioning  -Continue PT/OT and other therapies as per care plan.  -Encouraged good nutrition and movement habits.   -Discussed care plan and expected course of stay.   -Continue to follow-up per routine schedule or sooner if needed.     Fall  -Please place on fall precautions and monitor patient routinely.  -Encouraged patient to ask for help with all transfers.   -Continue to assess for developing injuries and if patient reports any pain request provider follow-up.    Left hip fracture/Left Hip Pain  -Recent x-ray negative.   -Tramadol 25 mg by mouth three times a day.   -May want to consider use of voltaren gel.   -Tylenol 650 mg by mouth 4 times daily scheduled.  -Ice and/or heat to left hip for comfort and pain relief.  -PT/OT.  -ASA 81 mg by mouth daily.  -Ortho follow-up sooner rather than later.     Otherwise continue current care plan for all other chronic medical conditions, as they are stable. Encouraged patient to engage in healthy lifestyle behaviors such as engaging in social activities, exercising (PT/OT), eating well, and following care plan. Follow  up for routine check-up, or sooner if needed. Will continue to monitor patient and work with nursing staff collaboratively to work toward positive patient outcomes.    Electronically signed by: Elisa Bell CNP

## 2021-06-20 NOTE — LETTER
Letter by Elisa Bell CNP at      Author: Elisa Bell CNP Service: -- Author Type: --    Filed:  Encounter Date: 3/16/2020 Status: (Other)         Patient: Dania Da Silva   MR Number: [id number]   YOB: 1927   Date of Visit: 3/16/2020     [facility]    Facility:   [facility] [[id number]]   Code Status: DNR/DNI and POLST AVAILABLE      CHIEF COMPLAINT/REASON FOR VISIT:  Chief Complaint   Patient presents with   ? Follow Up     discharge coordination       HISTORY:      HPI: Dania is a 89 y.o. female who  has a past medical history of Cancer (H), GERD (gastroesophageal reflux disease), Hypertension, Osteoporosis, Renal cell carcinoma of right kidney (H), and Rheumatoid arthritis (H).  She was recently admitted to [facility] on 1/30/2020 for a fall with subsequent hip fracture.  She was discharged on 2/3/2020.  The discharging provider summarized the hospitalization in previous notes.     Today we are having a discussion regarding discharge planning and follow up. Dania's son in law Zen is here. Dania has been in the facility for quite some time and her pending discharge has made her somewhat anxious. We did discuss that this is a normal feeling and that we can work through it together. We did discuss that medication would not be optimal at this time. It would probably be detrimental to her her health. Dania understands. Zen also feels this is the best plan forward. Dania has several supports in place to be able to successful. She has PT/OT and home health care set up at her assisted living facility.     Past Medical History:   Diagnosis Date   ? Cancer (H)    ? GERD (gastroesophageal reflux disease)    ? Hypertension    ? Osteoporosis    ? Renal cell carcinoma of right kidney (H)    ? Rheumatoid arthritis (H)              Family History   Problem Relation Age of Onset   ? Thyroid cancer Daughter    ? Cervical cancer Daughter    ? Breast cancer Daughter   "  ? Uterine cancer Daughter    ? Testicular cancer Nephew    ? Lung cancer Cousin      Social History     Socioeconomic History   ? Marital status: Single     Spouse name: Not on file   ? Number of children: Not on file   ? Years of education: Not on file   ? Highest education level: Not on file   Occupational History   ? Not on file   Social Needs   ? Financial resource strain: Not on file   ? Food insecurity     Worry: Not on file     Inability: Not on file   ? Transportation needs     Medical: Not on file     Non-medical: Not on file   Tobacco Use   ? Smoking status: Never Smoker   ? Smokeless tobacco: Never Used   Substance and Sexual Activity   ? Alcohol use: Yes     Comment: \"very infrequent\"   ? Drug use: No   ? Sexual activity: Never   Lifestyle   ? Physical activity     Days per week: Not on file     Minutes per session: Not on file   ? Stress: Not on file   Relationships   ? Social connections     Talks on phone: Not on file     Gets together: Not on file     Attends Restorationism service: Not on file     Active member of club or organization: Not on file     Attends meetings of clubs or organizations: Not on file     Relationship status: Not on file   ? Intimate partner violence     Fear of current or ex partner: Not on file     Emotionally abused: Not on file     Physically abused: Not on file     Forced sexual activity: Not on file   Other Topics Concern   ? Not on file   Social History Narrative   ? Not on file       REVIEW OF SYSTEM:  Pertinent items are noted in HPI.    PHYSICAL EXAM:   /74   Pulse 79   Temp 97.3  F (36.3  C)   Resp 17   Wt (!) 98 lb 12.8 oz (44.8 kg)   SpO2 99%   BMI 18.67 kg/m    General appearance: alert, appears stated age and cooperative  HEENT: Head is normocephalic with normal hair distribution. No evidence of trauma. Ears: Without lesions or deformity. No acute purulent discharge. Eyes: Conjunctivae pink with no scleral icterus or erythema. Nose: Normal mucosa and " septum. Oropharnyx: mmm, no lesions present.  Lungs: respirations without effort  : Indwelling catheter draining clear straw-colored urine  Extremities: extremities normal, atraumatic, no cyanosis or edema.  Skin: Skin color, texture, turgor normal. No rashes or lesions.  Neurologic: Grossly normal   Psych: interacts well with caregivers, exhibits logical thought processes and connections, pleasant      LABS:   None today.    ASSESSMENT:      ICD-10-CM    1. Physical deconditioning  R53.81    2. Fall, initial encounter  W19.XXXA    3. Closed left hip fracture, initial encounter (H)  S72.002A        PLAN:    Discussed care plan with NAOMY Zaldivar. We discussed the anxieties surrounding discharging home and the ways to combat this anxiety. We also discussed the plans for discharge home. Zen verbalized understanding. He was able to ask any questions he would like.     Physical Deconditioning  -Continue PT/OT and other therapies as per care plan.  -Encouraged good nutrition and movement habits.   -Discussed care plan and expected course of stay.   -Continue to follow-up per routine schedule or sooner if needed.     Fall  -Please place on fall precautions and monitor patient routinely.  -Encouraged patient to ask for help with all transfers.   -Continue to assess for developing injuries and if patient reports any pain request provider follow-up.    Left hip fracture/Left Hip Pain  -Tramadol 50 mg by mouth three times a day and three times a day as needed.    -Tylenol 650 mg by mouth 4 times daily scheduled.  -Ice and/or heat to left hip for comfort and pain relief.  -ASA 81 mg by mouth daily.    Otherwise continue current care plan for all other chronic medical conditions, as they are stable. Encouraged patient to engage in healthy lifestyle behaviors such as engaging in social activities, exercising (PT/OT), eating well, and following care plan. Follow up for routine check-up, or sooner if needed. Will continue to  monitor patient and work with nursing staff collaboratively to work toward positive patient outcomes.    Electronically signed by: Elisa Bell CNP

## 2021-06-20 NOTE — LETTER
Letter by Elisa Bell CNP at      Author: Elisa Bell CNP Service: -- Author Type: --    Filed:  Encounter Date: 2/10/2020 Status: (Other)         Patient: Dania Da Silva   MR Number: [id number]   YOB: 1927   Date of Visit: 2/10/2020     [facility]    Facility:   [facility] [[id number]]   Code Status: DNR/DNI and POLST AVAILABLE      CHIEF COMPLAINT/REASON FOR VISIT:  Chief Complaint   Patient presents with   ? Review Of Multiple Medical Conditions     Review of TCU problems       HISTORY:      HPI: Dania is a 89 y.o. female who  has a past medical history of Cancer (H), GERD (gastroesophageal reflux disease), Hypertension, Osteoporosis, Renal cell carcinoma of right kidney (H), and Rheumatoid arthritis (H).  She was recently admitted to [facility] on 1/30/2020 for a fall with subsequent hip fracture.  She was discharged on 2/3/2020.  The discharging provider summarized the hospitalization in previous notes.     Today Dania is being evaluated for a routine review of multiple medical problems while in the TCU. Dania has been doing quite well and does not have any new issues to report. However, PT/OT does share that she does have quite a bit of pain. Dania states she does not want to have any further pain medication. She states the pain is tolerable and she feels that she can manage without additional pain medication. Dania states that she understands there is a bit of pain to come with working hard. Dania does not have any acute concerns. She has been eating, drinking and eliminating well (she does have catheter). Dania denies any other concerns including fevers/chills, cough or cold symptoms, headaches, vision changes, chest pain/pressure, difficulty breathing, SOB, abdominal pain, nausea, vomiting, diarrhea, dysuria, increasing weakness, increasing pain.       Past Medical History:   Diagnosis Date   ? Cancer (H)    ? GERD (gastroesophageal reflux  "disease)    ? Hypertension    ? Osteoporosis    ? Renal cell carcinoma of right kidney (H)    ? Rheumatoid arthritis (H)              Family History   Problem Relation Age of Onset   ? Thyroid cancer Daughter    ? Cervical cancer Daughter    ? Breast cancer Daughter    ? Uterine cancer Daughter    ? Testicular cancer Nephew    ? Lung cancer Cousin      Social History     Socioeconomic History   ? Marital status: Single     Spouse name: None   ? Number of children: None   ? Years of education: None   ? Highest education level: None   Occupational History   ? None   Social Needs   ? Financial resource strain: None   ? Food insecurity:     Worry: None     Inability: None   ? Transportation needs:     Medical: None     Non-medical: None   Tobacco Use   ? Smoking status: Never Smoker   ? Smokeless tobacco: Never Used   Substance and Sexual Activity   ? Alcohol use: Yes     Comment: \"very infrequent\"   ? Drug use: No   ? Sexual activity: Never   Lifestyle   ? Physical activity:     Days per week: None     Minutes per session: None   ? Stress: None   Relationships   ? Social connections:     Talks on phone: None     Gets together: None     Attends Latter day service: None     Active member of club or organization: None     Attends meetings of clubs or organizations: None     Relationship status: None   ? Intimate partner violence:     Fear of current or ex partner: None     Emotionally abused: None     Physically abused: None     Forced sexual activity: None   Other Topics Concern   ? None   Social History Narrative   ? None       REVIEW OF SYSTEM:  Pertinent items are noted in HPI.    PHYSICAL EXAM:   /69   Pulse 85   Temp 98.1  F (36.7  C)   Resp 15   Wt 99 lb 12.8 oz (45.3 kg)   SpO2 98%   BMI 18.86 kg/m     General appearance: alert, appears stated age and cooperative  HEENT: Head is normocephalic with normal hair distribution. No evidence of trauma. Ears: Without lesions or deformity. No acute purulent " discharge. Eyes: Conjunctivae pink with no scleral icterus or erythema. Nose: Normal mucosa and septum. Oropharnyx: mmm, no lesions present.  Lungs: clear to auscultation bilaterally, respirations without effort  Heart: regular rate and rhythm, S1, S2 normal, no murmur, click, rub or gallop  Abdomen: soft, non-tender; bowel sounds normal; no masses,  no organomegaly  : Indwelling Briones catheter draining clear straw-colored urine  Extremities: extremities normal, atraumatic, no cyanosis or edema  Pulses: 2+ and symmetric   Skin: Skin color, texture, turgor normal. No rashes or lesions.  Neurologic: Grossly normal   Psych: interacts well with caregivers, exhibits logical thought processes and connections, pleasant      LABS:   None today.    ASSESSMENT:      ICD-10-CM    1. Physical deconditioning R53.81    2. Fall, initial encounter W19.XXXA    3. Closed left hip fracture, initial encounter (H) S72.002A        PLAN:    Care plan reviewed and remains appropriate.     Physical Deconditioning  -Continue PT/OT and other therapies as per care plan.  -Encouraged good nutrition and movement habits.   -Discussed care plan and expected course of stay.   -Continue to follow-up per routine schedule or sooner if needed.     Fall  -Please place on fall precautions and monitor patient routinely.  -Encouraged patient to ask for help with all transfers.   -Continue to assess for developing injuries and if patient reports any pain request provider follow-up.    Left hip fracture  -Discontinue naproxen.  -Declines additional pain medication.   -Tylenol 650 mg by mouth 4 times daily scheduled.  -Ice and/or heat to left hip for comfort and pain relief.  -PT/OT.  -ASA 81 mg by mouth daily.  -Follow with Ortho.    Otherwise continue current care plan for all other chronic medical conditions, as they are stable. Encouraged patient to engage in healthy lifestyle behaviors such as engaging in social activities, exercising (PT/OT), eating  well, and following care plan. Follow up for routine check-up, or sooner if needed. Will continue to monitor patient and work with nursing staff collaboratively to work toward positive patient outcomes.    Electronically signed by: Elisa Bell CNP

## 2021-06-20 NOTE — PROGRESS NOTES
"ASSESSMENT & PLAN    No problem-specific Assessment & Plan notes found for this encounter.      Dania Somers was seen today for establish care and foot pain.    Diagnoses and all orders for this visit:    Metastasis to bone (H)    Pain in limb  -     XR Foot Left 3 or More VWS; Future    Unsteady gait  -     Ambulatory referral to Adult PT- External    Muscle weakness (generalized)  -     Ambulatory referral to Adult PT- External  -     Vitamin B12  -     Thyroid Cascade  -     CK Total    Bilateral inguinal hernia with obstruction and without gangrene, recurrence not specified    Hyperparathyroidism (H)  -     Parathyroid Hormone Intact  -     Calcium, Ionized, Measured    Hypercalcemia      There are no Patient Instructions on file for this visit.    No Follow-up on file.            CHIEF COMPLAINT: Dania Da Silva had concerns including Establish Care and Foot Pain.    [place]: [place].............. had concerns including Establish Care and Foot Pain.    1. Metastasis to bone (H)    2. Pain in limb    3. Unsteady gait    4. Muscle weakness (generalized)    5. Bilateral inguinal hernia with obstruction and without gangrene, recurrence not specified    6. Hyperparathyroidism (H)    7. Hypercalcemia          CC:             Why are you here today?                              Establish care. HTN. Fatigue all the time.    Has some fatigue  Feels weak proximal left and right lower extremity  Feels wobbly and weak  Occasional numbness hams feet has been on and off for a long period of time  Following with Dr. andrews for renal cell carcinoma      What is the issue like in one word?:                                 Tired; legs feel heavy  How long is it ongoing: days, weeks,months?:                 Since starting \"cancer medication\". Pt thinks since April 2018  What makes it worse: activity? Eating? Movement? :      NA  What makes it better?:                                                      NA  0/10-10/10:Pain/Intesity             "                                        mild      Any associated Sx to above complaint:  Balance is off    Any other Problems in order of Priority:  Cramping in bowels and bladder. Ache in right arm-possible arthritis.          SUBJECTIVE:  Dania Da Silva is a 89 y.o. female    Past Medical History:   Diagnosis Date     Cancer (H)      GERD (gastroesophageal reflux disease)      Hypertension      Osteoporosis      Rheumatoid arthritis (H)      Past Surgical History:   Procedure Laterality Date     RI CYSTO/URETERO/PYELOSCOPY,W/RESECT TUMOR      Description: Cystoscopy With Resection Of Tumor;  Proc Date: 12/01/2003;     RI CYSTOURETHROSCOPY      Description: Cystoscopy (Diagnostic);  Proc Date: 01/13/2005;     RI REMOVAL OF TONSILS,<11 Y/O      Description: Tonsillectomy;  Recorded: 06/03/2013;     RI TOTAL HIP ARTHROPLASTY      Description: Total Hip Replacement;  Recorded: 04/23/2008;     Sulfa (sulfonamide antibiotics) and Sulindac  Current Outpatient Prescriptions   Medication Sig Dispense Refill     acetaminophen (TYLENOL) 500 MG tablet Take 1,000 mg by mouth every 6 (six) hours as needed for pain.       amLODIPine (NORVASC) 5 MG tablet Take 1 tablet (5 mg total) by mouth daily. As directed 90 tablet 2     amoxicillin (AMOXIL) 500 MG capsule Take 4 capsules by mouth as needed. Prior to dental appointments       aspirin 81 MG EC tablet Take 81 mg by mouth daily.       cholecalciferol, vitamin D3, 10,000 unit Tab Take 3 tablets by mouth daily.        CLINDAMYCIN HCL ORAL Take 3 tablets by mouth as needed.       folic acid (FOLVITE) 1 MG tablet TAKE 1 TABLET BY MOUTH EVERY DAY 90 tablet 3     lactase (LACTAID) 3,000 unit tablet Take 3,000 Units by mouth as needed.       lisinopril (PRINIVIL,ZESTRIL) 10 MG tablet TAKE 1 TABLET BY MOUTH EVERY DAY (Patient taking differently: TAKE 1 TABLET (10 mg)  BY MOUTH EVERY DAY) 90 tablet 3     loperamide (IMODIUM) 2 mg capsule Take 2 mg by mouth 4 (four) times a day as  "needed for diarrhea.       methotrexate sodium (METHOTREXATE) 2.5 mg DsPk Take 15 mg by mouth once a week.       multivitamin (MULTIPLE VITAMINS) per tablet Take 1 tablet by mouth daily.       NaCl 0.9% IR (NS) 0.9 % irrigation USE FOR IRRIGATION. (Patient taking differently: USE FOR IRRIGATION. TWICE DAILY) 2000 mL PRN     naproxen sodium (ALEVE) 220 MG tablet Take 220 mg by mouth daily.        psyllium (METAMUCIL) 3.4 gram packet Take 1 packet by mouth daily.       SIMETHICONE (GAS-X EXTRA STRENGTH ORAL) Take 1 tablet by mouth as needed.       triamterene-hydrochlorothiazide (MAXZIDE-25) 37.5-25 mg per tablet Take 1 tablet by mouth daily. 90 tablet 3     VOTRIENT 200 mg Tab Take 1 tablet by mouth daily. 30 tablet 0     No current facility-administered medications for this visit.      Family History   Problem Relation Age of Onset     Thyroid cancer Daughter      Cervical cancer Daughter      Breast cancer Daughter      Uterine cancer Daughter      Testicular cancer Nephew      Lung cancer Cousin      Social History     Social History     Marital status:      Spouse name: N/A     Number of children: N/A     Years of education: N/A     Social History Main Topics     Smoking status: Never Smoker     Smokeless tobacco: Never Used     Alcohol use Yes      Comment: \"very infrequent\"     Drug use: No     Sexual activity: No     Other Topics Concern     None     Social History Narrative     Patient Active Problem List   Diagnosis     Hypertension     Ganglion     Abdominal Pain Feels Crampy / Colicky     Abnormal Blood Chemistry     Squamous Cell Carcinoma Of The Urethra     Hyperlipidemia     Hyperparathyroidism (H)     Hypercalcemia     Rheumatoid Arthritis     Osteoporosis, senile     Bilateral Inguinal Hernia     Limb Pain     Urinary Frequency Increased     Renal cell carcinoma of right kidney (H)     Metastasis to bone (H)     Chemotherapy management, encounter for                                          " "    SOCIAL: She  reports that she has never smoked. She has never used smokeless tobacco. She reports that she drinks alcohol. She reports that she does not use illicit drugs.    REVIEW OF SYSTEMS:   Family history not pertinent to chief complaint or presenting problem    Review of Systems:     Nervous System: No headache, dizziness, fainting or memory loss.                              Positive tingling sensation of hand or feet.    Ears: No hearing loss or ringing in the ears    Eyes: No blurring of vision, redness, itching or dryness.    Nose: No nosebleed or loss of smell    Mouth: No mouth sores or loss of taste    Throat: No hoarseness or difficulty swallowing    Neck: No enlarged thyroid or lymph nodes.    Heart: No chest pain, palpitation or irregular heartbeat.             No swelling of hands or feet    Lungs: No shortness of breath, cough, night sweats,               No wheezing or hemoptysis.    Gastrointestinal: No nausea or vomiting,  No constipation or occasional cramping and diarrhea.  Takes Imodium helps out                             No acid reflux, abdominal pain or blood in stools.  Kidney/Bladdr: No polyuria, polydipsia, nocturia or hematuria.  Catheter in place    Genital/Sexual: No loss of libido No Sex function Changes     Skin: No rash, hair loss or hirsutism.     Muscles/Joints/Bones: No morning stiffness, muscle aches pain and tender left metatarsal    Review of systems otherwise negative as requested from patient, except   Those positive ROS outlined and discussed in [place].    OBJECTIVE:  /60  Pulse 73  Ht 5' 5\" (1.651 m)  Wt 105 lb (47.6 kg)  SpO2 98%  BMI 17.47 kg/m2    GENERAL:     No acute distress.   Alert and oriented X 3         Physical:    Lateral cataracts extracted  Carotid pulses are full without bruits  Oropharynx is clear  Lungs are clear  Cardiac S1-S2 regular sinus appreciable murmur gallop  Abdomen soft left reducible inguinal hernia  No auscultated abdominal " bruit  No femoral bruit  Palpable distal pulses  Tenderness to palpation across the second third metatarsal of the left foot  catheter in place next      ASSESSMENT & PLAN      Dania Somers was seen today for establish care and foot pain.    Diagnoses and all orders for this visit:    Metastasis to bone (H)    Pain in limb  -     XR Foot Left 3 or More VWS; Future    Unsteady gait  -     Ambulatory referral to Adult PT- External    Muscle weakness (generalized)  -     Ambulatory referral to Adult PT- External  -     Vitamin B12  -     Thyroid Cascade  -     CK Total    Bilateral inguinal hernia with obstruction and without gangrene, recurrence not specified    Hyperparathyroidism (H)  -     Parathyroid Hormone Intact  -     Calcium, Ionized, Measured    Hypercalcemia      No Follow-up on file.       Anticipatory Guidance and Symptomatic Cares Discussed   Advised to call back directly if there are further questions, or if these symptoms fail to improve as anticipated or worsen.  Return to clinic if patient has a clinical concern that warrants an exam.         I spent 25 minutes with this patient face to face, of which 50% or greater was spent in counseling and coordination of care with regards to Dania Somers was seen today for establish care and foot pain.    Diagnoses and all orders for this visit:    Metastasis to bone (H)    Pain in limb  -     XR Foot Left 3 or More VWS; Future    Unsteady gait  -     Ambulatory referral to Adult PT- External    Muscle weakness (generalized)  -     Ambulatory referral to Adult PT- External  -     Vitamin B12  -     Thyroid Cascade  -     CK Total    Bilateral inguinal hernia with obstruction and without gangrene, recurrence not specified    Hyperparathyroidism (H)  -     Parathyroid Hormone Intact  -     Calcium, Ionized, Measured    Hypercalcemia      Jose Leija MD  Family Medicine   [facility] [id number] [phone]

## 2021-06-20 NOTE — LETTER
Letter by Jose Leija MD at      Author: Jose Leija MD Service: -- Author Type: --    Filed:  Encounter Date: 7/7/2020 Status: (Other)         July 7, 2020     Patient: Dania Da Silva   YOB: 1927   Date of Visit: 7/7/2020       To Whom It May Concern:    Please order Covid-19 Acute PCR testing for Dania Da Silva for the following dates    Dx: Z20.9    7/8/20  7/15/20  7/22/20      Attn: Danii   Fax to [phone]      If you have any questions or concerns, please don't hesitate to call.    Sincerely,        Electronically signed by Jose Leija MD

## 2021-06-20 NOTE — PROGRESS NOTES
Patient here today for labs, OV with Dr. Britton, and Zometa infusion for metastatic renal carcinoma/MICHAEL Posadas RN

## 2021-06-20 NOTE — PROGRESS NOTES
[facility] Hematology and Oncology Progress Note    Patient: Dania Da Silva  MRN: [id number]  Date of Service:10/4/2018      Reason for Visit    Follow up metastatic clear-cell renal cell carcinoma    Assessment and Plan  Renal cell carcinoma of right kidney (H)    Staging form: Kidney, AJCC 8th Edition    - Clinical stage from 4/3/2018: Stage IV (cT1b, cNX, pM1) - Signed by Ernesto Britton MD on 4/3/2018    ECOG Performance   ECOG Performance Status: 1     Distress Assessment  Distress Assessment Score: No distress    Pain  Currently in Pain: No/denies    #.  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   I reviewed her labs and she has normal renal function and liver function.  Hemogram is also very normal.  Her blood pressure is elevated.  I strongly encouraged her to take her blood pressure medication regularly as it is open it can increase her blood pressure.  Diarrhea side effect is manageable.  We will continue current dose of pazopanib to 200 mg daily.   Follow-up with in 4 weeks with labs and exam and CT scan prior.     #. Acute diarrhea grade 2-3 - secondary to pazopanib, resolved with lowering dose of pazopanib.  Overall her diarrhea is very manageable with adding Metamucil at this point.      #.  Metastatic bone disease   On Zometa every 12 weeks.     #.  Cancer related pain, controlled well.   She is now taking Aleve 1 tablet daily in the morning with food and Tylenol 1 tablet once a day periodically.  No additional pain medication needed at this point.      #.  2 several centimeter bladder stones.   She had a cystoscopy on 6/27/2018 by Dr. Trevino and [facility].  She has mild symptomatic (bladder spasm about once a  month) at this point.  She was offered litholapaxy and bladder biopsy if she is more symptomatic.  Now she feels that it would be worth of proceeding with procedure and she will contact her urology.      #.  Rheumatoid arthritis- on methotrexate  #.  Osteoporosis  #.  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  has a suprapubic.  She is follow by urology at [facility].    #.  Hypertension, uncontrolled.  Stressed about taking medication regularly.    Problem List    1. Renal cell carcinoma of right kidney (H)  CT Chest Abdomen Pelvis With Oral With IV Cont      _  TT: 40 minutes and more than 50% was spent on coordination and counseling of care.  _____________________________________________________________________________  Diagnosis  March 2018- Metastatic renal cell carcinoma of the right kidney- clear cell type (2.5 cm pleural-based expansile destructive lesion in the right lateral seventh rib, pleural-based nodules, multiple bilateral pulmonary nodules with small right pleural effusion, 3.2 cm renal mass consistent with renal cell carcinoma)- PDL1 expression 90%.    Treatment to date  4/18/18- started pazopanib 400 mg daily (at 50% dose reduction with plan to escalate to 800 mg daily as tolerated), stop on 5/26/2018 due to grade 3 diarrhea   -CT scan show overall stable disease (mild progression about 20% increase in size of pleural-based metastases), stable or slightly smaller pulmonary nodules.   - 6/5/2018- restarted pazopaninb 200 mg daily for better tolerability    History of Present Illness    Ms. Da Silva is here for follow up, accompanied by her friend.     She continues to have bladder cramps.  She is now thinking about proceeding with litholapaxy for 2 bladder stone.  She continued to have intermittent diarrhea episode, however overall Imodium and Metamucil has been controlling it very well.  She reported that she has been forgetting to take her  "blood pressure medication.  Her blood pressure was noted to be 6 elevated today.  Her right-sided chest wall pain has been fairly stable.  No new bone pain.      Pain Status  Currently in Pain: No/denies    Review of Systems    Constitutional  Fatigue: Fatigue relieved by rest  Fever: None  Chills: None  Weight Gain: None  Weight Loss: None  Neurosensory  Neurosensory (WDL): Exceptions to WDL  Peripheral Motor Neuropathy: Asymptomatic, clinical or diagnostic observations only, intervention not indicated  Ataxia: Asymptomatic, clinical or diagnostic observations only, intervention not indicated  Peripheral Sensory Neuropathy: Asymptomatic, loss of deep tendon reflexes or paresthesia (fet numb in the morning)  Confusion: None  Syncope: None  Eye   Eye Disorder (WDL): All eye disorder elements are within defined limits  Ear  Ear Disorder (WDL): All ear disorder elements are within defined limits  Cardiovascular  Cardiovascular (WDL): Exceptions to WDL  Palpitations: None  Edema: No  Phlebitis: None  Superficial thrombophlebitis: None  Pulmonary  Respiratory (WDL): Exceptions to WDL  Cough: None  Dyspnea: Shortness of breath with moderate exertion  Hypoxia: None  Gastrointestinal  Anorexia: None  Constipation: None  Diarrhea: Increase of <4 stools per day over baseline, mild increase in ostomy output compared to baseline (\"gets the runs from the pill')  Dysphagia: None  Esophagitis: None  Nausea: None  Pharyngitis: None  Vomiting: None  Dysgeusia: Altered taste but no change in diet  Dry Mouth: Symptomatic (e.g., dry or thick saliva) without significant dietary alteration, unstimulated saliva flow >0.2 ml/min  Genitourinary  Genitourinary (WDL): All genitourinary elements are within defined limits (indwelling supra pubic cath)  Lymphatic  Lymph (WDL): All lymph disorder elements are within defined limits  Musculoskeletal and Connective Tissue  Musculoskeletal and Connetive Tissue Disorders (WDL): Exceptions to " WDL  Arthralgia: Mild pain  Bone Pain: None  Muscle Weakness : Symptomatic, perceived by patient but not evident on physical exam  Myalgia: Mild pain  Integumentary  Integumentary (WDL): All integumentary elements are within defined limits  Patient Coping  Patient Coping: Accepting  Distress Assessment  Distress Assessment Score: No distress  Accompanied by  Accompanied by: Friend  Oral Chemo Adherence       Past History  Past Medical History:   Diagnosis Date     Cancer (H)      GERD (gastroesophageal reflux disease)      Hypertension      Osteoporosis      Rheumatoid arthritis (H)        Physical Exam    Recent Vitals 10/4/2018   Height -   Weight 105 lbs 6 oz   BSA (m2) -   /78   Pulse 75   Temp 97.5   Temp src 1   SpO2 98   Some recent data might be hidden     General: alert, awake, not in acute distress.  Thin female  HEENT: Head: Normal, normocephalic, atraumatic.  Eye: Normal external eye, conjunctiva, lids cornea, MAHESH.  Ears:  Non-tender.  Nose: Normal external nose, mucus membranes and septum.  Pharynx: Dental Hygiene adequate. Normal buccal mucosa. Normal pharynx.  Neck / Thyroid: Supple, no masses, nodes, nodules or enlargement.  Lymphatics: No abnormally enlarged lymph nodes.  Chest: Normal chest wall and respirations. Clear to auscultation.  Heart: S1 S2 RRR, no murmur.   Abdomen: abdomen is soft without significant tenderness, masses, organomegaly or guarding  Extremities: normal strength, tone, and muscle mass  Skin: normal. no rash or abnormalities  CNS: non focal.    Lab Results    Recent Results (from the past 168 hour(s))   Comprehensive Metabolic Panel   Result Value Ref Range    Sodium 135 (L) 136 - 145 mmol/L    Potassium 4.1 3.5 - 5.0 mmol/L    Chloride 100 98 - 107 mmol/L    CO2 28 22 - 31 mmol/L    Anion Gap, Calculation 7 5 - 18 mmol/L    Glucose 103 70 - 125 mg/dL    BUN 22 8 - 28 mg/dL    Creatinine 0.83 0.60 - 1.10 mg/dL    GFR MDRD Af Amer >60 >60 mL/min/1.73m2    GFR MDRD Non Af  Amer >60 >60 mL/min/1.73m2    Bilirubin, Total 0.6 0.0 - 1.0 mg/dL    Calcium 9.7 8.5 - 10.5 mg/dL    Protein, Total 5.9 (L) 6.0 - 8.0 g/dL    Albumin 3.3 (L) 3.5 - 5.0 g/dL    Alkaline Phosphatase 93 45 - 120 U/L    AST 20 0 - 40 U/L    ALT 17 0 - 45 U/L   HM1 (CBC with Diff)   Result Value Ref Range    WBC 6.1 4.0 - 11.0 thou/uL    RBC 4.05 3.80 - 5.40 mill/uL    Hemoglobin 13.0 12.0 - 16.0 g/dL    Hematocrit 38.4 35.0 - 47.0 %    MCV 95 80 - 100 fL    MCH 32.1 27.0 - 34.0 pg    MCHC 33.9 32.0 - 36.0 g/dL    RDW 13.8 11.0 - 14.5 %    Platelets 281 140 - 440 thou/uL    MPV 9.9 8.5 - 12.5 fL    Neutrophils % 72 (H) 50 - 70 %    Lymphocytes % 18 (L) 20 - 40 %    Monocytes % 9 2 - 10 %    Eosinophils % 1 0 - 6 %    Basophils % 1 0 - 2 %    Neutrophils Absolute 4.4 2.0 - 7.7 thou/uL    Lymphocytes Absolute 1.1 0.8 - 4.4 thou/uL    Monocytes Absolute 0.5 0.0 - 0.9 thou/uL    Eosinophils Absolute 0.0 0.0 - 0.4 thou/uL    Basophils Absolute 0.0 0.0 - 0.2 thou/uL       Imaging    Xr Foot Left 3 Or More Vws    Result Date: 9/25/2018  XR FOOT LEFT 3 OR MORE VWS 9/25/2018 11:44 AM INDICATION: Pain in unspecified limb COMPARISON: None. FINDINGS: Mild bony demineralization. No fracture or dislocation. No radiopaque foreign body. No plain radiographic evidence for osteomyelitis. There are mild degenerative changes in the midfoot.    TT: 30 minutes and more than 50% was spent on coordination and counseling of care.    Signed by: Ernesto Britton MD

## 2021-06-20 NOTE — LETTER
"Letter by Eliceo Lim MD at      Author: Eliceo Lim MD Service: -- Author Type: --    Filed:  Encounter Date: 2/13/2020 Status: (Other)         Patient: Dania Da Silva   MR Number: [id number]   YOB: 1927   Date of Visit: 2/13/2020     [facility]    Facility:   [facility] [[id number]]   Code Status: DNR      CHIEF COMPLAINT/REASON FOR VISIT:  Chief Complaint   Patient presents with   ? Problem Visit       HISTORY:   HPI:     Recall that Ms. 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     Hospital course patient was admitted to [facility] between January 30 and February 3.  She was admitted following a fall at her home (\"[address]"), and assisted living facility.  She experienced left hip pain.  Fall is believed mechanical in nature (reaching for something throwing her off balance).  Evaluation included imaging of the hip and pelvis revealing periprosthetic femur fracture.  Patient was seen by orthopedics who determined the fracture was non-surgical and recommended conservative care.  They are allowing weightbearing as tolerated.  Patient additionally had head CT and C-spine CT without acute findings.  Her pain has been managed with acetaminophen only.  Hospital course was otherwise remarkable for elevated blood pressure at times.            Subjective/review of systems:  I am asked to see patient today by nursing staff as well as by physical therapist.  I discussed her situation with both of these specialties.  They are concerned by patient's increasing left hip pain and decreasing performance in therapy.   Patient reports that she " "thinks her pain is worse than when I last saw her, or in general early in her stay here.  The character of the pain has also changed.  The deep bony lateral pain persists and is more predictable.  In addition she is having some other pain that she has a hard time describing but it is definitely associated with weightbearing.  It is more in the anterior thigh radiating down towards the knee.  She also notes that the leg \"jumps around\" after moving it.  She has known back pain.  She has no numbness weakness tingling.  She has minimal rest pain.  Therapist reports that it is difficult to figure out because when I put her in the standing bracket machine she seems to be able to bear weight without unusual pain but at other times seems to be guarding and fearful.  There have been no additional falls or injuries.  There is been no new bruising.  There is no redness or swelling.  There is been no fever sweats or chills.  Patient has been eating okay.  She has been sleeping pretty well.  No constipation flank pain or dysuria.  No peripheral edema.    Past Medical History:   Diagnosis Date   ? Cancer (H)    ? GERD (gastroesophageal reflux disease)    ? Hypertension    ? Osteoporosis    ? Renal cell carcinoma of right kidney (H)    ? Rheumatoid arthritis (H)              Family History   Problem Relation Age of Onset   ? Thyroid cancer Daughter    ? Cervical cancer Daughter    ? Breast cancer Daughter    ? Uterine cancer Daughter    ? Testicular cancer Nephew    ? Lung cancer Cousin      Social History     Socioeconomic History   ? Marital status: Single     Spouse name: None   ? Number of children: None   ? Years of education: None   ? Highest education level: None   Occupational History   ? None   Social Needs   ? Financial resource strain: None   ? Food insecurity:     Worry: None     Inability: None   ? Transportation needs:     Medical: None     Non-medical: None   Tobacco Use   ? Smoking status: Never Smoker   ? " "Smokeless tobacco: Never Used   Substance and Sexual Activity   ? Alcohol use: Yes     Comment: \"very infrequent\"   ? Drug use: No   ? Sexual activity: Never   Lifestyle   ? Physical activity:     Days per week: None     Minutes per session: None   ? Stress: None   Relationships   ? Social connections:     Talks on phone: None     Gets together: None     Attends Restorationist service: None     Active member of club or organization: None     Attends meetings of clubs or organizations: None     Relationship status: None   ? Intimate partner violence:     Fear of current or ex partner: None     Emotionally abused: None     Physically abused: None     Forced sexual activity: None   Other Topics Concern   ? None   Social History Narrative   ? None         Review of Systems as above    Vitals:    02/13/20 1810   BP: 134/68   Pulse: 90   Resp: 16   Temp: 98.3  F (36.8  C)   SpO2: 97%   Weight: 102 lb (46.3 kg)       Physical Exam  Patient is alert oriented normally conversant in no distress.  She is sitting up in her chair.  She has soft belly without tenderness, bowel sounds within normal limits.  Catheter looks fine with clear urine in bag.  She has mild tenderness over the greater trochanteric area.  She is notably tender anteriorly over the hip joint itself.  There is no swelling redness.  Her exam is not reproducible in the sense that sometimes just brushing her skin on the anterior thigh will cause her to jump and tightening and complain of pain.  At other times she will even notice it.  Deeper palpation in the musculature of the thigh anteriorly medially posteriorly shows no reproducible tenderness.  Knee joint is not red swollen or tender.  CMS is intact to the toe tips  LABS:   X-ray is added and pending    ASSESSMENT:      ICD-10-CM    1. Pain of left hip joint M25.552        Left hip pain  Periprosthetic left hip fracture     Unusual course with patient having more trouble in therapy over the last few days that she " had earlier.  Her symptom complex and exam was difficult to figure out.  There definitely seems to be some guarding/fearfulness.  Excessive guarding could be causing trouble in its own right with muscle pains.  I am not getting any sense of radicular pain and her strength is good hip flexors extensors knee flexors extensors ankle flexors and extensors.  There is no swelling or venous distention or palpable cords.  Therapist is unsure what is going on and requests an x-ray to make sure that there has not been shifting of fracture fragments etc. which could be playing a role here.    -X-rays ordered  -Recommend and order orthopedic evaluation.  As far as I can tell she has not been seen by Ortho since discharge.  That is her understanding as well.    -I add Voltaren gel 3 times daily to her pre-existing acetaminophen, aspirin.  Ms. Bell increased her tramadol yesterday as well.    Electronically signed by: Eliceo Lim MD

## 2021-06-20 NOTE — PROGRESS NOTES
[facility] Hematology and Oncology Progress Note    Patient: Dania Da Silva  MRN: [id number]  Date of Service:9/6/2018      Reason for Visit    Follow up metastatic clear-cell renal cell carcinoma    Assessment and Plan  Renal cell carcinoma of right kidney (H)    Staging form: Kidney, AJCC 8th Edition    - Clinical stage from 4/3/2018: Stage IV (cT1b, cNX, pM1) - Signed by Ernesto Britton MD on 4/3/2018    ECOG Performance   ECOG Performance Status: 1     Distress Assessment  Distress Assessment Score: No distress    Pain  Currently in Pain: No/denies  Pain Score (Initial OR Reassessment): No/Denies Pain  Location: R arm, abd     #.  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   I reviewed her labs.  She has normal renal function, kidney function and hemogram.  She has ongoing diarrhea which be is manageable and mild fatigue.  She wants to continue current dose of pazopanib to 200 mg daily which she feels that tolerates fairly well.  She would like to continue therapy as long as it is working.   Follow-up with in 4 weeks with labs and exam.   Plan for restaging CT scan in about after next visit.     #. Acute diarrhea grade 2-3 - secondary to pazopanib, resolved with lowering dose of pazopanib.  Overall her diarrhea is very manageable with adding Metamucil at this point.      #.  Metastatic bone disease   On Zometa every 12 weeks. She received a dose today.    #.  Cancer related pain   She is now taking Aleve 1 tablet daily in the morning with food and Tylenol 1 tablet once a day periodically.  No additional pain medication needed at this point.      #.  2 several centimeter bladder stones.   She had a cystoscopy on 6/27/2018 by   [facility] and [facility].  She has mild symptomatic (bladder spasm about once a month) at this point.  She was offered litholapaxy and bladder biopsy if she is more symptomatic.  She will think about it regarding how symptomatic she is.  I asked her to make a journal about the frequency of bladder discomfort.    #.  Rheumatoid arthritis- on methotrexate  #.  Osteoporosis  #.  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  has a suprapubic.  She is follow by urology at [facility].    #.  Hypertension, improved    Problem List    1. Renal cell carcinoma of right kidney (H)  HM1(CBC and Differential)    HM1 (CBC with Diff)    [facility] RED    HM1(CBC and Differential)    Comprehensive Metabolic Panel      _  TT: 40 minutes and more than 50% was spent on coordination and counseling of care.  _____________________________________________________________________________  Diagnosis  March 2018- Metastatic renal cell carcinoma of the right kidney- clear cell type (2.5 cm pleural-based expansile destructive lesion in the right lateral seventh rib, pleural-based nodules, multiple bilateral pulmonary nodules with small right pleural effusion, 3.2 cm renal mass consistent with renal cell carcinoma)- PDL1 expression 90%.    Treatment to date  4/18/18- started pazopanib 400 mg daily (at 50% dose reduction with plan to escalate to 800 mg daily as tolerated), stop on 5/26/2018 due to grade 3 diarrhea   -CT scan show overall stable disease (mild progression about 20% increase in size of pleural-based metastases), stable or slightly smaller pulmonary nodules.   - 6/5/2018- restarted pazopaninb 200 mg daily for better tolerability    History of Present Illness    Ms. Da Silva is here for follow up, accompanied by her friend.     She feels that she is feeling progressive fatigue although attributed to lack of motivation and lack of activity.  She has gained about 3 pounds.  Her diarrhea is  "pretty much controlled with Metamucil although she has some bouts of watery stools.  Abdominal pain is good.  Her right-sided chest pain is also very stable.  No other new side effects from pazopanib.  She is wondering about my opinion on whether she should receive litholapaxy for 2 bladder stone.    Pain Status  Currently in Pain: No/denies    Review of Systems    Constitutional  Constitutional (WDL): Exceptions to WDL  Fatigue: Fatigue relieved by rest  Fever: None  Chills: None  Weight Gain: 5 - <10% from baseline (3# 8/16/18)  Weight Loss: None  Neurosensory  Neurosensory (WDL): Exceptions to WDL  Peripheral Motor Neuropathy: Asymptomatic, clinical or diagnostic observations only, intervention not indicated  Ataxia: Moderate symptoms, limiting instrumental ADL (uses cane)  Peripheral Sensory Neuropathy: Asymptomatic, loss of deep tendon reflexes or paresthesia  Confusion: None  Syncope: None  Eye   Eye Disorder (WDL): Exceptions to WDL  Blurred Vision: Intervention not indicated (\"don't see as well as I did\"-eyes get tired easily)  Dry Eye: Asymptomatic, clinical or diagnostic observations only, mild symptoms relieved by lubricants (itchy)  Eye Pain: None  Watering Eyes: None  Ear  Ear Disorder (WDL): All ear disorder elements are within defined limits  Cardiovascular  Cardiovascular (WDL): All cardiovascular elements are within defined limits  Pulmonary  Respiratory (WDL): Exceptions to WDL  Cough: None  Dyspnea: Shortness of breath with moderate exertion  Hypoxia: None  Gastrointestinal  Gastrointestinal (WDL): Exceptions to WDL (intermittent abd cramping)  Anorexia: Loss of appetite without alteration in eating habits (\"don't eat a whole lot\"-frequent, small meals)  Constipation: None  Diarrhea: Increase of <4 stools per day over baseline, mild increase in ostomy output compared to baseline  Dysphagia: None  Esophagitis: None  Nausea: None  Pharyngitis: None  Vomiting: None  Dysgeusia: None  Dry Mouth: " "Symptomatic (e.g., dry or thick saliva) without significant dietary alteration, unstimulated saliva flow >0.2 ml/min  Genitourinary  Genitourinary (WDL): Exceptions to WDL (suprapubic catheter)  Lymphatic  Lymph (WDL): All lymph disorder elements are within defined limits  Musculoskeletal and Connective Tissue  Musculoskeletal and Connetive Tissue Disorders (WDL): Exceptions to WDL  Arthralgia: Mild pain (R arm)  Bone Pain: Mild pain (R arm)  Muscle Weakness : Symptomatic, perceived by patient but not evident on physical exam  Myalgia: Mild pain (leg cramps @ noc)  Integumentary  Integumentary (WDL): All integumentary elements are within defined limits  Patient Coping  Patient Coping: Accepting  Distress Assessment  Distress Assessment Score: No distress  Accompanied by  Accompanied by: Friend (Friend, Loni)  Oral Chemo Adherence       Past History  Past Medical History:   Diagnosis Date     Cancer (H)      GERD (gastroesophageal reflux disease)      Hypertension      Osteoporosis      Rheumatoid arthritis (H)        Physical Exam    Recent Vitals 9/6/2018   Height 5' 5\"   Weight 108 lbs 2 oz   BSA (m2) 1.5 m2   /76   Pulse 80   Temp 97.4   Temp src 1   SpO2 100   Some recent data might be hidden     General: alert, awake, not in acute distress.  Thin female  HEENT: Head: Normal, normocephalic, atraumatic.  Eye: Normal external eye, conjunctiva, lids cornea, MAHESH.  Ears:  Non-tender.  Nose: Normal external nose, mucus membranes and septum.  Pharynx: Dental Hygiene adequate. Normal buccal mucosa. Normal pharynx.  Neck / Thyroid: Supple, no masses, nodes, nodules or enlargement.  Lymphatics: No abnormally enlarged lymph nodes.  Chest: Normal chest wall and respirations. Clear to auscultation.  Heart: S1 S2 RRR, no murmur.   Abdomen: abdomen is soft without significant tenderness, masses, organomegaly or guarding  Extremities: normal strength, tone, and muscle mass  Skin: normal. no rash or abnormalities  CNS: " non focal.    Lab Results    Recent Results (from the past 168 hour(s))   Comprehensive Metabolic Panel   Result Value Ref Range    Sodium 134 (L) 136 - 145 mmol/L    Potassium 4.2 3.5 - 5.0 mmol/L    Chloride 101 98 - 107 mmol/L    CO2 25 22 - 31 mmol/L    Anion Gap, Calculation 8 5 - 18 mmol/L    Glucose 97 70 - 125 mg/dL    BUN 21 8 - 28 mg/dL    Creatinine 0.78 0.60 - 1.10 mg/dL    GFR MDRD Af Amer >60 >60 mL/min/1.73m2    GFR MDRD Non Af Amer >60 >60 mL/min/1.73m2    Bilirubin, Total 0.7 0.0 - 1.0 mg/dL    Calcium 10.2 8.5 - 10.5 mg/dL    Protein, Total 6.2 6.0 - 8.0 g/dL    Albumin 3.5 3.5 - 5.0 g/dL    Alkaline Phosphatase 92 45 - 120 U/L    AST 25 0 - 40 U/L    ALT 22 0 - 45 U/L   HM1 (CBC with Diff)   Result Value Ref Range    WBC 6.5 4.0 - 11.0 thou/uL    RBC 4.18 3.80 - 5.40 mill/uL    Hemoglobin 13.2 12.0 - 16.0 g/dL    Hematocrit 39.7 35.0 - 47.0 %    MCV 95 80 - 100 fL    MCH 31.6 27.0 - 34.0 pg    MCHC 33.2 32.0 - 36.0 g/dL    RDW 14.6 (H) 11.0 - 14.5 %    Platelets 272 140 - 440 thou/uL    MPV 9.6 8.5 - 12.5 fL    Neutrophils % 74 (H) 50 - 70 %    Lymphocytes % 18 (L) 20 - 40 %    Monocytes % 7 2 - 10 %    Eosinophils % 1 0 - 6 %    Basophils % 1 0 - 2 %    Neutrophils Absolute 4.8 2.0 - 7.7 thou/uL    Lymphocytes Absolute 1.2 0.8 - 4.4 thou/uL    Monocytes Absolute 0.4 0.0 - 0.9 thou/uL    Eosinophils Absolute 0.1 0.0 - 0.4 thou/uL    Basophils Absolute 0.1 0.0 - 0.2 thou/uL       Imaging    Ct Chest Abdomen Pelvis Without Oral With Iv Contrast    Result Date: 8/14/2018  CT CHEST, ABDOMEN, AND PELVIS 8/14/2018 11:17 AM      INDICATION: Metastatic renal cell cancer, treatment follow up. TECHNIQUE: CT chest, abdomen, and pelvis. Dose reduction techniques were used. IV CONTRAST: Iohexol (Omni) 100 mL. COMPARISON: 6/4/2018. FINDINGS: CHEST: Scattered right pleural metastases and bilateral pulmonary nodules show no significant change. Improved but persistent minimal right pleural effusion. No new or  enlarging adenopathy.  ABDOMEN: Allowing for small differences in measurement, no significant change of 3.3 cm right renal mass. Unremarkable liver, pancreas, spleen, adrenals and left kidney. No adenopathy. PELVIS: No adenopathy. No bowel obstruction. Suprapubic bladder catheter. Left inguinal hernia containing minimal small bowel. MUSCULOSKELETAL: Focal ovoid expansion of the anterior right sixth rib has mildly increased, with overall dimensions remaining similar at 13 x 15 mm (series 2, image 61). Diffuse expansion with peripheral thickening involving the lateral to anterior right seventh rib has significantly improved since prior. Suspect pathologic fractures of the anterior right fifth through seventh ribs. Left hip arthroplasty.     CONCLUSION: 1.  Diffuse expansion of the anterior-lateral right seventh rib has improved since prior. Focally increased ovoid expansile appearance of the anterior right sixth rib. Otherwise, no significant change of pulmonary nodules, pleural metastases and right renal mass since prior. 2.  Improved but persistent minimal right pleural effusion. 3.  Small left inguinal hernia containing minimal small bowel. No obstruction or bowel wall thickening.     TT: 40 minutes and more than 50% was spent on coordination and counseling of care.    Signed by: Ernesto Britton MD

## 2021-06-20 NOTE — LETTER
"Letter by Elisa Bell CNP at      Author: Elisa Bell CNP Service: -- Author Type: --    Filed:  Encounter Date: 2/4/2020 Status: (Other)         Patient: Dania Da Silva   MR Number: [id number]   YOB: 1927   Date of Visit: 2/4/2020     [facility]    Facility:   [facility] [[id number]]   Code Status: DNR/DNI and POLST AVAILABLE      CHIEF COMPLAINT/REASON FOR VISIT:  Chief Complaint   Patient presents with   ? Review Of Multiple Medical Conditions     TCU intake       HISTORY:      HPI: Dania is a 89 y.o. female who  has a past medical history of Cancer (H), GERD (gastroesophageal reflux disease), Hypertension, Osteoporosis, Renal cell carcinoma of right kidney (H), and Rheumatoid arthritis (H).  She was recently admitted to [facility] on 1/30/2020 for a fall with subsequent hip fracture.  She was discharged on 2/3/2020.  The discharging provider summarized the hospitalization as follows:    \"89-year-old female status post fall with a left proximal femur fracture and evaluated by orthopedic surgery who recommended no surgical intervention and conservative medical management. Her BP was transiently elevated but well-controlled with resumption of her regular home anti-hypertensive meds; her renal function was checked and normal. PT/OT recommended TCU. She was discharged to TCU with PCP follow-up. Her pain was well-controlled with just APAP by day of discharge.  \"    Today Dania is being evaluated for an intake into the TCU.  She is accompanied by her son-in-law Zen.  And states she has been doing well has no new concerns or issues to report.  She has been participating in physical and occupational therapies.  She reports that she has been eating, drinking emanating without a problem.  She does admit to occasional constipation.  Nursing staff do have several order clarifications and issues to have resolved.  They would like to try a bladder trial given " she is wearing a Briones catheter.  However upon discussing with Zen and Dania and it appears that this is a chronic indwelling Briones catheter that she has had for over 20+ years due to renal cancer with subsequent neurogenic bladder.  In addition she is currently taking naproxen which carries a high risk for GI bleed.  We did discuss discontinuing naproxen and starting scheduled Tylenol.  She does agree to this and orders have been placed.  We also discussed the use of lactase.  She states that she has not used it for quite some time.  However we did discuss with Zen and Zen feels that it is best to have it kept on her medication list as a as needed drug for use with dairy products. Dania has no new concerns or issues to report. Dania denies any other concerns including fevers/chills, cough or cold symptoms, headaches, vision changes, chest pain/pressure, difficulty breathing, SOB, abdominal pain, nausea, vomiting, diarrhea, dysuria, increasing weakness, increasing pain.     Advanced Care Planning  Spoke with Dania and Zen regarding code status and advanced care planning. Discussed that she would NOT like to have full resuscitation efforts. Dania would like to be a DNR/DNI with a comfort focus. However, she would also like to have treatment if she were to fall ill with certain things. she would like medical treatment for select medical issues. Zen would decide for her if she was unable to decide.  Zen is her power of .  He does have an electronic advance directive that is uploaded to epic.      Past Medical History:   Diagnosis Date   ? Cancer (H)    ? GERD (gastroesophageal reflux disease)    ? Hypertension    ? Osteoporosis    ? Renal cell carcinoma of right kidney (H)    ? Rheumatoid arthritis (H)              Family History   Problem Relation Age of Onset   ? Thyroid cancer Daughter    ? Cervical cancer Daughter    ? Breast cancer Daughter    ? Uterine cancer Daughter    ? Testicular cancer Nephew   "  ? Lung cancer Cousin      Social History     Socioeconomic History   ? Marital status: Single     Spouse name: Not on file   ? Number of children: Not on file   ? Years of education: Not on file   ? Highest education level: Not on file   Occupational History   ? Not on file   Social Needs   ? Financial resource strain: Not on file   ? Food insecurity:     Worry: Not on file     Inability: Not on file   ? Transportation needs:     Medical: Not on file     Non-medical: Not on file   Tobacco Use   ? Smoking status: Never Smoker   ? Smokeless tobacco: Never Used   Substance and Sexual Activity   ? Alcohol use: Yes     Comment: \"very infrequent\"   ? Drug use: No   ? Sexual activity: Never   Lifestyle   ? Physical activity:     Days per week: Not on file     Minutes per session: Not on file   ? Stress: Not on file   Relationships   ? Social connections:     Talks on phone: Not on file     Gets together: Not on file     Attends Faith service: Not on file     Active member of club or organization: Not on file     Attends meetings of clubs or organizations: Not on file     Relationship status: Not on file   ? Intimate partner violence:     Fear of current or ex partner: Not on file     Emotionally abused: Not on file     Physically abused: Not on file     Forced sexual activity: Not on file   Other Topics Concern   ? Not on file   Social History Narrative   ? Not on file       REVIEW OF SYSTEM:  Pertinent items are noted in HPI.    PHYSICAL EXAM:   BP (!) 182/89   Pulse 87   Temp 98.9  F (37.2  C)   Resp 21   Wt 101 lb 6.4 oz (46 kg)   SpO2 94%   BMI 19.16 kg/m     General appearance: alert, appears stated age and cooperative  HEENT: Head is normocephalic with normal hair distribution. No evidence of trauma. Ears: Without lesions or deformity. No acute purulent discharge. Eyes: Conjunctivae pink with no scleral icterus or erythema. Nose: Normal mucosa and septum. Oropharnyx: mmm, no lesions present.  Lungs: clear " to auscultation bilaterally, respirations without effort  Heart: regular rate and rhythm, S1, S2 normal, no murmur, click, rub or gallop  Abdomen: soft, non-tender; bowel sounds normal; no masses,  no organomegaly  : Indwelling Briones catheter draining clear straw-colored urine  Extremities: extremities normal, atraumatic, no cyanosis or edema  Pulses: 2+ and symmetric   Skin: Skin color, texture, turgor normal. No rashes or lesions.  Neurologic: Grossly normal   Psych: interacts well with caregivers, exhibits logical thought processes and connections, pleasant      LABS:   None today.    ASSESSMENT:      ICD-10-CM    1. Fall, initial encounter W19.XXXA    2. Physical deconditioning R53.81    3. Closed left hip fracture, initial encounter (H) S72.002A    4. Renal cell carcinoma of right kidney (H) C64.1        PLAN:    Physical Deconditioning  -Continue PT/OT and other therapies as per care plan.  -Encouraged good nutrition and movement habits.   -Discussed care plan and expected course of stay.   -Continue to follow-up per routine schedule or sooner if needed.     Fall  -Please place on fall precautions and monitor patient routinely.  -Encouraged patient to ask for help with all transfers.   -Continue to assess for developing injuries and if patient reports any pain request provider follow-up.    Left hip fracture  -Discontinue naproxen.  -Tylenol 650 mg by mouth 4 times daily scheduled.  -Ice and/or heat to left hip for comfort and pain relief.  -PT/OT.  -ASA 81 mg by mouth daily.  -Follow with Ortho.    Renal cell carcinoma of right kidney/indwelling Briones catheter  -Indwelling Briones catheter to be changed monthly, bags to be changed every 2 weeks.  -Continue to follow with oncology.    Advanced Care Planning  -POLST reviewed and signed.   -DNR/DNI.    Admission history and physical per MD in the next 30 days. At this time continue current care plan for all chronic medical conditions, as they are stable.  Encouraged patient to engage in PT/OT for strengthening and conditioning. Encouraged patient to work closely with nursing staff to ensure any medical complaints are quickly addressed. Follow up this week or sooner if needed. Will continue to monitor patient and work with nursing staff collaboratively to work toward positive patient outcomes.    Total unit/floor time of 40 minutes time consisted of the following: time spent with patient, examination of patient, reviewing the record including pertinent labs and completing documentation. More than 50% of this time was spent in coordination of care time with nursing staff and other healthcare providers, this time was spent on discussion/counseling on current care plan including medical management of chronic health problems and acute health problems, education pertaining to plan, and discussion of the goals of care pertaining to the current outlined plan with nursing staff and patient. An additional 16 minutes of time was spent discussing code status, wishes for end of life care and reviewing POLST from 1300 to 1316. POLST signed and left with nursing staff.     Electronically signed by: Elisa Bell CNP

## 2021-06-20 NOTE — LETTER
"Letter by Eliceo Lim MD at      Author: Eliceo Lim MD Service: -- Author Type: --    Filed:  Encounter Date: 2/6/2020 Status: (Other)         Patient: Dania Da Silva   MR Number: [id number]   YOB: 1927   Date of Visit: 2/6/2020     [facility]    Facility:   [facility] [[id number]]   Code Status: DNR      CHIEF COMPLAINT/REASON FOR VISIT:  Chief Complaint   Patient presents with   ? H & P       HISTORY:      HPI: Ms. 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    Hospital course patient was admitted to [facility] between January 30 and February 3.  She was admitted following a fall at her home (\"[place] [address]\"), and assisted living facility.  She experienced left hip pain.  Fall is believed mechanical in nature (reaching for something throwing her off balance).  Evaluation included imaging of the hip and pelvis revealing periprosthetic femur fracture.  Patient was seen by orthopedics who determined the fracture was non-surgical and recommended conservative care.  They are allowing weightbearing as tolerated.  Patient additionally had head CT and C-spine CT without acute findings.  Her pain has been managed with acetaminophen only.  Hospital course was otherwise remarkable for elevated blood pressure at times.      Patient was seen by Ms. Bell on February 4 with discontinuation of naproxen with risks outweighing benefits at this point.    Subjective/review of systems: Patient is alert pleasant and conversant.  She has some insight to her memory impairment but " "says that it is mild.  Patient reports that she generally uses a walker at home.  She reports \"lots of falls\".  When asked what that means she guesses about 1 fall every 10 days.  She says she does not get lightheaded or syncopal but says that her legs are weak and her balance is not good.  She reports no pain at rest.  She says the only time it really bothers her is with therapy.  She says the Tylenol is adequate to control that pain.  Says she is not sleeping well here but that is not new she is also had poor sleep.  Her main concern is constipation.  She has had significant constipation in the past and we eventually arrive at the conclusion that senna has been the most effective drug for her.  She denies headaches change in vision speaking swallowing hearing.  She denies neck or back pain.  She denies cough shortness of breath orthopnea PND wheezing.  She reports no change in appetite or weight loss to her knowledge.  She reports no chest pain exertional otherwise.  She reports no abdominal pain.  No nausea vomiting diarrhea melena or bright red blood per rectum.  She says her catheter is not bothering her.  Is changed once a month.  She cannot move the last time she had a UTI.  She reports her mood is stable.  She is not had fever sweats or chills.  She is not been exposed in the lining illness.  Remainder ROS is negative      Family history update: Patient reports that her mother had a CVA    Social history, as above additionally she has 2 daughters but they do not live locally one is in [place] which is.  She does have a granddaughter here named Trina walters but does not like to bother her as she is quite a busy life but would be available if need be.    Past Medical History:   Diagnosis Date   ? Cancer (H)    ? GERD (gastroesophageal reflux disease)    ? Hypertension    ? Osteoporosis    ? Renal cell carcinoma of right kidney (H)    ? Rheumatoid arthritis (H)              Family History   Problem Relation Age " "of Onset   ? Thyroid cancer Daughter    ? Cervical cancer Daughter    ? Breast cancer Daughter    ? Uterine cancer Daughter    ? Testicular cancer Nephew    ? Lung cancer Cousin      Social History     Socioeconomic History   ? Marital status: Single     Spouse name: None   ? Number of children: None   ? Years of education: None   ? Highest education level: None   Occupational History   ? None   Social Needs   ? Financial resource strain: None   ? Food insecurity:     Worry: None     Inability: None   ? Transportation needs:     Medical: None     Non-medical: None   Tobacco Use   ? Smoking status: Never Smoker   ? Smokeless tobacco: Never Used   Substance and Sexual Activity   ? Alcohol use: Yes     Comment: \"very infrequent\"   ? Drug use: No   ? Sexual activity: Never   Lifestyle   ? Physical activity:     Days per week: None     Minutes per session: None   ? Stress: None   Relationships   ? Social connections:     Talks on phone: None     Gets together: None     Attends [employer] service: None     Active member of club or organization: None     Attends meetings of clubs or organizations: None     Relationship status: None   ? Intimate partner violence:     Fear of current or ex partner: None     Emotionally abused: None     Physically abused: None     Forced sexual activity: None   Other Topics Concern   ? None   Social History Narrative   ? None         Review of Systems as above    Vitals:    02/06/20 2228   BP: 154/74   Pulse: 85   Resp: 16   Temp: 98.8  F (37.1  C)   SpO2: 97%   Weight: (!) 99 lb (44.9 kg)       Physical Exam  Patient is alert and well oriented although she does not know the exact day/date.  She is thin and seated in a wheelchair.  She is well dressed and groomed.  She has normocephalic atraumatic sclera clear nonicteric oropharynx is clear neck is supple without tenderness or mass or thyromegaly.  Heart is regular S1-S2 without murmur gallop or rub lungs are clear she moves her easily.  " Abdomen is soft without HSM.  Left-sided lower quadrant area with some mobile nodularity most likely stool.  Extremities show no edema with good perfusion and good cap refill.  She does have a scrape on the top of her right foot which appears several days old.  She says she does not know how she got it.  No sign of cellulitis there.  LABS:   Results for CARTER SOLIS (MRN [id number]) as of 2/6/2020 22:42   Ref. Range 2/2/2020 07:20   Sodium Latest Ref Range: 136 - 145 mmol/L 137   Potassium Latest Ref Range: 3.5 - 5.0 mmol/L 4.0   Chloride Latest Ref Range: 98 - 107 mmol/L 103   CO2 Latest Ref Range: 22 - 31 mmol/L 28   Anion Gap, Calculation Latest Ref Range: 5 - 18 mmol/L 6   BUN Latest Ref Range: 8 - 28 mg/dL 23   Creatinine Latest Ref Range: 0.60 - 1.10 mg/dL 0.74   GFR MDRD Af Amer Latest Ref Range: >60 mL/min/1.73m2 >60   GFR MDRD Non Af Amer Latest Ref Range: >60 mL/min/1.73m2 >60   Calcium Latest Ref Range: 8.5 - 10.5 mg/dL 9.5   Glucose Latest Ref Range: 70 - 125 mg/dL 98   Hemoglobin Latest Ref Range: 12.0 - 16.0 g/dL 11.4 (L)     SIGNIFICANT FINDINGS (Imaging, labs):   Xr Chest 1 View Portable  Result Date: 1/31/2020  No acute abnormality.     Ct Head Without Contrast  Result Date: 1/30/2020  1.  No acute traumatic intracranial abnormality. 2.  Age-indeterminate lacunar type infarctions involving the right anterior limb of the internal capsule and left subinsular region. If the patient is experiencing an acute, focal, or ongoing neurologic deficit, an MRI may be indicated. 3.  Chronic senescent and ischemic changes as above.     Ct Cervical Spine Without Contrast  Result Date: 1/30/2020  1.  No acute fracture identified. 2.  Grade 1 anterolisthesis at the levels of the C2-C3, C7-T1 and T1-T2. Grade 1 retrolisthesis of C3-C4, C4-C5 and C5-C6. These findings are likely degenerative in etiology, however, ligamentous injury could have a similar appearance. 3.  Advanced multilevel degenerative changes of the  cervical spine, as above. 4.  1 cm left thyroid lobe nodule.      Ct Pelvis Without Oral Without Iv Contrast  Result Date: 1/31/2020  1.  Comminuted oblique fracture involving the anterior cortex of the proximal left femur. This fracture is comminuted along the superior lateral aspect of the proximal left femur at the left greater trochanter without significant displacement or angulation of the smaller fracture line/fracture fragments. This becomes a more linear oblique fracture line along the anterior cortex as it extends inferiorly over a short distance without significant angulation or displacement of the fracture more inferiorly. This fracture is intimately associated with the anterior margin of the the left LEEANNE femoral component. 2.  No other acutely displaced fractures. 3.  Left LEEANNE with components intact. No evidence for displacement of components or dislocation. 4.  Marked degenerative osteoarthrosis right hip. 5.  No significant subcutaneous or intramuscular hematoma. 6.  Fullness within the left inguinal canal with a minimal amount of herniated nonobstructed small bowel and fluid. No evidence for small bowel dilatation. Large amount of stool throughout colon. 7.  Briones catheter decompressing the bladder with 2 large calculi present within the bladder lumen.     Xr Pelvis W 2 Vw Hip Left  Result Date: 1/30/2020  Postoperative changes of a left total hip arthroplasty. Best visualized on the lateral view, there is an oblique fracture through the proximal aspect of the femur adjacent to the femoral prosthesis with minimal anterior displacement. There is  also a subtle fracture line along the posterior aspect of the proximal left femur. No evidence for dislocation of the components. No other acute fractures. Marked degenerative osteoarthrosis right hip. Minimal degenerative changes at the symphysis pubis  and both SI joints. Marked degenerative disc changes lumbar spine. Pelvic phleboliths. Larger  calcifications involving the pelvis may reside within the bladder.       ASSESSMENT:      ICD-10-CM    1. Hypertension I10    2. Rheumatoid arthritis, involving unspecified site, unspecified rheumatoid factor presence (H) M06.9    3. Renal cell carcinoma of right kidney (H) C64.1    4. Chronic suprapubic catheter (H) Z93.59    5. Pain of left hip joint M25.552      Assessment/plan    Fall with injury  Periprosthetic left femur fracture  Frequent falls  Unsteady gait     -PT, OT, social service   -Continue acetaminophen for pain control   -Agree that naproxen has suitable risk and she is not needing it for pain control at this point anyway   -Patient says her plan is to return to her assisted living facility   -Orthostatic BPs and pulses    Mild anemia, normochromic normocytic      patient is down to 11.4 but stable.  Looks like her baseline has been above 12.  This is likely acute blood loss.  No directed therapy at this time.  Recheck in a month or so.    Constipation     Mild at this point.  Senna is added to the MiraLAX which is still PRN.  8.6 mg twice daily with a hold for loose stool ordered.    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  Is unclear if this was a metastatic renal focus order some other cancer.  I cannot find primary documentation in the records which have access to this point.  She follows with urologist in [place].    Renal cell cancer  Metastatic bone disease     Looks like she was on immune modulators up until mid December 2019 (nivolumab she last saw oncology December 4, 2019.  Radiographically her cath was felt to be stable without new metastases.  Due to her fatigue and tiredness which might be medication related they decided to take a medication break.   For her medicine she is on somatic every 12 weeks with the last treatment December 2019.  No new meds noted.  I am not sure if I have additional  medical therapy in the future.       Hypertension     Based on her current readings it looks like she needs an increase in her medication dosage.  However her blood pressure was high for tach in the hospital and settled down as well.  I favor watchful waiting at the moment she is not having any dangerous highs, is an foreign, potentially stressful.  I think would make sense should she need an increase to go to Prinzide 20/12.5 effectively doubling her ACE inhibitor dose with follow-up creatinine.      Multiple medical issues reviewed with patient, additional changes to her therapy resulted.            Electronically signed by: Eliceo Lim MD

## 2021-06-21 NOTE — PROGRESS NOTES
Patient here today for labs and follow-up visit with Dr. Britton for metastatic renal cell carcinoma/MICHAEL Posadas RN

## 2021-06-21 NOTE — PROGRESS NOTES
[facility] Hematology and Oncology Progress Note    Patient: Dania Da Silva  MRN: [id number]  Date of Service:11/1/2018      Reason for Visit    Follow up metastatic clear-cell renal cell carcinoma    Assessment and Plan  Renal cell carcinoma of right kidney (H)    Staging form: Kidney, AJCC 8th Edition    - Clinical stage from 4/3/2018: Stage IV (cT1b, cNX, pM1) - Signed by Ernesto Britton MD on 4/3/2018    ECOG Performance   ECOG Performance Status: 1     Distress Assessment  Distress Assessment Score: 2 (due to today's visit)    Pain  Currently in Pain: No/denies  Pain Score (Initial OR Reassessment): No/Denies Pain  Location: R abd    #.  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   I reviewed her labs and they were fairly normal range.  I reviewed her interval staging CT scan and informed her that lung nodules showed mixed response, stable right pleural metastases and skeletal metastases and no new lesions.  I explained to her that we could continue pazopanib at this point based on these findings.  However it appears that she has a cumulative side effects from the chemotherapy.  I explained to her that she could definitely hold treatment and follow clinically and radiographically.  Once there is clear progression of the disease, we could change treatment to immunotherapy or perhaps resuming pazopanib.  On the other hand, she could switch to nivolumab now based on intolerance to pazopanib.  I again explained to her that the treatment goal is palliative and may need to help the symptoms related to cancer and not compromising her quality of life.  She voiced understanding.   After a long discussion, she would like to think about  it.  She is going to take Metamucil schedule and more use of Imodium to help her bowel movements.    Follow-up with me in 4 weeks with repeat labs.  She is advised to call me sooner if she wants to change her treatment.     #. Acute diarrhea grade 2-3 - secondary to pazopanib, improved with lowering dose of pazopanib.     Reported slightly worse again in the last month.  She wants to try with taking Metamucil more regularly and use of Imodium in the next few weeks.        #.  Metastatic bone disease   On Zometa every 12 weeks.     #.  Cancer related pain, controlled well.   Fairly controlled at this point and not needing pain medication much at all.    #.  2 several centimeter bladder stones.   She had a cystoscopy on 6/27/2018 by Dr. Trevino and [facility].  She has mild symptomatic (bladder spasm about once a month) at this point.  She was offered litholapaxy and bladder biopsy if she is more symptomatic.  She will continue follow-up with her urology.    #.  Rheumatoid arthritis- on methotrexate  #.  Osteoporosis  #.  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  has a suprapubic.  She is follow by urology at [facility].    #.  Hypertension, uncontrolled.  Stressed about taking medication regularly.    Problem List    1. Renal cell carcinoma of right kidney (H)     2. Chemotherapy management, encounter for     3. Diarrhea due to drug        _  TT: 40 minutes and more than 50% was spent on coordination and counseling of care.  _____________________________________________________________________________  Diagnosis  March 2018- Metastatic renal cell carcinoma of the right kidney- clear cell type (2.5 cm pleural-based expansile destructive lesion in the right lateral seventh rib, pleural-based nodules, multiple bilateral pulmonary nodules with small right pleural effusion, 3.2 cm renal mass consistent with renal cell carcinoma)- PDL1 expression 90%.    Treatment to  date  4/18/18- started pazopanib 400 mg daily (at 50% dose reduction with plan to escalate to 800 mg daily as tolerated), stop on 5/26/2018 due to grade 3 diarrhea   -CT scan show overall stable disease (mild progression about 20% increase in size of pleural-based metastases), stable or slightly smaller pulmonary nodules.   - 6/5/2018- restarted pazopaninb 200 mg daily for better tolerability    History of Present Illness    Ms. Da Silva is here for follow up, accompanied by her friend.     She has progressive fatigue, weakness and poor energy level.  She is wondering protein supplements would help her.  She continued to have abdominal cramping and recurrence of diarrhea (described as soft stool) about 4 times a day.  She has still been taking Metamucil regularly.  She is afraid of taking Imodium regularly.  Her pain in the right-sided chest wall has been fairly good and not needing pain medication for the most part.      Pain Status  Currently in Pain: No/denies    Review of Systems    Constitutional  Constitutional (WDL): Exceptions to WDL  Fatigue: Fatigue not relieved by rest - Limiting instrumental ADL  Fever: None  Chills: None  Weight Gain: None  Weight Loss: None  Neurosensory  Neurosensory (WDL): Exceptions to WDL  Peripheral Motor Neuropathy: Asymptomatic, clinical or diagnostic observations only, intervention not indicated  Ataxia: Moderate symptoms, limiting instrumental ADL  Peripheral Sensory Neuropathy: Asymptomatic, loss of deep tendon reflexes or paresthesia (bilat feet, numbness-at noc)  Confusion: None  Syncope: None  Eye   Eye Disorder (WDL): Exceptions to WDL  Blurred Vision: Intervention not indicated (when reads a lot, eyes get tired)  Dry Eye: None  Eye Pain: None  Watering Eyes: None  Ear  Ear Disorder (WDL): Exceptions to WDL ([place]-wearing hearing aids)  Ear Pain: None  Tinnitus: None  Cardiovascular  Cardiovascular (WDL): All cardiovascular elements are within defined  limits  Pulmonary  Respiratory (WDL): Exceptions to WDL  Cough: None  Dyspnea: Shortness of breath with moderate exertion  Hypoxia: None  Gastrointestinal  Gastrointestinal (WDL): Exceptions to WDL ( intermittent abd cramping)  Anorexia: Loss of appetite without alteration in eating habits  Constipation: None  Diarrhea: Increase of <4 stools per day over baseline, mild increase in ostomy output compared to baseline (4 soft stools yesterday)  Dysphagia: None  Esophagitis: None  Nausea: None  Pharyngitis: None  Vomiting: None  Dysgeusia: Altered taste but no change in diet  Dry Mouth: None  Genitourinary  Genitourinary (WDL): All genitourinary elements are within defined limits (supra pubic cathetar. Flushes daily)  Lymphatic  Lymph (WDL): All lymph disorder elements are within defined limits  Musculoskeletal and Connective Tissue  Musculoskeletal and Connetive Tissue Disorders (WDL): Exceptions to WDL  Arthralgia: None  Bone Pain: None  Muscle Weakness : Symptomatic, perceived by patient but not evident on physical exam (drinks Diet Mt. Dew prn for energy)  Myalgia: None  Integumentary  Integumentary (WDL): All integumentary elements are within defined limits  Patient Coping  Patient Coping: Accepting  Distress Assessment  Distress Assessment Score: 2 (due to today's visit)  Accompanied by  Accompanied by: Friend  Oral Chemo Adherence  Oral Chemo Adherence  What Oral Chemotherapy is the patient taking?: Pazopanib  Did the patient fill and  the prescription as written?: Yes  Did patient start taking oral chemo on time?: Yes, patient started taking oral chemo on time  Does the patient report missing any doses?: No    Past History  Past Medical History:   Diagnosis Date     Cancer (H)      GERD (gastroesophageal reflux disease)      Hypertension      Osteoporosis      Rheumatoid arthritis (H)        Physical Exam    Recent Vitals 11/1/2018   Height -   Weight 106 lbs 11 oz   BSA (m2) -   /74   Pulse 67    Temp 97.5   Temp src 1   SpO2 99   Some recent data might be hidden     General: alert, awake, not in acute distress.  Thin female  HEENT: Head: Normal, normocephalic, atraumatic.  Eye: Normal external eye, conjunctiva, lids cornea, MAHESH.  Ears:  Non-tender.  Nose: Normal external nose, mucus membranes and septum.  Pharynx: Dental Hygiene adequate. Normal buccal mucosa. Normal pharynx.  Neck / Thyroid: Supple, no masses, nodes, nodules or enlargement.  Lymphatics: No abnormally enlarged lymph nodes.  Chest: Normal chest wall and respirations. Clear to auscultation.  Heart: S1 S2 RRR, no murmur.   Abdomen: abdomen is soft without significant tenderness, masses, organomegaly or guarding  Extremities: normal strength, tone, and muscle mass  Skin: normal. no rash or abnormalities  CNS: non focal.    Lab Results    Recent Results (from the past 168 hour(s))   Comprehensive Metabolic Panel   Result Value Ref Range    Sodium 132 (L) 136 - 145 mmol/L    Potassium 3.9 3.5 - 5.0 mmol/L    Chloride 97 (L) 98 - 107 mmol/L    CO2 26 22 - 31 mmol/L    Anion Gap, Calculation 9 5 - 18 mmol/L    Glucose 96 70 - 125 mg/dL    BUN 22 8 - 28 mg/dL    Creatinine 0.79 0.60 - 1.10 mg/dL    GFR MDRD Af Amer >60 >60 mL/min/1.73m2    GFR MDRD Non Af Amer >60 >60 mL/min/1.73m2    Bilirubin, Total 1.2 (H) 0.0 - 1.0 mg/dL    Calcium 10.4 8.5 - 10.5 mg/dL    Protein, Total 6.8 6.0 - 8.0 g/dL    Albumin 3.8 3.5 - 5.0 g/dL    Alkaline Phosphatase 94 45 - 120 U/L    AST 30 0 - 40 U/L    ALT 27 0 - 45 U/L   HM1 (CBC with Diff)   Result Value Ref Range    WBC 6.8 4.0 - 11.0 thou/uL    RBC 4.16 3.80 - 5.40 mill/uL    Hemoglobin 13.7 12.0 - 16.0 g/dL    Hematocrit 40.0 35.0 - 47.0 %    MCV 96 80 - 100 fL    MCH 32.9 27.0 - 34.0 pg    MCHC 34.3 32.0 - 36.0 g/dL    RDW 14.0 11.0 - 14.5 %    Platelets 269 140 - 440 thou/uL    MPV 9.6 8.5 - 12.5 fL    Neutrophils % 79 (H) 50 - 70 %    Lymphocytes % 15 (L) 20 - 40 %    Monocytes % 5 2 - 10 %    Eosinophils  % 1 0 - 6 %    Basophils % 1 0 - 2 %    Neutrophils Absolute 5.3 2.0 - 7.7 thou/uL    Lymphocytes Absolute 1.0 0.8 - 4.4 thou/uL    Monocytes Absolute 0.3 0.0 - 0.9 thou/uL    Eosinophils Absolute 0.0 0.0 - 0.4 thou/uL    Basophils Absolute 0.1 0.0 - 0.2 thou/uL       Imaging    Ct Chest Abdomen Pelvis With Oral With Iv Cont    Result Date: 10/23/2018  CT CHEST, ABDOMEN, AND PELVIS 10/23/2018 2:37 PM      INDICATION: Metastatic renal cell cancer, treatment follow-up. TECHNIQUE: CT chest, abdomen, and pelvis. Dose reduction techniques were used. IV CONTRAST: Iohexol (Omni) 100 mL COMPARISON: 8/14/2018 FINDINGS: CHEST: Multiple pleural masses on the right have not appreciably changed. There are multiple bilateral pulmonary nodules. The majority of the nodules are unchanged. Some of the nodules are clearly smaller. For an example, in the medial right lower lobe on series 3 image 97, there is a 2.5 mm nodule. Previously, this nodule measured 5 mm. There are also nodules that are larger than previous. For example, in the right lower lobe image 99, there is a 5 mm pulmonary nodule that previously measured 3 mm. Mildly enlarged paratracheal lymph node is unchanged. No pleural effusion.  ABDOMEN: 3.5 cm heterogeneously enhancing solid right renal mass has not appreciably changed. Normal liver, gallbladder, pancreas, spleen, adrenal glands, and left kidney. No lymphadenopathy. PELVIS: Bilateral inguinal hernias contain nonobstructed bowel. Suprapubic catheter and large stones in the bladder, unchanged. MUSCULOSKELETAL: Left hip arthroplasty. Severe degenerative changes right hip. Right rib lesions and fractures are unchanged.     CONCLUSION: 1.  Mixed response to therapy. Some of the pulmonary nodules are larger than previous, and some are smaller. Others are unchanged in size. 2.  Skeletal and pleural metastases, as well as right renal mass have not appreciably changed.    TT: 40 minutes and more than 50% was spent on  coordination and counseling of care.    Signed by: Ernesto Britton MD

## 2021-06-22 NOTE — TELEPHONE ENCOUNTER
"Patient called in, she was wondering if she should take imodium.   She had been taking Metamucil twice daily and stopped it a few days ago because \"everything was going good.\" Now today, she had a soft stool and some cramping.   Advised NO imodium, imodium is for diarrhea, liquid stool only.   Advised she restart metamucil like she had been doing, since it was working.   OK to try simethicone for cramping.   Call with ongoing concerns.   The patient is aware of plan and verbalizes agreement.  Edel Geiger RN  "

## 2021-06-22 NOTE — PROGRESS NOTES
Pt amb into clinic with friend, Loni, for Zometa and first nivolumab infusion. Reviewed meds and possible side effects. Pt given thermometer and hand-out on when and where to call if problems. Report given to Dottie ORELLANA

## 2021-06-22 NOTE — PROGRESS NOTES
(1600) Patient tolerated nivolumab infusion and zometa infusion with no complaints/no signs of adverse reaction. IV removed from site prior to discharge to home. Patient discharged to home ambulatory with steady gait and friend.

## 2021-06-22 NOTE — PROGRESS NOTES
I stopped in to see Dania Somers today.  I introduced my role as her navigator.  She was here today with her friend who drives her.  Her  states she does not need help with transportation but I did suggest having a resource for back up.  They agreed and I gave them the info for the [facility] program.  She states she does not need anything else at this time.  I gave her my card and encouraged she call me if needed for assistance.

## 2021-06-22 NOTE — PROGRESS NOTES
Patient is here for three week  follow-up of renal cell cancer.  Accompanied by friend Loni.  Edel Geiger RN

## 2021-06-22 NOTE — PROGRESS NOTES
Patient here today for labs and follow-up visit with Dr. Britton for renal cell carcinoma/MICHAEL Posadas RN

## 2021-06-22 NOTE — PROGRESS NOTES
[facility] Hematology and Oncology Progress Note    Patient: Dania Da Silva  MRN: [id number]  Date of Service:12/6/2018      Reason for Visit    Follow up metastatic clear-cell renal cell carcinoma    Assessment and Plan  Cancer Staging  Renal cell carcinoma of right kidney (H)  Staging form: Kidney, AJCC 8th Edition  - Clinical stage from 4/3/2018: Stage IV (cT1b, cNX, pM1) - Signed by Ernesto Britton MD on 4/3/2018      ECOG Performance   ECOG Performance Status: 1     Distress Assessment  Distress Assessment Score: 7(due to potential treatment change)    Pain  Currently in Pain: No/denies    #.  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   I reviewed her labs and they were about her baseline with slight elevation of calcium.  Hemogram was fairly normal range.  She has cumulative side effects with severe fatigue, loose stools from pazopanib, resulting loss of balance and sustained a fall.  She has large bruise on her forehead, right eyelid and abrasion on the nose.  She took her last dose today.  She feels that she cannot continue pazopanib at this point due to severe diarrhea.  I agreed that she should not continue pazopanib due to intolerable side effects.   In the last visit, we discussed about possible use of immunotherapy, nivolumab.  I reviewed the side effects again and schedule.  She wanted to her son-in-law, Zen to hear the options.  They would like to try nivolumab  at this point.     Plan is to stop pazopanib and hoping that she has some improvement/recovery from fatigue.   Return to clinic next week to start nivolumab.   Follow-up lab and provider visit in 3 weeks prior to second dose of nivolumab.     #. Acute  diarrhea grade 2-3 - secondary to pazopanib, improved with lowering dose of pazopanib.     Progressively worse.  Will stop pazopanib.       #.  Metastatic bone disease   On Zometa every 12 weeks.     #.  Cancer related pain, controlled well.   Fairly controlled at this point and not needing pain medication much at all.    #.  2 several centimeter bladder stones.   She had a cystoscopy on 6/27/2018 by Dr. Trevino and [facility].  She has mild symptomatic (bladder spasm about once a month) at this point.  She was offered litholapaxy and bladder biopsy if she is more symptomatic.  She will continue follow-up with her urology.    #.  Rheumatoid arthritis- on methotrexate  #.  Osteoporosis  #.  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  has a suprapubic.  She is follow by urology at [facility].    #.  Hypertension, uncontrolled.  Stressed about taking medication regularly.    Problem List    1. Renal cell carcinoma of right kidney (H)  prochlorperazine (COMPAZINE) 10 MG tablet    Infusion Appointment    Infusion Appointment    CC OFFICE VISIT LONG    TSH   2. Metastasis to bone (H)  TSH   3. Abnormal findings on diagnostic imaging of other specified body structures   TSH      _____________________________________________________________________________  Diagnosis  March 2018- Metastatic renal cell carcinoma of the right kidney- clear cell type (2.5 cm pleural-based expansile destructive lesion in the right lateral seventh rib, pleural-based nodules, multiple bilateral pulmonary nodules with small right pleural effusion, 3.2 cm renal mass consistent with renal cell carcinoma)- PDL1 expression 90%.    Treatment to date  4/18/18- started pazopanib 400 mg daily (at 50% dose reduction with plan to escalate to 800 mg daily as tolerated), stop on 5/26/2018 due to grade 3 diarrhea   -CT scan show overall stable disease (mild progression about 20% increase in size of  pleural-based metastases), stable or slightly smaller pulmonary nodules.   - 6/5/2018- restarted pazopaninb 200 mg daily for better tolerability    History of Present Illness    Ms. Da Silva is here for follow up, accompanied by her friend.  Her daughter and son-in-law, Zen attended the visit over the phone.    She she has progressive fatigue, weakness and not able to do any activity.  On the top, she has intermittent large volume diarrhea/incontinence stool.  Last week, she felt from losing balance after she was trying to change her dependent so frequently throughout the day.  She sustained injury to her face and forehead.  Did not lose her consciousness.    Pain Status  Currently in Pain: No/denies    Review of Systems    Constitutional  Constitutional (WDL): Exceptions to WDL  Fatigue: Fatigue relieved by rest  Fever: None  Chills: None  Weight Gain: None  Weight Loss: None  Neurosensory  Neurosensory (WDL): Exceptions to WDL(lost balance- fell in shower 11/30/18)  Peripheral Motor Neuropathy: Asymptomatic, clinical or diagnostic observations only, intervention not indicated  Ataxia: Asymptomatic, clinical or diagnostic observations only, intervention not indicated(uses cane)  Peripheral Sensory Neuropathy: Asymptomatic, loss of deep tendon reflexes or paresthesia(bilat hands and feet, intermittent-worse at night)  Confusion: None(can be forgetful)  Syncope: None  Eye   Eye Disorder (WDL): Exceptions to WDL(wears glasses)  Blurred Vision: None  Dry Eye: Asymptomatic, clinical or diagnostic observations only, mild symptoms relieved by lubricants  Eye Pain: None  Watering Eyes: None  Ear  Ear Disorder (WDL): Exceptions to WDL([employer]-wears bilat hearing aids)  Ear Pain: None  Tinnitus: None  Cardiovascular  Cardiovascular (WDL): All cardiovascular elements are within defined limits  Pulmonary  Respiratory (WDL): Exceptions to WDL  Cough: None  Dyspnea: Shortness of breath with moderate exertion  Hypoxia:  "None  Gastrointestinal  Gastrointestinal (WDL): Exceptions to WDL  Anorexia: Loss of appetite without alteration in eating habits(makes self eat)  Constipation: None  Diarrhea: Increase of <4 stools per day over baseline, mild increase in ostomy output compared to baseline(intermittent, not everyday-loose/incon of stool, at times. Wears depends/Metamucil daily helps)  Dysphagia: None  Esophagitis: None  Nausea: None  Pharyngitis: None  Vomiting: None  Dysgeusia: None  Dry Mouth: None  Genitourinary  Genitourinary (WDL): Exceptions to WDL(suprapubic catheter)  Urinary Frequency: None  Urinary Retention: None  Urinary Tract Pain: None  Lymphatic  Lymph (WDL): All lymph disorder elements are within defined limits  Musculoskeletal and Connective Tissue  Musculoskeletal and Connetive Tissue Disorders (WDL): All Musculoskeletal and Connetive Tissue Disorder elements are within defined limits  Arthralgia: None  Bone Pain: None  Muscle Weakness : Symptomatic, perceived by patient but not evident on physical exam(\"no energy\")  Myalgia: None  Integumentary  Integumentary (WDL): Exceptions to WDL(bruising to R forehead, R eyelid, R eyebrow. Abrasion to bridge of nose.)  Alopecia: None  Rash Maculo-Papular: None  Pruritus: None  Urticaria: None  Palmar-Plantar Erythrodysesthesia Syndrome: None  Flushing: None  Patient Coping  Patient Coping: Accepting;Open/discussion  Distress Assessment  Distress Assessment Score: 7(due to potential treatment change)  Accompanied by  Accompanied by: Friend(Friend, Loni)  Oral Chemo Adherence  Oral Chemo Adherence  What Oral Chemotherapy is the patient taking?: Pazopanib  Did the patient fill and  the prescription as written?: Yes  Did patient start taking oral chemo on time?: Yes, patient started taking oral chemo on time  Does the patient report missing any doses?: Yes  Reason for missing dose: Felt too sick    Past History  Past Medical History:   Diagnosis Date     Cancer (H)      " GERD (gastroesophageal reflux disease)      Hypertension      Osteoporosis      Rheumatoid arthritis (H)        Physical Exam    Recent Vitals 12/6/2018   Height -   Weight 105 lbs 11 oz   BSA (m2) 1.48 m2   /76   Pulse 82   Temp -   Temp src 1   SpO2 100   Some recent data might be hidden     General: alert, awake, not in acute distress.  Thin female  HEENT: Head: Normal, normocephalic, atraumatic.  Eye: Normal external eye, conjunctiva, lids cornea, MAHESH.  Ears:  Non-tender.  Nose: Normal external nose, mucus membranes and septum.  Pharynx: Dental Hygiene adequate. Normal buccal mucosa. Normal pharynx.  Neck / Thyroid: Supple, no masses, nodes, nodules or enlargement.  Lymphatics: No abnormally enlarged lymph nodes.  Chest: Normal chest wall and respirations. Clear to auscultation.  Heart: S1 S2 RRR, no murmur.   Abdomen: abdomen is soft without significant tenderness, masses, organomegaly or guarding  Extremities: normal strength, tone, and muscle mass  Skin: Ecchymosis on the right forehead, around the right eye.  CNS: non focal.    Lab Results    Recent Results (from the past 168 hour(s))   Comprehensive Metabolic Panel   Result Value Ref Range    Sodium 131 (L) 136 - 145 mmol/L    Potassium 3.7 3.5 - 5.0 mmol/L    Chloride 93 (L) 98 - 107 mmol/L    CO2 29 22 - 31 mmol/L    Anion Gap, Calculation 9 5 - 18 mmol/L    Glucose 113 70 - 125 mg/dL    BUN 16 8 - 28 mg/dL    Creatinine 0.90 0.60 - 1.10 mg/dL    GFR MDRD Af Amer >60 >60 mL/min/1.73m2    GFR MDRD Non Af Amer 59 (L) >60 mL/min/1.73m2    Bilirubin, Total 0.7 0.0 - 1.0 mg/dL    Calcium 10.6 (H) 8.5 - 10.5 mg/dL    Protein, Total 6.6 6.0 - 8.0 g/dL    Albumin 3.8 3.5 - 5.0 g/dL    Alkaline Phosphatase 88 45 - 120 U/L    AST 22 0 - 40 U/L    ALT 20 0 - 45 U/L   HM1 (CBC with Diff)   Result Value Ref Range    WBC 8.5 4.0 - 11.0 thou/uL    RBC 4.00 3.80 - 5.40 mill/uL    Hemoglobin 13.3 12.0 - 16.0 g/dL    Hematocrit 38.9 35.0 - 47.0 %    MCV 97 80 - 100  fL    MCH 33.3 27.0 - 34.0 pg    MCHC 34.2 32.0 - 36.0 g/dL    RDW 14.3 11.0 - 14.5 %    Platelets 312 140 - 440 thou/uL    MPV 9.9 8.5 - 12.5 fL    Neutrophils % 76 (H) 50 - 70 %    Lymphocytes % 16 (L) 20 - 40 %    Monocytes % 8 2 - 10 %    Eosinophils % 0 0 - 6 %    Basophils % 0 0 - 2 %    Neutrophils Absolute 6.4 2.0 - 7.7 thou/uL    Lymphocytes Absolute 1.4 0.8 - 4.4 thou/uL    Monocytes Absolute 0.6 0.0 - 0.9 thou/uL    Eosinophils Absolute 0.0 0.0 - 0.4 thou/uL    Basophils Absolute 0.0 0.0 - 0.2 thou/uL   TSH   Result Value Ref Range    TSH 1.99 0.30 - 5.00 uIU/mL       Imaging    No results found.     TT: 40 minutes and more than 50% was spent on coordination and counseling of care.      Signed by: Ernesto Britton MD

## 2021-06-22 NOTE — PROGRESS NOTES
[facility] Hematology and Oncology Progress Note    Patient: Dania Da Silva  MRN: [id number]  Date of Service:12/27/2018      Reason for Visit    Follow up metastatic clear-cell renal cell carcinoma    Assessment and Plan  Cancer Staging  Renal cell carcinoma of right kidney (H)  Staging form: Kidney, AJCC 8th Edition  - Clinical stage from 4/3/2018: Stage IV (cT1b, cNX, pM1) - Signed by Ernesto Britton MD on 4/3/2018      ECOG Performance   ECOG Performance Status: 1     Distress Assessment  Distress Assessment Score: 5    Pain  Currently in Pain: No/denies    #.  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   She received first treatment of nivolumab 2 weeks ago.  Tolerated well so far.  No concerning side effects.  She is okay to continue day #15 cycle #1 today.    Follow-up lab and provider visit in 2 weeks prior to C#2 nivolumab.   Plan for restaging CT scan after 3 cycles of nivolumab.       #. Acute diarrhea grade 2-3 - secondary to pazopanib, resolved.       #.  Metastatic bone disease   On Zometa every 12 weeks.     #.  Cancer related pain, controlled well.   Fairly controlled at this point and not needing pain medication at all.    #.  2 several centimeter bladder stones.   She had a cystoscopy on 6/27/2018 by Dr. Trevino and [facility].  She has mild symptomatic (bladder spasm about once a month) at this point.  She was offered litholapaxy and bladder biopsy if she is more symptomatic.  She will continue follow-up with her urology in spring as she currently does not have any bladder spasm.    #.  Rheumatoid arthritis- on methotrexate  #.  Osteoporosis  #.  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  has a suprapubic.  She is follow by urology at [facility].    #.  Hypertension, controlled.    #.  He has information and approval about a [employer] for transportation.  However she has a good friend support that they are willing to take her to every 2 weeks appointment.  She will let us know with any assistance needs in the future.    Problem List    1. Renal cell carcinoma of right kidney (H)  CC OFFICE VISIT LONG    Infusion Appointment    CC OFFICE VISIT LONG      _____________________________________________________________________________  Diagnosis  March 2018- Metastatic renal cell carcinoma of the right kidney- clear cell type (2.5 cm pleural-based expansile destructive lesion in the right lateral seventh rib, pleural-based nodules, multiple bilateral pulmonary nodules with small right pleural effusion, 3.2 cm renal mass consistent with renal cell carcinoma)- PDL1 expression 90%.    Treatment to date  4/18/18- started pazopanib 400 mg daily (at 50% dose reduction with plan to escalate to 800 mg daily as tolerated), stop on 5/26/2018 due to grade 3 diarrhea   -CT scan show overall stable disease (mild progression about 20% increase in size of pleural-based metastases), stable or slightly smaller pulmonary nodules.   - 6/5/2018- restarted pazopanib 200 mg daily for better tolerability  12/12/2018-therapy changed to nivolumab due to intolerable diarrhea with pazopanib.    History of Present Illness    Ms. Da Silva is here for follow up, accompanied by her friend.     She went to the ER yesterday for left hip pain which she had a total hip replacement.  No injury.  Sudden movement make her more painful.  She was given tramadol which she picked up but she has not started yet.  She is doing okay without any pain medication.  Her diarrhea has significantly better at this point.  Does not feel any spasm in the bladder or bowel.  She has ongoing fatigue but not worsening.       Pain Status  Currently in Pain: No/denies    Review of Systems    Constitutional  Constitutional (WDL): Exceptions to WDL  Fatigue: Fatigue relieved by rest  Fever: None  Chills: None  Weight Gain: 5 - <10% from baseline  Weight Loss: None  Neurosensory  Neurosensory (WDL): Exceptions to WDL  Peripheral Motor Neuropathy: Asymptomatic, clinical or diagnostic observations only, intervention not indicated  Ataxia: Asymptomatic, clinical or diagnostic observations only, intervention not indicated(USES CANE)  Peripheral Sensory Neuropathy: Asymptomatic, loss of deep tendon reflexes or paresthesia(numb fingertips)  Confusion: None(forgetful)  Syncope: None  Eye   Eye Disorder (WDL): Exceptions to WDL(wears glasses)  Blurred Vision: None  Dry Eye: Asymptomatic, clinical or diagnostic observations only, mild symptoms relieved by lubricants  Eye Pain: None  Watering Eyes: None  Ear  Ear Disorder (WDL): Exceptions to WDL(([place]))  Ear Pain: None  Tinnitus: None  Cardiovascular  Cardiovascular (WDL): All cardiovascular elements are within defined limits  Pulmonary  Respiratory (WDL): Exceptions to WDL  Cough: None  Dyspnea: Shortness of breath with moderate exertion  Hypoxia: None  Gastrointestinal  Gastrointestinal (WDL): Exceptions to WDL  Anorexia: Loss of appetite without alteration in eating habits  Constipation: None  Diarrhea: None  Dysphagia: None  Esophagitis: None  Nausea: None  Pharyngitis: None  Vomiting: None  Dysgeusia: None  Dry Mouth: None  Genitourinary  Genitourinary (WDL): Exceptions to WDL(suprapubic cath)  Urinary Frequency: None  Urinary Retention: None  Urinary Tract Pain: None  Lymphatic  Lymph (WDL): All lymph disorder elements are within defined limits  Musculoskeletal and Connective Tissue  Musculoskeletal and Connetive Tissue Disorders (WDL): Exceptions to WDL  Arthralgia: Moderate pain, limiting instrumental ADL(Lt hip, was bad last three days)  Bone Pain: Mild pain  Muscle Weakness :  Symptomatic, perceived by patient but not evident on physical exam  Myalgia: None  Integumentary  Integumentary (WDL): All integumentary elements are within defined limits  Patient Coping  Patient Coping: Accepting;Open/discussion  Distress Assessment  Distress Assessment Score: 5  Accompanied by  Accompanied by: Friend  Oral Chemo Adherence       Past History  Past Medical History:   Diagnosis Date     Cancer (H)      GERD (gastroesophageal reflux disease)      Hypertension      Osteoporosis      Rheumatoid arthritis (H)        Physical Exam    Recent Vitals 12/27/2018   Weight 104 lbs 11 oz   BSA (m2) 1.48 m2   /89   Pulse 78   Temp 98.6   Temp src -   SpO2 96   Some recent data might be hidden     General: alert, awake, not in acute distress.  Thin female  HEENT: Head: Normal, normocephalic, atraumatic.  Eye: Normal external eye, conjunctiva, lids cornea, MAHESH.  Ears:  Non-tender.  Nose: Normal external nose, mucus membranes and septum.  Pharynx: Dental Hygiene adequate. Normal buccal mucosa. Normal pharynx.  Neck / Thyroid: Supple, no masses, nodes, nodules or enlargement.  Lymphatics: No abnormally enlarged lymph nodes.  Chest: Normal chest wall and respirations. Clear to auscultation.  Heart: S1 S2 RRR, no murmur.   Abdomen: abdomen is soft without significant tenderness, masses, organomegaly or guarding.  Urine color is clear in the bag.  Extremities: normal strength, tone, and muscle mass  Skin: No rash.    CNS: non focal.    Lab Results    No results found for this or any previous visit (from the past 168 hour(s)).    Imaging    Xr Femur Left 2 Vws    Result Date: 12/26/2018  [facility] RADIOLOGY EXAM: XR FEMUR LEFT 2 VWS LOCATION: [facility] DATE/TIME: 12/26/2018 12:07 PM INDICATION: Thigh pain COMPARISON: Multiple abdomen CTs with the most recent 10/23/2018 FINDINGS: There is a cemented left total hip arthroplasty in anatomic alignment. No periprosthetic fracture or evidence of loosening.  Bones are demineralized. In addition to the numerous phleboliths, there are at least 2 bladder stones as noted before.    Xr Pelvis Ap    Result Date: 12/26/2018  XR PELVIS AP 12/26/2018 12:07 PM INDICATION: Hip pain COMPARISON: None. FINDINGS: Severe degenerative changes noted in the right hip. Prior left total hip arthroplasty. Bones are demineralized. No fracture. 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 Phleboliths and bladder stones again noted.     TT: 40 minutes and more than 50% was spent on coordination and counseling of care.    Signed by: Ernesto Britton MD

## 2021-06-23 NOTE — TELEPHONE ENCOUNTER
"Patient rescheduled appointment today due to weather.  She had requested we call her with results.  She was anxious to get them. Reviewed with Dr. Britton. \"mixed response, similar as before.  Considered stable treatment response.  Continue current treatment\".  Called patient with this information. Patient verbalized understanding and thanked me for calling. Patient rescheduled for Mon 2/11.  Instructed her to come in at 115 so we could draw labs, prior to seeing Aidan.  She verbalized she would. Message to JASWINDER Esquivel to add patient to lab schedule/MICHAEL Posadas RN   "

## 2021-06-23 NOTE — PROGRESS NOTES
Patient here today for labs, OV with Dr. Britton, and chemo for renal cell carcinoma/MICHAEL Posadas RN

## 2021-06-23 NOTE — PATIENT INSTRUCTIONS - HE
Recent Results (from the past 24 hour(s))   Comprehensive Metabolic Panel   Result Value Ref Range    Sodium 133 (L) 136 - 145 mmol/L    Potassium 4.1 3.5 - 5.0 mmol/L    Chloride 98 98 - 107 mmol/L    CO2 25 22 - 31 mmol/L    Anion Gap, Calculation 10 5 - 18 mmol/L    Glucose 100 70 - 125 mg/dL    BUN 22 8 - 28 mg/dL    Creatinine 0.81 0.60 - 1.10 mg/dL    GFR MDRD Af Amer >60 >60 mL/min/1.73m2    GFR MDRD Non Af Amer >60 >60 mL/min/1.73m2    Bilirubin, Total 0.7 0.0 - 1.0 mg/dL    Calcium 10.3 8.5 - 10.5 mg/dL    Protein, Total 6.6 6.0 - 8.0 g/dL    Albumin 3.7 3.5 - 5.0 g/dL    Alkaline Phosphatase 112 45 - 120 U/L    AST 21 0 - 40 U/L    ALT 18 0 - 45 U/L   HM1 (CBC with Diff)   Result Value Ref Range    WBC 6.4 4.0 - 11.0 thou/uL    RBC 4.15 3.80 - 5.40 mill/uL    Hemoglobin 13.0 12.0 - 16.0 g/dL    Hematocrit 39.2 35.0 - 47.0 %    MCV 95 80 - 100 fL    MCH 31.3 27.0 - 34.0 pg    MCHC 33.2 32.0 - 36.0 g/dL    RDW 13.2 11.0 - 14.5 %    Platelets 321 140 - 440 thou/uL    MPV 9.5 8.5 - 12.5 fL    Neutrophils % 73 (H) 50 - 70 %    Lymphocytes % 19 (L) 20 - 40 %    Monocytes % 7 2 - 10 %    Eosinophils % 1 0 - 6 %    Basophils % 1 0 - 2 %    Neutrophils Absolute 4.6 2.0 - 7.7 thou/uL    Lymphocytes Absolute 1.2 0.8 - 4.4 thou/uL    Monocytes Absolute 0.5 0.0 - 0.9 thou/uL    Eosinophils Absolute 0.0 0.0 - 0.4 thou/uL    Basophils Absolute 0.0 0.0 - 0.2 thou/uL

## 2021-06-23 NOTE — PROGRESS NOTES
ASSESSMENT & PLAN    No problem-specific Assessment & Plan notes found for this encounter.      Dania Somers was seen today for follow-up, medication refill, constipation and referral.    Diagnoses and all orders for this visit:    Spasm of muscle    Indwelling Briones catheter present        Patient Instructions   Add magnesium citrate 250 mg daily as a supplement this should help your bowels    Charles seed ground up 1 tablespoon in the morning and 1 tablespoon in the evening  (This can also be made into a pudding 1 tablespoon Charles seed and almond milk, rice milk or cow's milk overnight the refrigerator)    Plus CBD Oil Yellow 15 mg Daily for Bladder Pain: [email or web address]/cbd-[email or web address]    Talk to Dr Barcenas about medication for Spasm:  Mebetriq?  Imipramine? But have side effects    Eat 1 cup fresh blueberries daily  Eat 2 servings each 1 cup of 2 of the 4 vegetables daily: Broccoli, cauliflower, [place] sprouts, asparagus      Return in about 6 months (around 7/15/2019) for if your symptoms worsen despite plan of care.       Little interest or pleasure in doing things: Not at all  Feeling down, depressed, or hopeless: Not at all    CHIEF COMPLAINT: Dania Da Silva had concerns including Follow-up (f/u ED visit ); Medication Refill (pt requesting tubing supply for leg bag, item number 9343 Brand: holister, leg bag strap ); Constipation (pt requesting suggestions with bowel movements ); and Referral (pt wanted to update you, she will be going to urology associates DR. Bo Barcenas in [place]).    [place]: 1.............. had concerns including Follow-up (f/u ED visit ); Medication Refill (pt requesting tubing supply for leg bag, item number 9343 Brand: holister, leg bag strap ); Constipation (pt requesting suggestions with bowel movements ); and Referral (pt wanted to update you, she will be going to [facility]rology [facility] DR. Bo Barcenas in [place]).    1. Spasm of muscle    2. Indwelling Briones catheter present          CC:              Why are you here today?                              SPASM abdomen seems to be coming from her catheter  Leg catheter present on the right side stress  Needs new prescription for leg straps  Urine has not had an order  Not sure if she is constipated  No diarrhea  No problems with catheter      What is the issue like in one word?:                                 Feels like a spasm  How long is it ongoing: days, weeks,months?:                 ON OFF  What makes it worse: activity? Eating? Movement? :     Not sure  if cath is irritating   What makes it better?:                                                      Nothing   0/10-10/10:Pain/Intesity                                                    Moderate when there      Any associated Sx to above complaint:   Occasional constipation    Any other Problems in order of Priority:  Medication Refill pt requesting tubing supply for leg bag, item number 9343 Brand: holister, leg bag strap      Constipation pt requesting suggestions with bowel movements     Referral pt wanted to update you, she will be going to urology [facility] DR. Bo Barcenas in [place]           SUBJECTIVE:  Dania Da Silva is a 89 y.o. female    Past Medical History:   Diagnosis Date     Cancer (H)      GERD (gastroesophageal reflux disease)      Hypertension      Osteoporosis      Rheumatoid arthritis (H)      Past Surgical History:   Procedure Laterality Date     AZ CYSTO/URETERO/PYELOSCOPY,W/RESECT TUMOR      Description: Cystoscopy With Resection Of Tumor;  Proc Date: 12/01/2003;     [place] CYSTOURETHROSCOPY      Description: Cystoscopy (Diagnostic);  Proc Date: 01/13/2005;     [place] REMOVAL OF TONSILS,<11 Y/O      Description: Tonsillectomy;  Recorded: 06/03/2013;     [place] TOTAL HIP ARTHROPLASTY      Description: Total Hip Replacement;  Recorded: 04/23/2008;     Sulfa (sulfonamide antibiotics) and Sulindac  Current Outpatient Medications   Medication Sig Dispense Refill     acetaminophen (TYLENOL)  500 MG tablet Take 1,000 mg by mouth every 6 (six) hours as needed for pain.       amLODIPine (NORVASC) 5 MG tablet Take 1 tablet (5 mg total) by mouth daily. As directed 90 tablet 2     aspirin 81 MG EC tablet Take 81 mg by mouth daily.       cholecalciferol, vitamin D3, 10,000 unit Tab Take 3 tablets by mouth daily.        CLINDAMYCIN HCL ORAL Take 3 tablets by mouth as needed.       folic acid (FOLVITE) 1 MG tablet TAKE 1 TABLET BY MOUTH EVERY DAY 90 tablet 3     lactase (LACTAID) 3,000 unit tablet Take 3,000 Units by mouth as needed.       lisinopril (PRINIVIL,ZESTRIL) 10 MG tablet TAKE 1 TABLET BY MOUTH EVERY DAY (Patient taking differently: TAKE 1 TABLET (10 mg)  BY MOUTH EVERY DAY) 90 tablet 3     loperamide (IMODIUM) 2 mg capsule Take 2 mg by mouth 4 (four) times a day as needed for diarrhea.       methotrexate sodium (METHOTREXATE) 2.5 mg DsPk Take 15 mg by mouth once a week.       multivitamin (MULTIPLE VITAMINS) per tablet Take 1 tablet by mouth daily.       NaCl 0.9% IR (NS) 0.9 % irrigation USE FOR IRRIGATION. (Patient taking differently: USE FOR IRRIGATION. TWICE DAILY) 2000 mL PRN     naproxen sodium (ALEVE) 220 MG tablet Take 220 mg by mouth daily.        prochlorperazine (COMPAZINE) 10 MG tablet Take 1 tablet (10 mg total) by mouth every 6 (six) hours as needed (For breakthrough nausea/vomiting). 30 tablet 1     psyllium (METAMUCIL) 3.4 gram packet Take 1 packet by mouth daily.       SIMETHICONE (GAS-X EXTRA STRENGTH ORAL) Take 1 tablet by mouth as needed.       traMADol (ULTRAM) 50 mg tablet Take 1 tablet (50 mg total) by mouth every 6 (six) hours as needed for pain. 12 tablet 0     triamterene-hydrochlorothiazide (MAXZIDE-25) 37.5-25 mg per tablet Take 1 tablet by mouth daily. 90 tablet 3     amoxicillin (AMOXIL) 500 MG capsule Take 4 capsules by mouth as needed. Prior to dental appointments       No current facility-administered medications for this visit.      Family History   Problem Relation  "Age of Onset     Thyroid cancer Daughter      Cervical cancer Daughter      Breast cancer Daughter      Uterine cancer Daughter      Testicular cancer Nephew      Lung cancer Cousin      Social History     Socioeconomic History     Marital status: Single     Spouse name: None     Number of children: None     Years of education: None     Highest education level: None   Social Needs     Financial resource strain: None     Food insecurity - worry: None     Food insecurity - inability: None     Transportation needs - medical: None     Transportation needs - non-medical: None   Occupational History     None   Tobacco Use     Smoking status: Never Smoker     Smokeless tobacco: Never Used   Substance and Sexual Activity     Alcohol use: Yes     Comment: \"very infrequent\"     Drug use: No     Sexual activity: No   Other Topics Concern     None   Social History Narrative     None     Patient Active Problem List   Diagnosis     Hypertension     Ganglion     Abdominal Pain Feels Crampy / Colicky     Abnormal Blood Chemistry     Squamous Cell Carcinoma Of The Urethra     Hyperlipidemia     Hyperparathyroidism (H)     Hypercalcemia     Rheumatoid Arthritis     Osteoporosis, senile     Bilateral Inguinal Hernia     Limb Pain     Urinary Frequency Increased     Renal cell carcinoma of right kidney (H)     Metastasis to bone (H)     Chemotherapy management, encounter for     Diarrhea due to drug     Encounter for antineoplastic immunotherapy                                              SOCIAL: She  reports that  has never smoked. she has never used smokeless tobacco. She reports that she drinks alcohol. She reports that she does not use drugs.    REVIEW OF SYSTEMS:   Family history not pertinent to chief complaint or presenting problem    Review of Systems:      Nervous System:  No headache, paresthesia or dizziness or fainting                                  Ears: No hearing loss or ringing in the ears    Eyes: No blurring of " vision, Double Vision            No redness, itching or dryness.    Nose: No nosebleed or loss of smell    Mouth: No mouth sores or  coated tongue    Throat: No hoarseness or difficulty swallowing    Neck: No enlarged thyroid or lymph nodes.    Heart: No chest pain, palpitation or irregular heartbeat.                  Lungs: No shortness of breath, wheezing or hemoptysis.    Gastrointestinal: No nausea or vomiting, melena or blood in stools.    Kidney/Bladdr: No polyuria, polydipsia, or hematuria.                             Genital/Sexual: No Sex function Changes                                Skin: No rash    Muscles/Joints/Bones: No Muscle morning stiffness, No Effusion of a Joint     Review of systems otherwise negative as requested from patient, except   Those positive ROS outlined and discussed in [place].    OBJECTIVE:  /60 (Patient Site: Left Arm, Patient Position: Sitting, Cuff Size: Adult Regular)   Pulse 82   Resp 16   Wt 104 lb (47.2 kg)   SpO2 98%   Breastfeeding? No   BMI 17.31 kg/m      GENERAL:     No acute distress.   Alert and oriented X 3         Physical:    Sitting in her chair no distress no jugular venous distention  Lungs are clear  Cardiac no murmur  Normal range of affect  Normal nonpressured speech  Abdomen slight distention of the lower belly  Presence of bowel sounds  Nontender to palpation  No CVA tenderness  No rash  Slight kyphosis  Calves are supple  Right catheter indwelling leg bag on the right  Osteoarthritis changes of the hands        ASSESSMENT & PLAN      Dania Somers was seen today for follow-up, medication refill, constipation and referral.    Diagnoses and all orders for this visit:    Spasm of muscle    Indwelling Briones catheter present      We talked about treating constipation  Follow through with urology  There may very weight will be muscular spasm the bladder area from the indwelling catheter  Medications discussed      Return in about 6 months (around 7/15/2019)  for if your symptoms worsen despite plan of care.       Anticipatory Guidance and Symptomatic Cares Discussed   Advised to call back directly if there are further questions, or if these symptoms fail to improve as anticipated or worsen.  Return to clinic if patient has a clinical concern that warrants an exam.         I spent 30 minutes with this patient face to face, of which 50% or greater was spent in counseling and coordination of care with regards to Dania Somers was seen today for follow-up, medication refill, constipation and referral.    Diagnoses and all orders for this visit:    Spasm of muscle    Indwelling Briones catheter present        Jose Leija MD  Family Medicine   [facility] [address] [phone]

## 2021-06-23 NOTE — PROGRESS NOTES
[facility] Hematology and Oncology Progress Note    Patient: Dania Da Silva  MRN: [id number]  Date of Service:1/24/2019      Reason for Visit    Follow up metastatic clear-cell renal cell carcinoma    Assessment and Plan  Cancer Staging  Renal cell carcinoma of right kidney (H)  Staging form: Kidney, AJCC 8th Edition  - Clinical stage from 4/3/2018: Stage IV (cT1b, cNX, pM1) - Signed by Ernesto Britton MD on 4/3/2018      ECOG Performance   ECOG Performance Status: 1     Distress Assessment  Distress Assessment Score: No distress    Pain  Currently in Pain: No/denies    #.  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   Currently on second line treatment with nivolumab.  Tolerated well so far.  No flare of rheumatoid arthritis.  No concerning side effects.  She is okay to continue day #15 cycle #2 today.    Follow-up lab and provider visit in 2 weeks prior to C#2 nivolumab.   Plan for restaging CT scan prior to next cycle of nivolumab.       #. Constipation   Managing okay with stool softener and suppository.      #.  Metastatic bone disease   On Zometa every 12 weeks.     #.  Cancer related pain, controlled well.   Fairly controlled at this point and not needing breakthrough pain medication at all.  She normally take 1 tablet of Aleve in the morning and 1 tablet of Tylenol at bedtime as a precautionary.    #.  2 several centimeter bladder stones.   She had a cystoscopy on 6/27/2018 by Dr. Trevino and [facility].  She has mild symptomatic (bladder spasm about once a month) at this point.  She was offered litholapaxy and bladder biopsy if she is more symptomatic.  She reports that she transfer her urology care near as traveling to  [place] was not ideal for her anymore.  She currently does not have any bladder spasm.    #.  Rheumatoid arthritis- on methotrexate  #.  Osteoporosis  #.  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  has a suprapubic catheter.    #.  Hypertension, controlled.      Problem List    1. 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    Treatment to date  4/18/18- started pazopanib 400 mg daily (at 50% dose reduction with plan to escalate to 800 mg daily as tolerated), stop on 5/26/2018 due to grade 3 diarrhea   -CT scan show overall stable disease (mild progression about 20% increase in size of pleural-based metastases), stable or slightly smaller pulmonary nodules.   - 6/5/2018- restarted pazopanib 200 mg daily for better tolerability  12/12/2018-therapy changed to nivolumab due to intolerable diarrhea with pazopanib.    History of Present Illness    Ms. Da Silva is here for follow up, accompanied by her friend.     She reports that she is now feeling more constipated than diarrhea.  She is managing okay with stool softener and suppository.  No intolerable side effects from immunotherapy.  She has chronic neuropathy in both feet.  No pain.  She wants to know how this immunotherapy is working or not.  She is asked about the timing of CT scan.  No concern.     Pain Status  Currently in Pain:  No/denies    Review of Systems    Constitutional  Constitutional (WDL): Exceptions to WDL  Fatigue: Fatigue relieved by rest  Fever: None  Chills: None  Weight Gain: None  Weight Loss: None  Neurosensory  Neurosensory (WDL): Exceptions to WDL  Peripheral Motor Neuropathy: Asymptomatic, clinical or diagnostic observations only, intervention not indicated  Ataxia: Asymptomatic, clinical or diagnostic observations only, intervention not indicated(uses cane)  Peripheral Sensory Neuropathy: Asymptomatic, loss of deep tendon reflexes or paresthesia(bilat feet-chronic)  Confusion: Mild disorientation(forgetful)  Syncope: None  Eye   Eye Disorder (WDL): Exceptions to WDL(wears glasses)  Blurred Vision: Intervention not indicated  Dry Eye: Asymptomatic, clinical or diagnostic observations only, mild symptoms relieved by lubricants  Eye Pain: None  Watering Eyes: None  Ear  Ear Disorder (WDL): All ear disorder elements are within defined limits  Cardiovascular  Cardiovascular (WDL): All cardiovascular elements are within defined limits  Pulmonary  Respiratory (WDL): Exceptions to WDL  Cough: None  Dyspnea: Shortness of breath with moderate exertion  Hypoxia: None  Gastrointestinal  Gastrointestinal (WDL): Exceptions to WDL  Anorexia: None  Constipation: Occasional or intermittent symptoms, occasional use of stool softeners, laxatives, dietary modification, or enema  Diarrhea: None  Dysphagia: None  Esophagitis: None  Nausea: None  Pharyngitis: None  Vomiting: None  Dysgeusia: None  Dry Mouth: Symptomatic (e.g., dry or thick saliva) without significant dietary alteration, unstimulated saliva flow >0.2 ml/min  Genitourinary  Genitourinary (WDL): Exceptions to WDL(suprapubic catheter)  Urinary Frequency: None  Urinary Retention: None  Urinary Tract Pain: None  Lymphatic  Lymph (WDL): All lymph disorder elements are within defined limits  Musculoskeletal and Connective Tissue  Musculoskeletal and Connetive Tissue Disorders (WDL):  Exceptions to WDL  Arthralgia: Mild pain(bilat hands)  Bone Pain: None  Muscle Weakness : Symptomatic, perceived by patient but not evident on physical exam  Myalgia: None  Integumentary  Integumentary (WDL): All integumentary elements are within defined limits  Patient Coping  Patient Coping: Accepting;Open/discussion  Distress Assessment  Distress Assessment Score: No distress  Accompanied by  Accompanied by: Friend(Mandy)  Oral Chemo Adherence       Past History  Past Medical History:   Diagnosis Date     Cancer (H)      GERD (gastroesophageal reflux disease)      Hypertension      Osteoporosis      Rheumatoid arthritis (H)        Physical Exam    Recent Vitals 1/24/2019   Weight 103 lbs 8 oz   BSA (m2) 1.47 m2   /70   Pulse 78   Temp 97.4   Temp src 1   SpO2 91   Some recent data might be hidden     General: alert, awake, not in acute distress  HEENT: Head: Normal, normocephalic, atraumatic.  Eye: Normal external eye, conjunctiva, lids cornea, MAHESH.  Ears:  Non-tender.  Nose: Normal external nose, mucus membranes and septum.  Pharynx: Dental Hygiene adequate. Normal buccal mucosa. Normal pharynx.  Neck / Thyroid: Supple, no masses, nodes, nodules or enlargement.  Lymphatics: No abnormally enlarged lymph nodes.  Chest: Normal chest wall and respirations. Clear to auscultation.  No palpable mass or reproducible pain.  Heart: S1 S2 RRR, no murmur.   Abdomen: abdomen is soft without significant tenderness, masses, organomegaly or guarding  Extremities: normal strength, tone, and muscle mass  Skin: normal. no rash or abnormalities  CNS: non focal.  Uses a cane for walking.    Lab Results    No results found for this or any previous visit (from the past 168 hour(s)).    Imaging    Xr Femur Left 2 Vws    Result Date: 12/26/2018  [facility] RADIOLOGY EXAM: XR FEMUR LEFT 2 VWS LOCATION: [facility] DATE/TIME: 12/26/2018 12:07 PM INDICATION: Thigh pain COMPARISON: Multiple abdomen CTs with the most recent  10/23/2018 FINDINGS: There is a cemented left total hip arthroplasty in anatomic alignment. No periprosthetic fracture or evidence of loosening. Bones are demineralized. In addition to the numerous phleboliths, there are at least 2 bladder stones as noted before.    Xr Pelvis Ap    Result Date: 12/26/2018  XR PELVIS AP 12/26/2018 12:07 PM INDICATION: Hip pain COMPARISON: None. FINDINGS: Severe degenerative changes noted in the right hip. Prior left total hip arthroplasty. Bones are demineralized. No fracture. 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 Phleboliths and bladder stones again noted.     TT: 40 minutes and more than 50% was spent on coordination and counseling of care.    Signed by: Ernesto Britton MD

## 2021-06-23 NOTE — PROGRESS NOTES
Dania is here today for ongoing management and tx of RCC. Today is planned D1C3 optivo pending labs and OV with Aidan Tatum NP. Natalie Díaz

## 2021-06-23 NOTE — PROGRESS NOTES
[facility] Hematology and Oncology Progress Note    Patient: Dania Da Silva  MRN: [id number]  Date of Service:1/10/2019      Reason for Visit:    C2 Opdivo therapy    Assessment and Plan:    1) Renal cell carcinoma of right kidney (H)   Clinical Stage IV (cT1b, cNX, pM1)    2.5 cm pleural-based expansile destructive lesion in the right lateral seventh rib, pleural-based nodules, multiple bilateral pulmonary nodules, small right pleural effusion, 3.2 cm renal mass   Clear cell type     PDL1 expression 90%    89 y.o.     2018, March - diagnosed.     03/09/18 CT chest - expansile destructive lesion in the right lateral 7th rib, consistent with a metastasis. Multiple enhancing pleural-based masses.  Small right pleural effusion.  Multiple pulmonary nodules in the right and left of varying size. (R) renal mass measuring 3.2 x 2.5 x 3.8 cm.    03/23/18 CT A&P - right kidney with 4 cm enhancing mass.  Patent renal vein.  No retroperitoneal lymphadenopathy.  Metastases in the lungs, right pleural space and right seventh rib.    03/26/18 CT lung biopsy - metastatic renal cell carcinoma - clear cell type.    04/18 - 05/26/18 - received pazopanib therapy @ 400 mg daily (at 50% dose reduction with plan to escalate to 800 mg daily as tolerated).  Stopped due to grade 3 diarrhea    06/04/18 CT CAP - overall stable disease (mild progression about 20% increase in size of pleural-based metastases), stable or slightly smaller pulmonary nodules.    06/05/18 - restarted pazopanib @ 200 mg daily for better tolerability.    08/14/18 CT CAP - Diffuse expansion of the anterior-lateral right seventh rib has improved.  Focally increased ovoid expansile appearance of the anterior right sixth rib.  No significant change of pulmonary nodules, pleural metastases and right renal mass.  Improved but persistent minimal right pleural effusion.  Small left inguinal hernia containing minimal small bowel.  No obstruction or bowel wall  thickening.    10/23/18 CT CAP - Mixed response to therapy. Some of the pulmonary nodules are larger than previous and some are smaller.  Others are unchanged in size.  Skeletal and pleural metastases, as well as right renal mass, have not appreciably changed.    12/12/18 - changed therapy to nivolumab due to intolerable diarrhea with pazopanib.    01/10/19:    CBC - WNL.    CMP - mild, chronic hyponatremia      Pleasant.  Looks and feels quite good.  Weight stable.  Appetite good.  BP high - didn't take antihypertensives today.  Diarrhea resolved after pazopanib stopped.     Instructed on the importance of consistently taking her medications.    Tolerating therapy acceptably well so far.      No concerning side effects.       Proceed D1C2 Nivolumab therapy.      01/24/19 - follow up D15C2    02/07/19 - follow up C3 nivolumab therapy    Plan for restaging CT scan after 3 cycles of nivolumab.      2) Metastatic bone disease and osteoporosis:    05/09/18 - started Zometa every 12 week therapy.    03/06/19 - next Zometa due.     3) Bladder stones:    06/27/18 cystoscopy by Dr. Trevino @ [facility].  2 several centimeter bladder stones.    Mild symptomatic bladder spasms about once a month.      Offered litholapaxy and bladder biopsy if she becomes more symptomatic.      Continue follow-up with Urology (spring).      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      Has a suprapubic catheter.    Followed by Urology at [facility].      ECOG Performance:     ECOG Performance Status: 1     Distress Assessment:    Distress Assessment Score: No distress        TT > 25 minutes face to face with > 50% counseling and care coordination.    Aidan Tatum, CNP   _____________________________________________________________    Interim History (IH):    Ms. Da Silva presents, accompanied by her friend, anticipating D1C2 Opdivo  therapy. She is very pleasant and looks and feels quite good.  She has tolerated Opdivo therapy without incidence.  She has chronic, stable fatigue.  She resides @ [facility] and does well there.  She receives PT and OT.  She denies cough, fever, chills, unusual headaches, visual or mentation disturbance.  She is s/p radical urethrectomy in 2003 and has since had a suprapubic catheter, changing the catheter herself monthly.    Pain Status:    Currently in Pain: No/denies    Review of Systems    Constitutional  Constitutional (WDL): Exceptions to WDL  Fatigue: Fatigue relieved by rest  Neurosensory  Neurosensory (WDL): Exceptions to WDL  Peripheral Motor Neuropathy: Asymptomatic, clinical or diagnostic observations only, intervention not indicated  Ataxia: Asymptomatic, clinical or diagnostic observations only, intervention not indicated  Peripheral Sensory Neuropathy: Asymptomatic, loss of deep tendon reflexes or paresthesia  Eye   Eye Disorder (WDL): Exceptions to WDL  Dry Eye: Asymptomatic, clinical or diagnostic observations only, mild symptoms relieved by lubricants  Ear  Ear Disorder (WDL): Assessment not pertinent to visit  Cardiovascular  Cardiovascular (WDL): All cardiovascular elements are within defined limits  Pulmonary  Respiratory (WDL): Exceptions to WDL  Dyspnea: Shortness of breath with moderate exertion  Gastrointestinal  Gastrointestinal (WDL): Exceptions to WDL  Constipation: Occasional or intermittent symptoms, occasional use of stool softeners, laxatives, dietary modification, or enema  Diarrhea: Increase of <4 stools per day over baseline, mild increase in ostomy output compared to baseline(goes between diarrhea and constipation)  Genitourinary  Genitourinary (WDL): Exceptions to WDL(has catheter since 2005)  Lymphatic  Lymph (WDL): All lymph disorder elements are within defined limits  Musculoskeletal and Connective Tissue  Musculoskeletal and Connetive Tissue Disorders  (WDL): Exceptions to WDL  Arthralgia: Mild pain(arthritis hands)  Bone Pain: Mild pain  Muscle Weakness : Symptomatic, perceived by patient but not evident on physical exam  Integumentary  Integumentary (WDL): All integumentary elements are within defined limits  Patient Coping  Patient Coping: Accepting;Open/discussion  Distress Assessment  Distress Assessment Score: No distress  Accompanied by  Accompanied by: Friend  Oral Chemo Adherence       Past History  Past Medical History:   Diagnosis Date     Cancer (H)      GERD (gastroesophageal reflux disease)      Hypertension      Osteoporosis      Rheumatoid arthritis (H)        Physical Exam    Recent Vitals 1/10/2019   Weight 104 lbs 10 oz   BSA (m2) 1.47 m2   /119   Pulse 89   Temp 97.7   Temp src 1   SpO2 96   Some recent data might be hidden     General:  Alert.  No acute distress.  Thin.  Accompanied by friend.  HEENT:  MAHESH. EOMI.  Anicteric sclera.  Normal buccal mucosa.   Lymphatics:  No abnormally enlarged lymph nodes.  Chest:   Normal chest wall and respirations. Clear to auscultation.  Heart:   S1 S2 heard.  RRR,  No murmur.   Abdomen:  Soft without significant tenderness, masses, organomegaly or guarding.  Suprapubic catheter - urine color is clear in the bag.  Extremities:  Normal strength, tone, and muscle mass  Skin:   No rash noted.    CNS:   Non focal.    Lab Results    Reviewed with patient.    Recent Results (from the past 24 hour(s))   Comprehensive Metabolic Panel   Result Value Ref Range    Sodium 134 (L) 136 - 145 mmol/L    Potassium 4.4 3.5 - 5.0 mmol/L    Chloride 99 98 - 107 mmol/L    CO2 25 22 - 31 mmol/L    Anion Gap, Calculation 10 5 - 18 mmol/L    Glucose 96 70 - 125 mg/dL    BUN 19 8 - 28 mg/dL    Creatinine 0.78 0.60 - 1.10 mg/dL    GFR MDRD Af Amer >60 >60 mL/min/1.73m2    GFR MDRD Non Af Amer >60 >60 mL/min/1.73m2    Bilirubin, Total 0.7 0.0 - 1.0 mg/dL    Calcium 10.0 8.5 - 10.5 mg/dL    Protein, Total 6.3 6.0 - 8.0 g/dL     Albumin 3.5 3.5 - 5.0 g/dL    Alkaline Phosphatase 100 45 - 120 U/L    AST 24 0 - 40 U/L    ALT 19 0 - 45 U/L   TSH   Result Value Ref Range    TSH 1.80 0.30 - 5.00 uIU/mL   HM1 (CBC with Diff)   Result Value Ref Range    WBC 7.4 4.0 - 11.0 thou/uL    RBC 3.83 3.80 - 5.40 mill/uL    Hemoglobin 12.3 12.0 - 16.0 g/dL    Hematocrit 37.1 35.0 - 47.0 %    MCV 97 80 - 100 fL    MCH 32.1 27.0 - 34.0 pg    MCHC 33.2 32.0 - 36.0 g/dL    RDW 13.0 11.0 - 14.5 %    Platelets 344 140 - 440 thou/uL    MPV 9.5 8.5 - 12.5 fL    Neutrophils % 72 (H) 50 - 70 %    Lymphocytes % 16 (L) 20 - 40 %    Monocytes % 10 2 - 10 %    Eosinophils % 1 0 - 6 %    Basophils % 1 0 - 2 %    Neutrophils Absolute 5.3 2.0 - 7.7 thou/uL    Lymphocytes Absolute 1.2 0.8 - 4.4 thou/uL    Monocytes Absolute 0.7 0.0 - 0.9 thou/uL    Eosinophils Absolute 0.1 0.0 - 0.4 thou/uL    Basophils Absolute 0.1 0.0 - 0.2 thou/uL       Imaging:    No new imaging.

## 2021-06-23 NOTE — PROGRESS NOTES
[facility] Hematology and Oncology Progress Note    Patient: Dania Da Silva  MRN: [id number]  Date of Service:2/11/2019      Reason for Visit:    C3 Opdivo therapy    Assessment and Plan:    1) Renal cell carcinoma of right kidney (H)   Clinical Stage IV (cT1b, cNX, pM1)    2.5 cm pleural-based expansile destructive lesion in the right lateral seventh rib, pleural-based nodules, multiple bilateral pulmonary nodules, small right pleural effusion, 3.2 cm renal mass   Clear cell type     PDL1 expression 90%    89 y.o.     2018, March - diagnosed.     03/09/18 CT chest - expansile destructive lesion in the right lateral 7th rib, consistent with a metastasis. Multiple enhancing pleural-based masses.  Small right pleural effusion.  Multiple pulmonary nodules in the right and left of varying size. (R) renal mass measuring 3.2 x 2.5 x 3.8 cm.    03/23/18 CT A&P - right kidney with 4 cm enhancing mass.  Patent renal vein.  No retroperitoneal lymphadenopathy.  Metastases in the lungs, right pleural space and right seventh rib.    03/26/18 CT lung biopsy - metastatic renal cell carcinoma - clear cell type.    04/18 - 05/26/18 - received pazopanib therapy @ 400 mg daily (at 50% dose reduction with plan to escalate to 800 mg daily as tolerated).  Stopped due to grade 3 diarrhea    06/04/18 CT CAP - overall stable disease (mild progression about 20% increase in size of pleural-based metastases), stable or slightly smaller pulmonary nodules.    06/05/18 - restarted pazopanib @ 200 mg daily for better tolerability.    08/14/18 CT CAP - Diffuse expansion of the anterior-lateral right seventh rib has improved.  Focally increased ovoid expansile appearance of the anterior right sixth rib.  No significant change of pulmonary nodules, pleural metastases and right renal mass.  Improved but persistent minimal right pleural effusion.  Small left inguinal hernia containing minimal small bowel.  No obstruction or bowel wall  thickening.    10/23/18 CT CAP - Mixed response to therapy. Some of the pulmonary nodules are larger than previous and some are smaller.  Others are unchanged in size.  Skeletal and pleural metastases, as well as right renal mass, have not appreciably changed.    12/12/18 - changed therapy to nivolumab due to intolerable diarrhea with pazopanib.    02/05/19 CT CAP - a few regions of pleural thickening and some pulmonary nodules have slightly improved. However, a couple pulmonary nodules have also slightly increased. Remaining exam without significant change, including heterogeneous right renal mass.    02/11/19:    CBC - WNL.    CMP - mild, chronic hyponatremia.  Otherwise WNL.      TSH - pending.    Patient looks and feels quite good.  Weight stable.  Appetite good.  Diarrhea resolved after pazopanib stopped.     Tolerating nivolumab therapy acceptably well so far.      Reviewed CT CAP with patient showing mixed response/minimal change.  Explained that with immunotherapies we often do not see significant changes after only 2 cycles and that current imaging shows acceptable results.      No concerning side effects from nivolumab therapy    Proceed D1C3 Nivolumab therapy.      02/28/19 - follow up D15C3 Nivolumab therapy, returning to a Thursday schedule      2) Metastatic bone disease and osteoporosis:    05/09/18 - started Zometa every 12 week therapy.    03/06/19 - next Zometa due.     3) Bladder stones:    06/27/18 cystoscopy by Dr. Trevino @ [facility].  2 several centimeter bladder stones.    Mild symptomatic bladder spasms about once a month.      Offered litholapaxy and bladder biopsy if she becomes more symptomatic.      Continue follow-up with Urology (spring).      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      Has a suprapubic catheter.    Followed by Urology at [facility].      ECOG  "Performance:     ECOG Performance Status: 2     Distress Assessment:    Distress Assessment Score: No distress        TT > 25 minutes face to face with > 50% counseling and care coordination.    Aidan Tatum, [facility]   _____________________________________________________________    Interim History (IH):    Ms. Da Silva presents, accompanied by her friend, anticipating D1C3 Opdivo therapy.  She looks and feels quite good, tolerating Opdivo therapy without incidence or apparent toxicities.  She has chronic, stable fatigue.  She resides @ [facility] and does well there receiving PT and OT.  She denies cough, fever, chills, unusual headaches, visual or mentation disturbance.  She is s/p radical urethrectomy in 2003 and has since had a suprapubic catheter, changing the catheter herself monthly.    Pain Status:    Currently in Pain: No/denies    Review of Systems    Constitutional  Constitutional (WDL): Exceptions to WDL  Fatigue: Fatigue not relieved by rest - Limiting instrumental ADL(\"always tired\")  Neurosensory  Neurosensory (WDL): Exceptions to WDL(\"poor balance\")  Peripheral Motor Neuropathy: Asymptomatic, clinical or diagnostic observations only, intervention not indicated  Ataxia: Asymptomatic, clinical or diagnostic observations only, intervention not indicated(uses a cane)  Peripheral Sensory Neuropathy: Asymptomatic, loss of deep tendon reflexes or paresthesia(feet - chronic)  Eye   Eye Disorder (WDL): Exceptions to WDL  Blurred Vision: Intervention not indicated(tired eyes)  Ear  Ear Disorder (WDL): All ear disorder elements are within defined limits  Cardiovascular  Cardiovascular (WDL): All cardiovascular elements are within defined limits  Pulmonary  Respiratory (WDL): Exceptions to WDL  Dyspnea: Shortness of breath with moderate exertion  Gastrointestinal  Gastrointestinal (WDL): All gastrointestinal elements are within defined limits  Genitourinary  Genitourinary (WDL): All " genitourinary elements are within defined limits  Lymphatic  Lymph (WDL): All lymph disorder elements are within defined limits  Musculoskeletal and Connective Tissue  Musculoskeletal and Connetive Tissue Disorders (WDL): Exceptions to WDL  Arthralgia: Mild pain  Muscle Weakness : Symptomatic, perceived by patient but not evident on physical exam  Integumentary  Integumentary (WDL): All integumentary elements are within defined limits  Patient Coping  Patient Coping: Accepting  Distress Assessment  Distress Assessment Score: No distress  Accompanied by  Accompanied by: Friend  Oral Chemo Adherence       Past History  Past Medical History:   Diagnosis Date     Cancer (H)      GERD (gastroesophageal reflux disease)      Hypertension      Osteoporosis      Rheumatoid arthritis (H)        Physical Exam    Recent Vitals 2/11/2019   Weight 104 lbs 8 oz   BSA (m2) 1.47 m2   /63   Pulse 79   Temp 97.4   Temp src 1   SpO2 90   Some recent data might be hidden     General:  Alert.  No acute distress.  Thin.  Accompanied by friend.  HEENT:  MAHESH. EOMI.  Anicteric sclera.  Normal buccal mucosa.   Lymphatics:  No abnormally enlarged lymph nodes.  Chest:   Normal chest wall and respirations. Clear to auscultation.  Heart:   S1 S2 heard.  RRR,  No murmur.   Abdomen:  Soft.  Not tender.  Not distended.  No palpable masses, organomegaly or guarding.  Suprapubic catheter - urine color is clear in the bag.  Extremities:  Normal strength, tone, and muscle mass  Skin:   No rash noted.    CNS:   Non focal.    Lab Results:    Reviewed with patient.    Recent Results (from the past 24 hour(s))   Comprehensive Metabolic Panel   Result Value Ref Range    Sodium 133 (L) 136 - 145 mmol/L    Potassium 4.1 3.5 - 5.0 mmol/L    Chloride 98 98 - 107 mmol/L    CO2 25 22 - 31 mmol/L    Anion Gap, Calculation 10 5 - 18 mmol/L    Glucose 100 70 - 125 mg/dL    BUN 22 8 - 28 mg/dL    Creatinine 0.81 0.60 - 1.10 mg/dL    GFR MDRD Af Amer >60 >60  mL/min/1.73m2    GFR MDRD Non Af Amer >60 >60 mL/min/1.73m2    Bilirubin, Total 0.7 0.0 - 1.0 mg/dL    Calcium 10.3 8.5 - 10.5 mg/dL    Protein, Total 6.6 6.0 - 8.0 g/dL    Albumin 3.7 3.5 - 5.0 g/dL    Alkaline Phosphatase 112 45 - 120 U/L    AST 21 0 - 40 U/L    ALT 18 0 - 45 U/L   HM1 (CBC with Diff)   Result Value Ref Range    WBC 6.4 4.0 - 11.0 thou/uL    RBC 4.15 3.80 - 5.40 mill/uL    Hemoglobin 13.0 12.0 - 16.0 g/dL    Hematocrit 39.2 35.0 - 47.0 %    MCV 95 80 - 100 fL    MCH 31.3 27.0 - 34.0 pg    MCHC 33.2 32.0 - 36.0 g/dL    RDW 13.2 11.0 - 14.5 %    Platelets 321 140 - 440 thou/uL    MPV 9.5 8.5 - 12.5 fL    Neutrophils % 73 (H) 50 - 70 %    Lymphocytes % 19 (L) 20 - 40 %    Monocytes % 7 2 - 10 %    Eosinophils % 1 0 - 6 %    Basophils % 1 0 - 2 %    Neutrophils Absolute 4.6 2.0 - 7.7 thou/uL    Lymphocytes Absolute 1.2 0.8 - 4.4 thou/uL    Monocytes Absolute 0.5 0.0 - 0.9 thou/uL    Eosinophils Absolute 0.0 0.0 - 0.4 thou/uL    Basophils Absolute 0.0 0.0 - 0.2 thou/uL         Imaging:    No new imaging.

## 2021-06-23 NOTE — TELEPHONE ENCOUNTER
Orders being requested: Urine catheter leg bag straps (2 packs per month)  Reason service is needed/diagnosis: ?  When are orders needed by: STAT  Where to send Orders: Fax: [facility] [phone]  Okay to leave detailed message?  No

## 2021-06-23 NOTE — PATIENT INSTRUCTIONS - HE
Add magnesium citrate 250 mg daily as a supplement this should help your bowels    Charels seed ground up 1 tablespoon in the morning and 1 tablespoon in the evening  (This can also be made into a pudding 1 tablespoon Charles seed and almond milk, rice milk or cow's milk overnight the refrigerator)    Plus CBD Oil Yellow 15 mg Daily for Bladder Pain: [email or web address]    Talk to Dr Barcenas about medication for Spasm:  Mebetriq?  Imipramine? But have side effects    Eat 1 cup fresh blueberries daily  Eat 2 servings each 1 cup of 2 of the 4 vegetables daily: Broccoli, cauliflower, Carrier sprouts, asparagus

## 2021-06-23 NOTE — PATIENT INSTRUCTIONS - HE
Recent Results (from the past 24 hour(s))   Comprehensive Metabolic Panel   Result Value Ref Range    Sodium 134 (L) 136 - 145 mmol/L    Potassium 4.4 3.5 - 5.0 mmol/L    Chloride 99 98 - 107 mmol/L    CO2 25 22 - 31 mmol/L    Anion Gap, Calculation 10 5 - 18 mmol/L    Glucose 96 70 - 125 mg/dL    BUN 19 8 - 28 mg/dL    Creatinine 0.78 0.60 - 1.10 mg/dL    GFR MDRD Af Amer >60 >60 mL/min/1.73m2    GFR MDRD Non Af Amer >60 >60 mL/min/1.73m2    Bilirubin, Total 0.7 0.0 - 1.0 mg/dL    Calcium 10.0 8.5 - 10.5 mg/dL    Protein, Total 6.3 6.0 - 8.0 g/dL    Albumin 3.5 3.5 - 5.0 g/dL    Alkaline Phosphatase 100 45 - 120 U/L    AST 24 0 - 40 U/L    ALT 19 0 - 45 U/L   HM1 (CBC with Diff)   Result Value Ref Range    WBC 7.4 4.0 - 11.0 thou/uL    RBC 3.83 3.80 - 5.40 mill/uL    Hemoglobin 12.3 12.0 - 16.0 g/dL    Hematocrit 37.1 35.0 - 47.0 %    MCV 97 80 - 100 fL    MCH 32.1 27.0 - 34.0 pg    MCHC 33.2 32.0 - 36.0 g/dL    RDW 13.0 11.0 - 14.5 %    Platelets 344 140 - 440 thou/uL    MPV 9.5 8.5 - 12.5 fL    Neutrophils % 72 (H) 50 - 70 %    Lymphocytes % 16 (L) 20 - 40 %    Monocytes % 10 2 - 10 %    Eosinophils % 1 0 - 6 %    Basophils % 1 0 - 2 %    Neutrophils Absolute 5.3 2.0 - 7.7 thou/uL    Lymphocytes Absolute 1.2 0.8 - 4.4 thou/uL    Monocytes Absolute 0.7 0.0 - 0.9 thou/uL    Eosinophils Absolute 0.1 0.0 - 0.4 thou/uL    Basophils Absolute 0.1 0.0 - 0.2 thou/uL

## 2021-06-24 NOTE — PROGRESS NOTES
Pt came into infusion clinic for her treatment as ordered. Labs Reviewed. Medications explained to pt who verbalized understanding. IV patent throughout infusion. Pt tolerated infusion with no complications. Pt left infusion clinic via ambulatory and will RTC as sched.

## 2021-06-24 NOTE — PROGRESS NOTES
Patient is here today for labs, OV with Dr. Britton, and chemo for renal carcinoma/MICHAEL Posadas RN

## 2021-06-24 NOTE — PROGRESS NOTES
[facility] Hematology and Oncology Progress Note    Patient: Dania Da Silva  MRN: [id number]  Date of Service:2/28/2019      Reason for Visit    Follow up metastatic clear-cell renal cell carcinoma    Assessment and Plan  Cancer Staging  Renal cell carcinoma of right kidney (H)  Staging form: Kidney, AJCC 8th Edition  - Clinical stage from 4/3/2018: Stage IV (cT1b, cNX, pM1) - Signed by Ernesto Britton MD on 4/3/2018      ECOG Performance   ECOG Performance Status: 1     Distress Assessment  Distress Assessment Score: No distress    Pain  Currently in Pain: No/denies    #.  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   Currently on second line treatment with nivolumab. Reviewed the interval CT from earlier this month and it showed mixed response, but overall stable disease. She tolerates nivolumab so far, and reported better tolerability.   Follow-up lab and provider visit and treatment in 2 weeks.   Plan for restaging CT scan in about 3 months (May 2019).     #. Constipation   Managing okay with stool softener and suppository. Advised to use Miralax if no stools for 3 days.       #.  Metastatic bone disease   On Zometa every 12 weeks.     #.  Cancer related pain, controlled well.   Fairly controlled at this point and not needing breakthrough pain medication at all.  She normally take 1 tablet of Aleve in the morning and 1 tablet of Tylenol at bedtime as a precautionary. She continues the same and takes more precautionary.     #.  2 several centimeter bladder stones.   She had a cystoscopy on 6/27/2018 by Dr. Trevino and [facility].  She has mild symptomatic (bladder spasm about once a month) at this point.  She was offered  litholapaxy and bladder biopsy if she is more symptomatic.  She reports that she transfer her urology care near as traveling to [place] was not ideal for her anymore.  She currently does not have any bladder spasm. She is going to see a new urologist next month in [place].    #.  Rheumatoid arthritis- on methotrexate  #.  Osteoporosis  #.  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  has a suprapubic catheter.    #.  Hypertension, controlled.      Problem List    1. Renal cell carcinoma of right kidney (H)  CC OFFICE VISIT LONG    Infusion Appointment    CC OFFICE VISIT LONG      _____________________________________________________________________________  Diagnosis  March 2018- Metastatic renal cell carcinoma of the right kidney- clear cell type (2.5 cm pleural-based expansile destructive lesion in the right lateral seventh rib, pleural-based nodules, multiple bilateral pulmonary nodules with small right pleural effusion, 3.2 cm renal mass consistent with renal cell carcinoma)- PDL1 expression 90%.    Treatment to date  4/18/18- started pazopanib 400 mg daily (at 50% dose reduction with plan to escalate to 800 mg daily as tolerated), stop on 5/26/2018 due to grade 3 diarrhea   -CT scan show overall stable disease (mild progression about 20% increase in size of pleural-based metastases), stable or slightly smaller pulmonary nodules.   - 6/5/2018- restarted pazopanib 200 mg daily for better tolerability  12/12/2018-therapy changed to nivolumab due to intolerable diarrhea with pazopanib.    History of Present Illness    Ms. Da Silva is here for follow up, accompanied by her friend.     She has some constipation and last bowel movement was days ago.  She was using Metamucil but not using reports that she is now feeling more constipated than diarrhea.  She is managing okay with stool softener and suppository.  No intolerable side effects from immunotherapy.  She has  "chronic neuropathy in both feet.  No pain.  She wants to know how this immunotherapy is working or not.  She is asked about the timing of CT scan.  No concern.     Pain Status  Currently in Pain: No/denies    Review of Systems    Constitutional  Constitutional (WDL): All constitutional elements are within defined limits  Neurosensory  Neurosensory (WDL): Exceptions to WDL  Peripheral Motor Neuropathy: Asymptomatic, clinical or diagnostic observations only, intervention not indicated  Ataxia: Asymptomatic, clinical or diagnostic observations only, intervention not indicated(uses cane)  Peripheral Sensory Neuropathy: Asymptomatic, loss of deep tendon reflexes or paresthesia(bilat hands and feet. Feet worse than hands)  Confusion: None  Syncope: None  Eye   Eye Disorder (WDL): Exceptions to WDL  Blurred Vision: Intervention not indicated(tired eyes)  Dry Eye: Asymptomatic, clinical or diagnostic observations only, mild symptoms relieved by lubricants  Eye Pain: None  Watering Eyes: None  Ear  Ear Disorder (WDL): All ear disorder elements are within defined limits  Cardiovascular  Cardiovascular (WDL): All cardiovascular elements are within defined limits  Pulmonary  Respiratory (WDL): Within Defined Limits  Gastrointestinal  Gastrointestinal (WDL): Exceptions to WDL  Anorexia: None  Constipation: Occasional or intermittent symptoms, occasional use of stool softeners, laxatives, dietary modification, or enema(Last BM \"days\" ago)  Diarrhea: None  Dysphagia: None  Esophagitis: None  Nausea: None  Pharyngitis: None  Vomiting: None  Dysgeusia: None  Dry Mouth: None  Genitourinary  Genitourinary (WDL): All genitourinary elements are within defined limits  Lymphatic  Lymph (WDL): All lymph disorder elements are within defined limits  Musculoskeletal and Connective Tissue  Musculoskeletal and Connetive Tissue Disorders (WDL): All Musculoskeletal and Connetive Tissue Disorder elements are within defined " limits  Integumentary  Integumentary (WDL): All integumentary elements are within defined limits  Patient Coping  Patient Coping: Accepting;Open/discussion  Distress Assessment  Distress Assessment Score: No distress  Accompanied by  Accompanied by: Friend(Friend, Mandy)  Oral Chemo Adherence       Past History  Past Medical History:   Diagnosis Date     Cancer (H)      GERD (gastroesophageal reflux disease)      Hypertension      Osteoporosis      Rheumatoid arthritis (H)        Physical Exam    Recent Vitals 2/28/2019   Weight 105 lbs 6 oz   BSA (m2) 1.48 m2   /98   Pulse 80   Temp 97.7   Temp src 1   SpO2 98   Some recent data might be hidden     General: alert, awake, not in acute distress  HEENT: Head: Normal, normocephalic, atraumatic.  Eye: Normal external eye, conjunctiva, lids cornea, MAHESH.  Ears:  Non-tender.  Nose: Normal external nose, mucus membranes and septum.  Pharynx: Dental Hygiene adequate. Normal buccal mucosa. Normal pharynx.  Neck / Thyroid: Supple, no masses, nodes, nodules or enlargement.  Lymphatics: No abnormally enlarged lymph nodes.  Chest: Normal chest wall and respirations. Clear to auscultation.  No palpable mass or reproducible pain.  Heart: S1 S2 RRR, no murmur.   Abdomen: abdomen is soft without significant tenderness, masses, organomegaly or guarding  Extremities: normal strength, tone, and muscle mass  Skin: normal. no rash or abnormalities  CNS: non focal.  Uses a cane for walking.    Lab Results    Recent Results (from the past 168 hour(s))   Comprehensive Metabolic Panel   Result Value Ref Range    Sodium 136 136 - 145 mmol/L    Potassium 4.0 3.5 - 5.0 mmol/L    Chloride 101 98 - 107 mmol/L    CO2 27 22 - 31 mmol/L    Anion Gap, Calculation 8 5 - 18 mmol/L    Glucose 93 70 - 125 mg/dL    BUN 22 8 - 28 mg/dL    Creatinine 0.85 0.60 - 1.10 mg/dL    GFR MDRD Af Amer >60 >60 mL/min/1.73m2    GFR MDRD Non Af Amer >60 >60 mL/min/1.73m2    Bilirubin, Total 0.6 0.0 - 1.0 mg/dL     Calcium 10.3 8.5 - 10.5 mg/dL    Protein, Total 6.3 6.0 - 8.0 g/dL    Albumin 3.7 3.5 - 5.0 g/dL    Alkaline Phosphatase 94 45 - 120 U/L    AST 20 0 - 40 U/L    ALT 17 0 - 45 U/L   HM1 (CBC with Diff)   Result Value Ref Range    WBC 7.8 4.0 - 11.0 thou/uL    RBC 4.05 3.80 - 5.40 mill/uL    Hemoglobin 12.2 12.0 - 16.0 g/dL    Hematocrit 38.2 35.0 - 47.0 %    MCV 94 80 - 100 fL    MCH 30.1 27.0 - 34.0 pg    MCHC 31.9 (L) 32.0 - 36.0 g/dL    RDW 13.8 11.0 - 14.5 %    Platelets 325 140 - 440 thou/uL    MPV 9.7 8.5 - 12.5 fL    Neutrophils % 71 (H) 50 - 70 %    Lymphocytes % 19 (L) 20 - 40 %    Monocytes % 7 2 - 10 %    Eosinophils % 1 0 - 6 %    Basophils % 1 0 - 2 %    Neutrophils Absolute 5.5 2.0 - 7.7 thou/uL    Lymphocytes Absolute 1.5 0.8 - 4.4 thou/uL    Monocytes Absolute 0.6 0.0 - 0.9 thou/uL    Eosinophils Absolute 0.1 0.0 - 0.4 thou/uL    Basophils Absolute 0.1 0.0 - 0.2 thou/uL       Imaging    Ct Chest Abdomen Pelvis With Oral With Iv Cont    Result Date: 2/5/2019  formerly [facility] RADIOLOGY EXAM: CT CHEST ABDOMEN PELVIS W ORAL W IV CONTRAST LOCATION: [facility] DATE/TIME: 2/5/2019 10:37 AM INDICATION: Metastatic renal cell carcinoma, follow up. COMPARISON: 10/23/2020. TECHNIQUE: Helical thin-section CT scan of the chest, abdomen, and pelvis was performed before and after injection of IV contrast. Multiplanar reformats were obtained. Dose reduction techniques were used. CONTRAST: Iohexol (Omni) 100 mL. FINDINGS: CHEST: Clustered subsolid opacities in the anterior left upper lobe have improved with only a 5 mm nodule remaining (series 3, image 48). Improved size of right lower lobe costophrenic angle nodule measuring 3 mm versus 5 mm (image 101). Improved 2 x 3 mm left lower lobe nodule versus 4 x 5 mm (image 115). Stable to marginally increased anterior-medial left upper lobe nodule measuring 5 mm versus 4 mm (image 48) and 3 mm versus 2 mm in a similar location (image 56). Increased left lower lobe  lobulated nodule measuring 6 mm versus 4 mm (image 61). Other nodules are significantly changed, including 6 x 8 middle lobe (image 83) and 5 x 8 mm left lower lobe nodule (image 103). Mild basilar predominant scarring and bronchiectasis. No pleural effusion. Pleural thickening along the anterior right third and along the lateral right sixth ribs has essentially resolved. Thickening along the right hemidiaphragm without significant change measuring roughly 8 x 11 mm (series 2, image 73). Mildly prominent low right paratracheal lymph node persists. Severe aortic atherosclerosis.  ABDOMEN: 3.5 cm largest dimension heterogeneously enhancing right renal mass without significant change. Similar slight right urothelial wall thickening. Couple tiny hepatic hypodensities are stable. Unremarkable pancreas, spleen, left kidney and adrenals. Borderline gastric wall thickening versus decompression. No adenopathy. Severe atherosclerosis. PELVIS: Diffuse colonic stool; correlate for constipation. No free air or adenopathy. Redemonstrated bilateral inguinal hernias containing nonobstructed bowel. Suprapubic catheter with similar-appearing stones in the bladder. MUSCULOSKELETAL: Posterior-lateral right 11th rib subacute appearing nondisplaced fracture with mild callus. Bony metastatic disease with right rib involvement, not significantly changed. Left hip arthroplasty. Stable small 5-6 mm right subcutaneous chest wall nodule laterally (series 2, and 40).     CONCLUSION: 2.  Mixed response, though overall minimal change since prior. A few regions of pleural thickening and some pulmonary nodules have slightly improved. However, a couple pulmonary nodules have also slightly increased. 3.  Remaining exam without significant change, including heterogeneous right renal mass.      TT: 40 minutes and more than 50% was spent on coordination and counseling of care.    Signed by: Ernesto Britton MD

## 2021-06-25 NOTE — PATIENT INSTRUCTIONS - HE
Recent Results (from the past 24 hour(s))   Comprehensive Metabolic Panel   Result Value Ref Range    Sodium 138 136 - 145 mmol/L    Potassium 4.2 3.5 - 5.0 mmol/L    Chloride 106 98 - 107 mmol/L    CO2 26 22 - 31 mmol/L    Anion Gap, Calculation 6 5 - 18 mmol/L    Glucose 91 70 - 125 mg/dL    BUN 25 8 - 28 mg/dL    Creatinine 0.84 0.60 - 1.10 mg/dL    GFR MDRD Af Amer >60 >60 mL/min/1.73m2    GFR MDRD Non Af Amer >60 >60 mL/min/1.73m2    Bilirubin, Total 0.6 0.0 - 1.0 mg/dL    Calcium 9.8 8.5 - 10.5 mg/dL    Protein, Total 6.2 6.0 - 8.0 g/dL    Albumin 3.5 3.5 - 5.0 g/dL    Alkaline Phosphatase 87 45 - 120 U/L    AST 20 0 - 40 U/L    ALT 15 0 - 45 U/L   TSH   Result Value Ref Range    TSH 1.60 0.30 - 5.00 uIU/mL   HM1 (CBC with Diff)   Result Value Ref Range    WBC 6.6 4.0 - 11.0 thou/uL    RBC 4.03 3.80 - 5.40 mill/uL    Hemoglobin 12.3 12.0 - 16.0 g/dL    Hematocrit 37.7 35.0 - 47.0 %    MCV 94 80 - 100 fL    MCH 30.5 27.0 - 34.0 pg    MCHC 32.6 32.0 - 36.0 g/dL    RDW 14.2 11.0 - 14.5 %    Platelets 310 140 - 440 thou/uL    MPV 9.8 8.5 - 12.5 fL    Neutrophils % 72 (H) 50 - 70 %    Lymphocytes % 17 (L) 20 - 40 %    Monocytes % 8 2 - 10 %    Eosinophils % 2 0 - 6 %    Basophils % 1 0 - 2 %    Neutrophils Absolute 4.8 2.0 - 7.7 thou/uL    Lymphocytes Absolute 1.2 0.8 - 4.4 thou/uL    Monocytes Absolute 0.5 0.0 - 0.9 thou/uL    Eosinophils Absolute 0.1 0.0 - 0.4 thou/uL    Basophils Absolute 0.1 0.0 - 0.2 thou/uL

## 2021-06-25 NOTE — PROGRESS NOTES
[facility] Hematology and Oncology Progress Note    Patient: Dania Da Silva  MRN: [id number]  Date of Service:3/14/2019      Reason for Visit:    C4 Opdivo therapy    Assessment and Plan:    1) Renal cell carcinoma of right kidney (H)   Clinical Stage IV (cT1b, cNX, pM1)    2.5 cm pleural-based expansile destructive lesion in the right lateral seventh rib, pleural-based nodules, multiple bilateral pulmonary nodules, small right pleural effusion, 3.2 cm renal mass   Clear cell type     PDL1 expression 90%    89 y.o.     2018, March - diagnosed.     03/09/18 CT chest - expansile destructive lesion in the right lateral 7th rib, consistent with a metastasis. Multiple enhancing pleural-based masses.  Small right pleural effusion.  Multiple pulmonary nodules in the right and left of varying size. (R) renal mass measuring 3.2 x 2.5 x 3.8 cm.    03/23/18 CT A&P - right kidney with 4 cm enhancing mass.  Patent renal vein.  No retroperitoneal lymphadenopathy.  Metastases in the lungs, right pleural space and right seventh rib.    03/26/18 CT lung biopsy - metastatic renal cell carcinoma - clear cell type.    04/18 - 05/26/18 - received pazopanib therapy @ 400 mg daily (at 50% dose reduction with plan to escalate to 800 mg daily as tolerated).  Stopped due to grade 3 diarrhea    06/04/18 CT CAP - overall stable disease (mild progression about 20% increase in size of pleural-based metastases), stable or slightly smaller pulmonary nodules.    06/05/18 - restarted pazopanib @ 200 mg daily for better tolerability.    08/14/18 CT CAP - Diffuse expansion of the anterior-lateral right seventh rib has improved.  Focally increased ovoid expansile appearance of the anterior right sixth rib.  No significant change of pulmonary nodules, pleural metastases and right renal mass.  Improved but persistent minimal right pleural effusion.  Small left inguinal hernia containing minimal small bowel.  No obstruction or bowel wall  thickening.    10/23/18 CT CAP - Mixed response to therapy. Some of the pulmonary nodules are larger than previous and some are smaller.  Others are unchanged in size.  Skeletal and pleural metastases, as well as right renal mass, have not appreciably changed.    12/12/18 - changed therapy to Nivolumab due to intolerable diarrhea with pazopanib.    02/05/19 CT CAP - a few regions of pleural thickening and some pulmonary nodules have slightly improved. However, a couple pulmonary nodules have also slightly increased. Remaining exam without significant change, including heterogeneous right renal mass.    03/14/19:    CBC - WNL.    CMP - WNL.      TSH - WNL.    Patient looks and feels quite good.  Weight stable.  Appetite good.  Bowels acceptable.     Tolerating nivolumab therapy acceptably well so far.      No concerning known side effects from nivolumab therapy    Proceed D1C4 Nivolumab therapy.      03/28/19 - D15C4 Nivolumab therapy.    04/11/19 - follow up D1C5 Nivolumab therapy.    Restaging CT in May.      2) Metastatic bone disease and osteoporosis:    05/09/18 - started Zometa every 12 week therapy.    Due for Zometa today.     3) Bladder stones:    06/27/18 cystoscopy by Dr. Trevino @ [facility].  2 several centimeter bladder stones.    Mild symptomatic bladder spasms about once a month.      Offered litholapaxy and bladder biopsy if she becomes more symptomatic.      Continue follow-up with Urology (spring).      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      Has a suprapubic catheter, changing the catheter herself monthly.    Followed by Urology at [facility].      6) Constipation:    Currently not needing or taking anything.  Has stool softeners if needed.     7) Pain:    Minimal - managed with 1 Aleve every morning and sometimes Tylenol at bedtime.    ECOG Performance:     ECOG Performance Status: 1      Distress Assessment:    Distress Assessment Score: 3        TT > 25 minutes face to face with > 50% counseling and care coordination.    Aidan ROSA Tatum, CNP   ____________________________________________    Interim History (IH):    Ms. Da Silva presents in a good mood, accompanied by her friend, anticipating D1C4 Opdivo therapy.  She looks and feels quite good, tolerating Opdivo therapy without incidence or apparent toxicities.  Her fatigue is chronic and stable.  She resides @ [facility] and does well there receiving PT and OT.  She denies cough, fever, chills, unusual headaches, visual or mentation disturbance.  She is s/p radical urethrectomy in 2003 and has since had a suprapubic catheter, changing the catheter herself monthly.    Pain Status:    Currently in Pain: No/denies    Review of Systems    Constitutional  Constitutional (WDL): Exceptions to WDL  Fatigue: Fatigue relieved by rest  Neurosensory  Neurosensory (WDL): Exceptions to WDL  Peripheral Motor Neuropathy: Asymptomatic, clinical or diagnostic observations only, intervention not indicated  Ataxia: Asymptomatic, clinical or diagnostic observations only, intervention not indicated  Peripheral Sensory Neuropathy: Asymptomatic, loss of deep tendon reflexes or paresthesia  Eye   Eye Disorder (WDL): Assessment not pertinent to visit  Ear  Ear Disorder (WDL): Assessment not pertinent to visit  Cardiovascular  Cardiovascular (WDL): Exceptions to WDL  Edema: No  Pulmonary  Respiratory (WDL): Exceptions to WDL  Cough: None  Dyspnea: Shortness of breath with moderate exertion  Gastrointestinal  Constipation: Occasional or intermittent symptoms, occasional use of stool softeners, laxatives, dietary modification, or enema(resolved)  Genitourinary  Genitourinary (WDL): Exceptions to WDL(has catheter)  Lymphatic  Lymph (WDL): All lymph disorder elements are within defined limits  Musculoskeletal and Connective Tissue  Musculoskeletal and  Connetive Tissue Disorders (WDL): All Musculoskeletal and Connetive Tissue Disorder elements are within defined limits  Integumentary     Patient Coping     Distress Assessment  Distress Assessment Score: 3  Accompanied by  Accompanied by: Family Member  Oral Chemo Adherence       Past History  Past Medical History:   Diagnosis Date     Cancer (H)      GERD (gastroesophageal reflux disease)      Hypertension      Osteoporosis      Rheumatoid arthritis (H)        Physical Exam    Recent Vitals 3/14/2019   Weight 105 lbs 10 oz   BSA (m2) 1.48 m2   /65   Pulse 78   Temp 97.6   Temp src 1   SpO2 96   Some recent data might be hidden     General:  Alert.  No acute distress.  Thin.  Accompanied by friend.  HEENT:  MAHESH. EOMI.  Anicteric sclera.  Normal buccal mucosa.   Lymphatics:  No abnormally enlarged lymph nodes.  Chest:   Normal chest wall and respirations. Clear to auscultation.  Heart:   S1 S2 heard.  RRR,  No murmur.   Abdomen:  Soft.  Not tender or distended.  No palpable masses, organomegaly or guarding.  Suprapubic catheter - urine color is clear in the bag.  Extremities:  Normal strength, tone, and muscle mass  Skin:   No rash noted.    CNS:   Non focal.    Lab Results:    Reviewed with patient.    Recent Results (from the past 24 hour(s))   Comprehensive Metabolic Panel   Result Value Ref Range    Sodium 138 136 - 145 mmol/L    Potassium 4.2 3.5 - 5.0 mmol/L    Chloride 106 98 - 107 mmol/L    CO2 26 22 - 31 mmol/L    Anion Gap, Calculation 6 5 - 18 mmol/L    Glucose 91 70 - 125 mg/dL    BUN 25 8 - 28 mg/dL    Creatinine 0.84 0.60 - 1.10 mg/dL    GFR MDRD Af Amer >60 >60 mL/min/1.73m2    GFR MDRD Non Af Amer >60 >60 mL/min/1.73m2    Bilirubin, Total 0.6 0.0 - 1.0 mg/dL    Calcium 9.8 8.5 - 10.5 mg/dL    Protein, Total 6.2 6.0 - 8.0 g/dL    Albumin 3.5 3.5 - 5.0 g/dL    Alkaline Phosphatase 87 45 - 120 U/L    AST 20 0 - 40 U/L    ALT 15 0 - 45 U/L   TSH   Result Value Ref Range    TSH 1.60 0.30 - 5.00  uIU/mL   HM1 (CBC with Diff)   Result Value Ref Range    WBC 6.6 4.0 - 11.0 thou/uL    RBC 4.03 3.80 - 5.40 mill/uL    Hemoglobin 12.3 12.0 - 16.0 g/dL    Hematocrit 37.7 35.0 - 47.0 %    MCV 94 80 - 100 fL    MCH 30.5 27.0 - 34.0 pg    MCHC 32.6 32.0 - 36.0 g/dL    RDW 14.2 11.0 - 14.5 %    Platelets 310 140 - 440 thou/uL    MPV 9.8 8.5 - 12.5 fL    Neutrophils % 72 (H) 50 - 70 %    Lymphocytes % 17 (L) 20 - 40 %    Monocytes % 8 2 - 10 %    Eosinophils % 2 0 - 6 %    Basophils % 1 0 - 2 %    Neutrophils Absolute 4.8 2.0 - 7.7 thou/uL    Lymphocytes Absolute 1.2 0.8 - 4.4 thou/uL    Monocytes Absolute 0.5 0.0 - 0.9 thou/uL    Eosinophils Absolute 0.1 0.0 - 0.4 thou/uL    Basophils Absolute 0.1 0.0 - 0.2 thou/uL         Imaging:    No new imaging.

## 2021-07-03 NOTE — ADDENDUM NOTE
Addendum Note by Gene Leija MD at 5/22/2020 10:00 AM     Author: Gene Leija MD Service: -- Author Type: Physician    Filed: 5/22/2020  2:24 PM Encounter Date: 5/22/2020 Status: Signed    : Gene Leija MD (Physician)    Addended by: GENE LEIJA on: 5/22/2020 02:24 PM        Modules accepted: Orders

## 2021-07-03 NOTE — ADDENDUM NOTE
Addendum Note by Lissy Weller CMA at 3/30/2020 11:58 AM     Author: Lissy Weller CMA Service: -- Author Type: Certified Medical Assistant    Filed: 3/30/2020 11:58 AM Encounter Date: 3/26/2020 Status: Signed    : Lissy Weller CMA (Certified Medical Assistant)    Addended by: LISSY WELLER on: 3/30/2020 11:58 AM        Modules accepted: Orders

## 2021-07-03 NOTE — ADDENDUM NOTE
Addendum Note by Ernesto Britton MD at 12/4/2019  9:45 AM     Author: Ernesto Britton MD Service: -- Author Type: Physician    Filed: 12/4/2019  9:32 PM Encounter Date: 12/4/2019 Status: Signed    : Ernesto Britton MD (Physician)    Addended by: ERNESTO BRITTON on: 12/4/2019 09:32 PM        Modules accepted: Level of Service

## 2021-07-03 NOTE — ADDENDUM NOTE
Addendum Note by Gene Leija MD at 3/24/2020 11:00 AM     Author: Gene Leija MD Service: -- Author Type: Physician    Filed: 4/3/2020  5:53 PM Encounter Date: 3/24/2020 Status: Signed    : Gene Leija MD (Physician)    Addended by: GENE LEIJA on: 4/3/2020 05:53 PM        Modules accepted: Level of Service

## 2021-07-03 NOTE — ADDENDUM NOTE
Addendum Note by Isabel Patel, PharmELBERT at 4/23/2019 10:00 AM     Author: Isabel Patel PharmD Service: -- Author Type: Pharmacist    Filed: 5/6/2019  4:20 PM Encounter Date: 4/23/2019 Status: Signed    : Isabel Patel PharmD (Pharmacist)    Addended by: ISABEL PATEL on: 5/6/2019 04:20 PM        Modules accepted: Orders

## 2021-07-14 PROBLEM — N17.9 AKI (ACUTE KIDNEY INJURY) (H): Status: RESOLVED | Noted: 2019-08-11 | Resolved: 2020-03-03

## 2021-08-03 PROBLEM — N39.0 SEPSIS DUE TO URINARY TRACT INFECTION (H): Status: RESOLVED | Noted: 2019-08-11 | Resolved: 2020-03-03

## 2021-08-03 PROBLEM — A41.9 SEPSIS DUE TO URINARY TRACT INFECTION (H): Status: RESOLVED | Noted: 2019-08-11 | Resolved: 2020-03-03

## 2021-08-14 ENCOUNTER — HEALTH MAINTENANCE LETTER (OUTPATIENT)
Age: 86
End: 2021-08-14

## 2021-10-10 ENCOUNTER — HEALTH MAINTENANCE LETTER (OUTPATIENT)
Age: 86
End: 2021-10-10

## 2022-09-18 ENCOUNTER — HEALTH MAINTENANCE LETTER (OUTPATIENT)
Age: 87
End: 2022-09-18

## 2023-10-08 ENCOUNTER — HEALTH MAINTENANCE LETTER (OUTPATIENT)
Age: 88
End: 2023-10-08
